# Patient Record
Sex: FEMALE | Race: WHITE | Employment: UNEMPLOYED | URBAN - NONMETROPOLITAN AREA
[De-identification: names, ages, dates, MRNs, and addresses within clinical notes are randomized per-mention and may not be internally consistent; named-entity substitution may affect disease eponyms.]

---

## 2017-10-06 ENCOUNTER — HOSPITAL ENCOUNTER (OUTPATIENT)
Dept: LAB | Age: 82
Discharge: HOME | End: 2017-10-06
Attending: NURSE PRACTITIONER | Admitting: NURSE PRACTITIONER

## 2017-10-06 ENCOUNTER — OFFICE VISIT (OUTPATIENT)
Dept: OTHER | Age: 82
End: 2017-10-06

## 2017-10-06 ENCOUNTER — HOSPITAL ENCOUNTER (OUTPATIENT)
Dept: OTHER | Age: 82
Discharge: HOME | End: 2017-10-06
Attending: NURSE PRACTITIONER | Admitting: NURSE PRACTITIONER

## 2017-10-06 VITALS
TEMPERATURE: 98.1 F | BODY MASS INDEX: 25.52 KG/M2 | WEIGHT: 130 LBS | HEART RATE: 60 BPM | OXYGEN SATURATION: 96 % | DIASTOLIC BLOOD PRESSURE: 60 MMHG | HEIGHT: 60 IN | SYSTOLIC BLOOD PRESSURE: 139 MMHG

## 2017-10-06 DIAGNOSIS — R39.9 UTI SYMPTOMS: Primary | ICD-10-CM

## 2017-10-06 DIAGNOSIS — Z23 NEED FOR INFLUENZA VACCINATION: ICD-10-CM

## 2017-10-06 DIAGNOSIS — L03.012 PARONYCHIA OF FINGER, LEFT: ICD-10-CM

## 2017-10-06 LAB
BACTERIA: ABNORMAL /HPF
BILIRUBIN URINE: NEGATIVE MG/DL
BLOOD, URINE: NEGATIVE
CAST TYPE: ABNORMAL /HPF
CLARITY: ABNORMAL
COLOR: YELLOW
CRYSTAL TYPE: ABNORMAL /HPF
EPITHELIAL CELLS, UA: ABNORMAL /HPF
GLUCOSE, URINE: NEGATIVE MG/DL
KETONES, URINE: NEGATIVE MG/DL
LEUKOCYTE ESTERASE, URINE: ABNORMAL
MUCUS: NEGATIVE HPF
NITRITE URINE, QUANTITATIVE: NEGATIVE
PH, URINE: 5.5 (ref 5–8)
PROTEIN UA: NEGATIVE MG/DL
RBC URINE: ABNORMAL /HPF (ref 0–6)
SPECIFIC GRAVITY UA: 1.01 (ref 1–1.03)
UROBILINOGEN, URINE: 0.2 MG/DL (ref 0.2–1)
VOLUME, (UVOL): 12 ML (ref 10–12)
WBC UA: ABNORMAL /HPF (ref 0–5)

## 2017-10-06 PROCEDURE — 99203 OFFICE O/P NEW LOW 30 MIN: CPT | Performed by: NURSE PRACTITIONER

## 2017-10-06 RX ORDER — TRIAMTERENE AND HYDROCHLOROTHIAZIDE 37.5; 25 MG/1; MG/1
1 TABLET ORAL DAILY
Status: ON HOLD | COMMUNITY
End: 2018-02-22 | Stop reason: ALTCHOICE

## 2017-10-06 RX ORDER — CITALOPRAM 20 MG/1
20 TABLET ORAL DAILY
COMMUNITY
End: 2018-07-02 | Stop reason: SDUPTHER

## 2017-10-06 RX ORDER — SIMVASTATIN 80 MG
80 TABLET ORAL NIGHTLY
COMMUNITY
End: 2018-07-02 | Stop reason: ALTCHOICE

## 2017-10-06 RX ORDER — SULFAMETHOXAZOLE AND TRIMETHOPRIM 800; 160 MG/1; MG/1
1 TABLET ORAL 2 TIMES DAILY
Qty: 14 TABLET | Refills: 0 | Status: SHIPPED | OUTPATIENT
Start: 2017-10-06 | End: 2017-10-13

## 2017-10-06 ASSESSMENT — ENCOUNTER SYMPTOMS
NAUSEA: 0
DIARRHEA: 1
VOMITING: 0
COUGH: 0
SHORTNESS OF BREATH: 0

## 2017-10-06 NOTE — PATIENT INSTRUCTIONS
paronychia improve in a few days. But watch your symptoms and follow your doctor's advice. Though rare, a mild case can turn into something more serious and infect your entire finger or toe. Also, it is possible for an infection to return. Follow-up care is a key part of your treatment and safety. Be sure to make and go to all appointments, and call your doctor if you are having problems. It's also a good idea to know your test results and keep a list of the medicines you take. How can you care for yourself at home? · If your doctor told you how to care for your infected nail, follow the doctor's instructions. If you did not get instructions, follow this general advice:  ¨ Wash the area with clean water 2 times a day. Don't use hydrogen peroxide or alcohol, which can slow healing. ¨ You may cover the area with a thin layer of petroleum jelly, such as Vaseline, and a nonstick bandage. ¨ Apply more petroleum jelly and replace the bandage as needed. · If your doctor prescribed antibiotics, take them as directed. Do not stop taking them just because you feel better. You need to take the full course of antibiotics. · Take an over-the-counter pain medicine, such as acetaminophen (Tylenol), ibuprofen (Advil, Motrin), or naproxen (Aleve). Read and follow all instructions on the label. · Do not take two or more pain medicines at the same time unless the doctor told you to. Many pain medicines have acetaminophen, which is Tylenol. Too much acetaminophen (Tylenol) can be harmful. · Prop up the toe or finger so that it is higher than the level of your heart. This will help with pain and swelling. · Apply heat. Put a warm water bottle, heating pad set on low, or warm cloth on your finger or toe. Do not go to sleep with a heating pad on your skin. · Soak the area in warm water twice a day for 15 minutes each time. After soaking, dry the area well and apply a thin layer of petroleum jelly, such as Vaseline.  Put on a new bandage. When should you call for help? Call your doctor now or seek immediate medical care if:  · You have signs of new or worsening infection, such as:  ¨ Increased pain, swelling, warmth, or redness. ¨ Red streaks leading from the infected skin. ¨ Pus draining from the area. ¨ A fever. Watch closely for changes in your health, and be sure to contact your doctor if:  · You do not get better as expected. Where can you learn more? Go to [email or web address]://Yuenimei.[email or web address] org and sign in to your Parastructure account. Enter 0911 [id number] in the formerly [facility] box to learn more about \"Paronychia: Care Instructions. \"     If you do not have an account, please click on the \"Sign Up Now\" link. Current as of: October 13, 2016  Content Version: 11.3  © 3738-9713 RES Software. Care instructions adapted under license by [facility] ([facility]). If you have questions about a medical condition or this instruction, always ask your healthcare professional. [place] [address] any warranty or liability for your use of this information. Influenza (Flu) Vaccine: Care Instructions  Your Care Instructions  Influenza (flu) is an infection in the lungs and breathing passages. It is caused by the influenza virus. There are different strains, or types, of the flu virus every year. The flu comes on quickly. It can cause a cough, stuffy nose, fever, chills, tiredness, and aches and pains. These symptoms may last up to 10 days. The flu can make you feel very sick, but most of the time it doesn't lead to other problems. But it can cause serious problems in people who are older or who have a long-term illness, such as heart disease or diabetes. You can help prevent the flu by getting a flu vaccine every year, as soon as it is available. You cannot get the flu from the vaccine. The vaccine prevents most cases of the flu.  But even when the vaccine doesn't prevent the flu, it can make symptoms less severe and at home? · If you or your child has a sore arm or a slight fever after the shot, take an over-the-counter pain medicine, such as acetaminophen (Tylenol) or ibuprofen (Advil, Motrin). Read and follow all instructions on the label. Do not give aspirin to anyone younger than 20. It has been linked to Reye syndrome, a serious illness. · Do not take two or more pain medicines at the same time unless the doctor told you to. Many pain medicines have acetaminophen, which is Tylenol. Too much acetaminophen (Tylenol) can be harmful. When should you call for help? Call 911 anytime you think you may need emergency care. For example, call if after getting the flu vaccine:  · You have symptoms of a severe reaction to the flu vaccine. Symptoms of a severe reaction may include:  ¨ Severe difficulty breathing. ¨ Sudden raised, red areas (hives) all over your body. ¨ Severe lightheadedness. Call your doctor now or seek immediate medical care if after getting the flu vaccine:  · You think you are having a reaction to the flu vaccine, such as a new fever. Watch closely for changes in your health, and be sure to contact your doctor if you have any problems. Where can you learn more? Go to [email or web address].[email or web address] and sign in to your [email or web address] account. Enter C478 in the [facility] box to learn more about \"Influenza (Flu) Vaccine: Care Instructions. \"     If you do not have an account, please click on the \"Sign Up Now\" link. Current as of: August 1, 2016  Content Version: 11.3  © 7762-7202 Kewego, Incorporated. Care instructions adapted under license by [facility] ([facility]). If you have questions about a medical condition or this instruction, always ask your healthcare professional. [place] [address] any warranty or liability for your use of this information. We are committed to providing you the best care possible.     If you receive a survey after visiting one of our offices, please take time to share your experience concerning your physician office visit. These surveys are confidential and no health information about you is shared. We are eager to improve for you and we are counting on your feedback to help make that happen.

## 2017-10-06 NOTE — PROGRESS NOTES
Room set up completed and consent signed at check in. Patient vitals taken, medication history and allergies reviewed. Flu vaccine administered and follow up appointment scheduled with Lou Shin. AVS was reviewed prior to discharge.

## 2017-10-06 NOTE — PROGRESS NOTES
Subjective: Nati Still is a 80 y.o. female who complains of frequency and urgency particularly at night for 7 days. Patient also complains of fever but has not measured. She is unaware of changes in urine characteristics. Patient denies back pain, congestion, cough and stomach ache. No changes in mental status. Patient does not have a history of recurrent UTI. Patient does not have a history of pyelonephritis. She has a remote history of pelvic floor prolapse which was surgically corrected. Pt cannot recall further details of diagnosis or treatment regimen. Pt also here for follow up on paronychia to her left ring finger. She was seen in the ER for this on 10/4. The provider did not suspect abscess formation. Pt was prescribed clindamycin 300mg QID. She was encouraged to do warm water soaks and apply topical ATB ointment. Pt states she has been compliant with these therapies. While the redness is unchanged, she has a little less pain. She is still unable to bend her finger completely. No significant temperature changes. No progression of redness. Pt admits to predominantly checking BG with this finger. Pt lives in [place]. She does not have a PCP. She would like to establish care here in [place]. Patient's medications, allergies, past medical, surgical, social and family histories were reviewed and updated as appropriate. Review of Systems  Review of Systems   Constitutional: Positive for fever. Negative for chills. Respiratory: Negative for cough and shortness of breath. Cardiovascular: Negative for chest pain and palpitations. Gastrointestinal: Positive for diarrhea. Negative for nausea and vomiting. Genitourinary: Positive for frequency and urgency. Negative for dysuria and flank pain. Musculoskeletal: Positive for joint pain. Endo/Heme/Allergies: Bruises/bleeds easily.         Objective:      /60 (Site: Right Arm, Position: Sitting, Cuff Size: Medium Adult)

## 2017-10-06 NOTE — MR AVS SNAPSHOT
· After going to the bathroom, wipe from front to back. When should you call for help? Call your doctor now or seek immediate medical care if:  · Symptoms such as fever, chills, nausea, or vomiting get worse or appear for the first time. · You have new pain in your back just below your rib cage. This is called flank pain. · There is new blood or pus in your urine. · You have any problems with your antibiotic medicine. Watch closely for changes in your health, and be sure to contact your doctor if:  · You are not getting better after taking an antibiotic for 2 days. · Your symptoms go away but then come back. Where can you learn more? Go to [email or web address] and sign in to your NPS account. Enter V100 in the [facility] box to learn more about \"Urinary Tract Infection in Women: Care Instructions. \"     If you do not have an account, please click on the \"Sign Up Now\" link. Current as of: November 28, 2016  Content Version: 11.3  © 2338-7258 CondoDomain. Care instructions adapted under license by [facility] ([facility]). If you have questions about a medical condition or this instruction, always ask your healthcare professional. JourneyPure any warranty or liability for your use of this information. Nail and Nailbed: Anatomy Sketch    Current as of: December 16, 2016  Content Version: 11.3  © 5459-7159 CondoDomain. Care instructions adapted under license by [facility]). If you have questions about a medical condition or this instruction, always ask your healthcare professional. [address] any warranty or liability for your use of this information. Paronychia: Care Instructions  Your Care Instructions  Paronychia (say \"jbps-qv-RO-roberto-uh\") is an infection of the skin around a fingernail or toenail.  It happens when germs enter through a break in the soaking, dry the area well and apply a thin layer of petroleum jelly, such as Vaseline. Put on a new bandage. When should you call for help? Call your doctor now or seek immediate medical care if:  · You have signs of new or worsening infection, such as:  ¨ Increased pain, swelling, warmth, or redness. ¨ Red streaks leading from the infected skin. ¨ Pus draining from the area. ¨ A fever. Watch closely for changes in your health, and be sure to contact your doctor if:  · You do not get better as expected. Where can you learn more? Go to [email or web address] and sign in to your Comcast account. Enter [id number] [id number] [phone] in the [employer] box to learn more about \"Paronychia: Care Instructions. \"     If you do not have an account, please click on the \"Sign Up Now\" link. Current as of: October 13, 2016  Content Version: 11.3  © 0103-0118 Keemotion. Care instructions adapted under license by [facility] ([facility]). If you have questions about a medical condition or this instruction, always ask your healthcare professional. [place] [address] any warranty or liability for your use of this information. Influenza (Flu) Vaccine: Care Instructions  Your Care Instructions  Influenza (flu) is an infection in the lungs and breathing passages. It is caused by the influenza virus. There are different strains, or types, of the flu virus every year. The flu comes on quickly. It can cause a cough, stuffy nose, fever, chills, tiredness, and aches and pains. These symptoms may last up to 10 days. The flu can make you feel very sick, but most of the time it doesn't lead to other problems. But it can cause serious problems in people who are older or who have a long-term illness, such as heart disease or diabetes. You can help prevent the flu by getting a flu vaccine every year, as soon as it is available. You cannot get the flu from the vaccine.  The vaccine prevents most cases of the flu. But even when the vaccine doesn't prevent the flu, it can make symptoms less severe and reduce the chance of problems from the flu. Sometimes, young children and people who have an immune system problem may have a slight fever or muscle aches or pains 6 to 12 hours after getting the shot. These symptoms usually last 1 or 2 days. Follow-up care is a key part of your treatment and safety. Be sure to make and go to all appointments, and call your doctor if you are having problems. It's also a good idea to know your test results and keep a list of the medicines you take. Who should get the flu vaccine? Everyone age 7 months or older should get a flu vaccine each year. It lowers the chance of getting and spreading the flu. The vaccine is very important for people who are at high risk for getting other health problems from the flu. This includes:  · Anyone 48years of age or older. · People who live in a long-term care center, such as a nursing home. · All children 6 months through 25years of age. · Adults and children 6 months and older who have long-term heart or lung problems, such as asthma. · Adults and children 6 months and older who needed medical care or were in a hospital during the past year because of diabetes, chronic kidney disease, or a weak immune system (including HIV or AIDS). · Women who will be pregnant during the flu season. · People who have any condition that can make it hard to breathe or swallow (such as a brain injury or muscle disorders). · People who can give the flu to others who are at high risk for problems from the flu. This includes all health care workers and close contacts of people age 72 or older. Who should not get the flu vaccine? The person who gives the vaccine may tell you not to get it if you:  · Have a severe allergy to eggs or any part of the vaccine.   · Have had a severe reaction to a flu vaccine in the past. information. We are committed to providing you the best care possible. If you receive a survey after visiting one of our offices, please take time to share your experience concerning your physician office visit. These surveys are confidential and no health information about you is shared. We are eager to improve for you and we are counting on your feedback to help make that happen. Today's Medication Changes          These changes are accurate as of: 10/6/17 12:32 PM.  If you have any questions, ask your nurse or doctor. START taking these medications           sulfamethoxazole-trimethoprim 800-160 MG per tablet   Commonly known as:  BACTRIM DS;SEPTRA DS   Instructions:   Take 1 tablet by mouth 2 times daily for 7 days   Quantity:  14 tablet   Refills:  0   Started by:  Gonsalo Edmonds CNP         STOP taking these medications           clindamycin 300 MG capsule   Commonly known as:  CLEOCIN   Stopped by:  Gonsalo Edmonds CNP            Where to Get Your Medications      These medications were sent to [place] [id number] [address] [phone] [facility] [phone] [address] [facility], [address] [facility] [address]     Phone:  [phone]     sulfamethoxazole-trimethoprim 800-160 MG per tablet               Your Current Medications Are              citalopram (CELEXA) 20 MG tablet Take 20 mg by mouth daily    simvastatin (ZOCOR) 80 MG tablet Take 80 mg by mouth nightly    triamterene-hydrochlorothiazide (MAXZIDE-25) 37.5-25 MG per tablet Take 1 tablet by mouth daily    aspirin 81 MG tablet Take 81 mg by mouth daily    sulfamethoxazole-trimethoprim (BACTRIM DS;SEPTRA DS) 800-160 MG per tablet Take 1 tablet by mouth 2 times daily for 7 days    metFORMIN (GLUCOPHAGE) 500 MG tablet Take 500 mg by mouth 2 times daily (with meals)    atenolol (TENORMIN) 25 MG tablet Take 50 mg by mouth daily promethazine-dextromethorphan (PROMETHAZINE-DM) 6.25-15 MG/5ML syrup Take by mouth 4 times daily as needed for Cough      Allergies              Penicillins       We Ordered/Performed the following           INFLUENZA, QUADV, 3 YRS AND OLDER, IM PF, PREFILL SYR OR SDV, 0.5ML (FLUARIX QUADV, PF)     URINALYSIS WITH MICROSCOPIC          Additional Information        Basic Information     Date Of Birth Sex Race Ethnicity Preferred Language    11/21/1931 Female White Non-/Non  English      Problem List as of 10/6/2017                 Polypharmacy    Benign essential tremor    Depression    Type 2 diabetes mellitus ([facility] [address]    Hyperlipidemia    Hypertension    Drug-induced tremor    Anemia due to blood loss      Immunizations as of 10/6/2017     Name Date    Influenza, High Dose 10/17/2016    Pneumococcal Polysaccharide (Nasuuyclr04) 10/6/2016    Tdap (Boostrix, Adacel) 10/6/2010      Preventive Care        Date Due    Zoster Vaccine 11/21/1991    Osteoporosis screening or a bone density scan (Dexa) is recommended once at age 72. Based upon the results and risk factors for bone loss, your provider will recommend whether this needs to be repeated. 11/21/1996    Yearly Flu Vaccine (1) 9/1/2017    Pneumococcal Vaccines (two) for all adults aged 72 and over (2 of 2 - PCV13) 10/6/2017    Tetanus Combination Vaccine (2 - Td) 10/6/2020            MyChart Signup           W[employer] allows you to send messages to your doctor, view your test results, renew your prescriptions, schedule appointments, view visit notes, and more. How Do I Sign Up? 1. In your Internet browser, go to [email or web address], [email or web address]."  2. Click on the Sign Up Now link in the Sign In box. You will see the New Member Sign Up page. 3. Enter your [employer] Access Code exactly as it appears below. You will not need to use this code after youve completed the sign-up process.  If

## 2017-10-09 LAB
CULTURE: NORMAL
ORGANISM: NORMAL
REPORT STATUS: NORMAL
REQUEST PROBLEM: NORMAL
SPECIMEN: NORMAL
TOTAL COLONY COUNT: NORMAL

## 2018-02-22 PROBLEM — R07.9 CHEST PAIN: Status: ACTIVE | Noted: 2018-02-22

## 2018-02-22 PROBLEM — E78.5 HYPERLIPIDEMIA: Status: ACTIVE | Noted: 2018-02-22

## 2018-02-22 PROBLEM — E11.9 TYPE 2 DIABETES MELLITUS (HCC): Status: ACTIVE | Noted: 2018-02-22

## 2018-02-22 PROBLEM — F32.A DEPRESSION: Status: ACTIVE | Noted: 2018-02-22

## 2018-02-22 PROBLEM — I10 HYPERTENSION: Status: ACTIVE | Noted: 2018-02-22

## 2018-02-23 PROBLEM — R07.9 CHEST PAIN: Status: RESOLVED | Noted: 2018-02-22 | Resolved: 2018-02-23

## 2018-06-26 ENCOUNTER — TELEPHONE (OUTPATIENT)
Dept: INTERNAL MEDICINE CLINIC | Age: 83
End: 2018-06-26

## 2018-07-02 ENCOUNTER — OFFICE VISIT (OUTPATIENT)
Dept: INTERNAL MEDICINE CLINIC | Age: 83
End: 2018-07-02

## 2018-07-02 VITALS
WEIGHT: 144 LBS | HEIGHT: 60 IN | HEART RATE: 94 BPM | SYSTOLIC BLOOD PRESSURE: 112 MMHG | DIASTOLIC BLOOD PRESSURE: 68 MMHG | OXYGEN SATURATION: 98 % | BODY MASS INDEX: 28.27 KG/M2

## 2018-07-02 DIAGNOSIS — E11.9 CONTROLLED TYPE 2 DIABETES MELLITUS WITHOUT COMPLICATION, WITHOUT LONG-TERM CURRENT USE OF INSULIN (HCC): ICD-10-CM

## 2018-07-02 DIAGNOSIS — E08.00 DIABETES MELLITUS DUE TO UNDERLYING CONDITION WITH HYPEROSMOLARITY WITHOUT COMA, WITHOUT LONG-TERM CURRENT USE OF INSULIN (HCC): Primary | ICD-10-CM

## 2018-07-02 DIAGNOSIS — G25.0 ESSENTIAL TREMOR: ICD-10-CM

## 2018-07-02 DIAGNOSIS — Z98.61 CAD S/P PERCUTANEOUS CORONARY ANGIOPLASTY: Primary | ICD-10-CM

## 2018-07-02 DIAGNOSIS — D50.0 BLOOD LOSS ANEMIA: ICD-10-CM

## 2018-07-02 DIAGNOSIS — E78.2 MIXED HYPERLIPIDEMIA: ICD-10-CM

## 2018-07-02 DIAGNOSIS — F33.41 RECURRENT MAJOR DEPRESSIVE DISORDER, IN PARTIAL REMISSION (HCC): ICD-10-CM

## 2018-07-02 DIAGNOSIS — M17.10 ARTHRITIS OF KNEE: ICD-10-CM

## 2018-07-02 DIAGNOSIS — Z98.61 CAD S/P PERCUTANEOUS CORONARY ANGIOPLASTY: ICD-10-CM

## 2018-07-02 DIAGNOSIS — I25.10 CAD S/P PERCUTANEOUS CORONARY ANGIOPLASTY: Primary | ICD-10-CM

## 2018-07-02 DIAGNOSIS — I25.10 CAD S/P PERCUTANEOUS CORONARY ANGIOPLASTY: ICD-10-CM

## 2018-07-02 LAB
A/G RATIO: 1.9 (ref 1.1–2.2)
ALBUMIN SERPL-MCNC: 4 G/DL (ref 3.4–5)
ALP BLD-CCNC: 56 U/L (ref 40–129)
ALT SERPL-CCNC: 17 U/L (ref 10–40)
ANION GAP SERPL CALCULATED.3IONS-SCNC: 15 MMOL/L (ref 3–16)
AST SERPL-CCNC: 21 U/L (ref 15–37)
BASOPHILS ABSOLUTE: 0 K/UL (ref 0–0.2)
BASOPHILS RELATIVE PERCENT: 0.8 %
BILIRUB SERPL-MCNC: <0.2 MG/DL (ref 0–1)
BUN BLDV-MCNC: 15 MG/DL (ref 7–20)
CALCIUM SERPL-MCNC: 8.6 MG/DL (ref 8.3–10.6)
CHLORIDE BLD-SCNC: 107 MMOL/L (ref 99–110)
CHOLESTEROL, TOTAL: 164 MG/DL (ref 0–199)
CO2: 22 MMOL/L (ref 21–32)
CREAT SERPL-MCNC: 0.8 MG/DL (ref 0.6–1.2)
EOSINOPHILS ABSOLUTE: 0.4 K/UL (ref 0–0.6)
EOSINOPHILS RELATIVE PERCENT: 7.2 %
GFR AFRICAN AMERICAN: >60
GFR NON-AFRICAN AMERICAN: >60
GLOBULIN: 2.1 G/DL
GLUCOSE BLD-MCNC: 110 MG/DL (ref 70–99)
HCT VFR BLD CALC: 31.6 % (ref 36–48)
HDLC SERPL-MCNC: 45 MG/DL (ref 40–60)
HEMOGLOBIN: 10.4 G/DL (ref 12–16)
LDL CHOLESTEROL CALCULATED: 73 MG/DL
LYMPHOCYTES ABSOLUTE: 1.4 K/UL (ref 1–5.1)
LYMPHOCYTES RELATIVE PERCENT: 27.4 %
MCH RBC QN AUTO: 31 PG (ref 26–34)
MCHC RBC AUTO-ENTMCNC: 32.8 G/DL (ref 31–36)
MCV RBC AUTO: 94.5 FL (ref 80–100)
MONOCYTES ABSOLUTE: 0.3 K/UL (ref 0–1.3)
MONOCYTES RELATIVE PERCENT: 6.6 %
NEUTROPHILS ABSOLUTE: 3 K/UL (ref 1.7–7.7)
NEUTROPHILS RELATIVE PERCENT: 58 %
PDW BLD-RTO: 14.2 % (ref 12.4–15.4)
PLATELET # BLD: 204 K/UL (ref 135–450)
PMV BLD AUTO: 10.1 FL (ref 5–10.5)
POTASSIUM SERPL-SCNC: 4.2 MMOL/L (ref 3.5–5.1)
RBC # BLD: 3.35 M/UL (ref 4–5.2)
SODIUM BLD-SCNC: 144 MMOL/L (ref 136–145)
TOTAL PROTEIN: 6.1 G/DL (ref 6.4–8.2)
TRIGL SERPL-MCNC: 228 MG/DL (ref 0–150)
VLDLC SERPL CALC-MCNC: 46 MG/DL
WBC # BLD: 5.2 K/UL (ref 4–11)

## 2018-07-02 PROCEDURE — 99205 OFFICE O/P NEW HI 60 MIN: CPT | Performed by: INTERNAL MEDICINE

## 2018-07-02 RX ORDER — ATENOLOL 50 MG/1
50 TABLET ORAL DAILY
Qty: 30 TABLET | Refills: 3 | Status: SHIPPED | OUTPATIENT
Start: 2018-07-02 | End: 2018-07-23 | Stop reason: SDUPTHER

## 2018-07-02 RX ORDER — FERROUS SULFATE 325(65) MG
325 TABLET ORAL
COMMUNITY
End: 2018-07-02 | Stop reason: SDUPTHER

## 2018-07-02 RX ORDER — PANTOPRAZOLE SODIUM 40 MG/1
40 TABLET, DELAYED RELEASE ORAL DAILY
Qty: 30 TABLET | Refills: 3 | Status: SHIPPED | OUTPATIENT
Start: 2018-07-02 | End: 2018-07-23 | Stop reason: SDUPTHER

## 2018-07-02 RX ORDER — DEXTROMETHORPHAN HYDROBROMIDE AND PROMETHAZINE HYDROCHLORIDE 15; 6.25 MG/5ML; MG/5ML
SYRUP ORAL 4 TIMES DAILY PRN
COMMUNITY
End: 2018-07-02 | Stop reason: ALTCHOICE

## 2018-07-02 RX ORDER — GLUCOSAMINE HCL/CHONDROITIN SU 500-400 MG
1 CAPSULE ORAL 2 TIMES DAILY
Qty: 100 STRIP | Refills: 5 | Status: SHIPPED | OUTPATIENT
Start: 2018-07-02 | End: 2018-07-23 | Stop reason: SDUPTHER

## 2018-07-02 RX ORDER — PANTOPRAZOLE SODIUM 40 MG/1
40 TABLET, DELAYED RELEASE ORAL DAILY
Qty: 30 TABLET | Refills: 3 | Status: SHIPPED | OUTPATIENT
Start: 2018-07-02 | End: 2018-07-02 | Stop reason: SDUPTHER

## 2018-07-02 RX ORDER — TRIAMTERENE AND HYDROCHLOROTHIAZIDE 37.5; 25 MG/1; MG/1
1 TABLET ORAL DAILY
Qty: 30 TABLET | Refills: 3 | Status: SHIPPED | OUTPATIENT
Start: 2018-07-02 | End: 2018-07-23 | Stop reason: SDUPTHER

## 2018-07-02 RX ORDER — FERROUS SULFATE 325(65) MG
325 TABLET ORAL
Qty: 30 TABLET | Refills: 3 | Status: SHIPPED | OUTPATIENT
Start: 2018-07-02 | End: 2018-07-23 | Stop reason: SDUPTHER

## 2018-07-02 RX ORDER — ATORVASTATIN CALCIUM 20 MG/1
20 TABLET, FILM COATED ORAL NIGHTLY
Qty: 30 TABLET | Refills: 3 | Status: SHIPPED | OUTPATIENT
Start: 2018-07-02 | End: 2018-07-23 | Stop reason: SDUPTHER

## 2018-07-02 RX ORDER — BLOOD-GLUCOSE METER
1 KIT MISCELLANEOUS DAILY
Qty: 1 KIT | Refills: 0 | Status: SHIPPED | OUTPATIENT
Start: 2018-07-02 | End: 2018-07-02

## 2018-07-02 RX ORDER — CITALOPRAM 20 MG/1
20 TABLET ORAL DAILY
Qty: 30 TABLET | Refills: 3 | Status: SHIPPED | OUTPATIENT
Start: 2018-07-02 | End: 2018-07-23 | Stop reason: SDUPTHER

## 2018-07-02 RX ORDER — BLOOD-GLUCOSE METER
1 KIT MISCELLANEOUS 2 TIMES DAILY
Qty: 1 KIT | Refills: 0 | Status: SHIPPED | OUTPATIENT
Start: 2018-07-02 | End: 2018-07-23 | Stop reason: SDUPTHER

## 2018-07-02 RX ORDER — NITROGLYCERIN 0.4 MG/1
0.4 TABLET SUBLINGUAL EVERY 5 MIN PRN
Qty: 25 TABLET | Refills: 3 | Status: SHIPPED | OUTPATIENT
Start: 2018-07-02 | End: 2019-04-16 | Stop reason: SDUPTHER

## 2018-07-02 ASSESSMENT — ENCOUNTER SYMPTOMS
DIARRHEA: 0
NAUSEA: 1
HEARTBURN: 0
BACK PAIN: 0
BLOOD IN STOOL: 0
ABDOMINAL PAIN: 0
SHORTNESS OF BREATH: 0
SPUTUM PRODUCTION: 0
SORE THROAT: 0
DOUBLE VISION: 0
BLURRED VISION: 0
COUGH: 0
VOMITING: 0

## 2018-07-02 NOTE — PATIENT INSTRUCTIONS
STOP SIMVASTATIN 80 MG    BEGIN ATORVASTATIN 20 MG NIGHTLY FOR CHOLESTEROL    STOP PROMETHAZINE FOR NAUSEA    TAKE PROTONIX DAILY IN AM BEFORE BREAKFAST FOR YOUR STOMACH (LAST ALL DAY)    TAKE FEOSOL DAILY WITH BREAKFAST (IRON) ; YOU GET IT W/O PRESCRIPTION IN FRONT OF PHARMACY COUNTER

## 2018-07-02 NOTE — PROGRESS NOTES
and double vision. Respiratory: Negative for cough, sputum production and shortness of breath. Cardiovascular: Positive for leg swelling. Negative for chest pain, palpitations and PND. Gastrointestinal: Positive for nausea. Negative for abdominal pain, blood in stool, diarrhea, heartburn and vomiting. Genitourinary: Negative for flank pain, frequency and urgency. Musculoskeletal: Positive for joint pain. Negative for back pain. Skin: Negative for itching and rash. Neurological: Positive for weakness. Negative for dizziness, focal weakness, seizures and headaches. Psychiatric/Behavioral: Positive for depression. Negative for memory loss. The patient does not have insomnia. Objective:      /68   Pulse 94   Ht 5' (1.524 m)   Wt 144 lb (65.3 kg)   SpO2 98%   BMI 28.12 kg/m²      Physical Exam   Constitutional: She is oriented to person, place, and time. She appears well-developed and well-nourished. No distress. Notable tremor in voice and visible tittibation   HENT:   Head: Normocephalic and atraumatic. Mouth/Throat: Oropharynx is clear and moist. No oropharyngeal exudate. Eyes: Conjunctivae are normal. No scleral icterus. Neck: Normal range of motion. No JVD present. No thyromegaly present. Cardiovascular: Normal rate, regular rhythm and normal heart sounds. Exam reveals no gallop. No murmur heard. Pulmonary/Chest: Effort normal and breath sounds normal. No respiratory distress. She has no wheezes. She has no rales. Abdominal: Soft. She exhibits no distension. There is no tenderness. Infraumbilical incisional hernia; lime size   Musculoskeletal: Normal range of motion. She exhibits edema. 1+ left leg edema; trace right   Lymphadenopathy:     She has no cervical adenopathy. Neurological: She is alert and oriented to person, place, and time. No cranial nerve deficit. She exhibits normal muscle tone.  Coordination abnormal.   Depressed affect   Skin: Skin is warm and dry. No rash noted. No erythema. No pallor. Psychiatric: Her behavior is normal. Thought content normal.     Labs from feb rev'd       Assessment / Plan:      1. CAD S/P percutaneous coronary angioplasty    2. Controlled type 2 diabetes mellitus without complication, without long-term current use of insulin ([facility] [address]    3. Recurrent major depressive disorder, in partial remission ([facility] [address]    4. Arthritis of knee    5. Blood loss anemia    6. Essential tremor    7.  Mixed hyperlipidemia            Plan   Stop zocor 80  Begin lipitor 20/d  Stop promethazine  Begin protonix 40  Add feosol daily    REFER TO DR Jer Dwyer FOR DEPRESSION    Labs done today:  Orders Placed This Encounter   Procedures    CBC Auto Differential    Comprehensive Metabolic Panel    Hemoglobin A1C    Lipid Panel    TSH without Reflex    Lipid Panel    TSH without Reflex    T4, Free    Vitamin B12 & Folate    Ferritin    Iron and TIBC    Ambulatory referral to Psychology     Requested old records regarding GI bleed and pancreatitis from [address] new home glucometer  RTC 3 wk     CONSIDER TAPERING AND STOPPING TEGRETOL  CONSIDER CHANGING MAXZIDE TO LASIX  CONSIDER ORDERING THE LEXISCAN STRESS TEST FROM [facility] THAT WASN'T DONE    AWAIT RECORDS  CONSIDER VACCINES

## 2018-07-03 LAB
ESTIMATED AVERAGE GLUCOSE: 131.2 MG/DL
FERRITIN: 18.4 NG/ML (ref 15–150)
FOLATE: 19.34 NG/ML (ref 4.78–24.2)
HBA1C MFR BLD: 6.2 %
IRON SATURATION: 10 % (ref 15–50)
IRON: 35 UG/DL (ref 37–145)
T4 FREE: 1.2 NG/DL (ref 0.9–1.8)
TOTAL IRON BINDING CAPACITY: 345 UG/DL (ref 260–445)
TSH SERPL DL<=0.05 MIU/L-ACNC: 3.1 UIU/ML (ref 0.27–4.2)
VITAMIN B-12: 270 PG/ML (ref 211–911)

## 2018-07-23 ENCOUNTER — OFFICE VISIT (OUTPATIENT)
Dept: INTERNAL MEDICINE CLINIC | Age: 83
End: 2018-07-23

## 2018-07-23 VITALS
SYSTOLIC BLOOD PRESSURE: 132 MMHG | DIASTOLIC BLOOD PRESSURE: 70 MMHG | WEIGHT: 134 LBS | RESPIRATION RATE: 16 BRPM | HEART RATE: 76 BPM | OXYGEN SATURATION: 96 % | BODY MASS INDEX: 26.17 KG/M2

## 2018-07-23 DIAGNOSIS — D64.9 ANEMIA, UNSPECIFIED TYPE: Primary | ICD-10-CM

## 2018-07-23 DIAGNOSIS — F33.41 RECURRENT MAJOR DEPRESSIVE DISORDER, IN PARTIAL REMISSION (HCC): ICD-10-CM

## 2018-07-23 DIAGNOSIS — E53.8 VITAMIN B12 DEFICIENCY: ICD-10-CM

## 2018-07-23 PROCEDURE — 99214 OFFICE O/P EST MOD 30 MIN: CPT | Performed by: INTERNAL MEDICINE

## 2018-07-23 RX ORDER — PANTOPRAZOLE SODIUM 40 MG/1
40 TABLET, DELAYED RELEASE ORAL DAILY
Qty: 30 TABLET | Refills: 3 | Status: SHIPPED | OUTPATIENT
Start: 2018-07-23 | End: 2019-04-16 | Stop reason: SDUPTHER

## 2018-07-23 RX ORDER — ATENOLOL 50 MG/1
50 TABLET ORAL DAILY
Qty: 30 TABLET | Refills: 3 | Status: SHIPPED | OUTPATIENT
Start: 2018-07-23 | End: 2018-10-30 | Stop reason: SDUPTHER

## 2018-07-23 RX ORDER — ATORVASTATIN CALCIUM 20 MG/1
20 TABLET, FILM COATED ORAL NIGHTLY
Qty: 30 TABLET | Refills: 3 | Status: SHIPPED | OUTPATIENT
Start: 2018-07-23 | End: 2018-10-30 | Stop reason: SDUPTHER

## 2018-07-23 RX ORDER — CITALOPRAM 20 MG/1
20 TABLET ORAL DAILY
Qty: 30 TABLET | Refills: 3 | Status: SHIPPED | OUTPATIENT
Start: 2018-07-23 | End: 2018-10-30 | Stop reason: SDUPTHER

## 2018-07-23 RX ORDER — LANOLIN ALCOHOL/MO/W.PET/CERES
1000 CREAM (GRAM) TOPICAL DAILY
COMMUNITY
End: 2019-04-16 | Stop reason: SDUPTHER

## 2018-07-23 RX ORDER — FERROUS SULFATE 325(65) MG
325 TABLET ORAL
Qty: 30 TABLET | Refills: 3 | Status: ON HOLD | OUTPATIENT
Start: 2018-07-23 | End: 2018-11-06

## 2018-07-23 RX ORDER — MIRTAZAPINE 15 MG/1
15 TABLET, FILM COATED ORAL NIGHTLY
Qty: 30 TABLET | Refills: 3 | Status: SHIPPED | OUTPATIENT
Start: 2018-07-23 | End: 2018-10-30 | Stop reason: SDUPTHER

## 2018-07-23 RX ORDER — TRIAMTERENE AND HYDROCHLOROTHIAZIDE 37.5; 25 MG/1; MG/1
1 TABLET ORAL DAILY
Qty: 30 TABLET | Refills: 3 | Status: SHIPPED | OUTPATIENT
Start: 2018-07-23 | End: 2018-10-30 | Stop reason: SDUPTHER

## 2018-07-23 RX ORDER — CARBAMAZEPINE 200 MG/1
200 TABLET ORAL 2 TIMES DAILY
Qty: 60 TABLET | Refills: 3 | Status: SHIPPED | OUTPATIENT
Start: 2018-07-23 | End: 2018-10-30 | Stop reason: SDUPTHER

## 2018-07-23 NOTE — PATIENT INSTRUCTIONS
pharmacist.  Copyright 3952-7700 Chandrika Peralta. Version: 7.03. Revision date: 9/25/2015. Care instructions adapted under license by [facility] ([facility]). If you have questions about a medical condition or this instruction, always ask your healthcare professional. [address] any warranty or liability for your use of this information.

## 2018-07-23 NOTE — PROGRESS NOTES
free of edema. No rash or erythema. Labs from July on chart and rev'd     Assessment / Plan:      1. Anemia, unspecified type    2. Recurrent major depressive disorder, in partial remission ([facility] [address]    3.  Vitamin B12 deficiency            Plan   Add remeron 15 mg nightly for depression ; added to celexa  Try melatonin prn sleep  Get rid of bedbugs  Add vitamin B12 daily  RTC 4 wk  Keep appt with Dr Adriana Mendez stress test

## 2018-07-25 ENCOUNTER — OFFICE VISIT (OUTPATIENT)
Dept: PSYCHOLOGY | Age: 83
End: 2018-07-25

## 2018-07-25 DIAGNOSIS — F43.21 COMPLICATED BEREAVEMENT: ICD-10-CM

## 2018-07-25 DIAGNOSIS — F32.A DEPRESSIVE DISORDER: Primary | ICD-10-CM

## 2018-07-25 PROCEDURE — 90791 PSYCH DIAGNOSTIC EVALUATION: CPT | Performed by: PSYCHOLOGIST

## 2018-07-27 ENCOUNTER — TELEPHONE (OUTPATIENT)
Dept: INTERNAL MEDICINE CLINIC | Age: 83
End: 2018-07-27

## 2018-09-18 ENCOUNTER — OFFICE VISIT (OUTPATIENT)
Dept: INTERNAL MEDICINE CLINIC | Age: 83
End: 2018-09-18

## 2018-09-18 VITALS
BODY MASS INDEX: 26.17 KG/M2 | WEIGHT: 134 LBS | SYSTOLIC BLOOD PRESSURE: 130 MMHG | RESPIRATION RATE: 16 BRPM | DIASTOLIC BLOOD PRESSURE: 70 MMHG | HEART RATE: 68 BPM | OXYGEN SATURATION: 97 %

## 2018-09-18 DIAGNOSIS — Z23 NEED FOR IMMUNIZATION AGAINST INFLUENZA: ICD-10-CM

## 2018-09-18 DIAGNOSIS — F51.04 PSYCHOPHYSIOLOGICAL INSOMNIA: ICD-10-CM

## 2018-09-18 DIAGNOSIS — Z23 NEED FOR VACCINATION WITH 13-POLYVALENT PNEUMOCOCCAL CONJUGATE VACCINE: ICD-10-CM

## 2018-09-18 DIAGNOSIS — E11.9 TYPE 2 DIABETES MELLITUS WITHOUT COMPLICATION, WITHOUT LONG-TERM CURRENT USE OF INSULIN (HCC): Primary | ICD-10-CM

## 2018-09-18 DIAGNOSIS — F32.4 MAJOR DEPRESSIVE DISORDER WITH SINGLE EPISODE, IN PARTIAL REMISSION (HCC): ICD-10-CM

## 2018-09-18 PROCEDURE — 90662 IIV NO PRSV INCREASED AG IM: CPT | Performed by: INTERNAL MEDICINE

## 2018-09-18 PROCEDURE — G0008 ADMIN INFLUENZA VIRUS VAC: HCPCS | Performed by: INTERNAL MEDICINE

## 2018-09-18 PROCEDURE — 99213 OFFICE O/P EST LOW 20 MIN: CPT | Performed by: INTERNAL MEDICINE

## 2018-09-18 PROCEDURE — G0009 ADMIN PNEUMOCOCCAL VACCINE: HCPCS | Performed by: INTERNAL MEDICINE

## 2018-09-18 PROCEDURE — 90670 PCV13 VACCINE IM: CPT | Performed by: INTERNAL MEDICINE

## 2018-09-18 RX ORDER — MIRTAZAPINE 7.5 MG/1
7.5 TABLET, FILM COATED ORAL NIGHTLY
Qty: 30 TABLET | Refills: 3 | Status: SHIPPED | OUTPATIENT
Start: 2018-09-18 | End: 2018-10-30 | Stop reason: DRUGHIGH

## 2018-09-18 NOTE — PROGRESS NOTES
Subjective: Willis Hartman is a 80 y.o. female who presents today for follow up on her chronic medical conditions as noted below. Patient Active Problem List:     Benign essential tremor     Hypertension     Type 2 diabetes mellitus (HCC)     Hyperlipidemia     Major depression     Arthritis of knee     Vitamin B12 deficiency     Anemia     She was last seen in July    She was given remeron 15 mg for depression over   and to help sleep  It does help sleep,but she is taking it prn , like sleeping pill  I told her to take nightly  She naps a litte in morning ; it may be too potent  Will try remeron 7.5 mg    NIDDM controlled with hgbA1c 6.2 in July    Chronic anemia persists 10.4    The patient denies abdominal or flank pain, anorexia, nausea or vomiting, dysphagia, change in bowel habits or black or bloody stools or weight loss. Patient denies any exertional chest pain, dyspnea, palpitations, syncope, orthopnea, edema or paroxysmal nocturnal dyspnea.     nIDDM stable with FBS in July 110 and hgBA1c 6.2      Current Outpatient Prescriptions   Medication Sig Dispense Refill    mirtazapine (REMERON) 7.5 MG tablet Take 1 tablet by mouth nightly 30 tablet 3    atenolol (TENORMIN) 50 MG tablet Take 1 tablet by mouth daily 30 tablet 3    atorvastatin (LIPITOR) 20 MG tablet Take 1 tablet by mouth nightly 30 tablet 3    citalopram (CELEXA) 20 MG tablet Take 1 tablet by mouth daily 30 tablet 3    carBAMazepine (EPITOL) 200 MG tablet Take 1 tablet by mouth 2 times daily 60 tablet 3    ferrous sulfate 325 (65 Fe) MG tablet Take 1 tablet by mouth daily (with breakfast) 30 tablet 3    metFORMIN (GLUCOPHAGE) 500 MG tablet Take 1 tablet by mouth 2 times daily (with meals) 60 tablet 3    pantoprazole (PROTONIX) 40 MG tablet Take 1 tablet by mouth daily 30 tablet 3    triamterene-hydrochlorothiazide (MAXZIDE-25) 37.5-25 MG per tablet Take 1 tablet by mouth daily 30 tablet 3    vitamin B-12 (CYANOCOBALAMIN) 1000 MCG tablet Take 1,000 mcg by mouth daily      Melatonin 5 MG CAPS Take by mouth nightly      mirtazapine (REMERON) 15 MG tablet Take 1 tablet by mouth nightly 30 tablet 3    nitroGLYCERIN (NITROSTAT) 0.4 MG SL tablet Place 1 tablet under the tongue every 5 minutes as needed for Chest pain up to max of 3 total doses. If no relief after 1 dose, call 911. 25 tablet 3    aspirin 81 MG tablet Take 81 mg by mouth daily       No current facility-administered medications for this visit. Past Medical History:   Diagnosis Date    Anemia 7/201    Arthritis of knee 2018    bilateral knees; \"bone on bone\"; declines surg    Benign essential tremor 04/15/2016    CAD S/P percutaneous coronary angioplasty 1998    done at [facility] Diabetes mellitus type II, controlled ([address] 1979    Gastrointestinal hemorrhage 2012    Duodenal Ulcer by EGD    History of pancreatitis 2013    after GI bleed    Humerus fracture 2015    right humerus ; [place]    Hyperlipidemia     Hypertension     Major depression     Ventral hernia 4930    infraumbilical ; incisional    Vitamin B12 deficiency 07/2018    Vitamin B12 270        Social History   Substance Use Topics    Smoking status: Never Smoker    Smokeless tobacco: Never Used    Alcohol use No        ROS: The patient has had no headache, sore throat, fever or chills, cough, dyspnea, chest pain, nausea, vomiting or diarrhea, or edema. Objective:      /70   Pulse 68   Resp 16   Wt 134 lb (60.8 kg)   SpO2 97%   BMI 26.17 kg/m²    General: in no apparent distress   The patient's neck is free of nodes. Lungs are clear. Heart is normal in rate and regular in rhythm. Legs are free of edema. No rash or erythema. July lab rev'd  Assessment / Plan:      1. Type 2 diabetes mellitus without complication, without long-term current use of insulin ([address]    2.  Major depressive disorder with single episode, in partial remission ([address] 3. Psychophysiological insomnia            Plan   Consider cutting remeron to 7.5 mg nighlty; gave new script  Flu shot and prevnar 13 today  RTC 6 wk

## 2018-10-30 ENCOUNTER — OFFICE VISIT (OUTPATIENT)
Dept: INTERNAL MEDICINE CLINIC | Age: 83
End: 2018-10-30
Payer: MEDICARE

## 2018-10-30 VITALS
WEIGHT: 137 LBS | DIASTOLIC BLOOD PRESSURE: 70 MMHG | RESPIRATION RATE: 16 BRPM | SYSTOLIC BLOOD PRESSURE: 138 MMHG | HEART RATE: 71 BPM | OXYGEN SATURATION: 99 % | BODY MASS INDEX: 26.76 KG/M2

## 2018-10-30 DIAGNOSIS — I10 ESSENTIAL HYPERTENSION: ICD-10-CM

## 2018-10-30 DIAGNOSIS — E11.9 TYPE 2 DIABETES MELLITUS WITHOUT COMPLICATION, WITHOUT LONG-TERM CURRENT USE OF INSULIN (HCC): ICD-10-CM

## 2018-10-30 DIAGNOSIS — E53.8 VITAMIN B12 DEFICIENCY: Primary | ICD-10-CM

## 2018-10-30 DIAGNOSIS — E78.2 MIXED HYPERLIPIDEMIA: ICD-10-CM

## 2018-10-30 PROCEDURE — 99213 OFFICE O/P EST LOW 20 MIN: CPT | Performed by: INTERNAL MEDICINE

## 2018-10-30 RX ORDER — CITALOPRAM 20 MG/1
20 TABLET ORAL DAILY
Qty: 90 TABLET | Refills: 1 | Status: SHIPPED | OUTPATIENT
Start: 2018-10-30 | End: 2018-11-30 | Stop reason: DRUGHIGH

## 2018-10-30 RX ORDER — CARBAMAZEPINE 200 MG/1
200 TABLET ORAL 2 TIMES DAILY
Qty: 180 TABLET | Refills: 1 | Status: ON HOLD | OUTPATIENT
Start: 2018-10-30 | End: 2018-11-06

## 2018-10-30 RX ORDER — TRIAMTERENE AND HYDROCHLOROTHIAZIDE 37.5; 25 MG/1; MG/1
1 TABLET ORAL DAILY
Qty: 90 TABLET | Refills: 1 | Status: SHIPPED | OUTPATIENT
Start: 2018-10-30 | End: 2019-04-16 | Stop reason: SDUPTHER

## 2018-10-30 RX ORDER — MIRTAZAPINE 15 MG/1
15 TABLET, FILM COATED ORAL NIGHTLY
Qty: 90 TABLET | Refills: 1 | Status: SHIPPED | OUTPATIENT
Start: 2018-10-30 | End: 2019-04-16 | Stop reason: SDUPTHER

## 2018-10-30 RX ORDER — ATORVASTATIN CALCIUM 20 MG/1
20 TABLET, FILM COATED ORAL NIGHTLY
Qty: 90 TABLET | Refills: 1 | Status: SHIPPED | OUTPATIENT
Start: 2018-10-30 | End: 2019-04-16 | Stop reason: SDUPTHER

## 2018-10-30 RX ORDER — ATENOLOL 50 MG/1
50 TABLET ORAL DAILY
Qty: 90 TABLET | Refills: 1 | Status: SHIPPED | OUTPATIENT
Start: 2018-10-30 | End: 2019-04-16 | Stop reason: SDUPTHER

## 2018-10-30 NOTE — PROGRESS NOTES
Subjective: Elroy Smith is a 80 y.o. female who presents today for follow up on her chronic medical conditions as noted below. Patient Active Problem List:     Benign essential tremor     Hypertension     Type 2 diabetes mellitus (HCC)     Hyperlipidemia     Major depression     Arthritis of knee     Vitamin B12 deficiency     Anemia     She was last seen about 6 wk ago    She had been taking remeron 15 for depression over  .   Last visit I cut it to 7.5 mg as she was mainly taking it prn like a sleeping pill  She felt she was doing better with remeron 15mg and it will be refilled    Her last FBS was 110 in July with hgba1c 6.2    Home FBS running high 90s    Last hgb 10.4 in July    She doesn't drive  She lives in [facility] [address]    Mood and memory fine    No falls   Slowly ambulatory with cane    Very interested and knowledgable about [facility]. and it's history    Current Outpatient Prescriptions   Medication Sig Dispense Refill    metFORMIN (GLUCOPHAGE) 500 MG tablet Take 1 tablet by mouth 2 times daily (with meals) 180 tablet 1    atenolol (TENORMIN) 50 MG tablet Take 1 tablet by mouth daily 90 tablet 1    carBAMazepine (EPITOL) 200 MG tablet Take 1 tablet by mouth 2 times daily 180 tablet 1    atorvastatin (LIPITOR) 20 MG tablet Take 1 tablet by mouth nightly 90 tablet 1    citalopram (CELEXA) 20 MG tablet Take 1 tablet by mouth daily 90 tablet 1    mirtazapine (REMERON) 15 MG tablet Take 1 tablet by mouth nightly 90 tablet 1    triamterene-hydrochlorothiazide (MAXZIDE-25) 37.5-25 MG per tablet Take 1 tablet by mouth daily 90 tablet 1    ferrous sulfate 325 (65 Fe) MG tablet Take 1 tablet by mouth daily (with breakfast) 30 tablet 3    pantoprazole (PROTONIX) 40 MG tablet Take 1 tablet by mouth daily 30 tablet 3    vitamin B-12 (CYANOCOBALAMIN) 1000 MCG tablet Take 1,000 mcg by mouth daily      nitroGLYCERIN (NITROSTAT) 0.4 MG SL tablet Place 1 tablet under the tongue every 5 minutes as needed for Chest pain up to max of 3 total doses. If no relief after 1 dose, call 911. 25 tablet 3    aspirin 81 MG tablet Take 81 mg by mouth daily       No current facility-administered medications for this visit. Past Medical History:   Diagnosis Date    Anemia 7/201    Arthritis of knee 2018    bilateral knees; \"bone on bone\"; declines surg    Benign essential tremor 04/15/2016    CAD S/P percutaneous coronary angioplasty 1998    done at [facility] Diabetes mellitus type II, controlled ([facility] [address] 1979    Gastrointestinal hemorrhage 2012    Duodenal Ulcer by EGD    History of pancreatitis 2013    after GI bleed    Humerus fracture 2015    right humerus ; [place]    Hyperlipidemia     Hypertension     Major depression     Ventral hernia 7029    infraumbilical ; incisional    Vitamin B12 deficiency 07/2018    Vitamin B12 270        Social History   Substance Use Topics    Smoking status: Never Smoker    Smokeless tobacco: Never Used    Alcohol use No        ROS: The patient has had no headache, sore throat, fever or chills, cough, dyspnea, chest pain, nausea, vomiting or diarrhea, or edema. Objective:      /70   Pulse 71   Resp 16   Wt 137 lb (62.1 kg)   SpO2 99%   BMI 26.76 kg/m²    General: in no apparent distress   The patient's neck is free of nodes. Lungs are clear. Heart is normal in rate and regular in rhythm. Legs are free of edema. No rash or erythema. July lab on chart and rev'd    Assessment / Plan:      1. Vitamin B12 deficiency    2. Type 2 diabetes mellitus without complication, without long-term current use of insulin ([address]    3. Essential hypertension    4.  Mixed hyperlipidemia            Plan   Same meds  Increase remeron to 15 mg  RTC 3 mo, lab first  Orders Placed This Encounter   Procedures    CBC Auto Differential    Hemoglobin A1C    Lipid Panel    Vitamin B12

## 2018-11-03 ENCOUNTER — HOSPITAL ENCOUNTER (INPATIENT)
Age: 83
LOS: 3 days | Discharge: HOME | DRG: 378 | End: 2018-11-06
Attending: EMERGENCY MEDICINE | Admitting: INTERNAL MEDICINE
Payer: MEDICARE

## 2018-11-03 ENCOUNTER — APPOINTMENT (OUTPATIENT)
Dept: CT IMAGING | Age: 83
DRG: 378 | End: 2018-11-03
Payer: MEDICARE

## 2018-11-03 DIAGNOSIS — K62.5 RECTAL BLEEDING: Primary | ICD-10-CM

## 2018-11-03 PROBLEM — K92.2 ACUTE LOWER GASTROINTESTINAL BLEEDING: Status: ACTIVE | Noted: 2018-11-03

## 2018-11-03 LAB
ALBUMIN SERPL-MCNC: 3.7 GM/DL (ref 3.4–5)
ALP BLD-CCNC: 56 IU/L (ref 40–129)
ALT SERPL-CCNC: 10 U/L (ref 10–40)
ANION GAP SERPL CALCULATED.3IONS-SCNC: 18 MMOL/L (ref 4–16)
APTT: 26.7 SECONDS (ref 21.2–33)
AST SERPL-CCNC: 15 IU/L (ref 15–37)
BASOPHILS ABSOLUTE: 0.1 K/CU MM
BASOPHILS RELATIVE PERCENT: 0.9 % (ref 0–1)
BILIRUB SERPL-MCNC: 0.1 MG/DL (ref 0–1)
BUN BLDV-MCNC: 19 MG/DL (ref 6–23)
CALCIUM SERPL-MCNC: 8.5 MG/DL (ref 8.3–10.6)
CHLORIDE BLD-SCNC: 101 MMOL/L (ref 99–110)
CO2: 22 MMOL/L (ref 21–32)
CREAT SERPL-MCNC: 1 MG/DL (ref 0.6–1.1)
DIFFERENTIAL TYPE: ABNORMAL
EOSINOPHILS ABSOLUTE: 0.2 K/CU MM
EOSINOPHILS RELATIVE PERCENT: 2.5 % (ref 0–3)
GFR AFRICAN AMERICAN: >60 ML/MIN/1.73M2
GFR NON-AFRICAN AMERICAN: 53 ML/MIN/1.73M2
GLUCOSE BLD-MCNC: 110 MG/DL (ref 70–99)
GLUCOSE BLD-MCNC: 142 MG/DL (ref 70–99)
HCT VFR BLD CALC: 33.9 % (ref 37–47)
HEMOGLOBIN: 10.6 GM/DL (ref 12.5–16)
IMMATURE NEUTROPHIL %: 0.3 % (ref 0–0.43)
INR BLD: 0.95 INDEX
LIPASE: 77 IU/L (ref 13–60)
LYMPHOCYTES ABSOLUTE: 2 K/CU MM
LYMPHOCYTES RELATIVE PERCENT: 31.7 % (ref 24–44)
MCH RBC QN AUTO: 32.3 PG (ref 27–31)
MCHC RBC AUTO-ENTMCNC: 31.3 % (ref 32–36)
MCV RBC AUTO: 103.4 FL (ref 78–100)
MONOCYTES ABSOLUTE: 0.5 K/CU MM
MONOCYTES RELATIVE PERCENT: 7 % (ref 0–4)
NUCLEATED RBC %: 0 %
PDW BLD-RTO: 13.8 % (ref 11.7–14.9)
PLATELET # BLD: 192 K/CU MM (ref 140–440)
PMV BLD AUTO: 11.2 FL (ref 7.5–11.1)
POTASSIUM SERPL-SCNC: 3.9 MMOL/L (ref 3.5–5.1)
PROTHROMBIN TIME: 10.8 SECONDS (ref 9.12–12.5)
RBC # BLD: 3.28 M/CU MM (ref 4.2–5.4)
SEGMENTED NEUTROPHILS ABSOLUTE COUNT: 3.7 K/CU MM
SEGMENTED NEUTROPHILS RELATIVE PERCENT: 57.6 % (ref 36–66)
SODIUM BLD-SCNC: 141 MMOL/L (ref 135–145)
TOTAL IMMATURE NEUTOROPHIL: 0.02 K/CU MM
TOTAL NUCLEATED RBC: 0 K/CU MM
TOTAL PROTEIN: 6.3 GM/DL (ref 6.4–8.2)
WBC # BLD: 6.4 K/CU MM (ref 4–10.5)

## 2018-11-03 PROCEDURE — 96365 THER/PROPH/DIAG IV INF INIT: CPT

## 2018-11-03 PROCEDURE — 2580000003 HC RX 258: Performed by: EMERGENCY MEDICINE

## 2018-11-03 PROCEDURE — 99284 EMERGENCY DEPT VISIT MOD MDM: CPT

## 2018-11-03 PROCEDURE — 74177 CT ABD & PELVIS W/CONTRAST: CPT

## 2018-11-03 PROCEDURE — 93005 ELECTROCARDIOGRAM TRACING: CPT | Performed by: EMERGENCY MEDICINE

## 2018-11-03 PROCEDURE — 82962 GLUCOSE BLOOD TEST: CPT

## 2018-11-03 PROCEDURE — 85610 PROTHROMBIN TIME: CPT

## 2018-11-03 PROCEDURE — G0378 HOSPITAL OBSERVATION PER HR: HCPCS

## 2018-11-03 PROCEDURE — 80053 COMPREHEN METABOLIC PANEL: CPT

## 2018-11-03 PROCEDURE — 86901 BLOOD TYPING SEROLOGIC RH(D): CPT

## 2018-11-03 PROCEDURE — 85025 COMPLETE CBC W/AUTO DIFF WBC: CPT

## 2018-11-03 PROCEDURE — 6360000004 HC RX CONTRAST MEDICATION: Performed by: EMERGENCY MEDICINE

## 2018-11-03 PROCEDURE — 85730 THROMBOPLASTIN TIME PARTIAL: CPT

## 2018-11-03 PROCEDURE — 86900 BLOOD TYPING SEROLOGIC ABO: CPT

## 2018-11-03 PROCEDURE — C9113 INJ PANTOPRAZOLE SODIUM, VIA: HCPCS | Performed by: EMERGENCY MEDICINE

## 2018-11-03 PROCEDURE — 6360000002 HC RX W HCPCS: Performed by: EMERGENCY MEDICINE

## 2018-11-03 PROCEDURE — 1200000000 HC SEMI PRIVATE

## 2018-11-03 PROCEDURE — 86850 RBC ANTIBODY SCREEN: CPT

## 2018-11-03 PROCEDURE — 36415 COLL VENOUS BLD VENIPUNCTURE: CPT

## 2018-11-03 PROCEDURE — 83690 ASSAY OF LIPASE: CPT

## 2018-11-03 RX ORDER — SODIUM CHLORIDE 0.9 % (FLUSH) 0.9 %
10 SYRINGE (ML) INJECTION PRN
Status: DISCONTINUED | OUTPATIENT
Start: 2018-11-03 | End: 2018-11-06 | Stop reason: HOSPADM

## 2018-11-03 RX ORDER — CARBAMAZEPINE 200 MG/1
200 TABLET ORAL DAILY
Status: DISCONTINUED | OUTPATIENT
Start: 2018-11-04 | End: 2018-11-06 | Stop reason: HOSPADM

## 2018-11-03 RX ORDER — FERROUS SULFATE 325(65) MG
325 TABLET ORAL
Status: DISCONTINUED | OUTPATIENT
Start: 2018-11-04 | End: 2018-11-06 | Stop reason: HOSPADM

## 2018-11-03 RX ORDER — SODIUM CHLORIDE 9 MG/ML
INJECTION, SOLUTION INTRAVENOUS CONTINUOUS
Status: DISCONTINUED | OUTPATIENT
Start: 2018-11-03 | End: 2018-11-05

## 2018-11-03 RX ORDER — CITALOPRAM 20 MG/1
20 TABLET ORAL DAILY
Status: DISCONTINUED | OUTPATIENT
Start: 2018-11-04 | End: 2018-11-06 | Stop reason: HOSPADM

## 2018-11-03 RX ORDER — TRIAMTERENE AND HYDROCHLOROTHIAZIDE 37.5; 25 MG/1; MG/1
1 TABLET ORAL DAILY
Status: DISCONTINUED | OUTPATIENT
Start: 2018-11-04 | End: 2018-11-06 | Stop reason: HOSPADM

## 2018-11-03 RX ORDER — MIRTAZAPINE 15 MG/1
15 TABLET, FILM COATED ORAL NIGHTLY
Status: DISCONTINUED | OUTPATIENT
Start: 2018-11-03 | End: 2018-11-06 | Stop reason: HOSPADM

## 2018-11-03 RX ORDER — ACETAMINOPHEN 325 MG/1
650 TABLET ORAL EVERY 4 HOURS PRN
Status: DISCONTINUED | OUTPATIENT
Start: 2018-11-03 | End: 2018-11-06 | Stop reason: HOSPADM

## 2018-11-03 RX ORDER — ONDANSETRON 2 MG/ML
4 INJECTION INTRAMUSCULAR; INTRAVENOUS EVERY 6 HOURS PRN
Status: DISCONTINUED | OUTPATIENT
Start: 2018-11-03 | End: 2018-11-06 | Stop reason: HOSPADM

## 2018-11-03 RX ORDER — ATENOLOL 50 MG/1
50 TABLET ORAL DAILY
Status: DISCONTINUED | OUTPATIENT
Start: 2018-11-04 | End: 2018-11-06 | Stop reason: HOSPADM

## 2018-11-03 RX ORDER — NITROGLYCERIN 0.4 MG/1
0.4 TABLET SUBLINGUAL EVERY 5 MIN PRN
Status: DISCONTINUED | OUTPATIENT
Start: 2018-11-03 | End: 2018-11-06 | Stop reason: HOSPADM

## 2018-11-03 RX ORDER — ATORVASTATIN CALCIUM 20 MG/1
20 TABLET, FILM COATED ORAL NIGHTLY
Status: DISCONTINUED | OUTPATIENT
Start: 2018-11-03 | End: 2018-11-06 | Stop reason: HOSPADM

## 2018-11-03 RX ORDER — SODIUM CHLORIDE 0.9 % (FLUSH) 0.9 %
10 SYRINGE (ML) INJECTION EVERY 12 HOURS SCHEDULED
Status: DISCONTINUED | OUTPATIENT
Start: 2018-11-03 | End: 2018-11-06 | Stop reason: HOSPADM

## 2018-11-03 RX ADMIN — IOPAMIDOL 75 ML: 755 INJECTION, SOLUTION INTRAVENOUS at 18:20

## 2018-11-03 RX ADMIN — SODIUM CHLORIDE 80 MG: 9 INJECTION, SOLUTION INTRAVENOUS at 17:24

## 2018-11-03 RX ADMIN — SODIUM CHLORIDE, PRESERVATIVE FREE 10 ML: 5 INJECTION INTRAVENOUS at 18:20

## 2018-11-03 NOTE — ED PROVIDER NOTES
Triage Chief Complaint:   Rectal Bleeding (onset this AM states liquid BM that was bloody occured X1 today)    Robinson:  Salome Yung is a 80 y.o. female that presents with complaint of with complaint of rectal bleeding that started today. Had liuid BM with bright red blood in it this morning, then had a second one and was feeling shaky, brought in by son. Has h/o GI bleed four years ago and almost , was told she had an ulcer. Has h/o chronic pancreatitis. Noted LLQ pain today, discomfort, mostly with bowel movements. No fevers. No SOB or CP. No syncope or lightheadedness. No blood thinners. Has not seen a GI doctor in awhile, does not recall her last colonoscopy results. Does not recall her GI doctor from previous bleed. No nausea or vomiting. No diarrhea prior to all of this. No recent illnesses. No blood in urine. ROS:  10 systems reviewed and negative except as above. Past Medical History:   Diagnosis Date    Anemia     Arthritis of knee     bilateral knees; \"bone on bone\"; declines surg    Benign essential tremor 04/15/2016    CAD S/P percutaneous coronary angioplasty 1998    done at [facility] Diabetes mellitus type II, controlled ([address] 1979    Gastrointestinal hemorrhage     Duodenal Ulcer by EGD    History of pancreatitis 2013    after GI bleed    Humerus fracture 2015    right humerus ; [place]    Hyperlipidemia     Hypertension     Major depression     Ventral hernia 4853    infraumbilical ; incisional    Vitamin B12 deficiency 2018    Vitamin B12 270     Past Surgical History:   Procedure Laterality Date    CHOLECYSTECTOMY      CORONARY ARTERY BYPASS GRAFT      ? number grafts    HYSTERECTOMY, VAGINAL      SALPINGO-OOPHORECTOMY       History reviewed. No pertinent family history. Social History     Social History    Marital status:       Spouse name: N/A    Number of children: N/A    Years of education: N/A     Occupational MM    MPV 11.2 (H) 7.5 - 11.1 [place]    Differential Type AUTOMATED DIFFERENTIAL     Segs Relative 57.6 36 - 66 %    Lymphocytes % 31.7 24 - 44 %    Monocytes % 7.0 (H) 0 - 4 %    Eosinophils % 2.5 0 - 3 %    Basophils % 0.9 0 - 1 %    Segs Absolute 3.7 K/CU MM    Lymphocytes # 2.0 K/CU MM    Monocytes # 0.5 K/CU MM    Eosinophils # 0.2 K/CU MM    Basophils # 0.1 K/CU MM    Nucleated RBC % 0.0 %    Total Nucleated RBC 0.0 K/CU MM    Total Immature Neutrophil 0.02 K/CU MM    Immature Neutrophil % 0.3 0 - 0.43 %   CMP   Result Value Ref Range    Sodium 141 135 - 145 MMOL/L    Potassium 3.9 3.5 - 5.1 MMOL/L    Chloride 101 99 - 110 mMol/L    CO2 22 21 - 32 MMOL/L    BUN 19 6 - 23 MG/DL    CREATININE 1.0 0.6 - 1.1 MG/DL    Glucose 142 (H) 70 - 99 MG/DL    Calcium 8.5 8.3 - 10.6 MG/DL    Alb 3.7 3.4 - 5.0 GM/DL    Total Protein 6.3 (L) 6.4 - 8.2 GM/DL    Total Bilirubin 0.1 0.0 - 1.0 MG/DL    ALT 10 10 - 40 U/L    AST 15 15 - 37 IU/L    Alkaline Phosphatase 56 40 - 129 IU/L    GFR Non- 53 (L) >60 mL/min/1.73m2    GFR African American >60 >60 mL/min/1.73m2    Anion Gap 18 (H) 4 - 16   Protime/INR & PTT   Result Value Ref Range    Protime 10.8 9.12 - 12.5 SECONDS    INR 0.95 INDEX    aPTT 26.7 21.2 - 33.0 SECONDS   Lipase   Result Value Ref Range    Lipase 77 (H) 13 - 60 IU/L   POCT Glucose   Result Value Ref Range    POC Glucose 110 (H) 70 - 99 MG/DL   EKG 12 Lead   Result Value Ref Range    Ventricular Rate 64 BPM    Atrial Rate 64 BPM    P-R Interval 232 ms    QRS Duration 116 ms    Q-T Interval 474 ms    QTc Calculation (Bazett) 489 ms    P Axis 65 degrees    R Axis 55 degrees    T Axis -19 degrees    Diagnosis       Sinus rhythm with 1st degree AV block  Right bundle branch block  T wave abnormality, consider inferior ischemia  Abnormal ECG  When compared with ECG of 22-FEB-2018 17:31,  No significant change was found        Radiographs (if obtained):  [] The following radiograph was interpreted by myself in the absence of a radiologist:   [x] Radiologist's Report Reviewed:  CT ABDOMEN PELVIS W IV CONTRAST Additional Contrast? None   Final Result   1. No acute infective or inflammatory process. 2. No bowel obstruction or CT evidence for acute GI process. .   3. Bilateral renal cysts. EKG (if obtained): (All EKG's are interpreted by myself in the absence of a cardiologist)Sinus rhythm with first-degree AV block, rate of 64 bpm with VT interval of 232 ms. Otherwise normal intervals. Right bundle-branch block. T-wave inversions noted in inferior leads. No ST elevation    Chart review shows recent radiographs:  No results found. MDM:  49-year-old female with history of present concern for bleeding in her stool, started today, she has a history of ulcer that bled point that she almost  4 years ago. She has had 2 episodes at home, bright red blood. Her vitals are stable, she was feeling lightheaded but no syncope. Basic labs type and screen and coags were sent. 2 large bore IVs were obtained and started on fluids as well as a Protonix drip, CT abdomen and pelvis also ordered as she had been having some left lower quadrant pain and concern for possible diverticulitis or colitis causing this. Her labs show hemoglobin is stable though she is anemic at baseline, her type and screen was sent and her electrolytes are stable. Her CT of her pelvis shows no evidence to give a reason for the bleeding, it is possible that is it the ulcer she had previously though would have to be brisk to cause BRBPR and her vitals do not suggest this. We will continue with plan for admission for serial hemoglobin and GI evaluation. Patient is agreeable plan, discussed with hospitalist team.  Sharri Ying stable on admission of the hospitalist team      Total critical care time today provided was 33 minutes. This excludes seperately billable procedure.  Critical care time provided for GI bleed that required close evaluation

## 2018-11-03 NOTE — ED NOTES
[facility] paged hospitalist     Linda Guzmán  11/03/18 Kevin 172 NP with [facility] returned call      Linda Guzmán  11/03/18 1919

## 2018-11-04 LAB
ANION GAP SERPL CALCULATED.3IONS-SCNC: 9 MMOL/L (ref 4–16)
BUN BLDV-MCNC: 14 MG/DL (ref 6–23)
CALCIUM SERPL-MCNC: 7.8 MG/DL (ref 8.3–10.6)
CHLORIDE BLD-SCNC: 107 MMOL/L (ref 99–110)
CO2: 25 MMOL/L (ref 21–32)
CREAT SERPL-MCNC: 0.9 MG/DL (ref 0.6–1.1)
GFR AFRICAN AMERICAN: >60 ML/MIN/1.73M2
GFR NON-AFRICAN AMERICAN: 59 ML/MIN/1.73M2
GLUCOSE BLD-MCNC: 114 MG/DL (ref 70–99)
GLUCOSE BLD-MCNC: 121 MG/DL (ref 70–99)
GLUCOSE BLD-MCNC: 127 MG/DL (ref 70–99)
GLUCOSE BLD-MCNC: 97 MG/DL (ref 70–99)
GLUCOSE BLD-MCNC: 98 MG/DL (ref 70–99)
HCT VFR BLD CALC: 30.1 % (ref 37–47)
HCT VFR BLD CALC: 30.3 % (ref 37–47)
HEMOGLOBIN: 9.2 GM/DL (ref 12.5–16)
HEMOGLOBIN: 9.4 GM/DL (ref 12.5–16)
MCH RBC QN AUTO: 31.2 PG (ref 27–31)
MCHC RBC AUTO-ENTMCNC: 30.6 % (ref 32–36)
MCV RBC AUTO: 102 FL (ref 78–100)
PDW BLD-RTO: 13.7 % (ref 11.7–14.9)
PLATELET # BLD: 150 K/CU MM (ref 140–440)
PMV BLD AUTO: 10.7 FL (ref 7.5–11.1)
POTASSIUM SERPL-SCNC: 4 MMOL/L (ref 3.5–5.1)
RBC # BLD: 2.95 M/CU MM (ref 4.2–5.4)
SODIUM BLD-SCNC: 141 MMOL/L (ref 135–145)
WBC # BLD: 4.9 K/CU MM (ref 4–10.5)

## 2018-11-04 PROCEDURE — 6370000000 HC RX 637 (ALT 250 FOR IP): Performed by: INTERNAL MEDICINE

## 2018-11-04 PROCEDURE — 6370000000 HC RX 637 (ALT 250 FOR IP): Performed by: NURSE PRACTITIONER

## 2018-11-04 PROCEDURE — G0378 HOSPITAL OBSERVATION PER HR: HCPCS

## 2018-11-04 PROCEDURE — 80048 BASIC METABOLIC PNL TOTAL CA: CPT

## 2018-11-04 PROCEDURE — 2580000003 HC RX 258: Performed by: EMERGENCY MEDICINE

## 2018-11-04 PROCEDURE — 82962 GLUCOSE BLOOD TEST: CPT

## 2018-11-04 PROCEDURE — C9113 INJ PANTOPRAZOLE SODIUM, VIA: HCPCS | Performed by: EMERGENCY MEDICINE

## 2018-11-04 PROCEDURE — 85027 COMPLETE CBC AUTOMATED: CPT

## 2018-11-04 PROCEDURE — 96376 TX/PRO/DX INJ SAME DRUG ADON: CPT

## 2018-11-04 PROCEDURE — 93010 ELECTROCARDIOGRAM REPORT: CPT | Performed by: INTERNAL MEDICINE

## 2018-11-04 PROCEDURE — 1200000000 HC SEMI PRIVATE

## 2018-11-04 PROCEDURE — 6360000002 HC RX W HCPCS: Performed by: EMERGENCY MEDICINE

## 2018-11-04 PROCEDURE — 85014 HEMATOCRIT: CPT

## 2018-11-04 PROCEDURE — 36415 COLL VENOUS BLD VENIPUNCTURE: CPT

## 2018-11-04 PROCEDURE — 85018 HEMOGLOBIN: CPT

## 2018-11-04 RX ORDER — DEXTROSE MONOHYDRATE 25 G/50ML
12.5 INJECTION, SOLUTION INTRAVENOUS PRN
Status: DISCONTINUED | OUTPATIENT
Start: 2018-11-04 | End: 2018-11-06 | Stop reason: HOSPADM

## 2018-11-04 RX ORDER — DEXTROSE MONOHYDRATE 50 MG/ML
100 INJECTION, SOLUTION INTRAVENOUS PRN
Status: DISCONTINUED | OUTPATIENT
Start: 2018-11-04 | End: 2018-11-06 | Stop reason: HOSPADM

## 2018-11-04 RX ORDER — NICOTINE POLACRILEX 4 MG
15 LOZENGE BUCCAL PRN
Status: DISCONTINUED | OUTPATIENT
Start: 2018-11-04 | End: 2018-11-06 | Stop reason: HOSPADM

## 2018-11-04 RX ADMIN — SODIUM CHLORIDE: 900 INJECTION INTRAVENOUS at 23:05

## 2018-11-04 RX ADMIN — MIRTAZAPINE 15 MG: 15 TABLET, FILM COATED ORAL at 22:44

## 2018-11-04 RX ADMIN — ATORVASTATIN CALCIUM 20 MG: 20 TABLET, FILM COATED ORAL at 22:44

## 2018-11-04 RX ADMIN — SODIUM CHLORIDE: 900 INJECTION INTRAVENOUS at 14:41

## 2018-11-04 RX ADMIN — CARBAMAZEPINE 200 MG: 200 TABLET ORAL at 10:03

## 2018-11-04 RX ADMIN — MAGESIUM CITRATE 296 ML: 1.75 LIQUID ORAL at 22:45

## 2018-11-04 RX ADMIN — TRIAMTERENE AND HYDROCHLOROTHIAZIDE 1 TABLET: 37.5; 25 TABLET ORAL at 10:03

## 2018-11-04 RX ADMIN — ACETAMINOPHEN 650 MG: 325 TABLET ORAL at 23:27

## 2018-11-04 RX ADMIN — POLYETHYLENE GLYCOL-3350 AND ELECTROLYTES 4000 ML: 236; 6.74; 5.86; 2.97; 22.74 POWDER, FOR SOLUTION ORAL at 14:35

## 2018-11-04 RX ADMIN — ATENOLOL 50 MG: 50 TABLET ORAL at 10:03

## 2018-11-04 RX ADMIN — CITALOPRAM HYDROBROMIDE 20 MG: 20 TABLET ORAL at 10:03

## 2018-11-04 RX ADMIN — SODIUM CHLORIDE 8 MG/HR: 9 INJECTION, SOLUTION INTRAVENOUS at 14:52

## 2018-11-04 RX ADMIN — FERROUS SULFATE TAB 325 MG (65 MG ELEMENTAL FE) 325 MG: 325 (65 FE) TAB at 10:03

## 2018-11-04 RX ADMIN — SODIUM CHLORIDE: 900 INJECTION INTRAVENOUS at 00:26

## 2018-11-04 RX ADMIN — SODIUM CHLORIDE: 900 INJECTION INTRAVENOUS at 06:43

## 2018-11-04 RX ADMIN — BISACODYL 10 MG: 5 TABLET, COATED ORAL at 12:51

## 2018-11-04 RX ADMIN — SODIUM CHLORIDE 8 MG/HR: 9 INJECTION, SOLUTION INTRAVENOUS at 01:56

## 2018-11-04 ASSESSMENT — PAIN SCALES - GENERAL: PAINLEVEL_OUTOF10: 2

## 2018-11-04 NOTE — ED NOTES
19:58 received ready bed [id number] -- notified [facility] RN @ 20:01     Sky SALDAÑA  11/03/18 2002

## 2018-11-04 NOTE — PROGRESS NOTES
Consult dictated [id number] colonoscopy tommorow
nerves appear grossly intact, normal speech, no lateralizing weakness. PSYCH Awake, alert, oriented x 4. Affect appropriate.     Medications:   Medications:    atenolol  50 mg Oral Daily    atorvastatin  20 mg Oral Nightly    carBAMazepine  200 mg Oral Daily    triamterene-hydrochlorothiazide  1 tablet Oral Daily    mirtazapine  15 mg Oral Nightly    citalopram  20 mg Oral Daily    ferrous sulfate  325 mg Oral Daily with breakfast    sodium chloride flush  10 mL Intravenous 2 times per day    insulin lispro  0-6 Units Subcutaneous TID WC    insulin lispro  0-3 Units Subcutaneous Nightly      Infusions:    sodium chloride 150 mL/hr at 11/04/18 1441    pantoprozole (PROTONIX) infusion 8 mg/hr (11/04/18 1452)    sodium chloride       PRN Meds:   sodium chloride flush 10 mL PRN   nitroGLYCERIN 0.4 mg Q5 Min PRN   sodium chloride flush 10 mL PRN   acetaminophen 650 mg Q4H PRN   ondansetron 4 mg Q6H PRN         Electronically signed by Doug Hogan MD on 11/4/2018 at 3:38 PM

## 2018-11-05 ENCOUNTER — ANESTHESIA EVENT (OUTPATIENT)
Dept: ENDOSCOPY | Age: 83
DRG: 378 | End: 2018-11-05
Payer: MEDICARE

## 2018-11-05 ENCOUNTER — ANESTHESIA (OUTPATIENT)
Dept: ENDOSCOPY | Age: 83
DRG: 378 | End: 2018-11-05
Payer: MEDICARE

## 2018-11-05 VITALS
SYSTOLIC BLOOD PRESSURE: 99 MMHG | OXYGEN SATURATION: 99 % | RESPIRATION RATE: 9 BRPM | TEMPERATURE: 98.6 F | DIASTOLIC BLOOD PRESSURE: 39 MMHG

## 2018-11-05 LAB
GLUCOSE BLD-MCNC: 107 MG/DL (ref 70–99)
GLUCOSE BLD-MCNC: 156 MG/DL (ref 70–99)
GLUCOSE BLD-MCNC: 93 MG/DL (ref 70–99)
GLUCOSE BLD-MCNC: 98 MG/DL (ref 70–99)
HCT VFR BLD CALC: 29.5 % (ref 37–47)
HEMOGLOBIN: 8.9 GM/DL (ref 12.5–16)

## 2018-11-05 PROCEDURE — 85014 HEMATOCRIT: CPT

## 2018-11-05 PROCEDURE — G0378 HOSPITAL OBSERVATION PER HR: HCPCS

## 2018-11-05 PROCEDURE — 2580000003 HC RX 258: Performed by: NURSE ANESTHETIST, CERTIFIED REGISTERED

## 2018-11-05 PROCEDURE — 6370000000 HC RX 637 (ALT 250 FOR IP): Performed by: NURSE PRACTITIONER

## 2018-11-05 PROCEDURE — C9113 INJ PANTOPRAZOLE SODIUM, VIA: HCPCS | Performed by: EMERGENCY MEDICINE

## 2018-11-05 PROCEDURE — 82962 GLUCOSE BLOOD TEST: CPT

## 2018-11-05 PROCEDURE — 0DJD8ZZ INSPECTION OF LOWER INTESTINAL TRACT, VIA NATURAL OR ARTIFICIAL OPENING ENDOSCOPIC: ICD-10-PCS | Performed by: INTERNAL MEDICINE

## 2018-11-05 PROCEDURE — 3609009500 HC COLONOSCOPY DIAGNOSTIC OR SCREENING: Performed by: INTERNAL MEDICINE

## 2018-11-05 PROCEDURE — 2500000003 HC RX 250 WO HCPCS: Performed by: NURSE ANESTHETIST, CERTIFIED REGISTERED

## 2018-11-05 PROCEDURE — 2709999900 HC NON-CHARGEABLE SUPPLY: Performed by: INTERNAL MEDICINE

## 2018-11-05 PROCEDURE — 6360000002 HC RX W HCPCS: Performed by: NURSE ANESTHETIST, CERTIFIED REGISTERED

## 2018-11-05 PROCEDURE — 6360000002 HC RX W HCPCS: Performed by: EMERGENCY MEDICINE

## 2018-11-05 PROCEDURE — 3700000000 HC ANESTHESIA ATTENDED CARE: Performed by: INTERNAL MEDICINE

## 2018-11-05 PROCEDURE — 1200000000 HC SEMI PRIVATE

## 2018-11-05 PROCEDURE — 36415 COLL VENOUS BLD VENIPUNCTURE: CPT

## 2018-11-05 PROCEDURE — 3700000001 HC ADD 15 MINUTES (ANESTHESIA): Performed by: INTERNAL MEDICINE

## 2018-11-05 PROCEDURE — 2580000003 HC RX 258: Performed by: NURSE PRACTITIONER

## 2018-11-05 PROCEDURE — 96376 TX/PRO/DX INJ SAME DRUG ADON: CPT

## 2018-11-05 PROCEDURE — 85018 HEMOGLOBIN: CPT

## 2018-11-05 PROCEDURE — 2580000003 HC RX 258: Performed by: EMERGENCY MEDICINE

## 2018-11-05 RX ORDER — PANTOPRAZOLE SODIUM 40 MG/1
40 TABLET, DELAYED RELEASE ORAL
Status: DISCONTINUED | OUTPATIENT
Start: 2018-11-06 | End: 2018-11-06 | Stop reason: HOSPADM

## 2018-11-05 RX ORDER — PROPOFOL 10 MG/ML
INJECTION, EMULSION INTRAVENOUS PRN
Status: DISCONTINUED | OUTPATIENT
Start: 2018-11-05 | End: 2018-11-05 | Stop reason: SDUPTHER

## 2018-11-05 RX ORDER — SODIUM CHLORIDE 9 MG/ML
INJECTION, SOLUTION INTRAVENOUS CONTINUOUS PRN
Status: DISCONTINUED | OUTPATIENT
Start: 2018-11-05 | End: 2018-11-05 | Stop reason: SDUPTHER

## 2018-11-05 RX ORDER — LIDOCAINE HYDROCHLORIDE 20 MG/ML
INJECTION, SOLUTION EPIDURAL; INFILTRATION; INTRACAUDAL; PERINEURAL PRN
Status: DISCONTINUED | OUTPATIENT
Start: 2018-11-05 | End: 2018-11-05 | Stop reason: SDUPTHER

## 2018-11-05 RX ADMIN — SODIUM CHLORIDE, PRESERVATIVE FREE 10 ML: 5 INJECTION INTRAVENOUS at 20:55

## 2018-11-05 RX ADMIN — PROPOFOL 170 MG: 10 INJECTION, EMULSION INTRAVENOUS at 13:48

## 2018-11-05 RX ADMIN — MIRTAZAPINE 15 MG: 15 TABLET, FILM COATED ORAL at 20:54

## 2018-11-05 RX ADMIN — SODIUM CHLORIDE 8 MG/HR: 9 INJECTION, SOLUTION INTRAVENOUS at 00:52

## 2018-11-05 RX ADMIN — ATORVASTATIN CALCIUM 20 MG: 20 TABLET, FILM COATED ORAL at 20:54

## 2018-11-05 RX ADMIN — SODIUM CHLORIDE: 900 INJECTION INTRAVENOUS at 07:25

## 2018-11-05 RX ADMIN — PROPOFOL 80 MG: 10 INJECTION, EMULSION INTRAVENOUS at 13:46

## 2018-11-05 RX ADMIN — LIDOCAINE HYDROCHLORIDE 100 MG: 20 INJECTION, SOLUTION EPIDURAL; INFILTRATION; INTRACAUDAL; PERINEURAL at 13:46

## 2018-11-05 RX ADMIN — SODIUM CHLORIDE: 9 INJECTION, SOLUTION INTRAVENOUS at 13:30

## 2018-11-05 RX ADMIN — SODIUM CHLORIDE 8 MG/HR: 9 INJECTION, SOLUTION INTRAVENOUS at 15:11

## 2018-11-05 ASSESSMENT — PULMONARY FUNCTION TESTS
PIF_VALUE: 0

## 2018-11-05 NOTE — ANESTHESIA PRE PROCEDURE
11/05/18 0059 11/05/18 0700 11/05/18 0930   BP: (!) 183/74 (!) 140/62 (!) 142/63 137/62   Pulse: 61 58 65 68   Resp: 17  16 17   Temp: 36.9 °C (98.5 °F)  36.8 °C (98.3 °F) 36.6 °C (97.8 °F)   TempSrc: Oral  Oral Oral   SpO2: 97%  95% 95%   Weight:       Height:                                                  BP Readings from Last 3 Encounters:   11/05/18 137/62   11/05/18 (!) 99/39   10/30/18 138/70       NPO Status: Time of last liquid consumption: 0000                        Time of last solid consumption: 0000                        Date of last liquid consumption: 11/05/18                        Date of last solid food consumption: 11/02/18    BMI:   Wt Readings from Last 3 Encounters:   11/04/18 139 lb (63 kg)   10/30/18 137 lb (62.1 kg)   09/18/18 134 lb (60.8 kg)     Body mass index is 27.15 kg/m².     CBC:   Lab Results   Component Value Date    WBC 4.9 11/04/2018    RBC 2.95 11/04/2018    HGB 9.2 11/04/2018    HCT 30.1 11/04/2018    .0 11/04/2018    RDW 13.7 11/04/2018     11/04/2018       CMP:   Lab Results   Component Value Date     11/04/2018    K 4.0 11/04/2018    K 4.2 02/23/2018     11/04/2018    CO2 25 11/04/2018    BUN 14 11/04/2018    CREATININE 0.9 11/04/2018    GFRAA >60 11/04/2018    AGRATIO 1.9 07/02/2018    LABGLOM 59 11/04/2018    GLUCOSE 98 11/04/2018    PROT 6.3 11/03/2018    CALCIUM 7.8 11/04/2018    BILITOT 0.1 11/03/2018    ALKPHOS 56 11/03/2018    AST 15 11/03/2018    ALT 10 11/03/2018       POC Tests:   Recent Labs      11/05/18   1138   POCGLU  98       Coags:   Lab Results   Component Value Date    PROTIME 10.8 11/03/2018    INR 0.95 11/03/2018    APTT 26.7 11/03/2018       HCG (If Applicable): No results found for: PREGTESTUR, PREGSERUM, HCG, HCGQUANT     ABGs: No results found for: PHART, PO2ART, DHN6BOE, ULK1DZF, BEART, H4IZITHE     Type & Screen (If Applicable):  No results found for: ABILIO [facility]    Anesthesia Evaluation  Patient summary reviewed no

## 2018-11-06 VITALS
WEIGHT: 139 LBS | TEMPERATURE: 98 F | DIASTOLIC BLOOD PRESSURE: 80 MMHG | RESPIRATION RATE: 16 BRPM | BODY MASS INDEX: 27.29 KG/M2 | SYSTOLIC BLOOD PRESSURE: 188 MMHG | HEART RATE: 67 BPM | OXYGEN SATURATION: 93 % | HEIGHT: 60 IN

## 2018-11-06 PROBLEM — Z12.11 COLON CANCER SCREENING: Status: ACTIVE | Noted: 2018-11-06

## 2018-11-06 PROBLEM — Z95.1 HISTORY OF CORONARY ARTERY BYPASS GRAFT: Status: ACTIVE | Noted: 2018-11-06

## 2018-11-06 LAB
BASOPHILS ABSOLUTE: 0 K/CU MM
BASOPHILS RELATIVE PERCENT: 0.9 % (ref 0–1)
DIFFERENTIAL TYPE: ABNORMAL
EOSINOPHILS ABSOLUTE: 0.2 K/CU MM
EOSINOPHILS RELATIVE PERCENT: 4.1 % (ref 0–3)
GLUCOSE BLD-MCNC: 110 MG/DL (ref 70–99)
HCT VFR BLD CALC: 30.9 % (ref 37–47)
HEMOGLOBIN: 9.8 GM/DL (ref 12.5–16)
IMMATURE NEUTROPHIL %: 0.2 % (ref 0–0.43)
LYMPHOCYTES ABSOLUTE: 2 K/CU MM
LYMPHOCYTES RELATIVE PERCENT: 42.6 % (ref 24–44)
MCH RBC QN AUTO: 32.5 PG (ref 27–31)
MCHC RBC AUTO-ENTMCNC: 31.7 % (ref 32–36)
MCV RBC AUTO: 102.3 FL (ref 78–100)
MONOCYTES ABSOLUTE: 0.3 K/CU MM
MONOCYTES RELATIVE PERCENT: 6.8 % (ref 0–4)
NUCLEATED RBC %: 0 %
PDW BLD-RTO: 13.4 % (ref 11.7–14.9)
PLATELET # BLD: 151 K/CU MM (ref 140–440)
PMV BLD AUTO: 11.1 FL (ref 7.5–11.1)
RBC # BLD: 3.02 M/CU MM (ref 4.2–5.4)
SEGMENTED NEUTROPHILS ABSOLUTE COUNT: 2.1 K/CU MM
SEGMENTED NEUTROPHILS RELATIVE PERCENT: 45.4 % (ref 36–66)
TOTAL IMMATURE NEUTOROPHIL: 0.01 K/CU MM
TOTAL NUCLEATED RBC: 0 K/CU MM
WBC # BLD: 4.7 K/CU MM (ref 4–10.5)

## 2018-11-06 PROCEDURE — 36415 COLL VENOUS BLD VENIPUNCTURE: CPT

## 2018-11-06 PROCEDURE — G0378 HOSPITAL OBSERVATION PER HR: HCPCS

## 2018-11-06 PROCEDURE — 6370000000 HC RX 637 (ALT 250 FOR IP): Performed by: HOSPITALIST

## 2018-11-06 PROCEDURE — 6370000000 HC RX 637 (ALT 250 FOR IP): Performed by: NURSE PRACTITIONER

## 2018-11-06 PROCEDURE — 82962 GLUCOSE BLOOD TEST: CPT

## 2018-11-06 PROCEDURE — 85014 HEMATOCRIT: CPT

## 2018-11-06 PROCEDURE — 85018 HEMOGLOBIN: CPT

## 2018-11-06 PROCEDURE — 85025 COMPLETE CBC W/AUTO DIFF WBC: CPT

## 2018-11-06 RX ORDER — CARBAMAZEPINE 200 MG/1
200 TABLET ORAL DAILY
Qty: 1 TABLET | Refills: 0
Start: 2018-11-06 | End: 2018-11-30 | Stop reason: DRUGHIGH

## 2018-11-06 RX ORDER — FERROUS SULFATE 325(65) MG
325 TABLET ORAL EVERY OTHER DAY
Qty: 30 TABLET | Refills: 0
Start: 2018-11-06 | End: 2018-11-30 | Stop reason: DRUGHIGH

## 2018-11-06 RX ADMIN — TRIAMTERENE AND HYDROCHLOROTHIAZIDE 1 TABLET: 37.5; 25 TABLET ORAL at 09:43

## 2018-11-06 RX ADMIN — ATENOLOL 50 MG: 50 TABLET ORAL at 09:44

## 2018-11-06 RX ADMIN — ACETAMINOPHEN 650 MG: 325 TABLET ORAL at 02:37

## 2018-11-06 RX ADMIN — CARBAMAZEPINE 200 MG: 200 TABLET ORAL at 09:44

## 2018-11-06 RX ADMIN — FERROUS SULFATE TAB 325 MG (65 MG ELEMENTAL FE) 325 MG: 325 (65 FE) TAB at 09:44

## 2018-11-06 RX ADMIN — PANTOPRAZOLE SODIUM 40 MG: 40 TABLET, DELAYED RELEASE ORAL at 05:39

## 2018-11-06 RX ADMIN — CITALOPRAM HYDROBROMIDE 20 MG: 20 TABLET ORAL at 09:44

## 2018-11-06 ASSESSMENT — PAIN SCALES - GENERAL: PAINLEVEL_OUTOF10: 5

## 2018-11-06 ASSESSMENT — PAIN DESCRIPTION - LOCATION: LOCATION: HEAD

## 2018-11-06 ASSESSMENT — PAIN DESCRIPTION - FREQUENCY: FREQUENCY: INTERMITTENT

## 2018-11-06 ASSESSMENT — PAIN DESCRIPTION - ONSET: ONSET: ON-GOING

## 2018-11-06 ASSESSMENT — PAIN DESCRIPTION - DESCRIPTORS: DESCRIPTORS: HEADACHE

## 2018-11-06 ASSESSMENT — PAIN DESCRIPTION - PAIN TYPE: TYPE: ACUTE PAIN

## 2018-11-06 NOTE — DISCHARGE SUMMARY
MCG tablet  Take 1,000 mcg by mouth daily                 Objective Findings at Discharge:   /63   Pulse 66   Temp 98 °F (36.7 °C) (Oral)   Resp 16   Ht 5' (1.524 m)   Wt 139 lb (63 kg)   SpO2 93%   BMI 27.15 kg/m²            PHYSICAL EXAM   GEN Awake female, sitting upright in bed in no apparent distress. Appears given age. LABS:    CBC:   Lab Results   Component Value Date    WBC 4.7 11/06/2018    HGB 9.8 11/06/2018    HCT 30.9 11/06/2018    .3 11/06/2018     11/06/2018     BMP:   Lab Results   Component Value Date     11/04/2018    K 4.0 11/04/2018    K 4.2 02/23/2018     11/04/2018    CO2 25 11/04/2018    BUN 14 11/04/2018    CREATININE 0.9 11/04/2018    CALCIUM 7.8 11/04/2018     Hepatic:   Recent Labs      11/03/18   1619   AST  15   ALT  10   BILITOT  0.1   ALKPHOS  64        IMAGING:    CT ABDOMEN PELVIS W IV CONTRAST Additional Contrast? None [[id number]] Collected: 11/03/18 1832      Order Status: Completed Updated: 11/03/18 1839     Narrative:       EXAMINATION:  CT OF THE ABDOMEN AND PELVIS WITH CONTRAST 11/3/2018 6:26 pm    TECHNIQUE:  CT of the abdomen and pelvis was performed with the administration of  intravenous contrast. Multiplanar reformatted images are provided for review. Dose modulation, iterative reconstruction, and/or weight based adjustment of  the mA/kV was utilized to reduce the radiation dose to as low as reasonably  achievable. COMPARISON:  02/22/2018 CT chest    HISTORY:  ORDERING SYSTEM PROVIDED HISTORY: GI bleed, lower abdominal pain  TECHNOLOGIST PROVIDED HISTORY:  Additional Contrast?->None  Ordering Physician Provided Reason for Exam: GI bleed, lower abdominal pain  Acuity: Acute  Type of Exam: Initial  Additional signs and symptoms: pt states blood in stool this morning  Relevant Medical/Surgical History: 75 ml isovue 370    FINDINGS:  Lower Chest: Minor subsegmental atelectasis posterior lung bases.     Organs: Cholecystectomy with

## 2018-11-07 ENCOUNTER — CARE COORDINATION (OUTPATIENT)
Dept: CASE MANAGEMENT | Age: 83
End: 2018-11-07

## 2018-11-07 LAB
EKG ATRIAL RATE: 64 BPM
EKG DIAGNOSIS: NORMAL
EKG P AXIS: 65 DEGREES
EKG P-R INTERVAL: 232 MS
EKG Q-T INTERVAL: 474 MS
EKG QRS DURATION: 116 MS
EKG QTC CALCULATION (BAZETT): 489 MS
EKG R AXIS: 55 DEGREES
EKG T AXIS: -19 DEGREES
EKG VENTRICULAR RATE: 64 BPM

## 2018-11-13 ENCOUNTER — TELEPHONE (OUTPATIENT)
Dept: PHARMACY | Facility: CLINIC | Age: 83
End: 2018-11-13

## 2018-11-14 ENCOUNTER — CARE COORDINATION (OUTPATIENT)
Dept: CASE MANAGEMENT | Age: 83
End: 2018-11-14

## 2018-11-14 DIAGNOSIS — K92.2 ACUTE LOWER GASTROINTESTINAL BLEEDING: Primary | ICD-10-CM

## 2018-11-14 PROCEDURE — 1111F DSCHRG MED/CURRENT MED MERGE: CPT | Performed by: INTERNAL MEDICINE

## 2018-11-16 ENCOUNTER — TELEPHONE (OUTPATIENT)
Dept: PHARMACY | Facility: CLINIC | Age: 83
End: 2018-11-16

## 2018-11-20 ENCOUNTER — CARE COORDINATION (OUTPATIENT)
Dept: CASE MANAGEMENT | Age: 83
End: 2018-11-20

## 2018-11-27 ENCOUNTER — CARE COORDINATION (OUTPATIENT)
Dept: CASE MANAGEMENT | Age: 83
End: 2018-11-27

## 2018-11-30 ENCOUNTER — CARE COORDINATION (OUTPATIENT)
Dept: CASE MANAGEMENT | Age: 83
End: 2018-11-30

## 2018-11-30 ENCOUNTER — OFFICE VISIT (OUTPATIENT)
Dept: INTERNAL MEDICINE CLINIC | Age: 83
End: 2018-11-30
Payer: MEDICARE

## 2018-11-30 VITALS
SYSTOLIC BLOOD PRESSURE: 130 MMHG | BODY MASS INDEX: 25.97 KG/M2 | WEIGHT: 133 LBS | DIASTOLIC BLOOD PRESSURE: 60 MMHG | HEART RATE: 75 BPM | RESPIRATION RATE: 16 BRPM

## 2018-11-30 DIAGNOSIS — E11.9 TYPE 2 DIABETES MELLITUS WITHOUT COMPLICATION, WITHOUT LONG-TERM CURRENT USE OF INSULIN (HCC): ICD-10-CM

## 2018-11-30 DIAGNOSIS — E78.2 MIXED HYPERLIPIDEMIA: ICD-10-CM

## 2018-11-30 DIAGNOSIS — K62.5 RECTAL BLEEDING: Primary | ICD-10-CM

## 2018-11-30 DIAGNOSIS — D50.0 BLOOD LOSS ANEMIA: ICD-10-CM

## 2018-11-30 PROCEDURE — 99214 OFFICE O/P EST MOD 30 MIN: CPT | Performed by: INTERNAL MEDICINE

## 2018-11-30 RX ORDER — FERROUS SULFATE 325(65) MG
325 TABLET ORAL
COMMUNITY
End: 2019-04-16 | Stop reason: SDUPTHER

## 2018-11-30 RX ORDER — CARBAMAZEPINE 200 MG/1
200 TABLET ORAL 2 TIMES DAILY
COMMUNITY
End: 2019-04-16 | Stop reason: SDUPTHER

## 2018-11-30 RX ORDER — CITALOPRAM 20 MG/1
20 TABLET ORAL DAILY
COMMUNITY
End: 2019-04-16 | Stop reason: SDUPTHER

## 2018-11-30 NOTE — PROGRESS NOTES
vitamin B-12 (CYANOCOBALAMIN) 1000 MCG tablet Take 1,000 mcg by mouth daily      nitroGLYCERIN (NITROSTAT) 0.4 MG SL tablet Place 1 tablet under the tongue every 5 minutes as needed for Chest pain up to max of 3 total doses. If no relief after 1 dose, call 911. 25 tablet 3    aspirin 81 MG tablet Take 81 mg by mouth daily       No current facility-administered medications for this visit. Past Medical History:   Diagnosis Date    Anemia 7/201    Arthritis of knee 2018    bilateral knees; \"bone on bone\"; declines surg    Benign essential tremor 04/15/2016    CAD S/P percutaneous coronary angioplasty 1998    done at [facility] Diabetes mellitus type II, controlled ([facility] [address] 1979    Gastrointestinal hemorrhage 2012    Duodenal Ulcer by EGD    History of pancreatitis 2013    after GI bleed    Humerus fracture 2015    right humerus ; [place]    Hyperlipidemia     Hypertension     Major depression     S/P CABG (coronary artery bypass graft) 1996    Ventral hernia 4617    infraumbilical ; incisional    Vitamin B12 deficiency 07/2018    Vitamin B12 270        Social History   Substance Use Topics    Smoking status: Never Smoker    Smokeless tobacco: Never Used    Alcohol use No        ROS: The patient has had no headache, sore throat, fever or chills, cough, dyspnea, chest pain, nausea, vomiting or diarrhea, or edema. Objective:      /60   Pulse 75   Resp 16   Wt 133 lb (60.3 kg)   BMI 25.97 kg/m²    General: in no apparent distress   The patient's neck is free of nodes. Lungs are clear. Heart is normal in rate and regular in rhythm. Legs are free of edema. No rash or erythema. hosp labs , er notes, colonoscopy and imagng rev'd     Assessment / Plan:      1. Rectal bleeding    2. Blood loss anemia    3. Mixed hyperlipidemia    4.  Type 2 diabetes mellitus without complication, without long-term current use of insulin [facility])            Plan   Same meds  RTC mid Jan, lab in

## 2018-12-03 ENCOUNTER — CARE COORDINATION (OUTPATIENT)
Dept: CASE MANAGEMENT | Age: 83
End: 2018-12-03

## 2018-12-06 PROBLEM — Z12.11 COLON CANCER SCREENING: Status: RESOLVED | Noted: 2018-11-06 | Resolved: 2018-12-06

## 2019-01-29 ENCOUNTER — APPOINTMENT (OUTPATIENT)
Dept: GENERAL RADIOLOGY | Age: 84
End: 2019-01-29
Payer: MEDICARE

## 2019-01-29 ENCOUNTER — APPOINTMENT (OUTPATIENT)
Dept: CT IMAGING | Age: 84
End: 2019-01-29
Payer: MEDICARE

## 2019-01-29 ENCOUNTER — HOSPITAL ENCOUNTER (EMERGENCY)
Age: 84
Discharge: HOME | End: 2019-01-29
Payer: MEDICARE

## 2019-01-29 VITALS
SYSTOLIC BLOOD PRESSURE: 159 MMHG | HEART RATE: 66 BPM | DIASTOLIC BLOOD PRESSURE: 66 MMHG | WEIGHT: 130 LBS | RESPIRATION RATE: 14 BRPM | OXYGEN SATURATION: 97 % | TEMPERATURE: 98 F | BODY MASS INDEX: 23.92 KG/M2 | HEIGHT: 62 IN

## 2019-01-29 DIAGNOSIS — S01.01XA LACERATION OF SCALP, INITIAL ENCOUNTER: Primary | ICD-10-CM

## 2019-01-29 DIAGNOSIS — R29.6 FALL IN ELDERLY PATIENT: ICD-10-CM

## 2019-01-29 DIAGNOSIS — S09.90XA INJURY OF HEAD, INITIAL ENCOUNTER: ICD-10-CM

## 2019-01-29 PROCEDURE — 72170 X-RAY EXAM OF PELVIS: CPT

## 2019-01-29 PROCEDURE — 4500000027

## 2019-01-29 PROCEDURE — 71046 X-RAY EXAM CHEST 2 VIEWS: CPT

## 2019-01-29 PROCEDURE — 70450 CT HEAD/BRAIN W/O DYE: CPT

## 2019-01-29 PROCEDURE — 72125 CT NECK SPINE W/O DYE: CPT

## 2019-01-29 PROCEDURE — 99284 EMERGENCY DEPT VISIT MOD MDM: CPT

## 2019-01-29 ASSESSMENT — PAIN DESCRIPTION - PAIN TYPE: TYPE: ACUTE PAIN

## 2019-01-29 ASSESSMENT — PAIN SCALES - GENERAL: PAINLEVEL_OUTOF10: 6

## 2019-02-14 ENCOUNTER — OFFICE VISIT (OUTPATIENT)
Dept: INTERNAL MEDICINE CLINIC | Age: 84
End: 2019-02-14
Payer: MEDICARE

## 2019-02-14 VITALS
SYSTOLIC BLOOD PRESSURE: 112 MMHG | OXYGEN SATURATION: 95 % | RESPIRATION RATE: 16 BRPM | BODY MASS INDEX: 24.51 KG/M2 | WEIGHT: 134 LBS | HEART RATE: 68 BPM | DIASTOLIC BLOOD PRESSURE: 70 MMHG

## 2019-02-14 DIAGNOSIS — E11.9 TYPE 2 DIABETES MELLITUS WITHOUT COMPLICATION, WITHOUT LONG-TERM CURRENT USE OF INSULIN (HCC): ICD-10-CM

## 2019-02-14 DIAGNOSIS — Z48.02 ENCOUNTER FOR STAPLE REMOVAL: ICD-10-CM

## 2019-02-14 DIAGNOSIS — S01.01XA LACERATION OF SKIN OF SCALP, INITIAL ENCOUNTER: Primary | ICD-10-CM

## 2019-02-14 DIAGNOSIS — F32.4 MAJOR DEPRESSIVE DISORDER, SINGLE EPISODE, IN PARTIAL REMISSION (HCC): ICD-10-CM

## 2019-02-14 PROCEDURE — 99213 OFFICE O/P EST LOW 20 MIN: CPT | Performed by: INTERNAL MEDICINE

## 2019-04-16 ENCOUNTER — OFFICE VISIT (OUTPATIENT)
Dept: PRIMARY CARE CLINIC | Age: 84
End: 2019-04-16
Payer: MEDICARE

## 2019-04-16 VITALS
BODY MASS INDEX: 26.7 KG/M2 | WEIGHT: 136 LBS | HEIGHT: 60 IN | DIASTOLIC BLOOD PRESSURE: 70 MMHG | HEART RATE: 65 BPM | OXYGEN SATURATION: 96 % | SYSTOLIC BLOOD PRESSURE: 124 MMHG

## 2019-04-16 DIAGNOSIS — F33.41 RECURRENT MAJOR DEPRESSIVE DISORDER, IN PARTIAL REMISSION (HCC): ICD-10-CM

## 2019-04-16 DIAGNOSIS — Z87.19 HX OF UPPER GASTROINTESTINAL HEMORRHAGE: ICD-10-CM

## 2019-04-16 DIAGNOSIS — E11.9 TYPE 2 DIABETES MELLITUS WITHOUT COMPLICATION, WITHOUT LONG-TERM CURRENT USE OF INSULIN (HCC): ICD-10-CM

## 2019-04-16 DIAGNOSIS — I10 ESSENTIAL HYPERTENSION: ICD-10-CM

## 2019-04-16 DIAGNOSIS — Z95.1 S/P CABG (CORONARY ARTERY BYPASS GRAFT): ICD-10-CM

## 2019-04-16 DIAGNOSIS — E61.1 IRON DEFICIENCY: ICD-10-CM

## 2019-04-16 DIAGNOSIS — E53.8 VITAMIN B12 DEFICIENCY: ICD-10-CM

## 2019-04-16 DIAGNOSIS — E78.2 MIXED HYPERLIPIDEMIA: ICD-10-CM

## 2019-04-16 DIAGNOSIS — Z95.820 S/P ANGIOPLASTY WITH STENT: Primary | ICD-10-CM

## 2019-04-16 DIAGNOSIS — G25.0 BENIGN ESSENTIAL TREMOR: ICD-10-CM

## 2019-04-16 PROCEDURE — 99204 OFFICE O/P NEW MOD 45 MIN: CPT | Performed by: INTERNAL MEDICINE

## 2019-04-16 PROCEDURE — G0444 DEPRESSION SCREEN ANNUAL: HCPCS | Performed by: INTERNAL MEDICINE

## 2019-04-16 RX ORDER — CARBAMAZEPINE 200 MG/1
200 TABLET ORAL 2 TIMES DAILY
Qty: 180 TABLET | Refills: 3 | Status: SHIPPED | OUTPATIENT
Start: 2019-04-16 | End: 2019-08-19 | Stop reason: SDUPTHER

## 2019-04-16 RX ORDER — ATENOLOL 50 MG/1
50 TABLET ORAL DAILY
Qty: 90 TABLET | Refills: 1 | Status: SHIPPED | OUTPATIENT
Start: 2019-04-16 | End: 2019-08-19 | Stop reason: SDUPTHER

## 2019-04-16 RX ORDER — NITROGLYCERIN 0.4 MG/1
0.4 TABLET SUBLINGUAL EVERY 5 MIN PRN
Qty: 25 TABLET | Refills: 3 | Status: SHIPPED | OUTPATIENT
Start: 2019-04-16

## 2019-04-16 RX ORDER — ATORVASTATIN CALCIUM 20 MG/1
20 TABLET, FILM COATED ORAL NIGHTLY
Qty: 90 TABLET | Refills: 1 | Status: SHIPPED | OUTPATIENT
Start: 2019-04-16 | End: 2019-08-19 | Stop reason: SDUPTHER

## 2019-04-16 RX ORDER — FERROUS SULFATE 325(65) MG
325 TABLET ORAL
Qty: 90 TABLET | Refills: 3 | Status: SHIPPED | OUTPATIENT
Start: 2019-04-16 | End: 2019-08-19 | Stop reason: SDUPTHER

## 2019-04-16 RX ORDER — PANTOPRAZOLE SODIUM 40 MG/1
40 TABLET, DELAYED RELEASE ORAL DAILY
Qty: 30 TABLET | Refills: 3 | Status: SHIPPED | OUTPATIENT
Start: 2019-04-16 | End: 2019-08-19 | Stop reason: SDUPTHER

## 2019-04-16 RX ORDER — CITALOPRAM 20 MG/1
20 TABLET ORAL DAILY
Qty: 90 TABLET | Refills: 3 | Status: SHIPPED | OUTPATIENT
Start: 2019-04-16 | End: 2019-08-19 | Stop reason: SDUPTHER

## 2019-04-16 RX ORDER — TRIAMTERENE AND HYDROCHLOROTHIAZIDE 37.5; 25 MG/1; MG/1
1 TABLET ORAL DAILY
Qty: 90 TABLET | Refills: 1 | Status: SHIPPED | OUTPATIENT
Start: 2019-04-16 | End: 2019-08-19 | Stop reason: SDUPTHER

## 2019-04-16 RX ORDER — LANOLIN ALCOHOL/MO/W.PET/CERES
1000 CREAM (GRAM) TOPICAL DAILY
Qty: 90 TABLET | Refills: 3 | Status: SHIPPED | OUTPATIENT
Start: 2019-04-16 | End: 2019-08-19 | Stop reason: SDUPTHER

## 2019-04-16 RX ORDER — MIRTAZAPINE 15 MG/1
15 TABLET, FILM COATED ORAL NIGHTLY
Qty: 90 TABLET | Refills: 1 | Status: SHIPPED | OUTPATIENT
Start: 2019-04-16 | End: 2019-08-19 | Stop reason: SDUPTHER

## 2019-04-16 ASSESSMENT — PATIENT HEALTH QUESTIONNAIRE - PHQ9
SUM OF ALL RESPONSES TO PHQ9 QUESTIONS 1 & 2: 5
10. IF YOU CHECKED OFF ANY PROBLEMS, HOW DIFFICULT HAVE THESE PROBLEMS MADE IT FOR YOU TO DO YOUR WORK, TAKE CARE OF THINGS AT HOME, OR GET ALONG WITH OTHER PEOPLE: 1
9. THOUGHTS THAT YOU WOULD BE BETTER OFF DEAD, OR OF HURTING YOURSELF: 0
8. MOVING OR SPEAKING SO SLOWLY THAT OTHER PEOPLE COULD HAVE NOTICED. OR THE OPPOSITE, BEING SO FIGETY OR RESTLESS THAT YOU HAVE BEEN MOVING AROUND A LOT MORE THAN USUAL: 0
2. FEELING DOWN, DEPRESSED OR HOPELESS: 3
1. LITTLE INTEREST OR PLEASURE IN DOING THINGS: 2
6. FEELING BAD ABOUT YOURSELF - OR THAT YOU ARE A FAILURE OR HAVE LET YOURSELF OR YOUR FAMILY DOWN: 3
4. FEELING TIRED OR HAVING LITTLE ENERGY: 3
7. TROUBLE CONCENTRATING ON THINGS, SUCH AS READING THE NEWSPAPER OR WATCHING TELEVISION: 1
SUM OF ALL RESPONSES TO PHQ QUESTIONS 1-9: 16
SUM OF ALL RESPONSES TO PHQ QUESTIONS 1-9: 16
5. POOR APPETITE OR OVEREATING: 1
3. TROUBLE FALLING OR STAYING ASLEEP: 3

## 2019-04-17 ASSESSMENT — ENCOUNTER SYMPTOMS
WHEEZING: 0
NAUSEA: 0
CONSTIPATION: 0
COUGH: 0
BACK PAIN: 0
ABDOMINAL DISTENTION: 0
SHORTNESS OF BREATH: 0
ABDOMINAL PAIN: 0
SINUS PAIN: 0
DIARRHEA: 0
VOMITING: 0
SINUS PRESSURE: 0

## 2019-04-17 NOTE — PROGRESS NOTES
[address] PRIMARY CARE  [address] [place] [address] [address]  Kim[place] [address]  Dept: [phone]  Dept Fax: [phone]    Jenn Montez is a 80 y.o. female who presents today for her medical conditions/complaints as noted below. Chief Complaint   Patient presents with    [facility]       HPI:     Past medical history of essential hypertension, type 2 diabetes, hyperlipidemia, depression, arthritis of knee, status post CABG and angioplasty with stent. She has had a history of acute GI bleed due to taking ibuprofen and the antiplatelet at the same time. Next line currently she lives by herself and her son helps out. She is on metformin for diabetes. She is on Remeron, Celexa or depression. She lost her  2 years ago. Reviewed all the medications with the patient and updated list.    She does not have any complaints or concerns at this time and wants meds refilled. Her blood pressure is well controlled at 24/70 and pulse at 65. She has a benign essential tremors and it runs in her family.       Hemoglobin A1C (%)   Date Value   07/02/2018 6.2   02/23/2018 5.7   04/24/2014 5.7             ( goal A1C is < 7)   No results found for: LABMICR  LDL Cholesterol (mg/dL)   Date Value   04/24/2014 76     LDL Calculated (mg/dL)   Date Value   07/02/2018 73       (goal LDL is <100)   AST (IU/L)   Date Value   11/03/2018 15     ALT (U/L)   Date Value   11/03/2018 10     BUN (MG/DL)   Date Value   11/04/2018 14     BP Readings from Last 3 Encounters:   04/16/19 124/70   02/14/19 112/70   01/29/19 (!) 159/66          (goal 120/80)    Past Medical History:   Diagnosis Date    Anemia 7/201    Arthritis of knee 2018    bilateral knees; \"bone on bone\"; declines surg    Benign essential tremor 04/15/2016    CAD S/P percutaneous coronary angioplasty 1998    done at [facility] Diabetes mellitus type II, controlled ([address] 1979    Gastrointestinal hemorrhage 2012 Recurrent major depressive disorder, in partial remission (HCC)    - citalopram (CELEXA) 20 MG tablet; Take 1 tablet by mouth daily  Dispense: 90 tablet; Refill: 3  - mirtazapine (REMERON) 15 MG tablet; Take 1 tablet by mouth nightly  Dispense: 90 tablet; Refill: 1    3. S/P CABG (coronary artery bypass graft)    - atorvastatin (LIPITOR) 20 MG tablet; Take 1 tablet by mouth nightly  Dispense: 90 tablet; Refill: 1  - aspirin 81 MG tablet; Take 1 tablet by mouth daily  Dispense: 90 tablet; Refill: 3    4. Essential hypertension    - atenolol (TENORMIN) 50 MG tablet; Take 1 tablet by mouth daily  Dispense: 90 tablet; Refill: 1  - triamterene-hydrochlorothiazide (MAXZIDE-25) 37.5-25 MG per tablet; Take 1 tablet by mouth daily  Dispense: 90 tablet; Refill: 1    5. Benign essential tremor    - carBAMazepine (TEGRETOL) 200 MG tablet; Take 1 tablet by mouth 2 times daily  Dispense: 180 tablet; Refill: 3    6. Mixed hyperlipidemia      7. Type 2 diabetes mellitus without complication, without long-term current use of insulin ([facility])    - metFORMIN (GLUCOPHAGE) 500 MG tablet; Take 1 tablet by mouth 2 times daily (with meals)  Dispense: 180 tablet; Refill: 1    8. Hx of upper gastrointestinal hemorrhage    - pantoprazole (PROTONIX) 40 MG tablet; Take 1 tablet by mouth daily  Dispense: 30 tablet; Refill: 3    9. Vitamin B12 deficiency  - vitamin B-12 (CYANOCOBALAMIN) 1000 MCG tablet; Take 1 tablet by mouth daily  Dispense: 90 tablet; Refill: 3    10. Iron deficiency    - ferrous sulfate 325 (65 Fe) MG tablet; Take 1 tablet by mouth daily (with breakfast)  Dispense: 90 tablet; Refill: 3            Diagnosis Orders   1. Recurrent major depressive disorder, in partial remission (HCC)  citalopram (CELEXA) 20 MG tablet    mirtazapine (REMERON) 15 MG tablet   2. S/P angioplasty with stent  atorvastatin (LIPITOR) 20 MG tablet    nitroGLYCERIN (NITROSTAT) 0.4 MG SL tablet   3.  S/P CABG (coronary artery bypass graft)  atorvastatin (LIPITOR) 20 MG tablet    aspirin 81 MG tablet   4. Essential hypertension  atenolol (TENORMIN) 50 MG tablet    triamterene-hydrochlorothiazide (MAXZIDE-25) 37.5-25 MG per tablet   5. Benign essential tremor  carBAMazepine (TEGRETOL) 200 MG tablet   6. Mixed hyperlipidemia     7. Type 2 diabetes mellitus without complication, without long-term current use of insulin (HCC)  metFORMIN (GLUCOPHAGE) 500 MG tablet   8. Hx of upper gastrointestinal hemorrhage  pantoprazole (PROTONIX) 40 MG tablet   9. Vitamin B12 deficiency  vitamin B-12 (CYANOCOBALAMIN) 1000 MCG tablet   10. Iron deficiency  ferrous sulfate 325 (65 Fe) MG tablet           Plan:      Return in about 4 months (around 8/16/2019). No orders of the defined types were placed in this encounter.     Orders Placed This Encounter   Medications    citalopram (CELEXA) 20 MG tablet     Sig: Take 1 tablet by mouth daily     Dispense:  90 tablet     Refill:  3    carBAMazepine (TEGRETOL) 200 MG tablet     Sig: Take 1 tablet by mouth 2 times daily     Dispense:  180 tablet     Refill:  3    metFORMIN (GLUCOPHAGE) 500 MG tablet     Sig: Take 1 tablet by mouth 2 times daily (with meals)     Dispense:  180 tablet     Refill:  1    ferrous sulfate 325 (65 Fe) MG tablet     Sig: Take 1 tablet by mouth daily (with breakfast)     Dispense:  90 tablet     Refill:  3    atenolol (TENORMIN) 50 MG tablet     Sig: Take 1 tablet by mouth daily     Dispense:  90 tablet     Refill:  1    atorvastatin (LIPITOR) 20 MG tablet     Sig: Take 1 tablet by mouth nightly     Dispense:  90 tablet     Refill:  1    mirtazapine (REMERON) 15 MG tablet     Sig: Take 1 tablet by mouth nightly     Dispense:  90 tablet     Refill:  1    triamterene-hydrochlorothiazide (MAXZIDE-25) 37.5-25 MG per tablet     Sig: Take 1 tablet by mouth daily     Dispense:  90 tablet     Refill:  1    pantoprazole (PROTONIX) 40 MG tablet     Sig: Take 1 tablet by mouth daily     Dispense:  30 tablet Refill:  3    vitamin B-12 (CYANOCOBALAMIN) 1000 MCG tablet     Sig: Take 1 tablet by mouth daily     Dispense:  90 tablet     Refill:  3    nitroGLYCERIN (NITROSTAT) 0.4 MG SL tablet     Sig: Place 1 tablet under the tongue every 5 minutes as needed for Chest pain up to max of 3 total doses. If no relief after 1 dose, call 911. Dispense:  25 tablet     Refill:  3    aspirin 81 MG tablet     Sig: Take 1 tablet by mouth daily     Dispense:  90 tablet     Refill:  3         Patient given educational materials - see patient instructions. Discussed use, benefit, and side effects of prescribedmedications. All patient questions answered. Pt voiced understanding. Reviewed health maintenance. Instructed to continue current medications, diet and exercise. Patient agreed with treatment plan. Follow up as directed. Of the given duration appointment visit, Dr. Jiles Ormond, MD  spent at least 50% of the face-to-face time in counseling, explanation of diagnosis, planning of further management, and answering all questions. Electronically signed by Jiles Ormond, MD on 4/17/2019 at 8:32 AM      Please note that this chart was generated using voice recognition Dragon dictation software. Although every effort was made to ensure the accuracy of this automatedtranscription, some errors in transcription may have occurred.

## 2019-08-19 ENCOUNTER — OFFICE VISIT (OUTPATIENT)
Dept: PRIMARY CARE CLINIC | Age: 84
End: 2019-08-19
Payer: MEDICARE

## 2019-08-19 VITALS
SYSTOLIC BLOOD PRESSURE: 162 MMHG | OXYGEN SATURATION: 98 % | RESPIRATION RATE: 15 BRPM | DIASTOLIC BLOOD PRESSURE: 68 MMHG | HEART RATE: 62 BPM | WEIGHT: 148.2 LBS | BODY MASS INDEX: 28.94 KG/M2

## 2019-08-19 DIAGNOSIS — Z95.820 S/P ANGIOPLASTY WITH STENT: ICD-10-CM

## 2019-08-19 DIAGNOSIS — G25.0 BENIGN ESSENTIAL TREMOR: ICD-10-CM

## 2019-08-19 DIAGNOSIS — E11.9 TYPE 2 DIABETES MELLITUS WITHOUT COMPLICATION, WITHOUT LONG-TERM CURRENT USE OF INSULIN (HCC): ICD-10-CM

## 2019-08-19 DIAGNOSIS — Z95.1 S/P CABG (CORONARY ARTERY BYPASS GRAFT): ICD-10-CM

## 2019-08-19 DIAGNOSIS — F33.41 RECURRENT MAJOR DEPRESSIVE DISORDER, IN PARTIAL REMISSION (HCC): ICD-10-CM

## 2019-08-19 DIAGNOSIS — I10 ESSENTIAL HYPERTENSION: Primary | ICD-10-CM

## 2019-08-19 DIAGNOSIS — E53.8 VITAMIN B12 DEFICIENCY: ICD-10-CM

## 2019-08-19 DIAGNOSIS — Z87.19 HX OF UPPER GASTROINTESTINAL HEMORRHAGE: ICD-10-CM

## 2019-08-19 DIAGNOSIS — E61.1 IRON DEFICIENCY: ICD-10-CM

## 2019-08-19 LAB — HBA1C MFR BLD: 6 %

## 2019-08-19 PROCEDURE — 83036 HEMOGLOBIN GLYCOSYLATED A1C: CPT | Performed by: INTERNAL MEDICINE

## 2019-08-19 PROCEDURE — 99214 OFFICE O/P EST MOD 30 MIN: CPT | Performed by: INTERNAL MEDICINE

## 2019-08-19 RX ORDER — TRIAMTERENE AND HYDROCHLOROTHIAZIDE 37.5; 25 MG/1; MG/1
1 TABLET ORAL DAILY
Qty: 90 TABLET | Refills: 1 | Status: ON HOLD | OUTPATIENT
Start: 2019-08-19 | End: 2021-10-12

## 2019-08-19 RX ORDER — LANOLIN ALCOHOL/MO/W.PET/CERES
1000 CREAM (GRAM) TOPICAL DAILY
Qty: 90 TABLET | Refills: 3 | Status: SHIPPED | OUTPATIENT
Start: 2019-08-19 | End: 2021-08-02 | Stop reason: SDUPTHER

## 2019-08-19 RX ORDER — CARBAMAZEPINE 200 MG/1
200 TABLET ORAL 2 TIMES DAILY
Qty: 180 TABLET | Refills: 3 | Status: SHIPPED | OUTPATIENT
Start: 2019-08-19 | End: 2021-08-02 | Stop reason: SDUPTHER

## 2019-08-19 RX ORDER — ATORVASTATIN CALCIUM 20 MG/1
20 TABLET, FILM COATED ORAL NIGHTLY
Qty: 90 TABLET | Refills: 1 | Status: SHIPPED | OUTPATIENT
Start: 2019-08-19 | End: 2021-08-02 | Stop reason: SDUPTHER

## 2019-08-19 RX ORDER — ATENOLOL 50 MG/1
50 TABLET ORAL DAILY
Qty: 90 TABLET | Refills: 1 | Status: SHIPPED | OUTPATIENT
Start: 2019-08-19 | End: 2021-08-02 | Stop reason: SDUPTHER

## 2019-08-19 RX ORDER — FERROUS SULFATE 325(65) MG
325 TABLET ORAL
Qty: 90 TABLET | Refills: 3 | Status: SHIPPED | OUTPATIENT
Start: 2019-08-19 | End: 2021-08-02 | Stop reason: SDUPTHER

## 2019-08-19 RX ORDER — CITALOPRAM 20 MG/1
20 TABLET ORAL DAILY
Qty: 90 TABLET | Refills: 3 | Status: SHIPPED | OUTPATIENT
Start: 2019-08-19 | End: 2021-08-02 | Stop reason: SDUPTHER

## 2019-08-19 RX ORDER — MIRTAZAPINE 15 MG/1
15 TABLET, FILM COATED ORAL NIGHTLY
Qty: 90 TABLET | Refills: 1 | Status: SHIPPED | OUTPATIENT
Start: 2019-08-19 | End: 2021-08-02 | Stop reason: SDUPTHER

## 2019-08-19 RX ORDER — PANTOPRAZOLE SODIUM 40 MG/1
40 TABLET, DELAYED RELEASE ORAL DAILY
Qty: 30 TABLET | Refills: 3 | Status: SHIPPED | OUTPATIENT
Start: 2019-08-19 | End: 2021-08-02 | Stop reason: SDUPTHER

## 2019-08-21 ENCOUNTER — CARE COORDINATION (OUTPATIENT)
Dept: CARE COORDINATION | Age: 84
End: 2019-08-21

## 2019-08-21 ASSESSMENT — ENCOUNTER SYMPTOMS
SINUS PRESSURE: 0
VOMITING: 0
SINUS PAIN: 0
ABDOMINAL DISTENTION: 0
NAUSEA: 0
BACK PAIN: 0
WHEEZING: 0
COUGH: 0
SHORTNESS OF BREATH: 0
CONSTIPATION: 0
DIARRHEA: 0
ABDOMINAL PAIN: 0

## 2019-10-30 ENCOUNTER — OFFICE VISIT (OUTPATIENT)
Dept: PRIMARY CARE CLINIC | Age: 84
End: 2019-10-30
Payer: MEDICARE

## 2019-10-30 VITALS
BODY MASS INDEX: 29.09 KG/M2 | SYSTOLIC BLOOD PRESSURE: 118 MMHG | HEART RATE: 64 BPM | WEIGHT: 148.15 LBS | RESPIRATION RATE: 18 BRPM | OXYGEN SATURATION: 98 % | DIASTOLIC BLOOD PRESSURE: 76 MMHG | HEIGHT: 60 IN

## 2019-10-30 DIAGNOSIS — B88.8 INFESTATION BY BED BUG: ICD-10-CM

## 2019-10-30 DIAGNOSIS — G25.0 BENIGN ESSENTIAL TREMOR: ICD-10-CM

## 2019-10-30 DIAGNOSIS — I10 ESSENTIAL HYPERTENSION: Primary | ICD-10-CM

## 2019-10-30 DIAGNOSIS — E11.9 TYPE 2 DIABETES MELLITUS WITHOUT COMPLICATION, WITHOUT LONG-TERM CURRENT USE OF INSULIN (HCC): ICD-10-CM

## 2019-10-30 DIAGNOSIS — M79.641 PAIN OF RIGHT HAND: ICD-10-CM

## 2019-10-30 PROCEDURE — 99214 OFFICE O/P EST MOD 30 MIN: CPT | Performed by: FAMILY MEDICINE

## 2019-10-31 ASSESSMENT — ENCOUNTER SYMPTOMS
CHEST TIGHTNESS: 0
ABDOMINAL PAIN: 0
SHORTNESS OF BREATH: 0
NAUSEA: 0

## 2019-12-30 ENCOUNTER — TELEPHONE (OUTPATIENT)
Dept: PRIMARY CARE CLINIC | Age: 84
End: 2019-12-30

## 2020-04-30 ENCOUNTER — TELEPHONE (OUTPATIENT)
Dept: PRIMARY CARE CLINIC | Age: 85
End: 2020-04-30

## 2020-05-04 ENCOUNTER — TELEPHONE (OUTPATIENT)
Dept: PRIMARY CARE CLINIC | Age: 85
End: 2020-05-04

## 2021-01-06 ENCOUNTER — HOSPITAL ENCOUNTER (OUTPATIENT)
Age: 86
Setting detail: SPECIMEN
Discharge: HOME | End: 2021-01-06
Payer: MEDICARE

## 2021-01-06 ENCOUNTER — OUTSIDE SERVICES (OUTPATIENT)
Dept: FAMILY MEDICINE CLINIC | Age: 86
End: 2021-01-06
Payer: MEDICARE
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
## 2021-01-06 DIAGNOSIS — E78.2 MIXED HYPERLIPIDEMIA: ICD-10-CM

## 2021-01-06 DIAGNOSIS — I10 ESSENTIAL HYPERTENSION: ICD-10-CM

## 2021-01-06 DIAGNOSIS — E11.9 TYPE 2 DIABETES MELLITUS WITHOUT COMPLICATION, WITHOUT LONG-TERM CURRENT USE OF INSULIN (HCC): ICD-10-CM

## 2021-01-06 PROCEDURE — 81001 URINALYSIS AUTO W/SCOPE: CPT

## 2021-01-06 PROCEDURE — 99306 1ST NF CARE HIGH MDM 50: CPT | Performed by: FAMILY MEDICINE

## 2021-01-06 NOTE — PROGRESS NOTES
[address] [address]  [place] [address]  Dept: [phone]    SARAH DILLARD RMA, am scribing for and in the presence of Dr. Jeremiah Akers. 1/6/21/9:20am/SNP    Glendy Quiñones is a 80 y.o. female being seen for her initial H&P visit. Location of visit: [facility]    HPI:  Admission History:  1/4/21 New admit, was a patient of Dr. Lourdes Ordoñez in [facility]. She is very independent, staff will be doing showers and medications. DX: depression, anxiety, neck pain    New today:  C/o weak and tired. ROS:  General Constitutional: Denies fever. Denies lightheadedness. Respiratory: Denies cough. Denies shortness of breath. Denies wheezing. Cardiovascular: Denies chest pain at rest.  Gastrointestinal: Denies abdominal pain. Denies blood in the stool. Denies constipation. Denies diarrhea. Denies nausea. Denies vomiting. Genitourinary: Denies blood in the urine. Denies painful urination. Skin: Denies rash. Admits itchy from bed bugs  Neurologic: Denies falls. Denies dizziness. Psychiatric: Denies sleep disturbance. Denies anxiety. Denies depressed mood. PHYSICAL EXAM:    General Appearance: in no acute distress, well nourished. Eyes: pupils equal, round reactive to light and accommodation. Oral Cavity: mucosa moist.  Neck/Thyroid:  no cervical lymphadenopathy, no thyromegaly  Skin: warm and dry, R shoulder, upper arm and anterior chest multiple bites with excoriated changes, bites on legs, diffuse  Heart: regular rate and rhythm. No murmurs. S1, S2 normal, no gallops. Rate 70  Lungs: clear to auscultation bilaterally. Abdomen:soft, nontender, nondistended, no masses or organomegaly. obese  Extremities: no cyanosis, 2+ edema bilat, L>R from vein graft  Peripheral Pulses: 2+ throughout, symetric. Neurologic: verbally responsive, moves extremities. Psych: normal affect, speech fluent. Pleasant     ASSESSMENT:   Diagnosis Orders   1.  Bedbug bite, initial encounter

## 2021-01-07 ENCOUNTER — HOSPITAL ENCOUNTER (OUTPATIENT)
Age: 86
Setting detail: SPECIMEN
Discharge: HOME | End: 2021-01-07
Payer: MEDICARE

## 2021-01-07 LAB
-: ABNORMAL
ABSOLUTE EOS #: 0.29 K/UL (ref 0–0.44)
ABSOLUTE IMMATURE GRANULOCYTE: <0.03 K/UL (ref 0–0.3)
ABSOLUTE LYMPH #: 1.33 K/UL (ref 1.1–3.7)
ABSOLUTE MONO #: 0.32 K/UL (ref 0.1–1.2)
ALBUMIN SERPL-MCNC: 3.2 G/DL (ref 3.5–5.2)
ALBUMIN/GLOBULIN RATIO: 1.6 (ref 1–2.5)
ALP BLD-CCNC: 42 U/L (ref 35–104)
ALT SERPL-CCNC: 6 U/L (ref 5–33)
AMORPHOUS: ABNORMAL
ANION GAP SERPL CALCULATED.3IONS-SCNC: 6 MMOL/L (ref 9–17)
AST SERPL-CCNC: 15 U/L
BACTERIA: ABNORMAL
BASOPHILS # BLD: 1 % (ref 0–2)
BASOPHILS ABSOLUTE: 0.03 K/UL (ref 0–0.2)
BILIRUB SERPL-MCNC: <0.1 MG/DL (ref 0.3–1.2)
BILIRUBIN URINE: NEGATIVE
BNP INTERPRETATION: ABNORMAL
BUN BLDV-MCNC: 20 MG/DL (ref 8–23)
BUN/CREAT BLD: 18 (ref 9–20)
CALCIUM SERPL-MCNC: 7.9 MG/DL (ref 8.6–10.4)
CARBAMAZEPINE DATE LAST DOSE: NORMAL
CARBAMAZEPINE DOSE AMOUNT: NORMAL
CARBAMAZEPINE DOSE TIME: NORMAL
CARBAMAZEPINE LEVEL: 8.9 UG/ML (ref 4–12)
CASTS UA: ABNORMAL /LPF
CHLORIDE BLD-SCNC: 105 MMOL/L (ref 98–107)
CHOLESTEROL/HDL RATIO: 3
CHOLESTEROL: 96 MG/DL
CO2: 27 MMOL/L (ref 20–31)
COLOR: YELLOW
COMMENT UA: ABNORMAL
CREAT SERPL-MCNC: 1.1 MG/DL (ref 0.5–0.9)
CRYSTALS, UA: ABNORMAL /HPF
DIFFERENTIAL TYPE: ABNORMAL
EOSINOPHILS RELATIVE PERCENT: 6 % (ref 1–4)
EPITHELIAL CELLS UA: ABNORMAL /HPF (ref 0–25)
ESTIMATED AVERAGE GLUCOSE: 128 MG/DL
FOLATE: 5.9 NG/ML
GFR AFRICAN AMERICAN: 57 ML/MIN
GFR NON-AFRICAN AMERICAN: 47 ML/MIN
GFR SERPL CREATININE-BSD FRML MDRD: ABNORMAL ML/MIN/{1.73_M2}
GFR SERPL CREATININE-BSD FRML MDRD: ABNORMAL ML/MIN/{1.73_M2}
GLUCOSE BLD-MCNC: 95 MG/DL (ref 70–99)
GLUCOSE URINE: NEGATIVE
HBA1C MFR BLD: 6.1 % (ref 4–6)
HCT VFR BLD CALC: 27 % (ref 36.3–47.1)
HDLC SERPL-MCNC: 32 MG/DL
HEMOGLOBIN: 8.1 G/DL (ref 11.9–15.1)
IMMATURE GRANULOCYTES: 0 %
KETONES, URINE: NEGATIVE
LDL CHOLESTEROL: 28 MG/DL (ref 0–130)
LEUKOCYTE ESTERASE, URINE: NEGATIVE
LYMPHOCYTES # BLD: 27 % (ref 24–43)
MCH RBC QN AUTO: 31.3 PG (ref 25.2–33.5)
MCHC RBC AUTO-ENTMCNC: 30 G/DL (ref 28.4–34.8)
MCV RBC AUTO: 104.2 FL (ref 82.6–102.9)
MONOCYTES # BLD: 7 % (ref 3–12)
MUCUS: ABNORMAL
NITRITE, URINE: NEGATIVE
NRBC AUTOMATED: 0 PER 100 WBC
OTHER OBSERVATIONS UA: ABNORMAL
PDW BLD-RTO: 14.6 % (ref 11.8–14.4)
PH UA: 5.5 (ref 5–9)
PLATELET # BLD: 149 K/UL (ref 138–453)
PLATELET ESTIMATE: ABNORMAL
PMV BLD AUTO: 12 FL (ref 8.1–13.5)
POTASSIUM SERPL-SCNC: 4.2 MMOL/L (ref 3.7–5.3)
PRO-BNP: 3079 PG/ML
PROTEIN UA: NEGATIVE
RBC # BLD: 2.59 M/UL (ref 3.95–5.11)
RBC # BLD: ABNORMAL 10*6/UL
RBC UA: ABNORMAL /HPF (ref 0–2)
RENAL EPITHELIAL, UA: ABNORMAL /HPF
SEG NEUTROPHILS: 59 % (ref 36–65)
SEGMENTED NEUTROPHILS ABSOLUTE COUNT: 2.88 K/UL (ref 1.5–8.1)
SODIUM BLD-SCNC: 138 MMOL/L (ref 135–144)
SPECIFIC GRAVITY UA: 1.02 (ref 1.01–1.02)
TOTAL PROTEIN: 5.2 G/DL (ref 6.4–8.3)
TRICHOMONAS: ABNORMAL
TRIGL SERPL-MCNC: 181 MG/DL
TSH SERPL DL<=0.05 MIU/L-ACNC: 1.41 MIU/L (ref 0.3–5)
TURBIDITY: CLEAR
URINE HGB: NEGATIVE
UROBILINOGEN, URINE: NORMAL
VITAMIN B-12: 368 PG/ML (ref 232–1245)
VITAMIN D 25-HYDROXY: 7.9 NG/ML (ref 30–100)
VLDLC SERPL CALC-MCNC: ABNORMAL MG/DL (ref 1–30)
WBC # BLD: 4.9 K/UL (ref 3.5–11.3)
WBC # BLD: ABNORMAL 10*3/UL
WBC UA: ABNORMAL /HPF (ref 0–5)
YEAST: ABNORMAL

## 2021-01-07 PROCEDURE — 83880 ASSAY OF NATRIURETIC PEPTIDE: CPT

## 2021-01-07 PROCEDURE — 83036 HEMOGLOBIN GLYCOSYLATED A1C: CPT

## 2021-01-07 PROCEDURE — 85025 COMPLETE CBC W/AUTO DIFF WBC: CPT

## 2021-01-07 PROCEDURE — 82607 VITAMIN B-12: CPT

## 2021-01-07 PROCEDURE — 80061 LIPID PANEL: CPT

## 2021-01-07 PROCEDURE — 80053 COMPREHEN METABOLIC PANEL: CPT

## 2021-01-07 PROCEDURE — 82746 ASSAY OF FOLIC ACID SERUM: CPT

## 2021-01-07 PROCEDURE — 80156 ASSAY CARBAMAZEPINE TOTAL: CPT

## 2021-01-07 PROCEDURE — 36415 COLL VENOUS BLD VENIPUNCTURE: CPT

## 2021-01-07 PROCEDURE — 84443 ASSAY THYROID STIM HORMONE: CPT

## 2021-01-07 PROCEDURE — P9603 ONE-WAY ALLOW PRORATED MILES: HCPCS

## 2021-01-07 PROCEDURE — 82306 VITAMIN D 25 HYDROXY: CPT

## 2021-01-08 NOTE — RESULT ENCOUNTER NOTE
Urine looks like infected  Start Keflex 500mg bid 7 days  Vit D 50,000 units weekly  Lasix 20mg qd  Bmp in 1 week

## 2021-01-18 ENCOUNTER — HOSPITAL ENCOUNTER (OUTPATIENT)
Age: 86
Setting detail: SPECIMEN
Discharge: HOME | End: 2021-01-18
Payer: MEDICARE

## 2021-02-03 ENCOUNTER — OUTSIDE SERVICES (OUTPATIENT)
Dept: FAMILY MEDICINE CLINIC | Age: 86
End: 2021-02-03
Payer: MEDICARE

## 2021-02-03 DIAGNOSIS — E78.2 MIXED HYPERLIPIDEMIA: ICD-10-CM

## 2021-02-03 DIAGNOSIS — B86 SCABIES: ICD-10-CM

## 2021-02-03 DIAGNOSIS — I10 ESSENTIAL HYPERTENSION: Primary | ICD-10-CM

## 2021-02-03 DIAGNOSIS — E11.9 TYPE 2 DIABETES MELLITUS WITHOUT COMPLICATION, WITHOUT LONG-TERM CURRENT USE OF INSULIN (HCC): ICD-10-CM

## 2021-02-03 PROCEDURE — 99308 SBSQ NF CARE LOW MDM 20: CPT | Performed by: FAMILY MEDICINE

## 2021-02-03 NOTE — PROGRESS NOTES
[address] [address]  [place] [address]  Dept: [phone]    SARAH DILLARD RMA, am scribing for and in the presence of Dr. Buck Severe. 2/3/21/10:33am/SNP    Bob Mohan is a 80 y.o. female being seen for her monthly follow up. Location of visit: [facility]    HPI:  Admission History:  1/4/21 New admit, was a patient of Dr. Reynold Welch in [place]. She is very independent, staff will be doing showers and medications. DX: depression, anxiety, neck pain    Last visit:  Her son had sudden cardiac death, which she found him at home. She was living at a place with used furniture and has a lot of bed bug bites on her arms and trunk.      She is a nonsmoker, no cardiac symptoms.      I will order CBC, CMP, A1C, Lipid, Vitamin B 12, Folate, TSH, Tegretol level, Vitamin D 25, BNP and UA.      I also will start her on Keflex 500 mg tid x 1 week, some bites appear to be getting infected. For the itch, I will starat her on 10 mg Atarax tid. Faxes since last seen:  ? 1/5/21 [facility] fax: Hydroxyzine not covered  this is what she needs and not much else will substitute, it should be very inexpensive for 1 week supply  ? 1/6/21 [facility] Keflex approved despite PCN allergy  ? 1/8/21 Labs reviewed-urine looks infected, start Keflex 500 mg BID x 7 days, Vit. D 50,000 units weekly, Lasix 20 mg qd BMP x 1 week  ? 1/12/21 Pt has Atarax for itching, states she has been sleeping so much better with it, had been worrying too much about her sons death to sleep- Melatonin 5 mg qhs  ? 1/13/21 [facility]   ? 1/20/21 Hydroxyzine not covered, switch to levocetirizine  ? 1/22/21 C/o itching from bed bugs, try Benadryl tid prn  ? 1/25/21 Will receive s Aid and Attendance through the [facility] today:  Chiqui Rangel still has quite a bit of itching with excoriations all over the body, face, arms, hands, blisters. She is eating alright and not in any distress.      ROS: General Constitutional: Denies fever. Denies lightheadedness. Respiratory: Denies cough. Denies shortness of breath. Denies wheezing. Cardiovascular: Denies chest pain at rest.  Gastrointestinal: Denies abdominal pain. Denies blood in the stool. Denies constipation. Denies diarrhea. Denies nausea. Denies vomiting. Genitourinary: Denies blood in the urine. Denies painful urination. Skin:  Admits rash and extreme itching. Neurologic: Denies falls. Denies dizziness. Psychiatric: Denies sleep disturbance. Denies anxiety. Denies depressed mood. PHYSICAL EXAM:    General Appearance: in no acute distress, well nourished. Eyes: pupils equal, round reactive to light and accommodation. Oral Cavity: mucosa moist.  Neck/Thyroid:  no cervical lymphadenopathy, no thyromegaly  Skin: warm and dry vesiculated lesions, palms of hands, arms and legs, scratched open. Heart: regular rate and rhythm. No murmurs. S1, S2 normal, no gallops. Lungs: clear to auscultation bilaterally. Abdomen:soft, nontender, nondistended, no masses or organomegaly. Extremities: no cyanosis or edema. Peripheral Pulses: 2+ throughout, symetric. Neurologic: verbally responsive, moves extremities. Psych: normal affect, speech fluent. ASSESSMENT:   Diagnosis Orders   1. Essential hypertension     2. Type 2 diabetes mellitus without complication, without long-term current use of insulin ([facility] [address]     3. Mixed hyperlipidemia     4. Scabies         PLAN:  I will have her take Ivermectin 12 mg po today and repeat the dose in 1 week. I also will have her start Prednisone 10 mg po bid x 5 days then 10 mg po qd x 5 days. I, Dr. Glenroy Zavala, personally performed the services described in this documentation as scribed by SARAH Neil in my presence, and is both accurate and complete.

## 2021-02-10 ENCOUNTER — HOSPITAL ENCOUNTER (OUTPATIENT)
Age: 86
Setting detail: SPECIMEN
Discharge: HOME | End: 2021-02-10
Payer: MEDICARE

## 2021-02-10 PROCEDURE — 87205 SMEAR GRAM STAIN: CPT

## 2021-02-10 PROCEDURE — 87070 CULTURE OTHR SPECIMN AEROBIC: CPT

## 2021-02-12 LAB
CULTURE: ABNORMAL
DIRECT EXAM: ABNORMAL
DIRECT EXAM: ABNORMAL
Lab: ABNORMAL
SPECIMEN DESCRIPTION: ABNORMAL

## 2021-03-02 ENCOUNTER — APPOINTMENT (OUTPATIENT)
Dept: GENERAL RADIOLOGY | Age: 86
End: 2021-03-02
Payer: MEDICARE

## 2021-03-02 ENCOUNTER — APPOINTMENT (OUTPATIENT)
Dept: CT IMAGING | Age: 86
End: 2021-03-02
Payer: MEDICARE

## 2021-03-02 ENCOUNTER — HOSPITAL ENCOUNTER (EMERGENCY)
Age: 86
Discharge: ANOTHER HOSPITAL | End: 2021-03-02
Attending: EMERGENCY MEDICINE
Payer: MEDICARE

## 2021-03-02 VITALS
WEIGHT: 130 LBS | BODY MASS INDEX: 25.39 KG/M2 | OXYGEN SATURATION: 96 % | HEART RATE: 59 BPM | DIASTOLIC BLOOD PRESSURE: 37 MMHG | RESPIRATION RATE: 16 BRPM | TEMPERATURE: 99.2 F | SYSTOLIC BLOOD PRESSURE: 120 MMHG

## 2021-03-02 DIAGNOSIS — L12.9 PEMPHIGOID: Primary | ICD-10-CM

## 2021-03-02 DIAGNOSIS — N17.9 ACUTE KIDNEY INJURY (HCC): ICD-10-CM

## 2021-03-02 LAB
-: ABNORMAL
ABSOLUTE EOS #: 0.63 K/UL (ref 0–0.44)
ABSOLUTE IMMATURE GRANULOCYTE: 0.03 K/UL (ref 0–0.3)
ABSOLUTE LYMPH #: 1.6 K/UL (ref 1.1–3.7)
ABSOLUTE MONO #: 0.43 K/UL (ref 0.1–1.2)
ALBUMIN SERPL-MCNC: 3.8 G/DL (ref 3.5–5.2)
ALBUMIN/GLOBULIN RATIO: 1.3 (ref 1–2.5)
ALP BLD-CCNC: 90 U/L (ref 35–104)
ALT SERPL-CCNC: 9 U/L (ref 5–33)
AMORPHOUS: ABNORMAL
ANION GAP SERPL CALCULATED.3IONS-SCNC: 20 MMOL/L (ref 9–17)
AST SERPL-CCNC: 18 U/L
BACTERIA: ABNORMAL
BASOPHILS # BLD: 1 % (ref 0–2)
BASOPHILS ABSOLUTE: 0.05 K/UL (ref 0–0.2)
BILIRUB SERPL-MCNC: 0.19 MG/DL (ref 0.3–1.2)
BILIRUBIN URINE: NEGATIVE
BUN BLDV-MCNC: 62 MG/DL (ref 8–23)
BUN/CREAT BLD: 24 (ref 9–20)
CALCIUM SERPL-MCNC: 9.1 MG/DL (ref 8.6–10.4)
CASTS UA: ABNORMAL /LPF
CHLORIDE BLD-SCNC: 90 MMOL/L (ref 98–107)
CO2: 25 MMOL/L (ref 20–31)
COLOR: YELLOW
COMMENT UA: ABNORMAL
CREAT SERPL-MCNC: 2.54 MG/DL (ref 0.5–0.9)
CRYSTALS, UA: ABNORMAL /HPF
DIFFERENTIAL TYPE: ABNORMAL
EOSINOPHILS RELATIVE PERCENT: 8 % (ref 1–4)
EPITHELIAL CELLS UA: ABNORMAL /HPF (ref 0–25)
GFR AFRICAN AMERICAN: 22 ML/MIN
GFR NON-AFRICAN AMERICAN: 18 ML/MIN
GFR SERPL CREATININE-BSD FRML MDRD: ABNORMAL ML/MIN/{1.73_M2}
GFR SERPL CREATININE-BSD FRML MDRD: ABNORMAL ML/MIN/{1.73_M2}
GLUCOSE BLD-MCNC: 132 MG/DL (ref 70–99)
GLUCOSE URINE: NEGATIVE
HCT VFR BLD CALC: 37.7 % (ref 36.3–47.1)
HEMOGLOBIN: 11.6 G/DL (ref 11.9–15.1)
IMMATURE GRANULOCYTES: 0 %
INR BLD: 1
KETONES, URINE: NEGATIVE
LACTIC ACID, SEPSIS WHOLE BLOOD: ABNORMAL MMOL/L (ref 0.5–1.9)
LACTIC ACID, SEPSIS WHOLE BLOOD: NORMAL MMOL/L (ref 0.5–1.9)
LACTIC ACID, SEPSIS: 1.4 MMOL/L (ref 0.5–1.9)
LACTIC ACID, SEPSIS: 3.6 MMOL/L (ref 0.5–1.9)
LEUKOCYTE ESTERASE, URINE: ABNORMAL
LYMPHOCYTES # BLD: 20 % (ref 24–43)
MCH RBC QN AUTO: 30.2 PG (ref 25.2–33.5)
MCHC RBC AUTO-ENTMCNC: 30.8 G/DL (ref 28.4–34.8)
MCV RBC AUTO: 98.2 FL (ref 82.6–102.9)
MONOCYTES # BLD: 5 % (ref 3–12)
MUCUS: ABNORMAL
NITRITE, URINE: NEGATIVE
NRBC AUTOMATED: 0 PER 100 WBC
OTHER OBSERVATIONS UA: ABNORMAL
PARTIAL THROMBOPLASTIN TIME: 25.6 SEC (ref 23.9–33.8)
PDW BLD-RTO: 13.1 % (ref 11.8–14.4)
PH UA: 5.5 (ref 5–9)
PLATELET # BLD: 197 K/UL (ref 138–453)
PLATELET ESTIMATE: ABNORMAL
PMV BLD AUTO: 12 FL (ref 8.1–13.5)
POTASSIUM SERPL-SCNC: 4.2 MMOL/L (ref 3.7–5.3)
PROTEIN UA: NEGATIVE
PROTHROMBIN TIME: 12.9 SEC (ref 11.5–14.2)
RBC # BLD: 3.84 M/UL (ref 3.95–5.11)
RBC # BLD: ABNORMAL 10*6/UL
RBC UA: ABNORMAL /HPF (ref 0–2)
RENAL EPITHELIAL, UA: ABNORMAL /HPF
SARS-COV-2, RAPID: NOT DETECTED
SEG NEUTROPHILS: 66 % (ref 36–65)
SEGMENTED NEUTROPHILS ABSOLUTE COUNT: 5.42 K/UL (ref 1.5–8.1)
SODIUM BLD-SCNC: 135 MMOL/L (ref 135–144)
SPECIFIC GRAVITY UA: 1.01 (ref 1.01–1.02)
SPECIMEN DESCRIPTION: NORMAL
TOTAL PROTEIN: 6.7 G/DL (ref 6.4–8.3)
TRICHOMONAS: ABNORMAL
TROPONIN INTERP: ABNORMAL
TROPONIN T: ABNORMAL NG/ML
TROPONIN, HIGH SENSITIVITY: 30 NG/L (ref 0–14)
TURBIDITY: CLEAR
URINE HGB: NEGATIVE
UROBILINOGEN, URINE: NORMAL
WBC # BLD: 8.2 K/UL (ref 3.5–11.3)
WBC # BLD: ABNORMAL 10*3/UL
WBC UA: ABNORMAL /HPF (ref 0–5)
YEAST: ABNORMAL

## 2021-03-02 PROCEDURE — 80053 COMPREHEN METABOLIC PANEL: CPT

## 2021-03-02 PROCEDURE — 87040 BLOOD CULTURE FOR BACTERIA: CPT

## 2021-03-02 PROCEDURE — 96374 THER/PROPH/DIAG INJ IV PUSH: CPT

## 2021-03-02 PROCEDURE — 85025 COMPLETE CBC W/AUTO DIFF WBC: CPT

## 2021-03-02 PROCEDURE — 36415 COLL VENOUS BLD VENIPUNCTURE: CPT

## 2021-03-02 PROCEDURE — 81001 URINALYSIS AUTO W/SCOPE: CPT

## 2021-03-02 PROCEDURE — 84484 ASSAY OF TROPONIN QUANT: CPT

## 2021-03-02 PROCEDURE — 72125 CT NECK SPINE W/O DYE: CPT

## 2021-03-02 PROCEDURE — 2580000003 HC RX 258: Performed by: EMERGENCY MEDICINE

## 2021-03-02 PROCEDURE — 85610 PROTHROMBIN TIME: CPT

## 2021-03-02 PROCEDURE — C9803 HOPD COVID-19 SPEC COLLECT: HCPCS

## 2021-03-02 PROCEDURE — 93005 ELECTROCARDIOGRAM TRACING: CPT | Performed by: EMERGENCY MEDICINE

## 2021-03-02 PROCEDURE — 99284 EMERGENCY DEPT VISIT MOD MDM: CPT

## 2021-03-02 PROCEDURE — 71046 X-RAY EXAM CHEST 2 VIEWS: CPT

## 2021-03-02 PROCEDURE — 6360000002 HC RX W HCPCS: Performed by: EMERGENCY MEDICINE

## 2021-03-02 PROCEDURE — 70450 CT HEAD/BRAIN W/O DYE: CPT

## 2021-03-02 PROCEDURE — 85730 THROMBOPLASTIN TIME PARTIAL: CPT

## 2021-03-02 PROCEDURE — U0002 COVID-19 LAB TEST NON-CDC: HCPCS

## 2021-03-02 PROCEDURE — 73200 CT UPPER EXTREMITY W/O DYE: CPT

## 2021-03-02 PROCEDURE — 73060 X-RAY EXAM OF HUMERUS: CPT

## 2021-03-02 PROCEDURE — 83605 ASSAY OF LACTIC ACID: CPT

## 2021-03-02 RX ORDER — CLINDAMYCIN PHOSPHATE 900 MG/50ML
900 INJECTION INTRAVENOUS ONCE
Status: DISCONTINUED | OUTPATIENT
Start: 2021-03-02 | End: 2021-03-02

## 2021-03-02 RX ORDER — IVERMECTIN 3 MG/1
TABLET ORAL ONCE
COMMUNITY
End: 2021-10-12

## 2021-03-02 RX ORDER — SODIUM CHLORIDE 9 MG/ML
1000 INJECTION, SOLUTION INTRAVENOUS CONTINUOUS
Status: DISCONTINUED | OUTPATIENT
Start: 2021-03-02 | End: 2021-03-03 | Stop reason: HOSPADM

## 2021-03-02 RX ORDER — DIPHENHYDRAMINE HYDROCHLORIDE 50 MG/ML
12.5 INJECTION INTRAMUSCULAR; INTRAVENOUS ONCE
Status: COMPLETED | OUTPATIENT
Start: 2021-03-02 | End: 2021-03-02

## 2021-03-02 RX ORDER — FUROSEMIDE 20 MG/1
20 TABLET ORAL DAILY
COMMUNITY
End: 2021-08-02 | Stop reason: SDUPTHER

## 2021-03-02 RX ORDER — 0.9 % SODIUM CHLORIDE 0.9 %
500 INTRAVENOUS SOLUTION INTRAVENOUS ONCE
Status: COMPLETED | OUTPATIENT
Start: 2021-03-02 | End: 2021-03-02

## 2021-03-02 RX ORDER — CHOLECALCIFEROL (VITAMIN D3) 125 MCG
50000 TABLET ORAL WEEKLY
COMMUNITY
End: 2021-08-02 | Stop reason: SDUPTHER

## 2021-03-02 RX ADMIN — DIPHENHYDRAMINE HYDROCHLORIDE 12.5 MG: 50 INJECTION, SOLUTION INTRAMUSCULAR; INTRAVENOUS at 19:30

## 2021-03-02 RX ADMIN — SODIUM CHLORIDE 1000 ML: 9 INJECTION, SOLUTION INTRAVENOUS at 15:00

## 2021-03-02 RX ADMIN — SODIUM CHLORIDE 500 ML: 9 INJECTION, SOLUTION INTRAVENOUS at 14:56

## 2021-03-02 NOTE — ED NOTES
Contacted [facility] to set up transport as we have a room at [facility].      Janny CARRILLO Reser  03/02/21 [id number]

## 2021-03-02 NOTE — ED PROVIDER NOTES
[address] [facility] ED  EMERGENCY DEPARTMENT ENCOUNTER      Pt Name: Lala Jordan  MRN: [id number]  [place] 11/21/1931  Date of evaluation: 3/2/2021  Provider: Dante Mcelroy MD    CHIEF COMPLAINT       Chief Complaint   Patient presents with    Fall     patient fel this am hitting her head, Dr Ann Marie Metcalf wants patient checked out for erupting blood blister throughout entire body.  Rash         HISTORY OF PRESENT ILLNESS   (Location/Symptom, Timing/Onset, Context/Setting, Quality, Duration, Modifying Factors, Severity)  Note limiting factors. Lala Jordan is a 80 y.o. female who presents to the emergency department     80year-old female sent to the emergency department from her extended care facility after slipping and hitting her head. Patient states she had stood up and was walking to the bathroom when she slipped and fell striking her right shoulder and head. She did not lose consciousness. She was able to stand and walk after the fall. She is complaining of pain in her head as well as her left shoulder area. She denies any dizziness weakness or lightheadedness. Any chest pain or shortness of breath. No UTI symptoms. In addition to being evaluated for fall patient has had an extensive rash that has been present for several weeks. The patient states she remembers she developed blood blisters on her hand which is where the rash started. Since then she has had a spread and the rash to her face neck chest back and as well as her feet. She states the areas itch but she is not experiencing any significant pain from them. She has been treated with steroids and other medications with no improvement. She denies any recent fevers chills no recent weight loss. Denies any previous similar episodes. Denies any new medications foods or hygiene products. She is denying any other acute concerns. Nursing Notes were reviewed.     REVIEW OF SYSTEMS    (2-9 systems for level 4, 10 or more for level 5) Review of Systems   All other systems reviewed and are negative. Except as noted above the remainder of the review of systems was reviewed and negative.        PAST MEDICAL HISTORY     Past Medical History:   Diagnosis Date    Anemia 7/201    Arthritis of knee 2018    bilateral knees; \"bone on bone\"; declines surg    Benign essential tremor 04/15/2016    CAD S/P percutaneous coronary angioplasty 1998    [facility] at [facility] Diabetes mellitus type II, controlled ([facility] [address] 1979    Gastrointestinal hemorrhage 2012    Duodenal Ulcer by EGD    History of pancreatitis 2013    after GI bleed    Humerus fracture 2015    right humerus ; [place]    Hyperlipidemia     Hypertension     Major depression     S/P CABG (coronary artery bypass graft) 1996    Ventral hernia 1481    infraumbilical ; incisional    Vitamin B12 deficiency 07/2018    Vitamin B12 270         SURGICAL HISTORY       Past Surgical History:   Procedure Laterality Date    CHOLECYSTECTOMY      COLONOSCOPY N/A 11/5/2018    COLONOSCOPY DIAGNOSTIC OR SCREENING performed by Martinez Jay MD at [facility] 1284    ? number grafts    HYSTERECTOMY, [facility]       Current Discharge Medication List      CONTINUE these medications which have NOT CHANGED    Details   furosemide (LASIX) 20 MG tablet Take 20 mg by mouth daily      ivermectin 3 MG tablet Take by mouth once      Ergocalciferol (VITAMIN D2) 50 MCG (2000 UT) TABS Take 50,000 Units by mouth once a week      atenolol (TENORMIN) 50 MG tablet Take 1 tablet by mouth daily  Qty: 90 tablet, Refills: 1    Associated Diagnoses: Essential hypertension      atorvastatin (LIPITOR) 20 MG tablet Take 1 tablet by mouth nightly  Qty: 90 tablet, Refills: 1    Associated Diagnoses: S/P angioplasty with stent; S/P CABG (coronary artery bypass graft)      carBAMazepine (TEGRETOL) 200 MG tablet Take 1 moist.   Eyes:      Extraocular Movements: Extraocular movements intact. Conjunctiva/sclera: Conjunctivae normal.   Neck:      Musculoskeletal: Normal range of motion and neck supple. Cardiovascular:      Rate and Rhythm: Normal rate and regular rhythm. Pulmonary:      Effort: Pulmonary effort is normal. No respiratory distress. Breath sounds: Normal breath sounds. Abdominal:      General: There is no distension. Palpations: Abdomen is soft. Tenderness: There is no abdominal tenderness. Musculoskeletal: Normal range of motion. General: No swelling. Comments: Patient did have tenderness left proximal humerus. There is no edema ecchymosis or deformity. Skin:     General: Skin is warm and dry. Comments: Patient has multiple bullous lesions on her chest back upper extremities and feet. These are nontender. No signs of secondary infection of lesions at this time. Neurological:      General: No focal deficit present. Mental Status: She is oriented to person, place, and time. DIAGNOSTIC RESULTS     EKG: All EKG's are interpreted by the Emergency Department Physician who either signs or Co-signs this chart in the absence of a cardiologist.    EKG sinus rhythm rate of 58. Right bundle branch block. Diffuse T wave inversions. No acute ST segment changes. Unchanged when compared with previous EKG from 11/3/2018. No acute findings of ischemia or infarction. RADIOLOGY:   Non-plain film images such as CT, Ultrasound and MRI are read by the radiologist. Plain radiographic images are visualized and preliminarily interpreted by the emergency physician with the below findings:        Interpretation per the Radiologist below, if available at the time of this note:    CT SHOULDER LEFT WO CONTRAST   Final Result   1. No acute fracture identified. 2. Mild-to-moderate glenohumeral and moderate acromioclavicular   osteoarthritis.    3. Small glenohumeral effusion. 4. Periarticular calcifications of the left glenohumeral acromioclavicular   joints with calcific tendinosis of the supraspinatus tendon also noted. 5. Atherosclerotic disease. XR HUMERUS LEFT (MIN 2 VIEWS)   Final Result   Questionable lucency through the sub coracoid area of the scapula. Advised   scapular films. XR CHEST (2 VW)   Final Result   There are some chronic lung and bony changes. No evidence of acute   cardiopulmonary process or osseous abnormality. CT Cervical Spine WO Contrast   Final Result   No acute fracture or subluxation of the cervical spine. CT Head WO Contrast   Final Result   No acute intracranial abnormality. Senescent changes are somewhat prominent as described above, but still within   limits of normal for the age of the patient.                ED BEDSIDE ULTRASOUND:   Performed by ED Physician - none    LABS:  Labs Reviewed   CBC WITH AUTO DIFFERENTIAL - Abnormal; Notable for the following components:       Result Value    RBC 3.84 (*)     Hemoglobin 11.6 (*)     Seg Neutrophils 66 (*)     Lymphocytes 20 (*)     Eosinophils % 8 (*)     Absolute Eos # 0.63 (*)     All other components within normal limits   COMPREHENSIVE METABOLIC PANEL W/ REFLEX TO MG FOR LOW K - Abnormal; Notable for the following components:    Glucose 132 (*)     BUN 62 (*)     CREATININE 2.54 (*)     Bun/Cre Ratio 24 (*)     Chloride 90 (*)     Anion Gap 20 (*)     Total Bilirubin 0.19 (*)     GFR Non- 18 (*)     GFR  22 (*)     All other components within normal limits   URINE RT REFLEX TO CULTURE - Abnormal; Notable for the following components:    Leukocyte Esterase, Urine SMALL (*)     All other components within normal limits   LACTATE, SEPSIS - Abnormal; Notable for the following components:    Lactic Acid, Sepsis 3.6 (*)     All other components within normal limits   TROPONIN - Abnormal; Notable for the following components:    Troponin, High Sensitivity 30 (*)     All other components within normal limits   MICROSCOPIC URINALYSIS - Abnormal; Notable for the following components:    Bacteria, UA 3+ (*)     All other components within normal limits   COVID-19, RAPID   CULTURE, BLOOD 1   CULTURE, BLOOD 2   LACTATE, SEPSIS   PROTIME-INR   APTT   APTT       All other labs were within normal range or not returned as of this dictation. EMERGENCY DEPARTMENT COURSE and DIFFERENTIAL DIAGNOSIS/MDM:   Vitals:    Vitals:    03/02/21 1502 03/02/21 1745 03/02/21 1800 03/02/21 1815   BP: (!) 120/37      Pulse:       Resp:       Temp:       TempSrc:       SpO2:  98% 94% 96%   Weight:               MDM  Number of Diagnoses or Management Options  Pemphigoid  Diagnosis management comments: CT head and neck unremarkable. Possible lucency noted on x-ray of humerus near the proximal coronoid. CT has been ordered. Patient continues to have mild tenderness in her left shoulder. Labs reviewed. Patient's not in 2.54, previous labs reviewed. IV fluids 500 mL normal saline bolus administered. Fluids continued 125 mL/h normal saline. Rashes been present for at least 6 weeks. She has been being treated with thyroids and ivermectin. She does complain of some itching of her skin. With the rash. She has not any fevers or chills. No new medications per patient. CT shoulder results reviewed and unremarkable. Patient was discussed via telephone with Zachery Montilla from [facility]. [facility]. Per our conversation she did recommend that the patient be transferred to [facility] for management of her current skin condition. Patient was discussed via telephone with the [facility] transport center as well as Dr. Blank Ramirez the hospitalist at [facility]. Patient was accepted for transfer. Patient was updated on plan of care. Currently complaining of some mild itching Benadryl 12.5 mg IV was ordered.   She is stable for transfer. REASSESSMENT          CRITICAL CARE TIME   Total Critical Care time was  minutes, excluding separately reportable procedures. There was a high probability of clinically significant/life threatening deterioration in the patient's condition which required my urgent intervention. CONSULTS:  None    PROCEDURES:  Unless otherwise noted below, none     Procedures        FINAL IMPRESSION      1. Pemphigoid    2. Acute kidney injury [facility])          DISPOSITION/PLAN   DISPOSITION Decision To Transfer 03/02/2021 02:53:35 PM      PATIENT REFERRED TO:  No follow-up provider specified. DISCHARGE MEDICATIONS:  Current Discharge Medication List        Controlled Substances Monitoring:     No flowsheet data found.     (Please note that portions of this note were completed with a voice recognition program.  Efforts were made to edit the dictations but occasionally words are mis-transcribed.)    Kemal Judd MD (electronically signed)  Attending Emergency Physician             Kemal Judd MD  03/02/21 5890

## 2021-03-02 NOTE — ED NOTES
[facility] called to speak to Dr. Nusrat Soriano.      Ramírez CARRILLO Reser  03/02/21 7864

## 2021-03-02 NOTE — ED NOTES
Bed: 04  Expected date:   Expected time:   Means of arrival: [facility]  Comments:  Fall with head injury and blisters on hand and feet     Xenia Pierre RN  03/02/21 6379

## 2021-03-02 NOTE — ED NOTES
Contacted radiology to send images to [facility].      Yvonne CARRILLO Reser  03/02/21 5296

## 2021-03-02 NOTE — ED NOTES
[facility] called with NP from [facility]'s on line to speak with Dr. Halley Henderson.      Janny CARRILLO Reser  03/02/21 5635

## 2021-03-03 LAB
EKG ATRIAL RATE: 58 BPM
EKG P AXIS: 5 DEGREES
EKG P-R INTERVAL: 272 MS
EKG Q-T INTERVAL: 534 MS
EKG QRS DURATION: 126 MS
EKG QTC CALCULATION (BAZETT): 524 MS
EKG R AXIS: 64 DEGREES
EKG T AXIS: -139 DEGREES
EKG VENTRICULAR RATE: 58 BPM

## 2021-03-03 PROCEDURE — 93010 ELECTROCARDIOGRAM REPORT: CPT | Performed by: FAMILY MEDICINE

## 2021-03-07 LAB
CULTURE: NORMAL
CULTURE: NORMAL
Lab: NORMAL
Lab: NORMAL
SPECIMEN DESCRIPTION: NORMAL
SPECIMEN DESCRIPTION: NORMAL

## 2021-04-07 ENCOUNTER — OUTSIDE SERVICES (OUTPATIENT)
Dept: FAMILY MEDICINE CLINIC | Age: 86
End: 2021-04-07
Payer: MEDICARE

## 2021-04-07 DIAGNOSIS — G31.84 MCI (MILD COGNITIVE IMPAIRMENT): ICD-10-CM

## 2021-04-07 DIAGNOSIS — E78.2 MIXED HYPERLIPIDEMIA: ICD-10-CM

## 2021-04-07 DIAGNOSIS — E11.9 TYPE 2 DIABETES MELLITUS WITHOUT COMPLICATION, WITHOUT LONG-TERM CURRENT USE OF INSULIN (HCC): ICD-10-CM

## 2021-04-07 DIAGNOSIS — B86 SCABIES: ICD-10-CM

## 2021-04-07 DIAGNOSIS — I10 ESSENTIAL HYPERTENSION: ICD-10-CM

## 2021-04-07 DIAGNOSIS — L12.0 BULLOUS PEMPHIGOID: ICD-10-CM

## 2021-04-07 DIAGNOSIS — I25.10 ASHD (ARTERIOSCLEROTIC HEART DISEASE): ICD-10-CM

## 2021-04-07 NOTE — PROGRESS NOTES
[address] [address]  [place] [address]  Dept: [phone]    SARAH DILLARD RMA, am scribing for and in the presence of Dr. Steven Jacinto. 4/7/21/9:40am/SNP    Gaby Jean Baptiste is a 80 y.o. female being seen for her monthly follow up. Location of visit: [facility]    HPI:  Admission History:  1/4/21 New admit, was a patient of Dr. Maria Antonia Rodriguez in [place]. She is very independent, staff will be doing showers and medications. DX: depression, anxiety, neck pain    Last visit:  I will have her take Ivermectin 12 mg po today and repeat the dose in 1 week. I also will have her start Prednisone 10 mg po bid x 5 days then 10 mg po qd x 5 days. Faxes since last seen:   3/8/21 [facility], has dry scabs on body, legs, arms and face. She is on tapering dose of prednisone and doxycycline. Check fsbs qam and 4:00pm   3/22/21 [facility] face to face signed. [facility] 3/22/21 [facility] OT eval signed.  3/23/21 blood sugars bid while taking prednisone, check blood sugars prn   3/31/21 [facility] physician order signed.  3/31/21 Prednisone completed from hospital, started to develop new blisters to hands and feet, some blisters are golf ball size, Start prednisone 10 mg po qd   3/31/21 [facility] plan of care signed   4/5/21 Tylenol 650 mg q4h prn pain ordered. New today:  N/A    ROS:  General Constitutional: Denies fever. Denies lightheadedness. Respiratory: Denies cough. Denies shortness of breath. Denies wheezing. Cardiovascular: Denies chest pain at rest.  Gastrointestinal: Denies abdominal pain. Denies blood in the stool. Denies constipation. Denies diarrhea. Denies nausea. Denies vomiting. Genitourinary: Denies blood in the urine. Denies painful urination. Skin:  Denies rash. Neurologic: Denies falls. Denies dizziness. Psychiatric: Denies sleep disturbance. Denies anxiety. Denies depressed mood.       PHYSICAL EXAM:    General Appearance: in no acute distress, well nourished. Eyes: pupils equal, round reactive to light and accommodation. Oral Cavity: mucosa moist.  Neck/Thyroid:  no cervical lymphadenopathy, no thyromegaly  Skin: warm and dry face free of lesions, no lesions on torso or proximal arms, legs or thighs, large bullous on feet including soles of feet, serous and blood, hands collapsed lesions 1 cm, appearance of blood, no inflammatory changse  Heart: regular rate and rhythm. No murmurs. S1, S2 normal, no gallops. Rate 80  Lungs: clear to auscultation bilaterally. sternotomy incision okay  Abdomen:soft, nontender, nondistended, no masses or organomegaly. Extremities: no cyanosis or edema. Peripheral Pulses: 2+ throughout, symetric. Neurologic: verbally responsive, moves extremities. Psych: normal affect, speech fluent. ASSESSMENT:   Diagnosis Orders   1. Bullous pemphigoid     2. Type 2 diabetes mellitus without complication, without long-term current use of insulin ([address]     3. ASHD (arteriosclerotic heart disease)     4. MCI (mild cognitive impairment)     5. Essential hypertension     6. Mixed hyperlipidemia     7. Scabies      resolved     PLAN:  She has been hospitalized for her bullous skin lesion, transferred to [facility] and ultimately biopsied, diagnosis of bullous pemphigoid. She had responded to steroids and doxycyline. However her rash flared up on feet and hands when her prednisone dose tapered. She had mistakenly been taken of doxycycline. The lesions are back under control with prednisone. She does have pain in her feet, dressings have been applied. Reports from [facility] [employer] are reviewed. I will have her consult with Dr. Mitra Escalante to evaluate the bullous disease. I spoke with Dr. Mitra Escalante and he will see her with an expedited appointment. He recommended kenalog 60 mg IM monthly instead of prednisone d/t diabetes. I will d/c prednisone.  He will consider treating her with CellCept      I will start her on doxycycline 100 mg po bid.    I will give an order for CBC, BMP, ALT, AST to be drawn monthly. I, Dr. Harper Gimenez, personally performed the services described in this documentation as scribed by SARAH Longoria in my presence, and is both accurate and complete.

## 2021-04-09 ENCOUNTER — HOSPITAL ENCOUNTER (OUTPATIENT)
Age: 86
Setting detail: SPECIMEN
Discharge: HOME | End: 2021-04-09
Payer: MEDICARE

## 2021-04-09 LAB
ABSOLUTE EOS #: 0.75 K/UL (ref 0–0.44)
ABSOLUTE IMMATURE GRANULOCYTE: <0.03 K/UL (ref 0–0.3)
ABSOLUTE LYMPH #: 1.87 K/UL (ref 1.1–3.7)
ABSOLUTE MONO #: 0.46 K/UL (ref 0.1–1.2)
ALT SERPL-CCNC: 8 U/L (ref 5–33)
ANION GAP SERPL CALCULATED.3IONS-SCNC: 9 MMOL/L (ref 9–17)
AST SERPL-CCNC: 14 U/L
BASOPHILS # BLD: 1 % (ref 0–2)
BASOPHILS ABSOLUTE: 0.03 K/UL (ref 0–0.2)
BUN BLDV-MCNC: 26 MG/DL (ref 8–23)
BUN/CREAT BLD: 27 (ref 9–20)
CALCIUM SERPL-MCNC: 8.7 MG/DL (ref 8.6–10.4)
CHLORIDE BLD-SCNC: 96 MMOL/L (ref 98–107)
CO2: 28 MMOL/L (ref 20–31)
CREAT SERPL-MCNC: 0.98 MG/DL (ref 0.5–0.9)
DIFFERENTIAL TYPE: ABNORMAL
EOSINOPHILS RELATIVE PERCENT: 14 % (ref 1–4)
GFR AFRICAN AMERICAN: >60 ML/MIN
GFR NON-AFRICAN AMERICAN: 53 ML/MIN
GFR SERPL CREATININE-BSD FRML MDRD: ABNORMAL ML/MIN/{1.73_M2}
GFR SERPL CREATININE-BSD FRML MDRD: ABNORMAL ML/MIN/{1.73_M2}
GLUCOSE BLD-MCNC: 102 MG/DL (ref 70–99)
HCT VFR BLD CALC: 27.1 % (ref 36.3–47.1)
HEMOGLOBIN: 8.5 G/DL (ref 11.9–15.1)
IMMATURE GRANULOCYTES: 0 %
LYMPHOCYTES # BLD: 34 % (ref 24–43)
MCH RBC QN AUTO: 30.4 PG (ref 25.2–33.5)
MCHC RBC AUTO-ENTMCNC: 31.4 G/DL (ref 28.4–34.8)
MCV RBC AUTO: 96.8 FL (ref 82.6–102.9)
MONOCYTES # BLD: 8 % (ref 3–12)
NRBC AUTOMATED: 0 PER 100 WBC
PDW BLD-RTO: 14.3 % (ref 11.8–14.4)
PLATELET # BLD: 245 K/UL (ref 138–453)
PLATELET ESTIMATE: ABNORMAL
PMV BLD AUTO: 10.7 FL (ref 8.1–13.5)
POTASSIUM SERPL-SCNC: 4.6 MMOL/L (ref 3.7–5.3)
RBC # BLD: 2.8 M/UL (ref 3.95–5.11)
RBC # BLD: ABNORMAL 10*6/UL
SEG NEUTROPHILS: 43 % (ref 36–65)
SEGMENTED NEUTROPHILS ABSOLUTE COUNT: 2.33 K/UL (ref 1.5–8.1)
SODIUM BLD-SCNC: 133 MMOL/L (ref 135–144)
WBC # BLD: 5.5 K/UL (ref 3.5–11.3)
WBC # BLD: ABNORMAL 10*3/UL

## 2021-04-09 PROCEDURE — 36415 COLL VENOUS BLD VENIPUNCTURE: CPT

## 2021-04-09 PROCEDURE — 80048 BASIC METABOLIC PNL TOTAL CA: CPT

## 2021-04-09 PROCEDURE — 85025 COMPLETE CBC W/AUTO DIFF WBC: CPT

## 2021-04-09 PROCEDURE — 84450 TRANSFERASE (AST) (SGOT): CPT

## 2021-04-09 PROCEDURE — 84460 ALANINE AMINO (ALT) (SGPT): CPT

## 2021-04-09 PROCEDURE — P9603 ONE-WAY ALLOW PRORATED MILES: HCPCS

## 2021-04-09 NOTE — RESULT ENCOUNTER NOTE
noityf kidney fucntion much better  Anemia however is worse  Lets get iron tibc and ferritin as well as B12 and folate levels

## 2021-04-12 ENCOUNTER — HOSPITAL ENCOUNTER (OUTPATIENT)
Age: 86
Setting detail: SPECIMEN
Discharge: HOME | End: 2021-04-12
Payer: MEDICARE

## 2021-04-12 LAB
FERRITIN: 30 UG/L (ref 13–150)
FOLATE: 13.4 NG/ML
IRON SATURATION: 11 % (ref 20–55)
IRON: 28 UG/DL (ref 37–145)
TOTAL IRON BINDING CAPACITY: 264 UG/DL (ref 250–450)
UNSATURATED IRON BINDING CAPACITY: 236 UG/DL (ref 112–347)
VITAMIN B-12: 723 PG/ML (ref 232–1245)

## 2021-04-12 PROCEDURE — 83540 ASSAY OF IRON: CPT

## 2021-04-12 PROCEDURE — 83550 IRON BINDING TEST: CPT

## 2021-04-12 PROCEDURE — 82607 VITAMIN B-12: CPT

## 2021-04-12 PROCEDURE — P9603 ONE-WAY ALLOW PRORATED MILES: HCPCS

## 2021-04-12 PROCEDURE — 82746 ASSAY OF FOLIC ACID SERUM: CPT

## 2021-04-12 PROCEDURE — 36415 COLL VENOUS BLD VENIPUNCTURE: CPT

## 2021-04-12 PROCEDURE — 82728 ASSAY OF FERRITIN: CPT

## 2021-04-15 ENCOUNTER — HOSPITAL ENCOUNTER (OUTPATIENT)
Age: 86
Setting detail: SPECIMEN
Discharge: HOME | End: 2021-04-15
Payer: MEDICARE

## 2021-04-15 LAB
ABSOLUTE EOS #: 0.98 K/UL (ref 0–0.44)
ABSOLUTE IMMATURE GRANULOCYTE: 0.03 K/UL (ref 0–0.3)
ABSOLUTE LYMPH #: 2.4 K/UL (ref 1.1–3.7)
ABSOLUTE MONO #: 0.66 K/UL (ref 0.1–1.2)
BASOPHILS # BLD: 1 % (ref 0–2)
BASOPHILS ABSOLUTE: 0.07 K/UL (ref 0–0.2)
DATE, STOOL #1: NORMAL
DATE, STOOL #2: NORMAL
DATE, STOOL #3: NORMAL
DIFFERENTIAL TYPE: ABNORMAL
EOSINOPHILS RELATIVE PERCENT: 12 % (ref 1–4)
HCT VFR BLD CALC: 32.4 % (ref 36.3–47.1)
HEMOCCULT SP1 STL QL: NEGATIVE
HEMOCCULT SP2 STL QL: NORMAL
HEMOCCULT SP3 STL QL: NORMAL
HEMOGLOBIN: 10 G/DL (ref 11.9–15.1)
IMMATURE GRANULOCYTES: 0 %
LYMPHOCYTES # BLD: 28 % (ref 24–43)
MCH RBC QN AUTO: 30.4 PG (ref 25.2–33.5)
MCHC RBC AUTO-ENTMCNC: 30.9 G/DL (ref 28.4–34.8)
MCV RBC AUTO: 98.5 FL (ref 82.6–102.9)
MONOCYTES # BLD: 8 % (ref 3–12)
NRBC AUTOMATED: 0 PER 100 WBC
PDW BLD-RTO: 14.8 % (ref 11.8–14.4)
PLATELET # BLD: 359 K/UL (ref 138–453)
PLATELET ESTIMATE: ABNORMAL
PMV BLD AUTO: 10.9 FL (ref 8.1–13.5)
RBC # BLD: 3.29 M/UL (ref 3.95–5.11)
RBC # BLD: ABNORMAL 10*6/UL
SEG NEUTROPHILS: 51 % (ref 36–65)
SEGMENTED NEUTROPHILS ABSOLUTE COUNT: 4.31 K/UL (ref 1.5–8.1)
TIME, STOOL #1: 1000
TIME, STOOL #2: NORMAL
TIME, STOOL #3: NORMAL
WBC # BLD: 8.5 K/UL (ref 3.5–11.3)
WBC # BLD: ABNORMAL 10*3/UL

## 2021-04-15 PROCEDURE — 85025 COMPLETE CBC W/AUTO DIFF WBC: CPT

## 2021-04-15 PROCEDURE — 36415 COLL VENOUS BLD VENIPUNCTURE: CPT

## 2021-04-15 PROCEDURE — 82272 OCCULT BLD FECES 1-3 TESTS: CPT

## 2021-05-05 ENCOUNTER — OUTSIDE SERVICES (OUTPATIENT)
Dept: FAMILY MEDICINE CLINIC | Age: 86
End: 2021-05-05
Payer: MEDICARE

## 2021-05-05 DIAGNOSIS — E78.2 MIXED HYPERLIPIDEMIA: ICD-10-CM

## 2021-05-05 DIAGNOSIS — G31.84 MCI (MILD COGNITIVE IMPAIRMENT): ICD-10-CM

## 2021-05-05 DIAGNOSIS — L12.0 BULLOUS PEMPHIGOID: Primary | ICD-10-CM

## 2021-05-05 DIAGNOSIS — I10 ESSENTIAL HYPERTENSION: ICD-10-CM

## 2021-05-05 DIAGNOSIS — B86 SCABIES: ICD-10-CM

## 2021-05-05 DIAGNOSIS — I25.10 ASHD (ARTERIOSCLEROTIC HEART DISEASE): ICD-10-CM

## 2021-05-05 DIAGNOSIS — E11.9 TYPE 2 DIABETES MELLITUS WITHOUT COMPLICATION, WITHOUT LONG-TERM CURRENT USE OF INSULIN (HCC): ICD-10-CM

## 2021-05-05 NOTE — PROGRESS NOTES
[address] [address]  [place] [address]  Dept: [phone]    SARAH DILLARD GERARDO, am scribing for and in the presence of Dr. Marycruz Burciaga. 5/5/21/9:42am/SNP    Lisandro Kendrick is a 80 y.o. female being seen for her monthly follow up. Location of visit: [facility]    HPI:  Admission History:  1/4/21 New admit, was a patient of Dr. Pal Baez in [place]. She is very independent, staff will be doing showers and medications. Last visit:  She has been hospitalized for her bullous skin lesion, transferred to [facility] in [place] and ultimately biopsied, diagnosis of bullous pemphigoid. She had responded to steroids and doxycyline. However her rash flared up on feet and hands when her prednisone dose tapered. She had mistakenly been taken of doxycycline. The lesions are back under control with prednisone. She does have pain in her feet, dressings have been applied. Reports from [place] are reviewed.      I will have her consult with Dr. Alex Desir to evaluate the bullous disease. I spoke with Dr. Alex Desir and he will see her with an expedited appointment. He recommended kenalog 60 mg IM monthly instead of prednisone d/t diabetes. I will d/c prednisone. He will consider treating her with CellCept       I will start her on doxycycline 100 mg po bid.     I will give an order for CBC, BMP, ALT, AST to be drawn monthly. Faxes since last seen:   4/5/21 PRN Tylenol requested, start 650 mg mg q4h prn pain   4/7/21 She should take doxycycline 100 mg bid until she sees Dr. Bonilla Hart 4/8/21 order signed to d/c prednisone and start Kenalog 60 mg IM monthly   4/9/21 Labs revd, anemia is worse, order iron, Tibc, ferritin, B12 and folate.  4/16/21 labs revd, no changes made.     4/19/21 Occult blood test revd, negative   4/21/21 blisters worsening, ordered prednisone 20 mg po qd x7 days then 10 mg . appt w/ Dr. Alex Desir 5/3/21   4/23/21 Hydroxyzince HCl 10mg po still vesicular     Troubles swallowing with oral pharyngeal stage. No aspiration while I watched her swallow    She was supposed to have seen Dr. Piña Began but was told she didn't have the appointment, she is scheduled for 2 weeks. I will give orders for a cbc w/ diff, CMP, ESR, A1C, and a tegretol level. I also will give an order for Kenalog 40 mg IM now and q2 weeks. FSBS weekly in am.     She had a significant intension tremor which she has had for some time. Her mother and grandmother both had it. I will discontinue atenolol and start her on inderal  mg po qd with ortho bp checked. I, Dr. Doni Garza, personally performed the services described in this documentation as scribed by SARAH Hauser in my presence, and is both accurate and complete.

## 2021-05-06 ENCOUNTER — HOSPITAL ENCOUNTER (OUTPATIENT)
Age: 86
Setting detail: SPECIMEN
Discharge: HOME | End: 2021-05-06
Payer: MEDICARE

## 2021-05-07 ENCOUNTER — HOSPITAL ENCOUNTER (OUTPATIENT)
Age: 86
Setting detail: SPECIMEN
Discharge: HOME | End: 2021-05-07
Payer: MEDICARE

## 2021-05-07 LAB
ABSOLUTE EOS #: <0.03 K/UL (ref 0–0.44)
ABSOLUTE IMMATURE GRANULOCYTE: 0.04 K/UL (ref 0–0.3)
ABSOLUTE LYMPH #: 2.77 K/UL (ref 1.1–3.7)
ABSOLUTE MONO #: 0.48 K/UL (ref 0.1–1.2)
ALBUMIN SERPL-MCNC: 3.2 G/DL (ref 3.5–5.2)
ALBUMIN/GLOBULIN RATIO: 1.2 (ref 1–2.5)
ALP BLD-CCNC: 85 U/L (ref 35–104)
ALT SERPL-CCNC: 13 U/L (ref 5–33)
ANION GAP SERPL CALCULATED.3IONS-SCNC: 15 MMOL/L (ref 9–17)
AST SERPL-CCNC: 26 U/L
BASOPHILS # BLD: 0 % (ref 0–2)
BASOPHILS ABSOLUTE: <0.03 K/UL (ref 0–0.2)
BILIRUB SERPL-MCNC: 0.37 MG/DL (ref 0.3–1.2)
BUN BLDV-MCNC: 71 MG/DL (ref 8–23)
BUN/CREAT BLD: 35 (ref 9–20)
CALCIUM SERPL-MCNC: 9 MG/DL (ref 8.6–10.4)
CARBAMAZEPINE DATE LAST DOSE: NORMAL
CARBAMAZEPINE DOSE AMOUNT: NORMAL
CARBAMAZEPINE DOSE TIME: NORMAL
CARBAMAZEPINE LEVEL: 5 UG/ML (ref 4–12)
CHLORIDE BLD-SCNC: 93 MMOL/L (ref 98–107)
CO2: 20 MMOL/L (ref 20–31)
CREAT SERPL-MCNC: 2.02 MG/DL (ref 0.5–0.9)
DIFFERENTIAL TYPE: ABNORMAL
EOSINOPHILS RELATIVE PERCENT: 0 % (ref 1–4)
ESTIMATED AVERAGE GLUCOSE: 131 MG/DL
GFR AFRICAN AMERICAN: 28 ML/MIN
GFR NON-AFRICAN AMERICAN: 23 ML/MIN
GFR SERPL CREATININE-BSD FRML MDRD: ABNORMAL ML/MIN/{1.73_M2}
GFR SERPL CREATININE-BSD FRML MDRD: ABNORMAL ML/MIN/{1.73_M2}
GLUCOSE BLD-MCNC: 117 MG/DL (ref 70–99)
HBA1C MFR BLD: 6.2 % (ref 4–6)
HCT VFR BLD CALC: 39.1 % (ref 36.3–47.1)
HEMOGLOBIN: 12.6 G/DL (ref 11.9–15.1)
IMMATURE GRANULOCYTES: 0 %
LYMPHOCYTES # BLD: 27 % (ref 24–43)
MCH RBC QN AUTO: 30.9 PG (ref 25.2–33.5)
MCHC RBC AUTO-ENTMCNC: 32.2 G/DL (ref 28.4–34.8)
MCV RBC AUTO: 95.8 FL (ref 82.6–102.9)
MONOCYTES # BLD: 5 % (ref 3–12)
NRBC AUTOMATED: 0 PER 100 WBC
PDW BLD-RTO: 15 % (ref 11.8–14.4)
PLATELET # BLD: ABNORMAL K/UL (ref 138–453)
PLATELET ESTIMATE: ABNORMAL
PLATELET, FLUORESCENCE: 184 K/UL (ref 138–453)
PLATELET, IMMATURE FRACTION: 15.8 % (ref 1.1–10.3)
PMV BLD AUTO: ABNORMAL FL (ref 8.1–13.5)
POTASSIUM SERPL-SCNC: 5.2 MMOL/L (ref 3.7–5.3)
RBC # BLD: 4.08 M/UL (ref 3.95–5.11)
RBC # BLD: ABNORMAL 10*6/UL
SEDIMENTATION RATE, ERYTHROCYTE: 4 MM (ref 0–20)
SEG NEUTROPHILS: 68 % (ref 36–65)
SEGMENTED NEUTROPHILS ABSOLUTE COUNT: 7.06 K/UL (ref 1.5–8.1)
SODIUM BLD-SCNC: 128 MMOL/L (ref 135–144)
TOTAL PROTEIN: 5.8 G/DL (ref 6.4–8.3)
WBC # BLD: 10.4 K/UL (ref 3.5–11.3)
WBC # BLD: ABNORMAL 10*3/UL

## 2021-05-07 PROCEDURE — 83036 HEMOGLOBIN GLYCOSYLATED A1C: CPT

## 2021-05-07 PROCEDURE — 80053 COMPREHEN METABOLIC PANEL: CPT

## 2021-05-07 PROCEDURE — 85055 RETICULATED PLATELET ASSAY: CPT

## 2021-05-07 PROCEDURE — P9603 ONE-WAY ALLOW PRORATED MILES: HCPCS

## 2021-05-07 PROCEDURE — 85025 COMPLETE CBC W/AUTO DIFF WBC: CPT

## 2021-05-07 PROCEDURE — 36415 COLL VENOUS BLD VENIPUNCTURE: CPT

## 2021-05-07 PROCEDURE — 80156 ASSAY CARBAMAZEPINE TOTAL: CPT

## 2021-05-07 PROCEDURE — 85652 RBC SED RATE AUTOMATED: CPT

## 2021-05-10 ENCOUNTER — HOSPITAL ENCOUNTER (OUTPATIENT)
Age: 86
Setting detail: SPECIMEN
Discharge: HOME | End: 2021-05-10
Payer: MEDICARE

## 2021-05-10 LAB
ANION GAP SERPL CALCULATED.3IONS-SCNC: 11 MMOL/L (ref 9–17)
BUN BLDV-MCNC: 70 MG/DL (ref 8–23)
BUN/CREAT BLD: 40 (ref 9–20)
CALCIUM SERPL-MCNC: 9.1 MG/DL (ref 8.6–10.4)
CHLORIDE BLD-SCNC: 97 MMOL/L (ref 98–107)
CO2: 23 MMOL/L (ref 20–31)
CREAT SERPL-MCNC: 1.77 MG/DL (ref 0.5–0.9)
GFR AFRICAN AMERICAN: 33 ML/MIN
GFR NON-AFRICAN AMERICAN: 27 ML/MIN
GFR SERPL CREATININE-BSD FRML MDRD: ABNORMAL ML/MIN/{1.73_M2}
GFR SERPL CREATININE-BSD FRML MDRD: ABNORMAL ML/MIN/{1.73_M2}
GLUCOSE BLD-MCNC: 123 MG/DL (ref 70–99)
POTASSIUM SERPL-SCNC: 4.5 MMOL/L (ref 3.7–5.3)
SODIUM BLD-SCNC: 131 MMOL/L (ref 135–144)

## 2021-05-10 PROCEDURE — 36415 COLL VENOUS BLD VENIPUNCTURE: CPT

## 2021-05-10 PROCEDURE — 80048 BASIC METABOLIC PNL TOTAL CA: CPT

## 2021-05-12 ENCOUNTER — HOSPITAL ENCOUNTER (OUTPATIENT)
Age: 86
Setting detail: SPECIMEN
Discharge: HOME | End: 2021-05-12
Payer: MEDICARE

## 2021-05-14 ENCOUNTER — HOSPITAL ENCOUNTER (OUTPATIENT)
Age: 86
Setting detail: SPECIMEN
Discharge: HOME | End: 2021-05-14
Payer: MEDICARE

## 2021-05-14 LAB
ANION GAP SERPL CALCULATED.3IONS-SCNC: 12 MMOL/L (ref 9–17)
BNP INTERPRETATION: ABNORMAL
BUN BLDV-MCNC: 64 MG/DL (ref 8–23)
BUN/CREAT BLD: 43 (ref 9–20)
CALCIUM SERPL-MCNC: 9 MG/DL (ref 8.6–10.4)
CHLORIDE BLD-SCNC: 97 MMOL/L (ref 98–107)
CO2: 24 MMOL/L (ref 20–31)
CREAT SERPL-MCNC: 1.49 MG/DL (ref 0.5–0.9)
GFR AFRICAN AMERICAN: 40 ML/MIN
GFR NON-AFRICAN AMERICAN: 33 ML/MIN
GFR SERPL CREATININE-BSD FRML MDRD: ABNORMAL ML/MIN/{1.73_M2}
GFR SERPL CREATININE-BSD FRML MDRD: ABNORMAL ML/MIN/{1.73_M2}
GLUCOSE BLD-MCNC: 107 MG/DL (ref 70–99)
POTASSIUM SERPL-SCNC: 4.1 MMOL/L (ref 3.7–5.3)
PRO-BNP: 1692 PG/ML
SODIUM BLD-SCNC: 133 MMOL/L (ref 135–144)

## 2021-05-14 PROCEDURE — 80048 BASIC METABOLIC PNL TOTAL CA: CPT

## 2021-05-14 PROCEDURE — 36415 COLL VENOUS BLD VENIPUNCTURE: CPT

## 2021-05-14 PROCEDURE — 83880 ASSAY OF NATRIURETIC PEPTIDE: CPT

## 2021-05-14 PROCEDURE — P9603 ONE-WAY ALLOW PRORATED MILES: HCPCS

## 2021-05-18 ENCOUNTER — HOSPITAL ENCOUNTER (OUTPATIENT)
Age: 86
Setting detail: SPECIMEN
Discharge: HOME | End: 2021-05-18
Payer: MEDICARE

## 2021-05-18 LAB
ABSOLUTE EOS #: 0.09 K/UL (ref 0–0.44)
ABSOLUTE IMMATURE GRANULOCYTE: 0.03 K/UL (ref 0–0.3)
ABSOLUTE LYMPH #: 2.52 K/UL (ref 1.1–3.7)
ABSOLUTE MONO #: 0.53 K/UL (ref 0.1–1.2)
ALT SERPL-CCNC: 14 U/L (ref 5–33)
AST SERPL-CCNC: 28 U/L
BASOPHILS # BLD: 0 % (ref 0–2)
BASOPHILS ABSOLUTE: <0.03 K/UL (ref 0–0.2)
DIFFERENTIAL TYPE: ABNORMAL
EOSINOPHILS RELATIVE PERCENT: 1 % (ref 1–4)
HCT VFR BLD CALC: 32.6 % (ref 36.3–47.1)
HEMOGLOBIN: 10.7 G/DL (ref 11.9–15.1)
IMMATURE GRANULOCYTES: 0 %
LYMPHOCYTES # BLD: 30 % (ref 24–43)
MCH RBC QN AUTO: 30.7 PG (ref 25.2–33.5)
MCHC RBC AUTO-ENTMCNC: 32.8 G/DL (ref 28.4–34.8)
MCV RBC AUTO: 93.7 FL (ref 82.6–102.9)
MONOCYTES # BLD: 6 % (ref 3–12)
NRBC AUTOMATED: 0 PER 100 WBC
PDW BLD-RTO: 14.7 % (ref 11.8–14.4)
PLATELET # BLD: 136 K/UL (ref 138–453)
PLATELET ESTIMATE: ABNORMAL
PMV BLD AUTO: 12.2 FL (ref 8.1–13.5)
RBC # BLD: 3.48 M/UL (ref 3.95–5.11)
RBC # BLD: ABNORMAL 10*6/UL
SEG NEUTROPHILS: 63 % (ref 36–65)
SEGMENTED NEUTROPHILS ABSOLUTE COUNT: 5.16 K/UL (ref 1.5–8.1)
WBC # BLD: 8.3 K/UL (ref 3.5–11.3)
WBC # BLD: ABNORMAL 10*3/UL

## 2021-05-18 PROCEDURE — P9604 ONE-WAY ALLOW PRORATED TRIP: HCPCS

## 2021-05-18 PROCEDURE — 84460 ALANINE AMINO (ALT) (SGPT): CPT

## 2021-05-18 PROCEDURE — 36415 COLL VENOUS BLD VENIPUNCTURE: CPT

## 2021-05-18 PROCEDURE — 85025 COMPLETE CBC W/AUTO DIFF WBC: CPT

## 2021-05-18 PROCEDURE — 84450 TRANSFERASE (AST) (SGOT): CPT

## 2021-05-28 ENCOUNTER — HOSPITAL ENCOUNTER (OUTPATIENT)
Age: 86
Setting detail: SPECIMEN
Discharge: HOME | End: 2021-05-28
Payer: MEDICARE

## 2021-05-28 LAB
ANION GAP SERPL CALCULATED.3IONS-SCNC: 8 MMOL/L (ref 9–17)
BNP INTERPRETATION: ABNORMAL
BUN BLDV-MCNC: 24 MG/DL (ref 8–23)
BUN/CREAT BLD: 27 (ref 9–20)
CALCIUM SERPL-MCNC: 7.6 MG/DL (ref 8.6–10.4)
CHLORIDE BLD-SCNC: 107 MMOL/L (ref 98–107)
CO2: 25 MMOL/L (ref 20–31)
CREAT SERPL-MCNC: 0.9 MG/DL (ref 0.5–0.9)
GFR AFRICAN AMERICAN: >60 ML/MIN
GFR NON-AFRICAN AMERICAN: 59 ML/MIN
GFR SERPL CREATININE-BSD FRML MDRD: ABNORMAL ML/MIN/{1.73_M2}
GFR SERPL CREATININE-BSD FRML MDRD: ABNORMAL ML/MIN/{1.73_M2}
GLUCOSE BLD-MCNC: 92 MG/DL (ref 70–99)
POTASSIUM SERPL-SCNC: 4.3 MMOL/L (ref 3.7–5.3)
PRO-BNP: 4842 PG/ML
SODIUM BLD-SCNC: 140 MMOL/L (ref 135–144)

## 2021-05-28 PROCEDURE — 83880 ASSAY OF NATRIURETIC PEPTIDE: CPT

## 2021-05-28 PROCEDURE — 80048 BASIC METABOLIC PNL TOTAL CA: CPT

## 2021-05-28 PROCEDURE — P9603 ONE-WAY ALLOW PRORATED MILES: HCPCS

## 2021-05-28 PROCEDURE — 36415 COLL VENOUS BLD VENIPUNCTURE: CPT

## 2021-06-02 ENCOUNTER — OUTSIDE SERVICES (OUTPATIENT)
Dept: FAMILY MEDICINE CLINIC | Age: 86
End: 2021-06-02
Payer: MEDICARE

## 2021-06-02 DIAGNOSIS — G31.84 MCI (MILD COGNITIVE IMPAIRMENT): ICD-10-CM

## 2021-06-02 DIAGNOSIS — I25.10 ASHD (ARTERIOSCLEROTIC HEART DISEASE): ICD-10-CM

## 2021-06-02 DIAGNOSIS — G25.0 ESSENTIAL TREMOR: ICD-10-CM

## 2021-06-02 DIAGNOSIS — E11.9 TYPE 2 DIABETES MELLITUS WITHOUT COMPLICATION, WITHOUT LONG-TERM CURRENT USE OF INSULIN (HCC): ICD-10-CM

## 2021-06-02 DIAGNOSIS — I10 ESSENTIAL HYPERTENSION: ICD-10-CM

## 2021-06-02 DIAGNOSIS — L12.0 BULLOUS PEMPHIGOID: Primary | ICD-10-CM

## 2021-06-02 DIAGNOSIS — E78.2 MIXED HYPERLIPIDEMIA: ICD-10-CM

## 2021-06-02 DIAGNOSIS — I87.2 VENOUS INSUFFICIENCY OF BOTH LOWER EXTREMITIES: ICD-10-CM

## 2021-06-02 NOTE — PROGRESS NOTES
[address] [address]  [place] [address]  Dept: [phone]    VISH DILLARD CMA, am scribing for and in the presence of Dr. Loenel Elizalde. 6/2/21/10:45/CRF    Phuong Ramirez is a 80 y.o. female being seen for her monthly follow up. Location of visit: [facility]    HPI:  Admission History:  1/4/21 New admit, was a patient of Dr. Yue Monsalve in [place]. She is very independent, staff will be doing showers and medications. Last visit:  She had improvement on the lesion on her hands but her feet are still vesicular      Troubles swallowing with oral pharyngeal stage. No aspiration while I watched her swallow     She was supposed to have seen Dr. Andrew Gil but was told she didn't have the appointment, she is scheduled for 2 weeks.      I will give orders for a cbc w/ diff, CMP, ESR, A1C, and a tegretol level.      I also will give an order for Kenalog 40 mg IM now and q2 weeks.      FSBS weekly in am.      She had a significant intension tremor which she has had for some time. Her mother and grandmother both had it. I will discontinue atenolol and start her on inderal  mg po qd with ortho bp checked.      Faxes since last seen:   5/5/21 [facility] plan of care reviewed   5/7/21 ortho BP, laying 132/65 rate 62  sitting 117/72 rate 71  standing 97/70 rate 74. Stop Maxzide change to Celexa to 30 mg qd. Repeat ortho BP weekly    5/7/21 stop Lasix and Maxzide ck BMP on 5/10/21   5/10/21 Labs revd, have her Lasix and maxzide stopped, recheck BMP and BNP in 2 days   5/11/21 Ortho BP revd. Brady Randhawa 5/12/21 [facility] OT care plan revd and signed.  5/14/21 Labs revd, no changes, recheck BMP in 2 weeks   5/18/21 Labs revd, no changes made.  5/28/21 Labs revd, no changes made. New today:  Domonique saw Dr. Andrew Gil once and will follow up. Started on cellcept to wean down on steroids.  She has concerns about not having nitro at bedside due to rises in BP from time to time but no typical angina. Skin is markedly improved has some swelling in feet and improved vesicular changes. Had renal impairment improved with lower diuretic doses. Did lead to increased pedal edema. Has questions about dark hemosiderin deposits on legs. Thinks her tremor has worsened. Inderal was not successful      ROS:  General Constitutional: Denies fever. Denies lightheadedness. Respiratory: Denies cough. Denies shortness of breath. Denies wheezing. Cardiovascular: Denies chest pain at rest.  Gastrointestinal: Denies abdominal pain. Denies blood in the stool. Denies constipation. Denies diarrhea. Denies nausea. Denies vomiting. Genitourinary: Denies blood in the urine. Denies painful urination. Skin:  Denies rash. Neurologic: Denies falls. Denies dizziness. Psychiatric: Denies sleep disturbance. Denies anxiety. Denies depressed mood. PHYSICAL EXAM:    General Appearance: in no acute distress, well nourished. Eyes: pupils equal, round reactive to light and accommodation. Oral Cavity: mucosa moist.  Neck/Thyroid:  no cervical lymphadenopathy, no thyromegaly  Skin: warm and dry scaling distal tips of toes  Heart: regular rate and rhythm. No murmurs. S1, S2 normal, no gallops. Rate: 70  Lungs: clear to auscultation bilaterally. Abdomen:soft, nontender, nondistended, no masses or organomegaly. Extremities: no cyanosis 3+ edema distal legs and feet. dependent rubor. Peripheral Pulses: 2+ throughout, symetric. Neurologic: verbally responsive, moves extremities. Bilateral course UE intention tremor. Some tremor at rest.   Psych: normal affect, speech fluent. ASSESSMENT:   Diagnosis Orders   1. Bullous pemphigoid     2. Type 2 diabetes mellitus without complication, without long-term current use of insulin ([facility] [address]     3. ASHD (arteriosclerotic heart disease)     4. MCI (mild cognitive impairment)     5. Essential hypertension     6. Mixed hyperlipidemia     7. Essential tremor     8.  Venous insufficiency of both lower extremities         PLAN:  I will give an order for nitrostat 0.4 mg prn angina, she may keep at bedside. I will d/c inderal la and start atenolol 50 mg po qd. I also will start mysoline 25 mg po bid x 1 week then 50 mg po bid for tremor. I also will give order for Ace wraps from feet to knee, daily off at hs.     I, Dr. Leonel Elizalde, personally performed the services described in this documentation as scribed by KWASI Jerez in my presence, and is both accurate and complete.

## 2021-06-03 ENCOUNTER — HOSPITAL ENCOUNTER (OUTPATIENT)
Age: 86
Setting detail: SPECIMEN
Discharge: HOME | End: 2021-06-03
Payer: MEDICARE

## 2021-06-03 LAB
ABSOLUTE EOS #: 0.04 K/UL (ref 0–0.44)
ABSOLUTE IMMATURE GRANULOCYTE: <0.03 K/UL (ref 0–0.3)
ABSOLUTE LYMPH #: 1.96 K/UL (ref 1.1–3.7)
ABSOLUTE MONO #: 0.36 K/UL (ref 0.1–1.2)
ALT SERPL-CCNC: 9 U/L (ref 5–33)
ANION GAP SERPL CALCULATED.3IONS-SCNC: 7 MMOL/L (ref 9–17)
AST SERPL-CCNC: 13 U/L
BASOPHILS # BLD: 0 % (ref 0–2)
BASOPHILS ABSOLUTE: <0.03 K/UL (ref 0–0.2)
BUN BLDV-MCNC: 22 MG/DL (ref 8–23)
BUN/CREAT BLD: 29 (ref 9–20)
CALCIUM SERPL-MCNC: 7.6 MG/DL (ref 8.6–10.4)
CHLORIDE BLD-SCNC: 107 MMOL/L (ref 98–107)
CO2: 26 MMOL/L (ref 20–31)
CREAT SERPL-MCNC: 0.76 MG/DL (ref 0.5–0.9)
DIFFERENTIAL TYPE: ABNORMAL
EOSINOPHILS RELATIVE PERCENT: 1 % (ref 1–4)
GFR AFRICAN AMERICAN: >60 ML/MIN
GFR NON-AFRICAN AMERICAN: >60 ML/MIN
GFR SERPL CREATININE-BSD FRML MDRD: ABNORMAL ML/MIN/{1.73_M2}
GFR SERPL CREATININE-BSD FRML MDRD: ABNORMAL ML/MIN/{1.73_M2}
GLUCOSE BLD-MCNC: 84 MG/DL (ref 70–99)
HCT VFR BLD CALC: 23.9 % (ref 36.3–47.1)
HEMOGLOBIN: 7.4 G/DL (ref 11.9–15.1)
IMMATURE GRANULOCYTES: 0 %
LYMPHOCYTES # BLD: 39 % (ref 24–43)
MCH RBC QN AUTO: 31.1 PG (ref 25.2–33.5)
MCHC RBC AUTO-ENTMCNC: 31 G/DL (ref 28.4–34.8)
MCV RBC AUTO: 100.4 FL (ref 82.6–102.9)
MONOCYTES # BLD: 7 % (ref 3–12)
NRBC AUTOMATED: 0 PER 100 WBC
PDW BLD-RTO: 16.2 % (ref 11.8–14.4)
PLATELET # BLD: 188 K/UL (ref 138–453)
PLATELET ESTIMATE: ABNORMAL
PMV BLD AUTO: 10.8 FL (ref 8.1–13.5)
POTASSIUM SERPL-SCNC: 4 MMOL/L (ref 3.7–5.3)
RBC # BLD: 2.38 M/UL (ref 3.95–5.11)
RBC # BLD: ABNORMAL 10*6/UL
SEG NEUTROPHILS: 53 % (ref 36–65)
SEGMENTED NEUTROPHILS ABSOLUTE COUNT: 2.7 K/UL (ref 1.5–8.1)
SODIUM BLD-SCNC: 140 MMOL/L (ref 135–144)
WBC # BLD: 5.1 K/UL (ref 3.5–11.3)
WBC # BLD: ABNORMAL 10*3/UL

## 2021-06-03 PROCEDURE — 85025 COMPLETE CBC W/AUTO DIFF WBC: CPT

## 2021-06-03 PROCEDURE — 36415 COLL VENOUS BLD VENIPUNCTURE: CPT

## 2021-06-03 PROCEDURE — 80048 BASIC METABOLIC PNL TOTAL CA: CPT

## 2021-06-03 PROCEDURE — 84450 TRANSFERASE (AST) (SGOT): CPT

## 2021-06-03 PROCEDURE — 84460 ALANINE AMINO (ALT) (SGPT): CPT

## 2021-06-03 PROCEDURE — P9603 ONE-WAY ALLOW PRORATED MILES: HCPCS

## 2021-06-07 ENCOUNTER — HOSPITAL ENCOUNTER (OUTPATIENT)
Age: 86
Setting detail: SPECIMEN
Discharge: HOME | End: 2021-06-07
Payer: MEDICARE

## 2021-06-07 LAB
FERRITIN: 49 UG/L (ref 13–150)
FOLATE: 13.5 NG/ML
IRON SATURATION: 13 % (ref 20–55)
IRON: 24 UG/DL (ref 37–145)
TOTAL IRON BINDING CAPACITY: 189 UG/DL (ref 250–450)
UNSATURATED IRON BINDING CAPACITY: 165 UG/DL (ref 112–347)
VITAMIN B-12: 675 PG/ML (ref 232–1245)

## 2021-06-07 PROCEDURE — 36415 COLL VENOUS BLD VENIPUNCTURE: CPT

## 2021-06-07 PROCEDURE — 82746 ASSAY OF FOLIC ACID SERUM: CPT

## 2021-06-07 PROCEDURE — 83540 ASSAY OF IRON: CPT

## 2021-06-07 PROCEDURE — P9603 ONE-WAY ALLOW PRORATED MILES: HCPCS

## 2021-06-07 PROCEDURE — 82728 ASSAY OF FERRITIN: CPT

## 2021-06-07 PROCEDURE — 82607 VITAMIN B-12: CPT

## 2021-06-07 PROCEDURE — 83550 IRON BINDING TEST: CPT

## 2021-06-10 ENCOUNTER — HOSPITAL ENCOUNTER (OUTPATIENT)
Age: 86
Setting detail: SPECIMEN
Discharge: HOME | End: 2021-06-10
Payer: MEDICARE

## 2021-06-10 LAB
DATE, STOOL #1: NORMAL
DATE, STOOL #2: NORMAL
DATE, STOOL #3: NORMAL
HEMOCCULT SP1 STL QL: NEGATIVE
HEMOCCULT SP2 STL QL: NORMAL
HEMOCCULT SP3 STL QL: NORMAL
TIME, STOOL #1: 915
TIME, STOOL #2: NORMAL
TIME, STOOL #3: NORMAL

## 2021-06-10 PROCEDURE — 82272 OCCULT BLD FECES 1-3 TESTS: CPT

## 2021-07-01 ENCOUNTER — HOSPITAL ENCOUNTER (OUTPATIENT)
Age: 86
Setting detail: SPECIMEN
Discharge: HOME | End: 2021-07-01
Payer: MEDICARE

## 2021-07-01 LAB
ABSOLUTE EOS #: 0.05 K/UL (ref 0–0.44)
ABSOLUTE IMMATURE GRANULOCYTE: <0.03 K/UL (ref 0–0.3)
ABSOLUTE LYMPH #: 1.75 K/UL (ref 1.1–3.7)
ABSOLUTE MONO #: 0.4 K/UL (ref 0.1–1.2)
ALT SERPL-CCNC: 12 U/L (ref 5–33)
ANION GAP SERPL CALCULATED.3IONS-SCNC: 10 MMOL/L (ref 9–17)
AST SERPL-CCNC: 19 U/L
BASOPHILS # BLD: 0 % (ref 0–2)
BASOPHILS ABSOLUTE: <0.03 K/UL (ref 0–0.2)
BUN BLDV-MCNC: 23 MG/DL (ref 8–23)
BUN/CREAT BLD: 31 (ref 9–20)
CALCIUM SERPL-MCNC: 8.3 MG/DL (ref 8.6–10.4)
CHLORIDE BLD-SCNC: 102 MMOL/L (ref 98–107)
CO2: 27 MMOL/L (ref 20–31)
CREAT SERPL-MCNC: 0.75 MG/DL (ref 0.5–0.9)
DIFFERENTIAL TYPE: ABNORMAL
EOSINOPHILS RELATIVE PERCENT: 1 % (ref 1–4)
GFR AFRICAN AMERICAN: >60 ML/MIN
GFR NON-AFRICAN AMERICAN: >60 ML/MIN
GFR SERPL CREATININE-BSD FRML MDRD: ABNORMAL ML/MIN/{1.73_M2}
GFR SERPL CREATININE-BSD FRML MDRD: ABNORMAL ML/MIN/{1.73_M2}
GLUCOSE BLD-MCNC: 83 MG/DL (ref 70–99)
HCT VFR BLD CALC: 25.6 % (ref 36.3–47.1)
HEMOGLOBIN: 8 G/DL (ref 11.9–15.1)
IMMATURE GRANULOCYTES: 0 %
LYMPHOCYTES # BLD: 34 % (ref 24–43)
MCH RBC QN AUTO: 32.4 PG (ref 25.2–33.5)
MCHC RBC AUTO-ENTMCNC: 31.3 G/DL (ref 28.4–34.8)
MCV RBC AUTO: 103.6 FL (ref 82.6–102.9)
MONOCYTES # BLD: 8 % (ref 3–12)
NRBC AUTOMATED: 0 PER 100 WBC
PDW BLD-RTO: 17 % (ref 11.8–14.4)
PLATELET # BLD: 208 K/UL (ref 138–453)
PLATELET ESTIMATE: ABNORMAL
PMV BLD AUTO: 11 FL (ref 8.1–13.5)
POTASSIUM SERPL-SCNC: 4.2 MMOL/L (ref 3.7–5.3)
RBC # BLD: 2.47 M/UL (ref 3.95–5.11)
RBC # BLD: ABNORMAL 10*6/UL
SEG NEUTROPHILS: 57 % (ref 36–65)
SEGMENTED NEUTROPHILS ABSOLUTE COUNT: 2.92 K/UL (ref 1.5–8.1)
SODIUM BLD-SCNC: 139 MMOL/L (ref 135–144)
WBC # BLD: 5.2 K/UL (ref 3.5–11.3)
WBC # BLD: ABNORMAL 10*3/UL

## 2021-07-01 PROCEDURE — 85025 COMPLETE CBC W/AUTO DIFF WBC: CPT

## 2021-07-01 PROCEDURE — 84460 ALANINE AMINO (ALT) (SGPT): CPT

## 2021-07-01 PROCEDURE — 84450 TRANSFERASE (AST) (SGOT): CPT

## 2021-07-01 PROCEDURE — 80048 BASIC METABOLIC PNL TOTAL CA: CPT

## 2021-07-01 PROCEDURE — 36415 COLL VENOUS BLD VENIPUNCTURE: CPT

## 2021-07-05 ENCOUNTER — HOSPITAL ENCOUNTER (OUTPATIENT)
Age: 86
Setting detail: SPECIMEN
Discharge: HOME | End: 2021-07-05
Payer: MEDICARE

## 2021-07-05 LAB
ANION GAP SERPL CALCULATED.3IONS-SCNC: 9 MMOL/L (ref 9–17)
BNP INTERPRETATION: ABNORMAL
BUN BLDV-MCNC: 31 MG/DL (ref 8–23)
BUN/CREAT BLD: 32 (ref 9–20)
CALCIUM SERPL-MCNC: 8 MG/DL (ref 8.6–10.4)
CHLORIDE BLD-SCNC: 104 MMOL/L (ref 98–107)
CO2: 27 MMOL/L (ref 20–31)
CREAT SERPL-MCNC: 0.97 MG/DL (ref 0.5–0.9)
GFR AFRICAN AMERICAN: >60 ML/MIN
GFR NON-AFRICAN AMERICAN: 54 ML/MIN
GFR SERPL CREATININE-BSD FRML MDRD: ABNORMAL ML/MIN/{1.73_M2}
GFR SERPL CREATININE-BSD FRML MDRD: ABNORMAL ML/MIN/{1.73_M2}
GLUCOSE BLD-MCNC: 96 MG/DL (ref 70–99)
POTASSIUM SERPL-SCNC: 4.7 MMOL/L (ref 3.7–5.3)
PRO-BNP: 3222 PG/ML
SODIUM BLD-SCNC: 140 MMOL/L (ref 135–144)

## 2021-07-05 PROCEDURE — 36415 COLL VENOUS BLD VENIPUNCTURE: CPT

## 2021-07-05 PROCEDURE — 83880 ASSAY OF NATRIURETIC PEPTIDE: CPT

## 2021-07-05 PROCEDURE — P9604 ONE-WAY ALLOW PRORATED TRIP: HCPCS

## 2021-07-05 PROCEDURE — 80048 BASIC METABOLIC PNL TOTAL CA: CPT

## 2021-07-07 ENCOUNTER — OUTSIDE SERVICES (OUTPATIENT)
Dept: FAMILY MEDICINE CLINIC | Age: 86
End: 2021-07-07
Payer: MEDICARE

## 2021-07-07 DIAGNOSIS — L12.0 BULLOUS PEMPHIGOID: Primary | ICD-10-CM

## 2021-07-07 DIAGNOSIS — D50.9 IRON DEFICIENCY ANEMIA, UNSPECIFIED IRON DEFICIENCY ANEMIA TYPE: ICD-10-CM

## 2021-07-07 DIAGNOSIS — G25.0 ESSENTIAL TREMOR: ICD-10-CM

## 2021-07-07 DIAGNOSIS — I25.10 ASHD (ARTERIOSCLEROTIC HEART DISEASE): ICD-10-CM

## 2021-07-07 DIAGNOSIS — G31.84 MCI (MILD COGNITIVE IMPAIRMENT): ICD-10-CM

## 2021-07-07 DIAGNOSIS — I87.2 VENOUS INSUFFICIENCY OF BOTH LOWER EXTREMITIES: ICD-10-CM

## 2021-07-07 DIAGNOSIS — E11.9 TYPE 2 DIABETES MELLITUS WITHOUT COMPLICATION, WITHOUT LONG-TERM CURRENT USE OF INSULIN (HCC): ICD-10-CM

## 2021-07-07 DIAGNOSIS — E78.2 MIXED HYPERLIPIDEMIA: ICD-10-CM

## 2021-07-07 DIAGNOSIS — I10 ESSENTIAL HYPERTENSION: ICD-10-CM

## 2021-07-07 NOTE — PROGRESS NOTES
[address] [address]  [place] [address]  Dept: [phone]    SARAH DILLARD RMA, am scribing for and in the presence of Dr. Enrique Langston. 7/7/21/10:10am/SNP    Duc Wild is a 80 y.o. female being seen for her monthly follow up. Location of visit: [facility]    HPI:  Admission History:  1/4/21 New admit, was a patient of Dr. Tacos Callaway in [facility]. She is very independent, staff will be doing showers and medications.     Last visit:  I will give an order for nitrostat 0.4 mg prn angina, she may keep at bedside.      I will d/c inderal la and start atenolol 50 mg po qd.    I also will start mysoline 25 mg po bid x 1 week then 50 mg po bid for tremor.      I also will give order for Ace wraps from feet to knee, daily off at hs. Faxes since last seen:   6/4/21 Labs revd, anemia is worse, repeat Fe Tibc and ferritin, order B12, folate level and check stool for hemoccult   6/4/21 orders revd from Dr. Roel Zamora for prednisone   6/14/21 [facility] due to goals met noted    6/28/21 D/C PO order, noted    6/30/21 Po for [facility]    07/01/2021 labs reviewed. No changes made.  07/02/2021 Staff notes LE edema wearing ACE wraps. Orders given for Lasix 20 mg po qd, and to ck BMP, BNP 1 week and get ortho BP weekly.  07/06/2021 Labs reviewed. No changes made. New today:  N/A    ROS:  General Constitutional: Denies fever. Denies lightheadedness. Respiratory: Denies cough. Denies shortness of breath. Denies wheezing. Cardiovascular: Denies chest pain at rest.  Gastrointestinal: Denies abdominal pain. Denies blood in the stool. Denies constipation. Denies diarrhea. Denies nausea. Denies vomiting. Genitourinary: Denies blood in the urine. Denies painful urination. Skin:  Denies rash. Neurologic: Denies falls. Denies dizziness. Psychiatric: Denies sleep disturbance. Denies anxiety. Denies depressed mood.     PHYSICAL EXAM:    General Appearance: in no acute distress, well nourished. Eyes: pupils equal, round reactive to light and accommodation. Oral Cavity: mucosa moist.  Neck/Thyroid:  no cervical lymphadenopathy, no thyromegaly  Skin: warm and dry  Heart: regular rate and rhythm. No murmurs. S1, S2 normal, no gallops. Rate 60  Lungs: clear to auscultation bilaterally. Abdomen:soft, nontender, nondistended, no masses or organomegaly. Extremities: no cyanosis, 2-3+ edema bilaterally at feet, no skin breakdown  Peripheral Pulses: 2+ throughout, symetric. Neurologic: verbally responsive, moves extremities. Psych: normal affect, speech fluent. ASSESSMENT:   Diagnosis Orders   1. Bullous pemphigoid     2. Type 2 diabetes mellitus without complication, without long-term current use of insulin ([facility] [address]     3. ASHD (arteriosclerotic heart disease)     4. MCI (mild cognitive impairment)     5. Essential hypertension     6. Mixed hyperlipidemia     7. Essential tremor     8. Venous insufficiency of both lower extremities     9. Iron deficiency anemia, unspecified iron deficiency anemia type       PLAN:  Her skin is looking much better, no open lesions or vesicular lesions. Denies pain at this time. She is cautious about ambulation d/t edema. ACE wraps are difficult to apply. She is notably anemic, 8-9 hgb range. I will put in a consult for Dr. Delfina Osgood or Dr. Katina Johnson regarding her fe deficiency and macrocytic anemia. I also will d/c ortho bps. I, Dr. Ania Mccormack, personally performed the services described in this documentation as scribed by S. Dossie Kocher in my presence, and is both accurate and complete.

## 2021-08-02 ENCOUNTER — TELEPHONE (OUTPATIENT)
Dept: FAMILY MEDICINE CLINIC | Age: 86
End: 2021-08-02

## 2021-08-02 DIAGNOSIS — E61.1 IRON DEFICIENCY: ICD-10-CM

## 2021-08-02 DIAGNOSIS — Z95.820 S/P ANGIOPLASTY WITH STENT: ICD-10-CM

## 2021-08-02 DIAGNOSIS — Z87.19 HX OF UPPER GASTROINTESTINAL HEMORRHAGE: ICD-10-CM

## 2021-08-02 DIAGNOSIS — E53.8 VITAMIN B12 DEFICIENCY: ICD-10-CM

## 2021-08-02 DIAGNOSIS — Z95.1 S/P CABG (CORONARY ARTERY BYPASS GRAFT): ICD-10-CM

## 2021-08-02 DIAGNOSIS — G25.0 BENIGN ESSENTIAL TREMOR: ICD-10-CM

## 2021-08-02 DIAGNOSIS — F33.41 RECURRENT MAJOR DEPRESSIVE DISORDER, IN PARTIAL REMISSION (HCC): ICD-10-CM

## 2021-08-02 DIAGNOSIS — E11.9 TYPE 2 DIABETES MELLITUS WITHOUT COMPLICATION, WITHOUT LONG-TERM CURRENT USE OF INSULIN (HCC): ICD-10-CM

## 2021-08-02 DIAGNOSIS — I10 ESSENTIAL HYPERTENSION: ICD-10-CM

## 2021-08-02 RX ORDER — ATORVASTATIN CALCIUM 20 MG/1
20 TABLET, FILM COATED ORAL NIGHTLY
Qty: 90 TABLET | Refills: 3 | Status: SHIPPED | OUTPATIENT
Start: 2021-08-02

## 2021-08-02 RX ORDER — PRIMIDONE 50 MG/1
50 TABLET ORAL 2 TIMES DAILY
Qty: 180 TABLET | Refills: 3 | Status: SHIPPED | OUTPATIENT
Start: 2021-08-02

## 2021-08-02 RX ORDER — FUROSEMIDE 20 MG/1
20 TABLET ORAL DAILY
Qty: 90 TABLET | Refills: 3 | Status: ON HOLD | OUTPATIENT
Start: 2021-08-02 | End: 2021-10-12

## 2021-08-02 RX ORDER — CHOLECALCIFEROL (VITAMIN D3) 125 MCG
50000 TABLET ORAL WEEKLY
Qty: 12 TABLET | Refills: 3 | Status: ON HOLD | OUTPATIENT
Start: 2021-08-02 | End: 2022-08-25 | Stop reason: HOSPADM

## 2021-08-02 RX ORDER — ERGOCALCIFEROL (VITAMIN D2) 10 MCG
1 TABLET ORAL DAILY
Qty: 90 TABLET | Refills: 3 | Status: SHIPPED | OUTPATIENT
Start: 2021-08-02

## 2021-08-02 RX ORDER — ATENOLOL 50 MG/1
50 TABLET ORAL DAILY
Qty: 90 TABLET | Refills: 3 | Status: SHIPPED | OUTPATIENT
Start: 2021-08-02

## 2021-08-02 RX ORDER — CARBAMAZEPINE 200 MG/1
200 TABLET ORAL 2 TIMES DAILY
Qty: 180 TABLET | Refills: 3 | Status: SHIPPED | OUTPATIENT
Start: 2021-08-02

## 2021-08-02 RX ORDER — ASPIRIN 81 MG/1
81 TABLET ORAL DAILY
Qty: 90 TABLET | Refills: 3 | Status: SHIPPED | OUTPATIENT
Start: 2021-08-02

## 2021-08-02 RX ORDER — FERROUS SULFATE 325(65) MG
325 TABLET ORAL
Qty: 90 TABLET | Refills: 3 | Status: ON HOLD
Start: 2021-08-02 | End: 2021-10-25 | Stop reason: HOSPADM

## 2021-08-02 RX ORDER — MYCOPHENOLATE MOFETIL 500 MG/1
TABLET ORAL
Qty: 270 TABLET | Refills: 3 | Status: SHIPPED | OUTPATIENT
Start: 2021-08-02

## 2021-08-02 RX ORDER — CITALOPRAM 10 MG/1
10 TABLET ORAL DAILY
Qty: 90 TABLET | Refills: 3 | Status: ON HOLD | OUTPATIENT
Start: 2021-08-02 | End: 2022-08-25 | Stop reason: SDUPTHER

## 2021-08-02 RX ORDER — MIRTAZAPINE 15 MG/1
15 TABLET, FILM COATED ORAL NIGHTLY
Qty: 90 TABLET | Refills: 3 | Status: SHIPPED | OUTPATIENT
Start: 2021-08-02

## 2021-08-02 RX ORDER — CITALOPRAM 20 MG/1
20 TABLET ORAL DAILY
Qty: 90 TABLET | Refills: 3 | Status: ON HOLD | OUTPATIENT
Start: 2021-08-02 | End: 2022-08-25 | Stop reason: SDUPTHER

## 2021-08-02 RX ORDER — PREDNISONE 1 MG/1
5 TABLET ORAL EVERY OTHER DAY
Qty: 45 TABLET | Refills: 3 | Status: SHIPPED | OUTPATIENT
Start: 2021-08-02 | End: 2021-10-31

## 2021-08-02 RX ORDER — PEDIATRIC MULTIVITAMIN NO.17
1 TABLET,CHEWABLE ORAL DAILY
Qty: 90 TABLET | Refills: 3 | Status: SHIPPED | OUTPATIENT
Start: 2021-08-02

## 2021-08-02 RX ORDER — CHOLECALCIFEROL (VITAMIN D3) 125 MCG
5 CAPSULE ORAL DAILY
Qty: 90 TABLET | Refills: 3 | Status: ON HOLD | OUTPATIENT
Start: 2021-08-02 | End: 2021-10-25 | Stop reason: SDUPTHER

## 2021-08-02 RX ORDER — PANTOPRAZOLE SODIUM 40 MG/1
40 TABLET, DELAYED RELEASE ORAL DAILY
Qty: 90 TABLET | Refills: 3 | Status: SHIPPED | OUTPATIENT
Start: 2021-08-02

## 2021-08-02 RX ORDER — LANOLIN ALCOHOL/MO/W.PET/CERES
1000 CREAM (GRAM) TOPICAL DAILY
Qty: 90 TABLET | Refills: 3 | Status: SHIPPED | OUTPATIENT
Start: 2021-08-02

## 2021-08-04 ENCOUNTER — OUTSIDE SERVICES (OUTPATIENT)
Dept: FAMILY MEDICINE CLINIC | Age: 86
End: 2021-08-04
Payer: MEDICARE

## 2021-08-04 DIAGNOSIS — Z95.1 S/P CABG (CORONARY ARTERY BYPASS GRAFT): ICD-10-CM

## 2021-08-04 DIAGNOSIS — Z87.19 HX OF UPPER GASTROINTESTINAL HEMORRHAGE: ICD-10-CM

## 2021-08-04 DIAGNOSIS — G25.0 BENIGN ESSENTIAL TREMOR: ICD-10-CM

## 2021-08-04 DIAGNOSIS — E61.1 IRON DEFICIENCY: ICD-10-CM

## 2021-08-04 DIAGNOSIS — Z95.820 S/P ANGIOPLASTY WITH STENT: ICD-10-CM

## 2021-08-04 DIAGNOSIS — D50.9 IRON DEFICIENCY ANEMIA, UNSPECIFIED IRON DEFICIENCY ANEMIA TYPE: ICD-10-CM

## 2021-08-04 DIAGNOSIS — E11.9 TYPE 2 DIABETES MELLITUS WITHOUT COMPLICATION, WITHOUT LONG-TERM CURRENT USE OF INSULIN (HCC): ICD-10-CM

## 2021-08-04 DIAGNOSIS — I10 ESSENTIAL HYPERTENSION: Primary | ICD-10-CM

## 2021-08-04 DIAGNOSIS — F33.41 RECURRENT MAJOR DEPRESSIVE DISORDER, IN PARTIAL REMISSION (HCC): ICD-10-CM

## 2021-08-04 DIAGNOSIS — E53.8 VITAMIN B12 DEFICIENCY: ICD-10-CM

## 2021-08-04 DIAGNOSIS — L12.0 BULLOUS PEMPHIGOID: ICD-10-CM

## 2021-08-04 NOTE — PROGRESS NOTES
[address] [address]  [phone]  Dept: [phone]    VISH DILLARD CMA, am scribing for and in the presence of Dr. Natividad Portillo. 8/4/21/8:55/CRF    Jayashree Harkins is a 80 y.o. female being seen for her monthly follow up. Location of visit: [facility]    HPI:  Admission History:  1/4/21 New admit, was a patient of Dr. Ingrid Perdue in [facility]. She is very independent, staff will be doing showers and medications.     Last visit:  Her skin is looking much better, no open lesions or vesicular lesions. Denies pain at this time. She is cautious about ambulation d/t edema. ACE wraps are difficult to apply.     She is notably anemic, 8-9 hgb range. I will put in a consult for Dr. Princess Vazquez or Dr. Bonilla Grant regarding her fe deficiency and macrocytic anemia.     I also will d/c ortho bps    Faxes since last seen:   7/9/21 Tubi  order signed    07/19/2021 Staff reports R has been refusing tubigrip to BLE due to it not being comfortable. Encouraged to increase elevation of legs as much as possible. Dr. Machelle Barnes also decreased prednisone to 5 mg po every other am. And is following up in 1 month. Noted.  7/21/21 noted refused to go se Dr. Jesús Monroe due to being Ronette Lazier and being  almost 80 yrs old and not doing this stuff anymore   8/3/21 Refills sent to [facility].     New today:  Ghada Weston has decided she doesn't want to go to the hospital or see another Dr. she prefers I take care of everything. This suggests she is doing much better. She is ambulatory and is not having angina. Has no more vesicular bullous lesions, prednisone is being weaned by Dr. Machelle Barnes. ROS:  General Constitutional: Denies fever. Denies lightheadedness. Respiratory: Denies cough. Denies shortness of breath. Denies wheezing. Cardiovascular: Denies chest pain at rest.  Gastrointestinal: Denies abdominal pain. Denies blood in the stool. Denies constipation. Denies diarrhea.  Denies nausea. Denies vomiting. Genitourinary: Denies blood in the urine. Denies painful urination. Skin:  Denies rash. Neurologic: Denies falls. Denies dizziness. Psychiatric: Denies sleep disturbance. Denies anxiety. Denies depressed mood. PHYSICAL EXAM:    General Appearance: in no acute distress, well nourished. alert, coherent. Eyes: pupils equal, round reactive to light and accommodation. Oral Cavity: mucosa moist.  Neck/Thyroid:  no cervical lymphadenopathy, no thyromegaly  Skin: warm and dry sclerotic changes toes of left foot with erythema and no tenderness or open lesions. Heart: regular rate and rhythm. No murmurs. S1, S2 normal, no gallops. Rate: 70's  Lungs: clear to auscultation bilaterally. Abdomen:soft, nontender, nondistended, no masses or organomegaly. Extremities: no cyanosis 2+ edema at feet,   Peripheral Pulses: 2+ throughout, symetric. Neurologic: verbally responsive, moves extremities. Psych: normal affect, speech fluent. ASSESSMENT:   Diagnosis Orders   1. Essential hypertension     2. S/P angioplasty with stent     3. S/P CABG (coronary artery bypass graft)     4. Benign essential tremor     5. Recurrent major depressive disorder, in partial remission ([address]     6. Iron deficiency     7. Type 2 diabetes mellitus without complication, without long-term current use of insulin ([address]     8. Hx of upper gastrointestinal hemorrhage     9. Vitamin B12 deficiency     10. Iron deficiency anemia, unspecified iron deficiency anemia type       with macrocytic changes refuses to hematology. 11. Bullous pemphigoid      improving. PLAN:  I will order a further work up for anemia by getting cbc w/ diff, BNP, CMP, ESR, CRP montly, A1C q3m, lipid q6m    I, Dr. Natividad Portillo, personally performed the services described in this documentation as scribed by VISH Gerardo in my presence, and is both accurate and complete.

## 2021-08-05 ENCOUNTER — HOSPITAL ENCOUNTER (OUTPATIENT)
Age: 86
Setting detail: SPECIMEN
Discharge: HOME | End: 2021-08-05
Payer: MEDICARE

## 2021-08-05 LAB
ABSOLUTE EOS #: 0.07 K/UL (ref 0–0.44)
ABSOLUTE IMMATURE GRANULOCYTE: <0.03 K/UL (ref 0–0.3)
ABSOLUTE LYMPH #: 1.84 K/UL (ref 1.1–3.7)
ABSOLUTE MONO #: 0.4 K/UL (ref 0.1–1.2)
ALBUMIN SERPL-MCNC: 3.6 G/DL (ref 3.5–5.2)
ALBUMIN/GLOBULIN RATIO: 2.1 (ref 1–2.5)
ALP BLD-CCNC: 43 U/L (ref 35–104)
ALT SERPL-CCNC: 10 U/L (ref 5–33)
ANION GAP SERPL CALCULATED.3IONS-SCNC: 12 MMOL/L (ref 9–17)
AST SERPL-CCNC: 17 U/L
BASOPHILS # BLD: 0 % (ref 0–2)
BASOPHILS ABSOLUTE: <0.03 K/UL (ref 0–0.2)
BILIRUB SERPL-MCNC: <0.1 MG/DL (ref 0.3–1.2)
BNP INTERPRETATION: ABNORMAL
BUN BLDV-MCNC: 34 MG/DL (ref 8–23)
BUN/CREAT BLD: 35 (ref 9–20)
C-REACTIVE PROTEIN: <3 MG/L (ref 0–5)
CALCIUM SERPL-MCNC: 8 MG/DL (ref 8.6–10.4)
CHLORIDE BLD-SCNC: 97 MMOL/L (ref 98–107)
CHOLESTEROL/HDL RATIO: 2.1
CHOLESTEROL: 119 MG/DL
CO2: 27 MMOL/L (ref 20–31)
CREAT SERPL-MCNC: 0.98 MG/DL (ref 0.5–0.9)
DIFFERENTIAL TYPE: ABNORMAL
EOSINOPHILS RELATIVE PERCENT: 1 % (ref 1–4)
GFR AFRICAN AMERICAN: >60 ML/MIN
GFR NON-AFRICAN AMERICAN: 53 ML/MIN
GFR SERPL CREATININE-BSD FRML MDRD: ABNORMAL ML/MIN/{1.73_M2}
GFR SERPL CREATININE-BSD FRML MDRD: ABNORMAL ML/MIN/{1.73_M2}
GLUCOSE BLD-MCNC: 96 MG/DL (ref 70–99)
HCT VFR BLD CALC: 26.8 % (ref 36.3–47.1)
HDLC SERPL-MCNC: 57 MG/DL
HEMOGLOBIN: 8.4 G/DL (ref 11.9–15.1)
IMMATURE GRANULOCYTES: 0 %
LDL CHOLESTEROL: 46 MG/DL (ref 0–130)
LYMPHOCYTES # BLD: 31 % (ref 24–43)
MCH RBC QN AUTO: 32.8 PG (ref 25.2–33.5)
MCHC RBC AUTO-ENTMCNC: 31.3 G/DL (ref 28.4–34.8)
MCV RBC AUTO: 104.7 FL (ref 82.6–102.9)
MONOCYTES # BLD: 7 % (ref 3–12)
NRBC AUTOMATED: 0 PER 100 WBC
PDW BLD-RTO: 14 % (ref 11.8–14.4)
PLATELET # BLD: 190 K/UL (ref 138–453)
PLATELET ESTIMATE: ABNORMAL
PMV BLD AUTO: 11 FL (ref 8.1–13.5)
POTASSIUM SERPL-SCNC: 4 MMOL/L (ref 3.7–5.3)
PRO-BNP: 546 PG/ML
RBC # BLD: 2.56 M/UL (ref 3.95–5.11)
RBC # BLD: ABNORMAL 10*6/UL
SEDIMENTATION RATE, ERYTHROCYTE: 14 MM (ref 0–20)
SEG NEUTROPHILS: 61 % (ref 36–65)
SEGMENTED NEUTROPHILS ABSOLUTE COUNT: 3.67 K/UL (ref 1.5–8.1)
SODIUM BLD-SCNC: 136 MMOL/L (ref 135–144)
TOTAL PROTEIN: 5.3 G/DL (ref 6.4–8.3)
TRIGL SERPL-MCNC: 79 MG/DL
VLDLC SERPL CALC-MCNC: NORMAL MG/DL (ref 1–30)
WBC # BLD: 6 K/UL (ref 3.5–11.3)
WBC # BLD: ABNORMAL 10*3/UL

## 2021-08-05 PROCEDURE — 86140 C-REACTIVE PROTEIN: CPT

## 2021-08-05 PROCEDURE — 80061 LIPID PANEL: CPT

## 2021-08-05 PROCEDURE — 80053 COMPREHEN METABOLIC PANEL: CPT

## 2021-08-05 PROCEDURE — 36415 COLL VENOUS BLD VENIPUNCTURE: CPT

## 2021-08-05 PROCEDURE — 85025 COMPLETE CBC W/AUTO DIFF WBC: CPT

## 2021-08-05 PROCEDURE — 83880 ASSAY OF NATRIURETIC PEPTIDE: CPT

## 2021-08-05 PROCEDURE — 85652 RBC SED RATE AUTOMATED: CPT

## 2021-08-05 PROCEDURE — P9603 ONE-WAY ALLOW PRORATED MILES: HCPCS

## 2021-09-01 ENCOUNTER — OUTSIDE SERVICES (OUTPATIENT)
Dept: FAMILY MEDICINE CLINIC | Age: 86
End: 2021-09-01
Payer: MEDICARE

## 2021-09-01 DIAGNOSIS — E61.1 IRON DEFICIENCY: ICD-10-CM

## 2021-09-01 DIAGNOSIS — G25.0 BENIGN ESSENTIAL TREMOR: ICD-10-CM

## 2021-09-01 DIAGNOSIS — L12.0 BULLOUS PEMPHIGOID: ICD-10-CM

## 2021-09-01 DIAGNOSIS — I25.10 ASHD (ARTERIOSCLEROTIC HEART DISEASE): ICD-10-CM

## 2021-09-01 DIAGNOSIS — F33.41 RECURRENT MAJOR DEPRESSIVE DISORDER, IN PARTIAL REMISSION (HCC): ICD-10-CM

## 2021-09-01 DIAGNOSIS — D50.9 IRON DEFICIENCY ANEMIA, UNSPECIFIED IRON DEFICIENCY ANEMIA TYPE: ICD-10-CM

## 2021-09-01 DIAGNOSIS — Z95.820 S/P ANGIOPLASTY WITH STENT: ICD-10-CM

## 2021-09-01 DIAGNOSIS — Z95.1 S/P CABG (CORONARY ARTERY BYPASS GRAFT): ICD-10-CM

## 2021-09-01 DIAGNOSIS — Z87.19 HX OF UPPER GASTROINTESTINAL HEMORRHAGE: ICD-10-CM

## 2021-09-01 DIAGNOSIS — I10 ESSENTIAL HYPERTENSION: Primary | ICD-10-CM

## 2021-09-01 DIAGNOSIS — E11.9 TYPE 2 DIABETES MELLITUS WITHOUT COMPLICATION, WITHOUT LONG-TERM CURRENT USE OF INSULIN (HCC): ICD-10-CM

## 2021-09-01 RX ORDER — FUROSEMIDE 20 MG/1
20 TABLET ORAL
Qty: 36 TABLET | Refills: 3 | Status: ON HOLD
Start: 2021-09-01 | End: 2021-10-25 | Stop reason: HOSPADM

## 2021-09-01 NOTE — PROGRESS NOTES
[address] [address]  [phone]  Dept: [phone]    SARAH DILLARD RMA, am scribing for and in the presence of Dr. Lizzette Lockwood. 9/1/21/8:30am/SNP    Kali Nuñez is a 80 y.o. female being seen for her monthly follow up. Location of visit: [facility]    HPI:  Admission History:  1/4/21 New admit, was a patient of Dr. Ashley Delgado in [facility]. She is very independent, staff will be doing showers and medications.     Last visit:  I will order a further work up for anemia by getting cbc w/ diff, BNP, CMP, ESR, CRP montly, A1C q3m, lipid q6m     Faxes since last seen:   8/6/21 lab revd no changes made    8/6/21 notice of out of medication    8/6/21 labs revd no changes made    8/16/21 ordered xray abdomen and clear diet until xray done   8/17/21 abdomen xray noted     New today:  Feeling pretty well still having swelling in feet but no lesions. Kenalog 40 mg  Every 4 weeks still on prednisone. Following Dr. Fabián Tadeo. No cardiac symptoms, labs reviewed     ROS:  General Constitutional: Denies fever. Denies lightheadedness. Respiratory: Denies cough. Denies shortness of breath. Denies wheezing. Cardiovascular: Denies chest pain at rest.  Gastrointestinal: Denies abdominal pain. Denies blood in the stool. Denies constipation. Denies diarrhea. Denies nausea. Denies vomiting. Genitourinary: Denies blood in the urine. Denies painful urination. Skin:  Denies rash. Neurologic: Denies falls. Denies dizziness. Psychiatric: Denies sleep disturbance. Denies anxiety. Denies depressed mood. PHYSICAL EXAM:    General Appearance: in no acute distress, well nourished. Eyes: pupils equal, round reactive to light and accommodation. Oral Cavity: mucosa moist.  Neck/Thyroid:  no cervical lymphadenopathy, no thyromegaly  Skin: warm and dry  Heart: regular rate and rhythm. No murmurs. S1, S2 normal, no gallops.  Rate 70   Lungs: clear to auscultation bilaterally. Abdomen:soft, nontender, nondistended, no masses or organomegaly. Extremities: no cyanosis or edema. 2+ edema in feet only , mild erythema left heel no boils lesion   Peripheral Pulses: 2+ throughout, symetric. Neurologic: verbally responsive, moves extremities. Psych: normal affect, speech fluent. ASSESSMENT:   Diagnosis Orders   1. Essential hypertension     2. S/P angioplasty with stent     3. S/P CABG (coronary artery bypass graft)     4. Benign essential tremor     5. Recurrent major depressive disorder, in partial remission ([address]     6. Iron deficiency     7. Type 2 diabetes mellitus without complication, without long-term current use of insulin ([facility] [address]     8. Hx of upper gastrointestinal hemorrhage     9. Iron deficiency anemia, unspecified iron deficiency anemia type     10. Bullous pemphigoid     11. ASHD (arteriosclerotic heart disease)         PLAN:  I will change Kenalog to 20 mg IM q0ztblt and change lasix to 20 mg po qam on MWF, only. I, Dr. Soraya Black, personally performed the services described in this documentation as scribed by SARAH Arrington in my presence, and is both accurate and complete.

## 2021-09-16 ENCOUNTER — HOSPITAL ENCOUNTER (OUTPATIENT)
Age: 86
Setting detail: SPECIMEN
Discharge: HOME | End: 2021-09-16
Payer: MEDICARE

## 2021-09-16 LAB
ABSOLUTE EOS #: 0.06 K/UL (ref 0–0.44)
ABSOLUTE IMMATURE GRANULOCYTE: <0.03 K/UL (ref 0–0.3)
ABSOLUTE LYMPH #: 1.92 K/UL (ref 1.1–3.7)
ABSOLUTE MONO #: 0.42 K/UL (ref 0.1–1.2)
ALBUMIN SERPL-MCNC: 3.6 G/DL (ref 3.5–5.2)
ALBUMIN/GLOBULIN RATIO: 2.1 (ref 1–2.5)
ALP BLD-CCNC: 44 U/L (ref 35–104)
ALT SERPL-CCNC: 11 U/L (ref 5–33)
ANION GAP SERPL CALCULATED.3IONS-SCNC: 13 MMOL/L (ref 9–17)
AST SERPL-CCNC: 15 U/L
BASOPHILS # BLD: 0 % (ref 0–2)
BASOPHILS ABSOLUTE: <0.03 K/UL (ref 0–0.2)
BILIRUB SERPL-MCNC: <0.1 MG/DL (ref 0.3–1.2)
BNP INTERPRETATION: ABNORMAL
BUN BLDV-MCNC: 30 MG/DL (ref 8–23)
BUN/CREAT BLD: 27 (ref 9–20)
C-REACTIVE PROTEIN: <3 MG/L (ref 0–5)
CALCIUM SERPL-MCNC: 8.3 MG/DL (ref 8.6–10.4)
CHLORIDE BLD-SCNC: 99 MMOL/L (ref 98–107)
CO2: 24 MMOL/L (ref 20–31)
CREAT SERPL-MCNC: 1.1 MG/DL (ref 0.5–0.9)
DIFFERENTIAL TYPE: ABNORMAL
EOSINOPHILS RELATIVE PERCENT: 1 % (ref 1–4)
GFR AFRICAN AMERICAN: 57 ML/MIN
GFR NON-AFRICAN AMERICAN: 47 ML/MIN
GFR SERPL CREATININE-BSD FRML MDRD: ABNORMAL ML/MIN/{1.73_M2}
GFR SERPL CREATININE-BSD FRML MDRD: ABNORMAL ML/MIN/{1.73_M2}
GLUCOSE BLD-MCNC: 93 MG/DL (ref 70–99)
HCT VFR BLD CALC: 28.7 % (ref 36.3–47.1)
HEMOGLOBIN: 8.8 G/DL (ref 11.9–15.1)
IMMATURE GRANULOCYTES: 0 %
LYMPHOCYTES # BLD: 34 % (ref 24–43)
MCH RBC QN AUTO: 32.6 PG (ref 25.2–33.5)
MCHC RBC AUTO-ENTMCNC: 30.7 G/DL (ref 28.4–34.8)
MCV RBC AUTO: 106.3 FL (ref 82.6–102.9)
MONOCYTES # BLD: 8 % (ref 3–12)
NRBC AUTOMATED: 0 PER 100 WBC
PDW BLD-RTO: 12.8 % (ref 11.8–14.4)
PLATELET # BLD: 194 K/UL (ref 138–453)
PLATELET ESTIMATE: ABNORMAL
PMV BLD AUTO: 11.5 FL (ref 8.1–13.5)
POTASSIUM SERPL-SCNC: 4.3 MMOL/L (ref 3.7–5.3)
PRO-BNP: 711 PG/ML
RBC # BLD: 2.7 M/UL (ref 3.95–5.11)
RBC # BLD: ABNORMAL 10*6/UL
SEDIMENTATION RATE, ERYTHROCYTE: 21 MM (ref 0–20)
SEG NEUTROPHILS: 57 % (ref 36–65)
SEGMENTED NEUTROPHILS ABSOLUTE COUNT: 3.15 K/UL (ref 1.5–8.1)
SODIUM BLD-SCNC: 136 MMOL/L (ref 135–144)
TOTAL PROTEIN: 5.3 G/DL (ref 6.4–8.3)
WBC # BLD: 5.6 K/UL (ref 3.5–11.3)
WBC # BLD: ABNORMAL 10*3/UL

## 2021-09-16 PROCEDURE — 83880 ASSAY OF NATRIURETIC PEPTIDE: CPT

## 2021-09-16 PROCEDURE — P9603 ONE-WAY ALLOW PRORATED MILES: HCPCS

## 2021-09-16 PROCEDURE — 36415 COLL VENOUS BLD VENIPUNCTURE: CPT

## 2021-09-16 PROCEDURE — 86140 C-REACTIVE PROTEIN: CPT

## 2021-09-16 PROCEDURE — 85025 COMPLETE CBC W/AUTO DIFF WBC: CPT

## 2021-09-16 PROCEDURE — 85652 RBC SED RATE AUTOMATED: CPT

## 2021-09-16 PROCEDURE — 80053 COMPREHEN METABOLIC PANEL: CPT

## 2021-10-01 ENCOUNTER — TELEPHONE (OUTPATIENT)
Dept: FAMILY MEDICINE CLINIC | Age: 86
End: 2021-10-01

## 2021-10-01 NOTE — TELEPHONE ENCOUNTER
Fransico Michael from [facility] [phone] left  9/30 @ 12:58 stating we ordered PT & OT for pt d/t frequent falls. She was asking for F 2 F note to state as such. Pt does not have upcoming appt scheduled and last note does not address frequent falls, please advise, thank you.

## 2021-10-01 NOTE — TELEPHONE ENCOUNTER
Kayla calls back, just asking if you would place in next encounter (on 10/6??) for pt. If you can add to note that you request pt. Be seen by PT & OT?  Then fax note to her fax # [phone]

## 2021-10-06 ENCOUNTER — OUTSIDE SERVICES (OUTPATIENT)
Dept: FAMILY MEDICINE CLINIC | Age: 86
End: 2021-10-06
Payer: MEDICARE

## 2021-10-06 DIAGNOSIS — Z87.19 HX OF UPPER GASTROINTESTINAL HEMORRHAGE: ICD-10-CM

## 2021-10-06 DIAGNOSIS — I10 ESSENTIAL HYPERTENSION: ICD-10-CM

## 2021-10-06 DIAGNOSIS — D50.9 IRON DEFICIENCY ANEMIA, UNSPECIFIED IRON DEFICIENCY ANEMIA TYPE: ICD-10-CM

## 2021-10-06 DIAGNOSIS — F33.41 RECURRENT MAJOR DEPRESSIVE DISORDER, IN PARTIAL REMISSION (HCC): ICD-10-CM

## 2021-10-06 DIAGNOSIS — L12.0 BULLOUS PEMPHIGOID: Primary | ICD-10-CM

## 2021-10-06 DIAGNOSIS — E61.1 IRON DEFICIENCY: ICD-10-CM

## 2021-10-06 DIAGNOSIS — Z95.820 S/P ANGIOPLASTY WITH STENT: ICD-10-CM

## 2021-10-06 DIAGNOSIS — Z95.1 S/P CABG (CORONARY ARTERY BYPASS GRAFT): ICD-10-CM

## 2021-10-06 DIAGNOSIS — G25.0 BENIGN ESSENTIAL TREMOR: ICD-10-CM

## 2021-10-06 DIAGNOSIS — E11.9 TYPE 2 DIABETES MELLITUS WITHOUT COMPLICATION, WITHOUT LONG-TERM CURRENT USE OF INSULIN (HCC): ICD-10-CM

## 2021-10-06 DIAGNOSIS — I25.10 ASHD (ARTERIOSCLEROTIC HEART DISEASE): ICD-10-CM

## 2021-10-06 NOTE — PROGRESS NOTES
tremor 04/15/2016    CAD S/P percutaneous coronary angioplasty 1998     done at [facility] Diabetes mellitus type II, controlled ([facility] [address] 1979    Gastrointestinal hemorrhage 2012     Duodenal Ulcer by EGD    History of pancreatitis 2013     after GI bleed    Humerus fracture 2015     right humerus ; [place]    Hyperlipidemia      Hypertension      Major depression      S/P CABG (coronary artery bypass graft) 1996    Ventral hernia 3664     infraumbilical ; incisional    Vitamin B12 deficiency 07/2018     Vitamin B12 270            Past Surgical History         Past Surgical History:   Procedure Laterality Date    CHOLECYSTECTOMY        COLONOSCOPY N/A 11/5/2018     COLONOSCOPY DIAGNOSTIC OR SCREENING performed by Alexys Bonilla MD at [address]     ? number grafts    HYSTERECTOMY, VAGINAL   1958    SALPINGO-OOPHORECTOMY   1960            PHYSICAL EXAM:     General Appearance: in no acute distress, well nourished. Alert pleasant and mentally sharp     Eyes: pupils equal, round reactive to light and accommodation. Oral Cavity: mucosa moist.  Neck/Thyroid:  no cervical lymphadenopathy, no thyromegaly  Skin: warm and dry  Heart: regular rate and rhythm. No murmurs. S1, S2 normal, no gallops. Rate: 70   Lungs: clear to auscultation bilaterally. Abdomen:soft, nontender, nondistended, no masses or organomegaly. Extremities: no cyanosis 2-3+ edema at feet and lower ankles distal 1/3 leg up no edema. Intolerant to any stocking or wrap on her feet. Peripheral Pulses: 2+ throughout, symetric. Neurologic: verbally responsive, moves extremities. Psych: normal affect, speech fluent.     ASSESSMENT:    Diagnosis Orders   1. Essential hypertension      2. S/P angioplasty with stent      3. S/P CABG (coronary artery bypass graft)      4. Benign essential tremor      5. Recurrent major depressive disorder, in partial remission (HCC)      6.  Iron deficiency    7. Type 2 diabetes mellitus without complication, without long-term current use of insulin (HCC)      8. Hx of upper gastrointestinal hemorrhage      9. Iron deficiency anemia, unspecified iron deficiency anemia type      10. Bullous pemphigoid      11. ASHD (arteriosclerotic heart disease)            PLAN:  I will give an order for PT/OT to eval balance and strength. Provide strengthening exercises and treatment. She is not interested in leaving the facility for any reason at this time.      I also will d/c Kenalog IM. she continues on low dose prednisone and Cellcept. I, Dr. Trista Caceres, personally performed the services described in this documentation as scribed/transcribed by KWASI Carvalho in my presence, and is both accurate and complete.

## 2021-10-11 ENCOUNTER — HOSPITAL ENCOUNTER (INPATIENT)
Age: 86
LOS: 13 days | Discharge: NURSING, REHAB OR LONG-TERM CARE | DRG: 480 | End: 2021-10-25
Attending: EMERGENCY MEDICINE | Admitting: FAMILY MEDICINE
Payer: MEDICARE

## 2021-10-11 DIAGNOSIS — S72.002A CLOSED LEFT HIP FRACTURE, INITIAL ENCOUNTER (HCC): ICD-10-CM

## 2021-10-11 DIAGNOSIS — S72.001A CLOSED RIGHT HIP FRACTURE, INITIAL ENCOUNTER (HCC): ICD-10-CM

## 2021-10-11 DIAGNOSIS — N30.00 ACUTE CYSTITIS WITHOUT HEMATURIA: ICD-10-CM

## 2021-10-11 DIAGNOSIS — L03.90 CELLULITIS, UNSPECIFIED CELLULITIS SITE: Primary | ICD-10-CM

## 2021-10-11 LAB
ABSOLUTE EOS #: 0.04 K/UL (ref 0–0.44)
ABSOLUTE IMMATURE GRANULOCYTE: <0.03 K/UL (ref 0–0.3)
ABSOLUTE LYMPH #: 1.83 K/UL (ref 1.1–3.7)
ABSOLUTE MONO #: 0.45 K/UL (ref 0.1–1.2)
BASOPHILS # BLD: 0 % (ref 0–2)
BASOPHILS ABSOLUTE: 0.03 K/UL (ref 0–0.2)
DIFFERENTIAL TYPE: ABNORMAL
EOSINOPHILS RELATIVE PERCENT: 1 % (ref 1–4)
HCT VFR BLD CALC: 31.2 % (ref 36.3–47.1)
HEMOGLOBIN: 9.9 G/DL (ref 11.9–15.1)
IMMATURE GRANULOCYTES: 0 %
LYMPHOCYTES # BLD: 24 % (ref 24–43)
MCH RBC QN AUTO: 32.6 PG (ref 25.2–33.5)
MCHC RBC AUTO-ENTMCNC: 31.7 G/DL (ref 28.4–34.8)
MCV RBC AUTO: 102.6 FL (ref 82.6–102.9)
MONOCYTES # BLD: 6 % (ref 3–12)
NRBC AUTOMATED: 0 PER 100 WBC
PDW BLD-RTO: 13 % (ref 11.8–14.4)
PLATELET # BLD: 265 K/UL (ref 138–453)
PLATELET ESTIMATE: ABNORMAL
PMV BLD AUTO: 10.5 FL (ref 8.1–13.5)
RBC # BLD: 3.04 M/UL (ref 3.95–5.11)
RBC # BLD: ABNORMAL 10*6/UL
SEG NEUTROPHILS: 69 % (ref 36–65)
SEGMENTED NEUTROPHILS ABSOLUTE COUNT: 5.27 K/UL (ref 1.5–8.1)
WBC # BLD: 7.6 K/UL (ref 3.5–11.3)
WBC # BLD: ABNORMAL 10*3/UL

## 2021-10-11 PROCEDURE — 36415 COLL VENOUS BLD VENIPUNCTURE: CPT

## 2021-10-11 PROCEDURE — 84484 ASSAY OF TROPONIN QUANT: CPT

## 2021-10-11 PROCEDURE — 96367 TX/PROPH/DG ADDL SEQ IV INF: CPT

## 2021-10-11 PROCEDURE — 80053 COMPREHEN METABOLIC PANEL: CPT

## 2021-10-11 PROCEDURE — 85025 COMPLETE CBC W/AUTO DIFF WBC: CPT

## 2021-10-11 PROCEDURE — 6360000002 HC RX W HCPCS: Performed by: EMERGENCY MEDICINE

## 2021-10-11 PROCEDURE — 93005 ELECTROCARDIOGRAM TRACING: CPT | Performed by: EMERGENCY MEDICINE

## 2021-10-11 PROCEDURE — 99285 EMERGENCY DEPT VISIT HI MDM: CPT

## 2021-10-11 PROCEDURE — 96365 THER/PROPH/DIAG IV INF INIT: CPT

## 2021-10-11 PROCEDURE — 96376 TX/PRO/DX INJ SAME DRUG ADON: CPT

## 2021-10-11 PROCEDURE — 96375 TX/PRO/DX INJ NEW DRUG ADDON: CPT

## 2021-10-11 RX ORDER — MORPHINE SULFATE 2 MG/ML
2 INJECTION, SOLUTION INTRAMUSCULAR; INTRAVENOUS ONCE
Status: COMPLETED | OUTPATIENT
Start: 2021-10-12 | End: 2021-10-11

## 2021-10-11 RX ADMIN — MORPHINE SULFATE 2 MG: 2 INJECTION, SOLUTION INTRAMUSCULAR; INTRAVENOUS at 23:55

## 2021-10-11 ASSESSMENT — PAIN DESCRIPTION - LOCATION: LOCATION: HIP

## 2021-10-11 ASSESSMENT — PAIN DESCRIPTION - ORIENTATION: ORIENTATION: RIGHT

## 2021-10-11 ASSESSMENT — PAIN DESCRIPTION - DESCRIPTORS: DESCRIPTORS: SHARP

## 2021-10-11 ASSESSMENT — PAIN DESCRIPTION - PAIN TYPE: TYPE: ACUTE PAIN

## 2021-10-11 ASSESSMENT — PAIN SCALES - GENERAL: PAINLEVEL_OUTOF10: 10

## 2021-10-12 ENCOUNTER — ANESTHESIA (OUTPATIENT)
Dept: MEDSURG UNIT | Age: 86
DRG: 480 | End: 2021-10-12
Payer: MEDICARE

## 2021-10-12 ENCOUNTER — APPOINTMENT (OUTPATIENT)
Dept: GENERAL RADIOLOGY | Age: 86
DRG: 480 | End: 2021-10-12
Payer: MEDICARE

## 2021-10-12 ENCOUNTER — APPOINTMENT (OUTPATIENT)
Dept: CT IMAGING | Age: 86
DRG: 480 | End: 2021-10-12
Payer: MEDICARE

## 2021-10-12 ENCOUNTER — APPOINTMENT (OUTPATIENT)
Dept: MRI IMAGING | Age: 86
DRG: 480 | End: 2021-10-12
Payer: MEDICARE

## 2021-10-12 ENCOUNTER — ANESTHESIA EVENT (OUTPATIENT)
Dept: MEDSURG UNIT | Age: 86
DRG: 480 | End: 2021-10-12
Payer: MEDICARE

## 2021-10-12 ENCOUNTER — APPOINTMENT (OUTPATIENT)
Dept: NON INVASIVE DIAGNOSTICS | Age: 86
DRG: 480 | End: 2021-10-12
Payer: MEDICARE

## 2021-10-12 PROBLEM — I25.10 CORONARY ARTERY DISEASE INVOLVING NATIVE CORONARY ARTERY OF NATIVE HEART WITHOUT ANGINA PECTORIS: Status: ACTIVE | Noted: 2021-10-12

## 2021-10-12 PROBLEM — N30.00 ACUTE CYSTITIS WITHOUT HEMATURIA: Status: ACTIVE | Noted: 2021-10-12

## 2021-10-12 PROBLEM — S72.001A CLOSED RIGHT HIP FRACTURE, INITIAL ENCOUNTER (HCC): Status: ACTIVE | Noted: 2021-10-12

## 2021-10-12 LAB
-: ABNORMAL
ALBUMIN SERPL-MCNC: 3.9 G/DL (ref 3.5–5.2)
ALBUMIN/GLOBULIN RATIO: 1.5 (ref 1–2.5)
ALP BLD-CCNC: 96 U/L (ref 35–104)
ALT SERPL-CCNC: 14 U/L (ref 5–33)
AMORPHOUS: ABNORMAL
ANION GAP SERPL CALCULATED.3IONS-SCNC: 16 MMOL/L (ref 9–17)
AST SERPL-CCNC: 20 U/L
BACTERIA: ABNORMAL
BILIRUB SERPL-MCNC: 0.18 MG/DL (ref 0.3–1.2)
BILIRUBIN URINE: NEGATIVE
BUN BLDV-MCNC: 26 MG/DL (ref 8–23)
BUN/CREAT BLD: 26 (ref 9–20)
CALCIUM SERPL-MCNC: 9.3 MG/DL (ref 8.6–10.4)
CASTS UA: ABNORMAL /LPF
CHLORIDE BLD-SCNC: 96 MMOL/L (ref 98–107)
CO2: 22 MMOL/L (ref 20–31)
COLOR: YELLOW
COMMENT UA: ABNORMAL
CREAT SERPL-MCNC: 0.99 MG/DL (ref 0.5–0.9)
CRYSTALS, UA: ABNORMAL /HPF
EKG ATRIAL RATE: 63 BPM
EKG P-R INTERVAL: 272 MS
EKG Q-T INTERVAL: 420 MS
EKG QRS DURATION: 124 MS
EKG QTC CALCULATION (BAZETT): 429 MS
EKG R AXIS: 47 DEGREES
EKG T AXIS: -27 DEGREES
EKG VENTRICULAR RATE: 63 BPM
EPITHELIAL CELLS UA: ABNORMAL /HPF (ref 0–25)
GFR AFRICAN AMERICAN: >60 ML/MIN
GFR NON-AFRICAN AMERICAN: 53 ML/MIN
GFR SERPL CREATININE-BSD FRML MDRD: ABNORMAL ML/MIN/{1.73_M2}
GFR SERPL CREATININE-BSD FRML MDRD: ABNORMAL ML/MIN/{1.73_M2}
GLUCOSE BLD-MCNC: 92 MG/DL (ref 70–99)
GLUCOSE URINE: NEGATIVE
INR BLD: 1
KETONES, URINE: NEGATIVE
LACTIC ACID, SEPSIS WHOLE BLOOD: NORMAL MMOL/L (ref 0.5–1.9)
LACTIC ACID, SEPSIS: 1.3 MMOL/L (ref 0.5–1.9)
LEUKOCYTE ESTERASE, URINE: ABNORMAL
LV EF: 55 %
LVEF MODALITY: NORMAL
MUCUS: ABNORMAL
NITRITE, URINE: POSITIVE
OTHER OBSERVATIONS UA: ABNORMAL
PARTIAL THROMBOPLASTIN TIME: 33 SEC (ref 23.9–33.8)
PH UA: 5.5 (ref 5–9)
POTASSIUM SERPL-SCNC: 4.7 MMOL/L (ref 3.7–5.3)
PROTEIN UA: NEGATIVE
PROTHROMBIN TIME: 13.3 SEC (ref 11.5–14.2)
RBC UA: ABNORMAL /HPF (ref 0–2)
RENAL EPITHELIAL, UA: ABNORMAL /HPF
SODIUM BLD-SCNC: 134 MMOL/L (ref 135–144)
SPECIFIC GRAVITY UA: 1.01 (ref 1.01–1.02)
TOTAL PROTEIN: 6.5 G/DL (ref 6.4–8.3)
TRICHOMONAS: ABNORMAL
TROPONIN INTERP: ABNORMAL
TROPONIN INTERP: ABNORMAL
TROPONIN T: ABNORMAL NG/ML
TROPONIN T: ABNORMAL NG/ML
TROPONIN, HIGH SENSITIVITY: 17 NG/L (ref 0–14)
TROPONIN, HIGH SENSITIVITY: 17 NG/L (ref 0–14)
TURBIDITY: CLEAR
URINE HGB: NEGATIVE
UROBILINOGEN, URINE: NORMAL
VITAMIN D 25-HYDROXY: 47.2 NG/ML (ref 30–100)
WBC UA: ABNORMAL /HPF (ref 0–5)
YEAST: ABNORMAL

## 2021-10-12 PROCEDURE — 6360000002 HC RX W HCPCS: Performed by: NURSE ANESTHETIST, CERTIFIED REGISTERED

## 2021-10-12 PROCEDURE — 70450 CT HEAD/BRAIN W/O DYE: CPT

## 2021-10-12 PROCEDURE — 87088 URINE BACTERIA CULTURE: CPT

## 2021-10-12 PROCEDURE — 86850 RBC ANTIBODY SCREEN: CPT

## 2021-10-12 PROCEDURE — 73502 X-RAY EXAM HIP UNI 2-3 VIEWS: CPT

## 2021-10-12 PROCEDURE — 93010 ELECTROCARDIOGRAM REPORT: CPT | Performed by: INTERNAL MEDICINE

## 2021-10-12 PROCEDURE — 71045 X-RAY EXAM CHEST 1 VIEW: CPT

## 2021-10-12 PROCEDURE — 99222 1ST HOSP IP/OBS MODERATE 55: CPT | Performed by: INTERNAL MEDICINE

## 2021-10-12 PROCEDURE — 6370000000 HC RX 637 (ALT 250 FOR IP): Performed by: NURSE PRACTITIONER

## 2021-10-12 PROCEDURE — 85610 PROTHROMBIN TIME: CPT

## 2021-10-12 PROCEDURE — 73552 X-RAY EXAM OF FEMUR 2/>: CPT

## 2021-10-12 PROCEDURE — 87086 URINE CULTURE/COLONY COUNT: CPT

## 2021-10-12 PROCEDURE — 73721 MRI JNT OF LWR EXTRE W/O DYE: CPT

## 2021-10-12 PROCEDURE — 86901 BLOOD TYPING SEROLOGIC RH(D): CPT

## 2021-10-12 PROCEDURE — 36415 COLL VENOUS BLD VENIPUNCTURE: CPT

## 2021-10-12 PROCEDURE — 86900 BLOOD TYPING SEROLOGIC ABO: CPT

## 2021-10-12 PROCEDURE — 73700 CT LOWER EXTREMITY W/O DYE: CPT

## 2021-10-12 PROCEDURE — 85730 THROMBOPLASTIN TIME PARTIAL: CPT

## 2021-10-12 PROCEDURE — 87040 BLOOD CULTURE FOR BACTERIA: CPT

## 2021-10-12 PROCEDURE — 73610 X-RAY EXAM OF ANKLE: CPT

## 2021-10-12 PROCEDURE — 83605 ASSAY OF LACTIC ACID: CPT

## 2021-10-12 PROCEDURE — 2580000003 HC RX 258: Performed by: NURSE PRACTITIONER

## 2021-10-12 PROCEDURE — 96367 TX/PROPH/DG ADDL SEQ IV INF: CPT

## 2021-10-12 PROCEDURE — 82306 VITAMIN D 25 HYDROXY: CPT

## 2021-10-12 PROCEDURE — 93306 TTE W/DOPPLER COMPLETE: CPT

## 2021-10-12 PROCEDURE — 84484 ASSAY OF TROPONIN QUANT: CPT

## 2021-10-12 PROCEDURE — 6360000002 HC RX W HCPCS: Performed by: EMERGENCY MEDICINE

## 2021-10-12 PROCEDURE — 94761 N-INVAS EAR/PLS OXIMETRY MLT: CPT

## 2021-10-12 PROCEDURE — 87186 SC STD MICRODIL/AGAR DIL: CPT

## 2021-10-12 PROCEDURE — 96365 THER/PROPH/DIAG IV INF INIT: CPT

## 2021-10-12 PROCEDURE — 81001 URINALYSIS AUTO W/SCOPE: CPT

## 2021-10-12 PROCEDURE — 6370000000 HC RX 637 (ALT 250 FOR IP): Performed by: INTERNAL MEDICINE

## 2021-10-12 PROCEDURE — 2580000003 HC RX 258: Performed by: EMERGENCY MEDICINE

## 2021-10-12 PROCEDURE — 96376 TX/PRO/DX INJ SAME DRUG ADON: CPT

## 2021-10-12 PROCEDURE — 1200000000 HC SEMI PRIVATE

## 2021-10-12 PROCEDURE — 76942 ECHO GUIDE FOR BIOPSY: CPT | Performed by: NURSE ANESTHETIST, CERTIFIED REGISTERED

## 2021-10-12 PROCEDURE — 6360000002 HC RX W HCPCS: Performed by: NURSE PRACTITIONER

## 2021-10-12 RX ORDER — ATENOLOL 50 MG/1
50 TABLET ORAL DAILY
Status: DISCONTINUED | OUTPATIENT
Start: 2021-10-12 | End: 2021-10-25 | Stop reason: HOSPADM

## 2021-10-12 RX ORDER — LANOLIN ALCOHOL/MO/W.PET/CERES
1000 CREAM (GRAM) TOPICAL DAILY
Status: DISCONTINUED | OUTPATIENT
Start: 2021-10-12 | End: 2021-10-25 | Stop reason: HOSPADM

## 2021-10-12 RX ORDER — PRIMIDONE 50 MG/1
50 TABLET ORAL 2 TIMES DAILY
Status: DISCONTINUED | OUTPATIENT
Start: 2021-10-12 | End: 2021-10-25 | Stop reason: HOSPADM

## 2021-10-12 RX ORDER — FERROUS SULFATE 325(65) MG
325 TABLET ORAL
Status: DISCONTINUED | OUTPATIENT
Start: 2021-10-13 | End: 2021-10-21

## 2021-10-12 RX ORDER — MIRTAZAPINE 15 MG/1
15 TABLET, FILM COATED ORAL NIGHTLY
Status: DISCONTINUED | OUTPATIENT
Start: 2021-10-12 | End: 2021-10-25 | Stop reason: HOSPADM

## 2021-10-12 RX ORDER — MORPHINE SULFATE 2 MG/ML
2 INJECTION, SOLUTION INTRAMUSCULAR; INTRAVENOUS ONCE
Status: COMPLETED | OUTPATIENT
Start: 2021-10-12 | End: 2021-10-12

## 2021-10-12 RX ORDER — TRAMADOL HYDROCHLORIDE 50 MG/1
25 TABLET ORAL 2 TIMES DAILY
Status: ON HOLD | COMMUNITY
End: 2022-08-25 | Stop reason: SDUPTHER

## 2021-10-12 RX ORDER — PANTOPRAZOLE SODIUM 40 MG/1
40 TABLET, DELAYED RELEASE ORAL DAILY
Status: DISCONTINUED | OUTPATIENT
Start: 2021-10-12 | End: 2021-10-25 | Stop reason: HOSPADM

## 2021-10-12 RX ORDER — SODIUM CHLORIDE 9 MG/ML
INJECTION, SOLUTION INTRAVENOUS CONTINUOUS
Status: DISCONTINUED | OUTPATIENT
Start: 2021-10-12 | End: 2021-10-22

## 2021-10-12 RX ORDER — ONDANSETRON 2 MG/ML
4 INJECTION INTRAMUSCULAR; INTRAVENOUS EVERY 6 HOURS PRN
Status: DISCONTINUED | OUTPATIENT
Start: 2021-10-12 | End: 2021-10-25 | Stop reason: HOSPADM

## 2021-10-12 RX ORDER — FENTANYL CITRATE 50 UG/ML
25 INJECTION, SOLUTION INTRAMUSCULAR; INTRAVENOUS ONCE
Status: COMPLETED | OUTPATIENT
Start: 2021-10-12 | End: 2021-10-12

## 2021-10-12 RX ORDER — POLYETHYLENE GLYCOL 3350 17 G/17G
17 POWDER, FOR SOLUTION ORAL DAILY PRN
Status: DISCONTINUED | OUTPATIENT
Start: 2021-10-12 | End: 2021-10-25 | Stop reason: HOSPADM

## 2021-10-12 RX ORDER — ERGOCALCIFEROL 1.25 MG/1
50000 CAPSULE ORAL WEEKLY
Status: DISCONTINUED | OUTPATIENT
Start: 2021-10-16 | End: 2021-10-25 | Stop reason: HOSPADM

## 2021-10-12 RX ORDER — HYDROCODONE BITARTRATE AND ACETAMINOPHEN 5; 325 MG/1; MG/1
2 TABLET ORAL EVERY 4 HOURS PRN
Status: DISCONTINUED | OUTPATIENT
Start: 2021-10-12 | End: 2021-10-25 | Stop reason: HOSPADM

## 2021-10-12 RX ORDER — ROPIVACAINE HYDROCHLORIDE 5 MG/ML
INJECTION, SOLUTION EPIDURAL; INFILTRATION; PERINEURAL PRN
Status: DISCONTINUED | OUTPATIENT
Start: 2021-10-12 | End: 2021-10-12 | Stop reason: HOSPADM

## 2021-10-12 RX ORDER — OMEGA-3S/DHA/EPA/FISH OIL/D3 300MG-1000
400 CAPSULE ORAL DAILY
Status: DISCONTINUED | OUTPATIENT
Start: 2021-10-12 | End: 2021-10-25 | Stop reason: HOSPADM

## 2021-10-12 RX ORDER — PEDIATRIC MULTIVITAMIN NO.17
1 TABLET,CHEWABLE ORAL DAILY
Status: DISCONTINUED | OUTPATIENT
Start: 2021-10-12 | End: 2021-10-12

## 2021-10-12 RX ORDER — HYDRALAZINE HYDROCHLORIDE 50 MG/1
50 TABLET, FILM COATED ORAL EVERY 8 HOURS SCHEDULED
Status: DISCONTINUED | OUTPATIENT
Start: 2021-10-12 | End: 2021-10-18

## 2021-10-12 RX ORDER — CITALOPRAM 20 MG/1
20 TABLET ORAL DAILY
Status: DISCONTINUED | OUTPATIENT
Start: 2021-10-12 | End: 2021-10-12

## 2021-10-12 RX ORDER — LEVOFLOXACIN 5 MG/ML
250 INJECTION, SOLUTION INTRAVENOUS EVERY 24 HOURS
Status: DISCONTINUED | OUTPATIENT
Start: 2021-10-13 | End: 2021-10-23

## 2021-10-12 RX ORDER — SODIUM CHLORIDE 0.9 % (FLUSH) 0.9 %
5-40 SYRINGE (ML) INJECTION PRN
Status: DISCONTINUED | OUTPATIENT
Start: 2021-10-12 | End: 2021-10-25 | Stop reason: HOSPADM

## 2021-10-12 RX ORDER — AMLODIPINE BESYLATE 5 MG/1
5 TABLET ORAL DAILY
Status: DISCONTINUED | OUTPATIENT
Start: 2021-10-13 | End: 2021-10-18

## 2021-10-12 RX ORDER — ERGOCALCIFEROL 1.25 MG/1
50000 CAPSULE ORAL WEEKLY
Status: DISCONTINUED | OUTPATIENT
Start: 2021-10-18 | End: 2021-10-12

## 2021-10-12 RX ORDER — ATORVASTATIN CALCIUM 20 MG/1
20 TABLET, FILM COATED ORAL NIGHTLY
Status: DISCONTINUED | OUTPATIENT
Start: 2021-10-12 | End: 2021-10-25 | Stop reason: HOSPADM

## 2021-10-12 RX ORDER — CARBAMAZEPINE 200 MG/1
200 TABLET ORAL 2 TIMES DAILY
Status: DISCONTINUED | OUTPATIENT
Start: 2021-10-12 | End: 2021-10-25 | Stop reason: HOSPADM

## 2021-10-12 RX ORDER — DEXAMETHASONE SODIUM PHOSPHATE 10 MG/ML
INJECTION INTRAMUSCULAR; INTRAVENOUS PRN
Status: DISCONTINUED | OUTPATIENT
Start: 2021-10-12 | End: 2021-10-12 | Stop reason: HOSPADM

## 2021-10-12 RX ORDER — LEVOFLOXACIN 5 MG/ML
500 INJECTION, SOLUTION INTRAVENOUS ONCE
Status: COMPLETED | OUTPATIENT
Start: 2021-10-12 | End: 2021-10-12

## 2021-10-12 RX ORDER — SODIUM CHLORIDE 0.9 % (FLUSH) 0.9 %
5-40 SYRINGE (ML) INJECTION EVERY 12 HOURS SCHEDULED
Status: DISCONTINUED | OUTPATIENT
Start: 2021-10-12 | End: 2021-10-25 | Stop reason: HOSPADM

## 2021-10-12 RX ORDER — SODIUM CHLORIDE 9 MG/ML
25 INJECTION, SOLUTION INTRAVENOUS PRN
Status: DISCONTINUED | OUTPATIENT
Start: 2021-10-12 | End: 2021-10-25 | Stop reason: HOSPADM

## 2021-10-12 RX ORDER — MORPHINE SULFATE 2 MG/ML
2 INJECTION, SOLUTION INTRAMUSCULAR; INTRAVENOUS
Status: DISCONTINUED | OUTPATIENT
Start: 2021-10-12 | End: 2021-10-25 | Stop reason: HOSPADM

## 2021-10-12 RX ORDER — MORPHINE SULFATE 4 MG/ML
4 INJECTION, SOLUTION INTRAMUSCULAR; INTRAVENOUS
Status: DISCONTINUED | OUTPATIENT
Start: 2021-10-12 | End: 2021-10-25 | Stop reason: HOSPADM

## 2021-10-12 RX ORDER — FUROSEMIDE 20 MG/1
20 TABLET ORAL
Status: DISCONTINUED | OUTPATIENT
Start: 2021-10-13 | End: 2021-10-24

## 2021-10-12 RX ORDER — ACETAMINOPHEN 325 MG/1
650 TABLET ORAL EVERY 4 HOURS PRN
COMMUNITY

## 2021-10-12 RX ORDER — HYDROCODONE BITARTRATE AND ACETAMINOPHEN 5; 325 MG/1; MG/1
1 TABLET ORAL EVERY 4 HOURS PRN
Status: DISCONTINUED | OUTPATIENT
Start: 2021-10-12 | End: 2021-10-25 | Stop reason: HOSPADM

## 2021-10-12 RX ORDER — CITALOPRAM 10 MG/1
10 TABLET ORAL DAILY
Status: DISCONTINUED | OUTPATIENT
Start: 2021-10-12 | End: 2021-10-12

## 2021-10-12 RX ORDER — LEVOFLOXACIN 5 MG/ML
500 INJECTION, SOLUTION INTRAVENOUS EVERY 24 HOURS
Status: DISCONTINUED | OUTPATIENT
Start: 2021-10-12 | End: 2021-10-12 | Stop reason: DRUGHIGH

## 2021-10-12 RX ORDER — PREDNISONE 1 MG/1
5 TABLET ORAL EVERY OTHER DAY
Status: DISCONTINUED | OUTPATIENT
Start: 2021-10-13 | End: 2021-10-25 | Stop reason: HOSPADM

## 2021-10-12 RX ORDER — ASPIRIN 81 MG/1
81 TABLET ORAL DAILY
Status: DISCONTINUED | OUTPATIENT
Start: 2021-10-12 | End: 2021-10-12

## 2021-10-12 RX ADMIN — VANCOMYCIN HYDROCHLORIDE 1000 MG: 1 INJECTION, POWDER, LYOPHILIZED, FOR SOLUTION INTRAVENOUS at 04:18

## 2021-10-12 RX ADMIN — FENTANYL CITRATE 25 MCG: 50 INJECTION, SOLUTION INTRAMUSCULAR; INTRAVENOUS at 13:25

## 2021-10-12 RX ADMIN — HYDRALAZINE HYDROCHLORIDE 50 MG: 50 TABLET, FILM COATED ORAL at 22:31

## 2021-10-12 RX ADMIN — METFORMIN HYDROCHLORIDE 500 MG: 500 TABLET ORAL at 14:19

## 2021-10-12 RX ADMIN — HYDROCODONE BITARTRATE AND ACETAMINOPHEN 2 TABLET: 5; 325 TABLET ORAL at 18:55

## 2021-10-12 RX ADMIN — CHOLECALCIFEROL TAB 10 MCG (400 UNIT) 400 UNITS: 10 TAB at 14:18

## 2021-10-12 RX ADMIN — DEXAMETHASONE SODIUM PHOSPHATE 10 MG: 10 INJECTION INTRAMUSCULAR; INTRAVENOUS at 13:40

## 2021-10-12 RX ADMIN — ATORVASTATIN CALCIUM 20 MG: 20 TABLET, FILM COATED ORAL at 20:51

## 2021-10-12 RX ADMIN — MIRTAZAPINE 15 MG: 15 TABLET, FILM COATED ORAL at 20:51

## 2021-10-12 RX ADMIN — PIPERACILLIN AND TAZOBACTAM 3375 MG: 3; .375 INJECTION, POWDER, FOR SOLUTION INTRAVENOUS at 03:53

## 2021-10-12 RX ADMIN — LEVOFLOXACIN 500 MG: 500 INJECTION, SOLUTION INTRAVENOUS at 14:26

## 2021-10-12 RX ADMIN — PRIMIDONE 50 MG: 50 TABLET ORAL at 14:19

## 2021-10-12 RX ADMIN — CARBAMAZEPINE 200 MG: 200 TABLET ORAL at 20:51

## 2021-10-12 RX ADMIN — ATENOLOL 50 MG: 50 TABLET ORAL at 14:18

## 2021-10-12 RX ADMIN — MORPHINE SULFATE 2 MG: 2 INJECTION, SOLUTION INTRAMUSCULAR; INTRAVENOUS at 16:59

## 2021-10-12 RX ADMIN — PRIMIDONE 50 MG: 50 TABLET ORAL at 20:51

## 2021-10-12 RX ADMIN — CARBAMAZEPINE 200 MG: 200 TABLET ORAL at 14:19

## 2021-10-12 RX ADMIN — MORPHINE SULFATE 2 MG: 2 INJECTION, SOLUTION INTRAMUSCULAR; INTRAVENOUS at 02:01

## 2021-10-12 RX ADMIN — CYANOCOBALAMIN TAB 1000 MCG 1000 MCG: 1000 TAB at 14:19

## 2021-10-12 RX ADMIN — SODIUM CHLORIDE, PRESERVATIVE FREE 10 ML: 5 INJECTION INTRAVENOUS at 14:19

## 2021-10-12 RX ADMIN — ROPIVACAINE HYDROCHLORIDE 37 ML: 5 INJECTION, SOLUTION EPIDURAL; INFILTRATION; PERINEURAL at 13:40

## 2021-10-12 RX ADMIN — ASPIRIN 81 MG: 81 TABLET, COATED ORAL at 14:19

## 2021-10-12 RX ADMIN — SODIUM CHLORIDE: 9 INJECTION, SOLUTION INTRAVENOUS at 14:22

## 2021-10-12 RX ADMIN — PANTOPRAZOLE SODIUM 40 MG: 40 TABLET, DELAYED RELEASE ORAL at 14:19

## 2021-10-12 ASSESSMENT — PAIN DESCRIPTION - DESCRIPTORS
DESCRIPTORS: ACHING

## 2021-10-12 ASSESSMENT — PAIN DESCRIPTION - FREQUENCY
FREQUENCY: CONTINUOUS

## 2021-10-12 ASSESSMENT — PAIN SCALES - GENERAL
PAINLEVEL_OUTOF10: 10
PAINLEVEL_OUTOF10: 10
PAINLEVEL_OUTOF10: 9
PAINLEVEL_OUTOF10: 8
PAINLEVEL_OUTOF10: 7
PAINLEVEL_OUTOF10: 9
PAINLEVEL_OUTOF10: 10
PAINLEVEL_OUTOF10: 10

## 2021-10-12 ASSESSMENT — PAIN DESCRIPTION - LOCATION
LOCATION: LEG

## 2021-10-12 ASSESSMENT — ENCOUNTER SYMPTOMS
VOMITING: 0
ABDOMINAL PAIN: 0
COUGH: 0
NAUSEA: 0
EYE REDNESS: 0

## 2021-10-12 ASSESSMENT — PAIN DESCRIPTION - ONSET
ONSET: AWAKENED FROM SLEEP
ONSET: GRADUAL

## 2021-10-12 ASSESSMENT — PAIN DESCRIPTION - PAIN TYPE
TYPE: ACUTE PAIN
TYPE: ACUTE PAIN

## 2021-10-12 ASSESSMENT — PAIN DESCRIPTION - ORIENTATION
ORIENTATION: RIGHT

## 2021-10-12 NOTE — PROGRESS NOTES
[facility] Department of Pharmacy   Pharmacy Renal Adjustment Note    Anastasia Yun is a 80 y.o. female. Pharmacist assessment of renally cleared medications. Recent Labs     10/11/21  2250   CREATININE 0.99*     Estimated Creatinine Clearance: 32 mL/min (A) (based on SCr of 0.99 mg/dL (H)). Height:   Ht Readings from Last 1 Encounters:   10/12/21 5' (1.524 m)     Weight:  Wt Readings from Last 1 Encounters:   10/12/21 135 lb 3.2 oz (61.3 kg)       The following medication has been adjusted based upon renal function:             Levofloxacin 500mg IV every 24 hours changed to levofloxacin 500mg IV once then decrease to levofloxacin 250mg IV every 24 hours for CrCl 20 to <50mL/min.     Thank you,  Myriam Nissen, [facility],10/12/2021,11:47 AM

## 2021-10-12 NOTE — ED NOTES
Bed: 08  Expected date:   Expected time:   Means of arrival:   Comments:  Jon Buchanan, RN  10/11/21 3513

## 2021-10-12 NOTE — CONSULTS
Vancomycin Dosing by Pharmacy - Initial Note   TODAY'S DATE:  10/12/2021  Patient name, age: Kosta Ayala, 80 y.o. Indication: SSTI, MRSA suspected. Additional antimicrobials:  levofloxacin, single dose of zosyn also received in ER    Allergies:  Penicillins and Penicillin g   Actual Weight:    Wt Readings from Last 1 Encounters:   10/12/21 135 lb 3.2 oz (61.3 kg)     Labs/Ancillary Data  Estimated Creatinine Clearance: 32 mL/min (A) (based on SCr of 0.99 mg/dL (H)). Recent Labs     10/11/21  2250   CREATININE 0.99*   BUN 26*   WBC 7.6     No results found for: PROCAL  No intake or output data in the 24 hours ending 10/12/21 1305  Temp: 98.3    Recent vancomycin administrations                     vancomycin 1000 mg IVPB in 250 mL D5W addavial (mg) 1,000 mg New Bag 10/12/21 0418                  Culture Date / Source  /  Results  10/12/21           urine         ordered  10/12/21           blood        no growth 3 hours    MRSA Nasal Swab: N/A. Non-respiratory infection. [place] PLAN   Initial loading dose of Vancomycin 1000mg  x 1   vancomycin 750 mg IV every 12 hours. Ensured BUN/sCr ordered at baseline and every 48 hours x at least 3 levels, then at least weekly. Vancomycin level ordered for 10/21/2021 @ 0600. Will use bayesian method for dosing. This level will not be a trough. Target AUC/JERRY: 400-600. Vancomycin Target Concentration Parameters  Treatment  Population Target AUC/JERRY Target Trough   Invasive MRSA Infection (bacteremia, pneumonia, meningitis, endocarditis, osteomyelitis)  Sepsis (undifferentiated) 400-600 N/A   Infection due to non-MRSA pathogen  Empiric treatment of non-invasive MRSA infection  (SSTI, UTI) <500 10-15 mg/L   CrCl < 29 mL/min  Rapidly fluctuating serum creatinine   THEODORA N/A < 15 mg/L     Renal replacement therapy is dosed by levels, per hospital protocol. Abbreviations  * Pauc: probability that AUC is >400 (efficacy);  Pconc: probability that Ctrough is above 20 ?g/mL (toxicity); Tox: Probability of nephrotoxicity, based on Shad et al. Clin Infect Dis 2009. Thank you for the consult. Pharmacy will continue to follow.   Avni Limon, PARKER.Ph., 10/12/2021,1:08 PM

## 2021-10-12 NOTE — H&P
[facility]  History and Physical        Patient:  Duncan Medrano  MRN: [id number]    Chief Complaint:    Chief Complaint   Patient presents with    Leg Pain     right leg pain after fall three days ago       History Obtained From:  patient, electronic medical record  PCP: Matilde Shearer MD    History of Present Illness: The patient is a 80 y.o. female  With h/o cad,htn,type 2 dm who presents with fall at home 3 days ago. She has rt leg pain. W/u in er shows rt hip fx and has uti. She is admitted for surgical evaluation of her fr and treatment of uti. She has multiple risk factors including cad,htn,uti. Past Medical History:        Diagnosis Date    Anemia 7/201    Arthritis of knee 2018    bilateral knees; \"bone on bone\"; declines surg    Benign essential tremor 04/15/2016    CAD S/P percutaneous coronary angioplasty 1998    done at [facility] Diabetes mellitus type II, controlled ([facility] [address] 1979    Gastrointestinal hemorrhage 2012    Duodenal Ulcer by EGD    History of pancreatitis 2013    after GI bleed    [facility]    [facility]    Hyperlipidemia     Hypertension     Major depression     S/P CABG (coronary artery bypass graft) 1996    Ventral hernia 7644    infraumbilical ; incisional    Vitamin B12 deficiency 07/2018    Vitamin B12 270       Past Surgical History:        Procedure Laterality Date    CHOLECYSTECTOMY      COLONOSCOPY N/A 11/5/2018    COLONOSCOPY DIAGNOSTIC OR SCREENING performed by Martita Reina MD at [facility]84    ? number grafts    HYSTERECTOMY, [address] SALPINGO-OOPHORECTOMY  1960       Medications Prior to Admission:    Prior to Admission medications    Medication Sig Start Date End Date Taking? Authorizing Provider   traMADol (ULTRAM) 50 MG tablet Take 25 mg by mouth 2 times daily.    Yes Historical Provider, MD   acetaminophen (TYLENOL) 325 MG tablet Take 650 mg 50 MG tablet Take 1 tablet by mouth 2 times daily 8/2/21  Yes Trista Caceres MD   Ergocalciferol (VITAMIN D2) 10 MCG (400 UNIT) TABS Take 1 tablet by mouth daily 8/2/21  Yes Trista Caceres MD   predniSONE (DELTASONE) 5 MG tablet Take 1 tablet by mouth every other day 8/2/21 10/31/21 Yes Trista Caceres MD   nitroGLYCERIN (NITROSTAT) 0.4 MG SL tablet Place 1 tablet under the tongue every 5 minutes as needed for Chest pain up to max of 3 total doses. If no relief after 1 dose, call 911. 4/16/19   Abran Morris MD       Allergies:  Penicillins and Penicillin g    Social History:   TOBACCO:   reports that she has never smoked. She has never used smokeless tobacco.  ETOH:   reports no history of alcohol use. Family History:       Problem Relation Age of Onset    Heart Disease Father     Stroke Sister        Review of Systems:  Constitutional:negative  for fevers, and negative for chills. Respiratory: negative for shortness of breath, negative for cough, and negative for wheezing  Cardiovascular: negative for chest pain, negative for palpitations, and negative for syncope, has bilat leg edema  Gastrointestinal: negative for abdominal pain, negative for nausea,negative for vomiting, negative for diarrhea, negative for constipation, and negative for hematochezia or melena  Genitourinary: negative for dysuria, negative for urinary urgency, negative for urinary frequency, and negative for hematuria  Neurological: negative for unilateral weakness, numbness or tingling. Has rt leg pain  All other systems were reviewed with the patient and are negative except as stated    Objective:    Vitals:   Temp: 98 °F (36.7 °C)  BP: (!) 166/60  Resp: 16  Pulse: 67  SpO2: 95 %  -----------------------------------------------------------------  Exam:  GEN:    Awake, alert and oriented x3. EYES:  EOMI, pupils equal   NECK: Supple. No lymphadenopathy.   No carotid bruit  CVS:    regular rate and rhythm, 2/6 systolic murmur  PULM: CTA, no wheezes, rales or rhonchi, no acute respiratory distress  ABD:    Bowels sounds normal.  Abdomen is soft. No distention. no tenderness to palpation. EXT:   1+ edema bilaterally . No calf tenderness. Rt leg in flexion ans shortened with painful movements   NEURO: Moves all extremities. Motor and sensory are grossly intact  SKIN:  No rashes. No skin lesions.    -----------------------------------------------------------------  Diagnostic Data:   · All diagnostic data was reviewed  Lab Results   Component Value Date    WBC 7.6 10/11/2021    HGB 9.9 (L) 10/11/2021    .6 10/11/2021     10/11/2021      Lab Results   Component Value Date    GLUCOSE 92 10/11/2021    BUN 26 (H) 10/11/2021    CREATININE 0.99 (H) 10/11/2021     (L) 10/11/2021    K 4.7 10/11/2021    CALCIUM 9.3 10/11/2021    CL 96 (L) 10/11/2021    CO2 22 10/11/2021     Lab Results   Component Value Date    WBCUA 20 TO 50 10/12/2021    RBCUA None 10/12/2021    EPITHUA 0 TO 2 10/12/2021    LEUKOCYTESUR MODERATE (A) 10/12/2021    SPECGRAV 1.010 10/12/2021    GLUCOSEU NEGATIVE 10/12/2021    KETUA NEGATIVE 10/12/2021    PROTEINU NEGATIVE 10/12/2021    HGBUR NEGATIVE 10/12/2021    CASTUA NOT REPORTED 10/12/2021    CRYSTUA NOT REPORTED 10/12/2021    BACTERIA 2+ (A) 10/12/2021    YEAST NOT REPORTED 10/12/2021       Assessment:     Principal Problem:    Closed right hip fracture, initial encounter [facility])  Active Problems:    Hypertension    Type 2 diabetes mellitus ([facility] [address]    Coronary artery disease involving native coronary artery of native heart without angina pectoris    Acute cystitis without hematuria  Resolved Problems:    * No resolved hospital problems.  *      Plan:     Problem List     Acute cystitis without hematuria           · This patient requires inpatient admission because of rthip fr requiring surgical evalustion  · Factors affecting the medical complexity of this patient include cad,htn,type 2 dm,uti  · Estimated length of stay is 3 days  · Discussed patient's symptoms and data results including labs and imaging studies with the ER MD at time of admission  · High risk drug monitoring: none  ·     CORE MEASURES  · DVT prophylaxis: SCD  · Decubitus ulcer present on admission: No  · CODE STATUS: FULL CODE  · Nutrition Status: fair   · Physical therapy: Yes   · Old Ch[place] reviewed: Yes  · EKG Reviewed:  Yes  · Advance Directive Addressed: Yes    Rafa Flores MD , M.D.  10/12/2021  7:17 PM

## 2021-10-12 NOTE — PROGRESS NOTES
.      Patient: Naty Berry  : 1931  Date of Admission: 10/11/2021  Primary Care Physician: Herlinda Serrano  Today's Date: 10/14/2021    REASON FOR CONSULTATION: Leg Pain (right leg pain after fall three days ago)      HPI: Ms. Yonas Veliz is a 80 y.o. female who was admitted to the hospital with right hip fracture. Cardiology consulted for preoperative cardiovascular evaluation. Ms. Yonas Veliz  reported a cardiac history of CABG in  as well as several stents placed in . She also has a history of hypertension and hyperlipidemia for which she typically takes medications and is well controlled. ECG 10/10/2021 showed sinus rhythm with right bundle branch block and ST-T wave abnormalities in anterolateral leads. Grossly unchanged from prior. Echo done 10/11/2021 showed normal ejection fraction and wall motion. Aortic valve sclerosis without significant stenosis. Mild tricuspid regurgitation and mild diastolic dysfunction.     Exercise Tolerance: She reports having a a very limited exercise tolerance. Ms. Yonas Veliz says that she Ambulate over short distances with a walker     Today Ms. Yonas Veliz  states she is doing okay but reports neck pain, right leg pain from her knee to ankle, and being extremely tired. She denies any lightheaded or dizziness, but states she has not been getting out of bed. She denied any current or recent chest pain, shortness of breath, increased lower extremity edema, abdominal pain, problems with her medications or any other concerns at this time. She does report a prior history of a GI bleed in the past.    Chart reviewed: Hgb 6.9 - blood transfusion ordered. Her blood pressure is 108/34 today.     Past Medical History:   Diagnosis Date    Anemia     Arthritis of knee 2018    bilateral knees; \"bone on bone\"; declines surg    Benign essential tremor 04/15/2016    CAD S/P percutaneous coronary angioplasty     [facility] Diabetes mellitus type II, controlled [facility]) 1979    Gastrointestinal hemorrhage 2012    Duodenal Ulcer by EGD    History of pancreatitis 2013    after GI bleed    Humerus fracture 2015    right humerus ; [place]    Hyperlipidemia     Hypertension     Major depression     S/P CABG (coronary artery bypass graft) 1996    Ventral hernia 6125    infraumbilical ; incisional    Vitamin B12 deficiency 07/2018    Vitamin B12 270       CURRENT ALLERGIES: Penicillins and Penicillin g REVIEW OF SYSTEMS: Limited review of systems but positive and negatives are mentioned in HPI. Patient also found to have urinary tract infection currently on intravenous antibiotics. Past Surgical History:   Procedure Laterality Date    CHOLECYSTECTOMY      COLONOSCOPY N/A 11/5/2018    COLONOSCOPY DIAGNOSTIC OR SCREENING performed by Nuris Avery MD at [place] [address]    ? number grafts    HIP SURGERY Right     HYSTERECTOMY, VAGINAL  1958    SALPINGO-OOPHORECTOMY  1960    Social History:  Social History     Tobacco Use    Smoking status: Never Smoker    Smokeless tobacco: Never Used   Vaping Use    Vaping Use: Never used   Substance Use Topics    Alcohol use: No    Drug use: No        CURRENT MEDICATIONS:  Outpatient Medications Marked as Taking for the 10/11/21 encounter [facility] Encounter)   Medication Sig Dispense Refill    traMADol (ULTRAM) 50 MG tablet Take 25 mg by mouth 2 times daily.       acetaminophen (TYLENOL) 325 MG tablet Take 650 mg by mouth every 4 hours as needed for Pain or Fever      VITAMIN D PO Take 400 mg by mouth      furosemide (LASIX) 20 MG tablet Take 1 tablet by mouth every morning (before breakfast) On Monday, Wednesday, and Friday only 36 tablet 3    atenolol (TENORMIN) 50 MG tablet Take 1 tablet by mouth daily 90 tablet 3    atorvastatin (LIPITOR) 20 MG tablet Take 1 tablet by mouth nightly 90 tablet 3    carBAMazepine (TEGRETOL) 200 MG tablet Take 1 tablet by mouth 2 times mass and no thyromegaly present. No lymphadenopathy present. Cardiovascular: Normal rate, regular rhythm, normal heart sounds and intact distal pulses. Exam reveals no gallop and no friction rub. Short apical systolic murmur noted. Pulmonary/Chest: Effort normal and breath sounds normal. No respiratory distress. She has no wheezes, rhonchi or rales. Abdominal: Soft, non-tender. Bowel sounds and aorta are normal. She exhibits no organomegaly, mass or bruit. Extremities: S/P ORIF of the right hip fracture. Neurological: She is alert and oriented to person, place, and time. Skin: Skin is warm and dry. There is no rash or diaphoresis. Psychiatric: She has a normal mood and affect.  Her speech is normal and behavior is normal.      MOST RECENT LABS ON RECORD:   Lab Results   Component Value Date    WBC 8.3 10/14/2021    HGB 6.9 (LL) 10/14/2021    HCT 23.4 (L) 10/14/2021     10/14/2021    CHOL 119 08/05/2021    TRIG 79 08/05/2021    HDL 57 08/05/2021    LDLDIRECT 41 02/23/2018    ALT 25 10/13/2021    AST 42 (H) 10/13/2021     (L) 10/13/2021    K 4.8 10/13/2021    CL 98 10/13/2021    CREATININE 0.93 (H) 10/13/2021    BUN 22 10/13/2021    CO2 24 10/13/2021    TSH 1.41 01/07/2021    INR 1.0 10/12/2021    LABA1C 6.2 (H) 05/07/2021       ASSESSMENT:  Patient Active Problem List    Diagnosis Date Noted    Acute blood loss as cause of postoperative anemia 10/14/2021    Closed left hip fracture, initial encounter ([facility] [address] 10/13/2021    Closed right hip fracture, initial encounter ([facility] [address] 10/12/2021    ASHD (arteriosclerotic heart disease) 10/12/2021    Acute cystitis without hematuria 10/12/2021    Abnormal ECG     S/P angioplasty with stent 04/16/2019    Acute lower gastrointestinal bleeding 11/03/2018    Anemia     Major depression     Vitamin B12 deficiency 07/01/2018    Uncontrolled hypertension 02/22/2018    Type 2 diabetes mellitus ([address] 02/22/2018    Dyslipidemia 02/22/2018    Arthritis of knee 01/01/2018    Benign essential tremor 04/15/2016    S/P CABG (coronary artery bypass graft) 01/01/1996       PLAN:   Atherosclerotic Heart Disease: Prior CABG, prior Stents   Antiplatelet Agent: Not indicated at this time. Patient Hgb 6.9, acute blood loss anemia status post open reduction internal fixation of right hip fracture.  Beta Blocker: Continue Atenolol (Tenormin) 50 mg daily.  Anti-anginal medications: STOP amlodipine (Norvasc) 5 mg once daily. Will consider restarting once she receives blood transfusion if hypotension resolves.  Cholesterol Reduction Therapy: Continue Atorvastatin (Lipitor) 20 mg daily.  Additional counseling: I told them to start wearing lower extremity compression stockings and I advised them to try and keep their legs up whenever possible and to limit salt in their diet. · Essential Hypertension: Controlled: Hypotensive today   Beta Blocker: Continue Atenolol (Tenormin) 50 mg daily.  ACE Inibitor/ARB: Not indicated at this time.  Calcium Channel Blocker: STOP amlodipine (Norvasc)    Diuretics: Continue Lasix to avoid volume overload from blood and fluid resuscitation.  Closely monitor blood pressure as per unit protocol. · Hyperlipidemia: Mixed  · Cholesterol Reduction Therapy: Continue Atorvastatin (Lipitor) 20 mg daily. · Acute blood loss anemia causing hypotension  · Blood is ordered per primary  · Hold amlodipine 5 mg   · Hold antiplatelet therapy  · She does report a prior history of GI bleed after taking ibuprofen 10-15 years ago. Once again, thank you for allowing me to participate in this patients care. Please do not hesitate to contact me could I be of further assistance. Sincerely,  Tasha Jimenez MD, MS, F.A.C.C.   [facility]) [facility], [address] [place] [address], [facility], [facility], [address] [place] [address]  Phone: [phone], Fax: [phone]      Based on the patients current medical conditions, I believe the risk of significant morbidity and mortality is: Intermediate to high.

## 2021-10-12 NOTE — CONSULTS
Department of Orthopedic Surgery  Attending Consult Note        Reason for Consult:  Right greater trochanter fracture    CHIEF COMPLAINT:  Fall, right hip pain    History Obtained From:  patient    HISTORY OF PRESENT ILLNESS:                The patient is a 80 y.o. female who sustained a fall several days ago. Patient has had continued pain in the right leg, making it difficult for her to bear weight. She was brought to the ER where imaging ultimately revealed a right greater trochanter fracture. Patient was admitted. Ortho was consulted. Patient admits to right hip pain. She denies any pain prior to the fall. She normally is ambulatory with a walker. She resides in assisted living facility. She admits to diffuse right leg pain. Denies pain elsewhere at this point.      Past Medical History:        Diagnosis Date    Anemia 7/201    Arthritis of knee 2018    bilateral knees; \"bone on bone\"; declines surg    Benign essential tremor 04/15/2016    CAD S/P percutaneous coronary angioplasty 1998    done at [facility] Diabetes mellitus type II, controlled ([address] 1979    Gastrointestinal hemorrhage 2012    Duodenal Ulcer by EGD    History of pancreatitis 2013    after GI bleed    [facility]us fracture 2015    [facility]    Hyperlipidemia     Hypertension     Major depression     S/P CABG (coronary artery bypass graft) 1996    Ventral hernia 4372    infraumbilical ; incisional    Vitamin B12 deficiency 07/2018    Vitamin B12 270     Past Surgical History:        Procedure Laterality Date    CHOLECYSTECTOMY      COLONOSCOPY N/A 11/5/2018    COLONOSCOPY DIAGNOSTIC OR SCREENING performed by Stephon Vanessa MD at [place] [address]    ? number grafts    HYSTERECTOMY, VAGINAL  1958    SALPINGO-OOPHORECTOMY  1960     Current Medications:   Current Facility-Administered Medications: atenolol (TENORMIN) tablet 50 mg, 50 mg, Oral, Daily  atorvastatin (LIPITOR) tablet 20 mg, 20 mg, Oral, Nightly  carBAMazepine (TEGRETOL) tablet 200 mg, 200 mg, Oral, BID  vitamin D3 (CHOLECALCIFEROL) tablet 400 Units, 400 Units, Oral, Daily  [START ON 10/13/2021] ferrous sulfate (IRON 325) tablet 325 mg, 325 mg, Oral, Daily with breakfast  [START ON 10/13/2021] furosemide (LASIX) tablet 20 mg, 20 mg, Oral, Q MWF  metFORMIN (GLUCOPHAGE) tablet 500 mg, 500 mg, Oral, BID WC  mirtazapine (REMERON) tablet 15 mg, 15 mg, Oral, Nightly  pantoprazole (PROTONIX) tablet 40 mg, 40 mg, Oral, Daily  [START ON 10/13/2021] predniSONE (DELTASONE) tablet 5 mg, 5 mg, Oral, Every Other Day  primidone (MYSOLINE) tablet 50 mg, 50 mg, Oral, BID  vitamin B-12 (CYANOCOBALAMIN) tablet 1,000 mcg, 1,000 mcg, Oral, Daily  0.9 % sodium chloride infusion, , IntraVENous, Continuous  sodium chloride flush 0.9 % injection 5-40 mL, 5-40 mL, IntraVENous, 2 times per day  sodium chloride flush 0.9 % injection 5-40 mL, 5-40 mL, IntraVENous, PRN  0.9 % sodium chloride infusion, 25 mL, IntraVENous, PRN  polyethylene glycol (GLYCOLAX) packet 17 g, 17 g, Oral, Daily PRN  ondansetron (ZOFRAN) injection 4 mg, 4 mg, IntraVENous, Q6H PRN  HYDROcodone-acetaminophen (NORCO) 5-325 MG per tablet 1 tablet, 1 tablet, Oral, Q4H PRN **OR** HYDROcodone-acetaminophen (NORCO) 5-325 MG per tablet 2 tablet, 2 tablet, Oral, Q4H PRN  morphine (PF) injection 2 mg, 2 mg, IntraVENous, Q2H PRN **OR** morphine injection 4 mg, 4 mg, IntraVENous, Q2H PRN  [COMPLETED] levoFLOXacin (LEVAQUIN) 500 MG/100ML infusion 500 mg, 500 mg, IntraVENous, Once **AND** [START ON 10/13/2021] levoFLOXacin (LEVAQUIN) 250 MG/50ML infusion 250 mg, 250 mg, IntraVENous, Q24H  [START ON 10/16/2021] vitamin D (ERGOCALCIFEROL) capsule 50,000 Units, 50,000 Units, Oral, Weekly  vancomycin (VANCOCIN) intermittent dosing (placeholder), , Other, RX Placeholder  [START ON 10/13/2021] vancomycin (VANCOCIN) 750 mg in dextrose 5 % 250 mL IVPB, 750 mg, IntraVENous, Q24H  [START ON 10/13/2021] amLODIPine (NORVASC) tablet 5 mg, 5 mg, Oral, Daily  hydrALAZINE (APRESOLINE) tablet 50 mg, 50 mg, Oral, 3 times per day  Allergies:  Penicillins and Penicillin g    Social History:   Resides in assisted living facility    Family History:       Problem Relation Age of Onset    Heart Disease Father     Stroke Sister      REVIEW OF SYSTEMS:    14 systems were reviewed. Pertinent positives and negatives as above, all else negative. PHYSICAL EXAM:    VITALS:  BP (!) 166/60   Pulse 67   Temp 98 °F (36.7 °C) (Temporal)   Resp 16   Ht 5' (1.524 m)   Wt 135 lb 3.2 oz (61.3 kg)   SpO2 95%   BMI 26.40 kg/m²     General: patient is alert and oriented. She is comfortable in bed. HEENT: Normocephalic, atraumatic  CV: normal rate, peripheral edema in lower legs  Resp: No dyspnea, no cough  Musc:  Right leg: Pain to palpation about the right hip region. Mild tenderness into the right ankle, where there is some swelling and ecchymosis laterally. Sensation intact throughout the leg. Distal pulses are intact. No evidence for wounds. Motor intact with foot and ankle motion. Left leg: Denies pain throughout the leg. NVI. DATA:    MOST RECENT LABS ON RECORD:         Lab Results   Component Value Date     WBC 7.6 10/11/2021     HGB 9.9 (L) 10/11/2021     HCT 31.2 (L) 10/11/2021      10/11/2021     CHOL 119 08/05/2021     TRIG 79 08/05/2021     HDL 57 08/05/2021     LDLDIRECT 41 02/23/2018     ALT 14 10/11/2021     AST 20 10/11/2021      (L) 10/11/2021     K 4.7 10/11/2021     CL 96 (L) 10/11/2021     CREATININE 0.99 (H) 10/11/2021     BUN 26 (H) 10/11/2021     CO2 22 10/11/2021     TSH 1.41 01/07/2021     INR 1.0 03/02/2021     LABA1C 6.2 (H) 05/07/2021     Xray pelvis/right hip/right femur:  Nondisplaced right greater troch fracture. No evidence for fracture extension distally. CT Right femur:  1.  Acute minimally displaced right greater trochanteric fracture with   adjacent soft tissue edema and fluid in the right greater trochanteric bursa. 2. Diffuse osteopenia. 3. Appearance of the wall of the urinary bladder which can be seen with   cystitis.  Correlate with urinalysis. 4. Mild right hip osteoarthrosis. 5. Mild-to-moderate tricompartmental osteoarthrosis of the right knee.  CPPD. 6. Small suprapatellar joint effusion. MRI Right hip:  1. Acute intratrochanteric fracture of the right femur with fracture line   extending to the level of the lesser trochanter.  No displacement at the   lesser trochanter identified.  Only minimal displacement of the right greater   trochanter.  Associated surrounding muscular strain as well as partial   tearing of the right gluteus minimus and medius insertion on the greater   trochanter and moderate amount of fluid within the right trochanteric bursa. 2. Note is also made of a nondisplaced fracture of the intratrochanteric left   femur with fracture lines extending to the greater and lesser trochanters. CT Right foot:  1. Diffuse osteopenia and degenerative changes as detailed above. 2. No acute osseous abnormality.  No aggressive osseous destruction. 3. Severe cellulitis and induration of the subcutaneous fat and skin about   the ankle and foot.  No discrete organized fluid collection. 4. Sequela of remote trauma along the inferior aspect of the lateral and   medial malleolus. 5. Irregularity of the 2nd metatarsal head could be related to sequela of   Freiberg's infraction. 6. Mild plantar calcaneal spur. IMPRESSION/RECOMMENDATIONS:    Bilateral nondisplaced intertrochanteric hip fractures    Diagnosis for this patient was discussed with her in detail. Imaging has been reviewed. Patient was seen and examined prior to MRI scan of the right hip. MRI of the right hip demonstrates right greater trochanter fracture with the fracture extending into the intertrochanteric and lesser troch regions.  There also appears to be a nondisplaced fracture of the LEFT intertrochanteric region, which was unexpected. The MRI findings will be discussed with the patient tomorrow morning. Isolated nondisplaced intertroch fracture could be potentially treated non-operatively. However, with bilateral intertroch fractures, I will recommend patient undergo short cephalomedullary nailing of the fractures to stabilize both fractures and allow for early weightbearing. Could consider operating on both hips at the same time or staging them 1-2 days apart. This will be further discussed with the patient. Patient to be NPO after midnight tonight. Recommend bedrest at this time. Anticipate surgical treatment tomorrow. Patient has been cleared by cardiology.

## 2021-10-12 NOTE — ANESTHESIA PRE PROCEDURE
Department of Anesthesiology  Preprocedure Note       Name:  Robbie Minaya   Age:  80 y.o.  :  1931                                          MRN:  [id number]         Date:  10/12/2021      Surgeon: * No surgeons listed *    Procedure: Nerve Block    Medications prior to admission:   Prior to Admission medications    Medication Sig Start Date End Date Taking?  Authorizing Provider   VITAMIN D PO Take 400 mg by mouth    Historical Provider, MD   furosemide (LASIX) 20 MG tablet Take 1 tablet by mouth every morning (before breakfast) On Monday, Wednesday, and Friday only 21   Cheo Anderson MD   furosemide (LASIX) 20 MG tablet Take 1 tablet by mouth daily 21   Cheo Anderson MD   atenolol (TENORMIN) 50 MG tablet Take 1 tablet by mouth daily 21   Cheo Anderson MD   atorvastatin (LIPITOR) 20 MG tablet Take 1 tablet by mouth nightly 21   Cheo Anderson MD   carBAMazepine (TEGRETOL) 200 MG tablet Take 1 tablet by mouth 2 times daily 21   Cheo Anderson MD   citalopram (CELEXA) 20 MG tablet Take 1 tablet by mouth daily 21   Cheo Anderson MD   ferrous sulfate (IRON 325) 325 (65 Fe) MG tablet Take 1 tablet by mouth daily (with breakfast) 21   Cheo Anderson MD   metFORMIN (GLUCOPHAGE) 500 MG tablet Take 1 tablet by mouth 2 times daily (with meals) 21   Cheo Anderson MD   mirtazapine (REMERON) 15 MG tablet Take 1 tablet by mouth nightly 21   Cheo Anderson MD   pantoprazole (PROTONIX) 40 MG tablet Take 1 tablet by mouth daily 21   Cheo Anderson MD   vitamin B-12 (CYANOCOBALAMIN) 1000 MCG tablet Take 1 tablet by mouth daily 21   Cheo Anderson MD   Ergocalciferol (VITAMIN D2) 50 MCG (2000 UT) TABS Take 50,000 Units by mouth once a week 21   Cheo Anderson MD   aspirin EC 81 MG EC tablet Take 1 tablet by mouth daily 21   Cheo Anderson MD   citalopram (CELEXA) 10 MG tablet Take 1 tablet by mouth daily 21   Cheo Anderson MD   melatonin 5 MG TABS tablet Take 1 tablet by mouth daily 8/2/21   Aamir Falcon MD   Pediatric Multiple Vitamins (MULTIVITAMIN CHILDRENS) CHEW Take 1 tablet by mouth daily 8/2/21   Aamir Falcon MD   mycophenolate (CELLCEPT) 500 MG tablet Take 2 tabs qam and 1 tab every evening 8/2/21   Aamir Falcon MD   primidone (MYSOLINE) 50 MG tablet Take 1 tablet by mouth 2 times daily 8/2/21   Aamir Falcon MD   Ergocalciferol (VITAMIN D2) 10 MCG (400 UNIT) TABS Take 1 tablet by mouth daily 8/2/21   Aamir Falcon MD   predniSONE (DELTASONE) 5 MG tablet Take 1 tablet by mouth every other day 8/2/21 10/31/21  Aamir Falcon MD   triamterene-hydrochlorothiazide [facility]) 37.5-25 MG per tablet Take 1 tablet by mouth daily 8/19/19   Janet Shell MD   nitroGLYCERIN (NITROSTAT) 0.4 MG SL tablet Place 1 tablet under the tongue every 5 minutes as needed for Chest pain up to max of 3 total doses. If no relief after 1 dose, call 911. 4/16/19   Janet Shell MD       Current medications:    No current facility-administered medications for this visit. No current outpatient medications on file.      Facility-Administered Medications Ordered in Other Visits   Medication Dose Route Frequency Provider Last Rate Last Admin    aspirin EC tablet 81 mg  81 mg Oral Daily Yamileth Maria, APRN - CNP        atenolol (TENORMIN) tablet 50 mg  50 mg Oral Daily Yamileth Maria, APRN - CNP        atorvastatin (LIPITOR) tablet 20 mg  20 mg Oral Nightly SIMBA Matute - CNP        carBAMazepine (TEGRETOL) tablet 200 mg  200 mg Oral BID Yamileth Maria, APRN - CNP        citalopram (CELEXA) tablet 10 mg  10 mg Oral Daily Yamileth Maria, SIMBA - CNP        citalopram (CELEXA) tablet 20 mg  20 mg Oral Daily Yamileth Maria, SIMBA - CNP        vitamin D3 (CHOLECALCIFEROL) tablet 400 Units  400 Units Oral Daily Yamileth Maria, SIMBA - CNP        [START ON 10/13/2021] ferrous sulfate (IRON 325) tablet 325 mg  325 mg Oral Daily with breakfast Yamileth Maria, APRN - CNP        [START ON 10/13/2021] furosemide (LASIX) tablet 20 mg  20 mg Oral Q MWF SIMBA Ortega CNP        metFORMIN (GLUCOPHAGE) tablet 500 mg  500 mg Oral BID  SIMBA Ortega CNP        mirtazapine (REMERON) tablet 15 mg  15 mg Oral Nightly SIMBA Ortega CNP        pantoprazole (PROTONIX) tablet 40 mg  40 mg Oral Daily SIMBA Ortega CNP, Eans ON 10/13/2021] predniSONE (DELTASONE) tablet 5 mg  5 mg Oral Every Other Day SIMBA Ortega CNP        primidone (MYSOLINE) tablet 50 mg  50 mg Oral BID SIMBA Ortega CNP        vitamin B-12 (CYANOCOBALAMIN) tablet 1,000 mcg  1,000 mcg Oral Daily SIMBA Ortega CNP        0.9 % sodium chloride infusion   IntraVENous Continuous SIMBA Ortega CNP        sodium chloride flush 0.9 % injection 5-40 mL  5-40 mL IntraVENous 2 times per day SIMBA Ortega CNP        sodium chloride flush 0.9 % injection 5-40 mL  5-40 mL IntraVENous PRN SIMBA Ortega CNP        0.9 % sodium chloride infusion  25 mL IntraVENous PRN SIMBA Ortega CNP        [START ON 10/14/2021] enoxaparin (LOVENOX) injection 40 mg  40 mg SubCUTAneous Daily SIMBA Ortega CNP        polyethylene glycol (GLYCOLAX) packet 17 g  17 g Oral Daily PRN SIMBA Ortega CNP        ondansetron (ZOFRAN) injection 4 mg  4 mg IntraVENous Q6H PRN SIMBA Ortega CNP        HYDROcodone-acetaminophen (NORCO) 5-325 MG per tablet 1 tablet  1 tablet Oral Q4H PRN SIMBA Ortega CNP        Or    HYDROcodone-acetaminophen (NORCO) 5-325 MG per tablet 2 tablet  2 tablet Oral Q4H PRN SIMBA Ortega CNP        morphine (PF) injection 2 mg  2 mg IntraVENous Q2H PRN SIMBA Ortega CNP        Or    morphine injection 4 mg  4 mg IntraVENous Q2H PRN SIMBA Ortega - CNP        levoFLOXacin (LEVAQUIN) 500 MG/100ML infusion 500 mg  500 mg IntraVENous Once Kailyn Arts, APRN - CNP        And   Ruddy Spaulding [START ON 10/13/2021] levoFLOXacin (LEVAQUIN) 250 MG/50ML infusion 250 mg  250 mg IntraVENous Q24H Kailyn Arts, APRN - CNP        [START ON 10/16/2021] vitamin D (ERGOCALCIFEROL) capsule 50,000 Units  50,000 Units Oral Weekly Kailyn Arts, APRN - CNP        vancomycin (VANCOCIN) intermittent dosing (placeholder)   Other RX Placeholder Kailyn Arts, APRN - CNP        [START ON 10/13/2021] vancomycin (VANCOCIN) 750 mg in dextrose 5 % 250 mL IVPB  750 mg IntraVENous Q24H Kailyn Arts, APRN - CNP           Allergies:     Allergies   Allergen Reactions    Penicillins Swelling     Rash and swelling      Penicillin G        Problem List:    Patient Active Problem List   Diagnosis Code    Benign essential tremor G25.0    Hypertension I10    Type 2 diabetes mellitus ([facility] [address] E11.9    Hyperlipidemia E78.5    Major depression F32.9    Arthritis of knee M17.10    Vitamin B12 deficiency E53.8    Anemia D64.9    Acute lower gastrointestinal bleeding K92.2    S/P CABG (coronary artery bypass graft) Z95.1    S/P angioplasty with stent Z95.820    Closed right hip fracture, initial encounter ([facility] [address] S72.001A       Past Medical History:        Diagnosis Date    Anemia 7/201    Arthritis of knee 2018    bilateral knees; \"bone on bone\"; declines surg    Benign essential tremor 04/15/2016    CAD S/P percutaneous coronary angioplasty 1998    [facility] at [facility] Diabetes mellitus type II, controlled ([facility] [address] 1979    Gastrointestinal hemorrhage 2012    Duodenal Ulcer by EGD    History of pancreatitis 2013    after GI bleed    Humerus fracture 2015    right humerus ; [place]    Hyperlipidemia     Hypertension     Major depression     S/P CABG (coronary artery bypass graft) 1996    Ventral hernia 2225    infraumbilical ; incisional    Vitamin B12 deficiency 07/2018    Vitamin B12 270       Past Surgical History:        Procedure Laterality Date    CHOLECYSTECTOMY      COLONOSCOPY N/A 11/5/2018    COLONOSCOPY DIAGNOSTIC OR SCREENING performed by Alexys Bonilla MD at [facility]    ? number grafts    HYSTERECTOMY, [address] SALPINGO-OOPHORECTOMY  1960       Social History:    Social History     Tobacco Use    Smoking status: Never Smoker    Smokeless tobacco: Never Used   Substance Use Topics    Alcohol use: No                                Counseling given: Not Answered      Vital Signs (Current): There were no vitals filed for this visit. BP Readings from Last 3 Encounters:   10/12/21 (!) 195/63   03/02/21 (!) 120/37   10/30/19 118/76       NPO Status:                                                                                 BMI:   Wt Readings from Last 3 Encounters:   10/12/21 135 lb 3.2 oz (61.3 kg)   03/02/21 130 lb (59 kg)   10/30/19 148 lb 2.4 oz (67.2 kg)     There is no height or weight on file to calculate BMI.    CBC:   Lab Results   Component Value Date    WBC 7.6 10/11/2021    RBC 3.04 10/11/2021    HGB 9.9 10/11/2021    HCT 31.2 10/11/2021    .6 10/11/2021    RDW 13.0 10/11/2021     10/11/2021       CMP:   Lab Results   Component Value Date     10/11/2021    K 4.7 10/11/2021    K 4.2 02/23/2018    CL 96 10/11/2021    CO2 22 10/11/2021    BUN 26 10/11/2021    CREATININE 0.99 10/11/2021    GFRAA >60 10/11/2021    AGRATIO 1.9 07/02/2018    LABGLOM 53 10/11/2021    GLUCOSE 92 10/11/2021    PROT 6.5 10/11/2021    CALCIUM 9.3 10/11/2021    BILITOT 0.18 10/11/2021    ALKPHOS 96 10/11/2021    AST 20 10/11/2021    ALT 14 10/11/2021       POC Tests:   No results for input(s): POCGLU, POCNA, POCK, POCCL, POCBUN, POCHEMO, POCHCT in the last 72 hours.     Coags:   Lab Results   Component Value Date    PROTIME 13.3 10/12/2021    INR 1.0 10/12/2021    APTT 33.0 10/12/2021    APTT 26.7 11/03/2018       HCG (If Applicable): No results found for: PREGTESTUR, PREGSERUM, HCG, HCGQUANT     ABGs: No results found for: PHART, PO2ART, PEM3KQI, TPR0LEY, BEART, R2MNXXYP     Type & Screen (If Applicable):  No results found for: LABABO, [address]    Anesthesia Evaluation  Patient summary reviewed and Nursing notes reviewed no history of anesthetic complications:   Airway: Mallampati: II  TM distance: >3 FB   Neck ROM: full  Mouth opening: > = 3 FB Dental: normal exam         Pulmonary:Negative Pulmonary ROS and normal exam                               Cardiovascular:  Exercise tolerance: poor (<4 METS),   (+) hypertension:, CAD:, CABG/stent:, hyperlipidemia        Rhythm: regular  Rate: normal                 ROS comment: 10-  Sinus rhythm with 1st degree A-V block  Right bundle branch block  Possible Inferior infarct (cited on or before 02-MAR-2021)  T wave abnormality, consider lateral ischemia  Abnormal ECG  When compared with ECG of 02-MAR-2021 14:43,  QT has shortened    WILL GET echo DONE TODAY 10-  Waiting on full cardiac evaluation    PE comment: HTN   Neuro/Psych:   (+) psychiatric history:depression/anxiety             GI/Hepatic/Renal:   (+) GERD: well controlled,          ROS comment: Acute lower gastrointestinal bleeding. Endo/Other:    (+) DiabetesType II DM, using insulin, blood dyscrasia: anemia, arthritis (bilat knees):., .                 Abdominal:             Vascular: negative vascular ROS. Other Findings:               Anesthesia Plan      regional     ASA 3       Induction: intravenous. Anesthetic plan and risks discussed with patient. Plan discussed with CRNA. Pre Anesthesia Evaluation complete. Anesthesia plan, risks, benefits, alternatives, and personnel discussed with patient and/or legal guardian. Patient and/or legal guardian verbalized an understanding and agreed to proceed. Anesthesia plan discussed with care team members and agreed upon.   Emily Tee SIMBA - CRNA  10/12/2021

## 2021-10-12 NOTE — PROGRESS NOTES
[facility]. Patient is having 9/10 pain in the right hip. Patient is A&Ox4 and on room air. Patient educated on plan of care and requesting we notify grandson. Assessment and vitals done. Given call light and oriented to room. Denies any needs.

## 2021-10-12 NOTE — ANESTHESIA PROCEDURE NOTES
Peripheral Block    Patient location during procedure: pre-op  Start time: 10/12/2021 1:30 PM  End time: 10/12/2021 1:40 PM  Staffing  Performed: resident/CRNA   Resident/CRNA: SIMBA Domínguez CRNA  Preanesthetic Checklist  Completed: patient identified, IV checked, site marked, risks and benefits discussed, monitors and equipment checked, pre-op evaluation, timeout performed, anesthesia consent given, oxygen available and patient being monitored  Peripheral Block  Patient position: supine  Prep: ChloraPrep  Patient monitoring: continuous pulse ox and cardiac monitor  Block type: Fascia iliaca  Laterality: right  Injection technique: single-shot  Guidance: ultrasound guided  Local infiltration: bupivacaine and decadron  Infiltration strength: 0.5 %  Dose: 37 mL  Provider prep: sterile gloves and mask  Local infiltration: bupivacaine and decadron  Needle  Needle type:  Other   Needle gauge: 22 G  Needle length: 5 cm  Needle localization: ultrasound guidanceOther needle type: pajunk  Assessment  Injection assessment: negative aspiration for heme, no paresthesia on injection and local visualized surrounding nerve on ultrasound  Paresthesia pain: none  Slow fractionated injection: yes  Hemodynamics: stable  Reason for block: post-op pain management and at surgeon's request

## 2021-10-12 NOTE — ED NOTES
Pt unable to use bedpan. Pt assisted to bedside commode. Call light left in place.      Colt Akers RN  10/12/21 [id number]

## 2021-10-12 NOTE — ED PROVIDER NOTES
[facility] ED  EMERGENCY DEPARTMENT ENCOUNTER      Pt Name: Ellen Luther  MRN: [id number]  [place] 11/21/1931  Date of evaluation: 10/11/2021  Provider: Nia Pearson MD    [address] [place] [address]       Chief Complaint   Patient presents with    Leg Pain     right leg pain after fall three days ago         HISTORY OF PRESENT ILLNESS   (Location/Symptom, Timing/Onset, Context/Setting, Quality, Duration, Modifying Factors, Severity)  Note limiting factors. Ellen Luther is a 80 y.o. female with PMH hypertension, type 2 diabetes, hyperlipidemia who presents to the emergency department with right leg pain. Patient states that she had a mechanical fall 3 days ago while using her walker and hit her head and hurt her right leg at that time. Denies LOC. She lives at [facility] and had an x-ray yesterday which was apparently negative for a fracture. However she continues to have progressively worsening pain and has been unable to walk. The patient denies any fever, chest pain, shortness of breath, abdominal pain, vomiting, diarrhea, dysuria. HPI    Nursing Notes were reviewed. REVIEW OF SYSTEMS    (2-9 systems for level 4, 10 or more for level 5)     Review of Systems   Constitutional: Negative for chills and fever. HENT: Negative for congestion. Eyes: Negative for redness. Respiratory: Negative for cough. Cardiovascular: Negative for chest pain. Gastrointestinal: Negative for abdominal pain, nausea and vomiting. Genitourinary: Negative for dysuria. Musculoskeletal: Positive for arthralgias and myalgias. Skin: Negative for rash. Neurological: Negative for syncope. Except as noted above the remainder of the review of systems was reviewed and negative.        PAST MEDICAL HISTORY     Past Medical History:   Diagnosis Date    Anemia 7/201    Arthritis of knee 2018    bilateral knees; \"bone on bone\"; declines surg    Benign essential tremor 04/15/2016    CAD S/P percutaneous coronary angioplasty 1998    [facility] at [facility] Diabetes mellitus type II, controlled ([facility] [address] 1979    Gastrointestinal hemorrhage 2012    Duodenal Ulcer by EGD    History of pancreatitis 2013    after GI bleed    Humerus fracture 2015    right humerus ; [place]    Hyperlipidemia     Hypertension     Major depression     S/P CABG (coronary artery bypass graft) 1996    Ventral hernia 0422    infraumbilical ; incisional    Vitamin B12 deficiency 07/2018    Vitamin B12 270         SURGICAL HISTORY       Past Surgical History:   Procedure Laterality Date    CHOLECYSTECTOMY      COLONOSCOPY N/A 11/5/2018    COLONOSCOPY DIAGNOSTIC OR SCREENING performed by Blayne Roland MD at [address]     ? number grafts    HYSTERECTOMY, [address]       Current Discharge Medication List      CONTINUE these medications which have NOT CHANGED    Details   VITAMIN D PO Take 400 mg by mouth      !! furosemide (LASIX) 20 MG tablet Take 1 tablet by mouth every morning (before breakfast) On Monday, Wednesday, and Friday only  Qty: 36 tablet, Refills: 3      atenolol (TENORMIN) 50 MG tablet Take 1 tablet by mouth daily  Qty: 90 tablet, Refills: 3    Associated Diagnoses: Essential hypertension      atorvastatin (LIPITOR) 20 MG tablet Take 1 tablet by mouth nightly  Qty: 90 tablet, Refills: 3    Associated Diagnoses: S/P angioplasty with stent; S/P CABG (coronary artery bypass graft)      carBAMazepine (TEGRETOL) 200 MG tablet Take 1 tablet by mouth 2 times daily  Qty: 180 tablet, Refills: 3    Associated Diagnoses: Benign essential tremor      !! citalopram (CELEXA) 20 MG tablet Take 1 tablet by mouth daily  Qty: 90 tablet, Refills: 3    Associated Diagnoses: Recurrent major depressive disorder, in partial remission (HCC)      ferrous sulfate (IRON 325) 325 (65 Fe) MG tablet Take 1 tablet by mouth daily (with breakfast)  Qty: 90 tablet, Refills: 3    Associated Diagnoses: Iron deficiency      metFORMIN (GLUCOPHAGE) 500 MG tablet Take 1 tablet by mouth 2 times daily (with meals)  Qty: 180 tablet, Refills: 3    Associated Diagnoses: Type 2 diabetes mellitus without complication, without long-term current use of insulin ([facility])      mirtazapine (REMERON) 15 MG tablet Take 1 tablet by mouth nightly  Qty: 90 tablet, Refills: 3    Associated Diagnoses: Recurrent major depressive disorder, in partial remission ([facility])      pantoprazole (PROTONIX) 40 MG tablet Take 1 tablet by mouth daily  Qty: 90 tablet, Refills: 3    Associated Diagnoses: Hx of upper gastrointestinal hemorrhage      vitamin B-12 (CYANOCOBALAMIN) 1000 MCG tablet Take 1 tablet by mouth daily  Qty: 90 tablet, Refills: 3    Associated Diagnoses: Vitamin B12 deficiency      !!  Ergocalciferol (VITAMIN D2) 50 MCG (2000 UT) TABS Take 50,000 Units by mouth once a week  Qty: 12 tablet, Refills: 3      !! citalopram (CELEXA) 10 MG tablet Take 1 tablet by mouth daily  Qty: 90 tablet, Refills: 3      Pediatric Multiple Vitamins (MULTIVITAMIN CHILDRENS) CHEW Take 1 tablet by mouth daily  Qty: 90 tablet, Refills: 3      mycophenolate (CELLCEPT) 500 MG tablet Take 2 tabs qam and 1 tab every evening  Qty: 270 tablet, Refills: 3      primidone (MYSOLINE) 50 MG tablet Take 1 tablet by mouth 2 times daily  Qty: 180 tablet, Refills: 3      predniSONE (DELTASONE) 5 MG tablet Take 1 tablet by mouth every other day  Qty: 45 tablet, Refills: 3      aspirin 81 MG tablet Take 1 tablet by mouth daily  Qty: 90 tablet, Refills: 3    Associated Diagnoses: S/P CABG (coronary artery bypass graft)      triamcinolone acetonide (KENALOG) 10 MG/ML injection Inject 2 mLs into the muscle every 14 days  Qty: 12 mL, Refills: 3      !! furosemide (LASIX) 20 MG tablet Take 1 tablet by mouth daily  Qty: 90 tablet, Refills: 3      aspirin EC 81 MG EC tablet Take 1 tablet by mouth daily  Qty: 90 tablet, Refills: 3      melatonin 5 MG TABS tablet Take 1 tablet by mouth daily  Qty: 90 tablet, Refills: 3      !! Ergocalciferol (VITAMIN D2) 10 MCG (400 UNIT) TABS Take 1 tablet by mouth daily  Qty: 90 tablet, Refills: 3      ivermectin 3 MG tablet Take by mouth once      triamterene-hydrochlorothiazide (MAXZIDE-25) 37.5-25 MG per tablet Take 1 tablet by mouth daily  Qty: 90 tablet, Refills: 1    Associated Diagnoses: Essential hypertension      nitroGLYCERIN (NITROSTAT) 0.4 MG SL tablet Place 1 tablet under the tongue every 5 minutes as needed for Chest pain up to max of 3 total doses. If no relief after 1 dose, call 911. Qty: 25 tablet, Refills: 3    Associated Diagnoses: S/P angioplasty with stent       !! - Potential duplicate medications found. Please discuss with provider. ALLERGIES     Penicillins and Penicillin g    FAMILY HISTORY       Family History   Problem Relation Age of Onset    Heart Disease Father     Stroke Sister           SOCIAL HISTORY       Social History     Socioeconomic History    Marital status:      Spouse name: None    Number of children: None    Years of education: None    Highest education level: None   Occupational History    None   Tobacco Use    Smoking status: Never Smoker    Smokeless tobacco: Never Used   Vaping Use    Vaping Use: Never used   Substance and Sexual Activity    Alcohol use: No    Drug use: No    Sexual activity: None   Other Topics Concern    None   Social History Narrative    None     Social Determinants of Health     Financial Resource Strain:     Difficulty of Paying Living Expenses:    Food Insecurity:     Worried About Running Out of Food in the Last Year:     Ran Out of Food in the Last Year:    Transportation Needs:     Lack of Transportation (Medical):      Lack of Transportation (Non-Medical):    Physical Activity:     Days of Exercise per Week:     Minutes of Exercise per Session:    Stress:     Feeling of Stress : Social Connections:     Frequency of Communication with Friends and Family:     Frequency of Social Gatherings with Friends and Family:     Attends Taoism Services:     Active Member of Clubs or Organizations:     Attends Club or Organization Meetings:     Marital Status:    Intimate Partner Violence:     Fear of Current or Ex-Partner:     Emotionally Abused:     Physically Abused:     Sexually Abused:        SCREENINGS                        PHYSICAL EXAM    (up to 7 for level 4, 8 or more for level 5)     ED Triage Vitals [10/11/21 2301]   BP Temp Temp Source Pulse Resp SpO2 Height Weight   (!) 164/45 98.3 °F (36.8 °C) Oral 63 20 95 % -- --       Physical Exam  Vitals and nursing note reviewed. Constitutional:       Appearance: She is well-developed. She is not ill-appearing or toxic-appearing. Comments: Frail   HENT:      Head: Normocephalic. Right Ear: External ear normal.      Left Ear: External ear normal.      Nose: Nose normal.      Mouth/Throat:      Mouth: Mucous membranes are moist.   Eyes:      Conjunctiva/sclera: Conjunctivae normal.   Cardiovascular:      Rate and Rhythm: Normal rate and regular rhythm. Pulses: Normal pulses. Heart sounds: Normal heart sounds. Pulmonary:      Effort: Pulmonary effort is normal.      Breath sounds: Normal breath sounds. Abdominal:      General: There is no distension. Palpations: Abdomen is soft. Tenderness: There is no abdominal tenderness. Musculoskeletal:      Cervical back: Normal range of motion. Right lower leg: Edema present. Left lower leg: Edema present. Comments: RLE ROM limited by pain   Skin:     General: Skin is warm and dry. Neurological:      General: No focal deficit present. Mental Status: She is alert and oriented to person, place, and time. Sensory: No sensory deficit.          DIAGNOSTIC RESULTS     EKG: All EKG's are interpreted by the Emergency Department Physician who the base of the 5th metatarsal and the lateral aspect of the lateral   cuneiform posteriorly. Consider follow-up CT or MRI. Diffuse edema of the visualized lower extremity likely representing dependent   edema. CT Head WO Contrast   Final Result   No acute intracranial abnormality. ED BEDSIDE ULTRASOUND:   Performed by ED Physician - none    LABS:  Labs Reviewed   CBC WITH AUTO DIFFERENTIAL - Abnormal; Notable for the following components:       Result Value    RBC 3.04 (*)     Hemoglobin 9.9 (*)     Hematocrit 31.2 (*)     Seg Neutrophils 69 (*)     All other components within normal limits   COMPREHENSIVE METABOLIC PANEL W/ REFLEX TO MG FOR LOW K - Abnormal; Notable for the following components:    BUN 26 (*)     CREATININE 0.99 (*)     Bun/Cre Ratio 26 (*)     Sodium 134 (*)     Chloride 96 (*)     Total Bilirubin 0.18 (*)     GFR Non- 53 (*)     All other components within normal limits   TROPONIN - Abnormal; Notable for the following components:    Troponin, High Sensitivity 17 (*)     All other components within normal limits   TROPONIN - Abnormal; Notable for the following components:    Troponin, High Sensitivity 17 (*)     All other components within normal limits   URINALYSIS - Abnormal; Notable for the following components:    Nitrite, Urine POSITIVE (*)     Leukocyte Esterase, Urine MODERATE (*)     All other components within normal limits   MICROSCOPIC URINALYSIS - Abnormal; Notable for the following components:    Bacteria, UA 2+ (*)     All other components within normal limits   CULTURE, BLOOD 1   CULTURE, BLOOD 2   CULTURE, URINE   LACTATE, SEPSIS   LACTATE, SEPSIS       All other labs were within normal range or not returned as of this dictation.     EMERGENCY DEPARTMENT COURSE/MDM:   Vitals:    Vitals:    10/11/21 2301 10/12/21 0156   BP: (!) 164/45 (!) 181/48   Pulse: 63    Resp: 20    Temp: 98.3 °F (36.8 °C)    TempSrc: Oral    SpO2: 95% 95% This is a 80 y.o. female presenting with right leg pain, weakness. Records reviewed, significant for . Initial orders included CBC, BMP, EKG, troponin, UA/culture, head CT, RLE xrays. Labs and imaging significant for UA positive for UTI. Interventions included IV morphine, bactrim. Likely diagnoses include right femur fracture. Discussed with Dr. Edie Jim (hospitalist) and Dr. Adalgisa Granger (orthopedic surgery) who stated fracture is nonoperative, patient will be admitted for further medical treatment. Discussed with patient and/or family members/companions accompanying patient who agree with plan. All questions were answered. CONSULTS:  None    PROCEDURES:  Unless otherwise noted below, none     Procedures    FINAL IMPRESSION      1. Cellulitis, unspecified cellulitis site    2. Acute cystitis without hematuria          DISPOSITION/PLAN   DISPOSITION Decision To Admit 10/12/2021 07:32:34 AM      PATIENT REFERRED TO:  No follow-up provider specified. DISCHARGE MEDICATIONS:  Current Discharge Medication List        Controlled Substances Monitoring:     No flowsheet data found.     (Please note that portions of this note were completed with a voice recognition program.  Efforts were made to edit the dictations but occasionally words are mis-transcribed.)    Anders Moctezuma MD (electronically signed)  Attending Emergency Physician           Dario Pearson MD  10/12/21 [address] Riddle MD  10/12/21 5464

## 2021-10-12 NOTE — PROGRESS NOTES
Dr Alycia Dillard called for consult he said Claritza Parry is taking the case and is on call. He said he is aware of consult and will see the patient later today.

## 2021-10-13 ENCOUNTER — APPOINTMENT (OUTPATIENT)
Dept: GENERAL RADIOLOGY | Age: 86
DRG: 480 | End: 2021-10-13
Payer: MEDICARE

## 2021-10-13 ENCOUNTER — ANESTHESIA (OUTPATIENT)
Dept: OPERATING ROOM | Age: 86
DRG: 480 | End: 2021-10-13
Payer: MEDICARE

## 2021-10-13 ENCOUNTER — ANESTHESIA EVENT (OUTPATIENT)
Dept: OPERATING ROOM | Age: 86
DRG: 480 | End: 2021-10-13
Payer: MEDICARE

## 2021-10-13 VITALS
OXYGEN SATURATION: 100 % | RESPIRATION RATE: 17 BRPM | DIASTOLIC BLOOD PRESSURE: 24 MMHG | SYSTOLIC BLOOD PRESSURE: 109 MMHG

## 2021-10-13 PROBLEM — S72.002A CLOSED LEFT HIP FRACTURE, INITIAL ENCOUNTER (HCC): Status: ACTIVE | Noted: 2021-10-13

## 2021-10-13 LAB
ABSOLUTE EOS #: <0.03 K/UL (ref 0–0.44)
ABSOLUTE IMMATURE GRANULOCYTE: <0.03 K/UL (ref 0–0.3)
ABSOLUTE LYMPH #: 1.4 K/UL (ref 1.1–3.7)
ABSOLUTE MONO #: 0.43 K/UL (ref 0.1–1.2)
ALBUMIN SERPL-MCNC: 3.4 G/DL (ref 3.5–5.2)
ALBUMIN/GLOBULIN RATIO: 1.6 (ref 1–2.5)
ALP BLD-CCNC: 124 U/L (ref 35–104)
ALT SERPL-CCNC: 25 U/L (ref 5–33)
ANION GAP SERPL CALCULATED.3IONS-SCNC: 11 MMOL/L (ref 9–17)
AST SERPL-CCNC: 42 U/L
BASOPHILS # BLD: 0 % (ref 0–2)
BASOPHILS ABSOLUTE: <0.03 K/UL (ref 0–0.2)
BILIRUB SERPL-MCNC: 0.18 MG/DL (ref 0.3–1.2)
BUN BLDV-MCNC: 22 MG/DL (ref 8–23)
BUN/CREAT BLD: 24 (ref 9–20)
CALCIUM SERPL-MCNC: 8.4 MG/DL (ref 8.6–10.4)
CHLORIDE BLD-SCNC: 98 MMOL/L (ref 98–107)
CO2: 24 MMOL/L (ref 20–31)
CREAT SERPL-MCNC: 0.93 MG/DL (ref 0.5–0.9)
DIFFERENTIAL TYPE: ABNORMAL
EOSINOPHILS RELATIVE PERCENT: 0 % (ref 1–4)
GFR AFRICAN AMERICAN: >60 ML/MIN
GFR NON-AFRICAN AMERICAN: 57 ML/MIN
GFR SERPL CREATININE-BSD FRML MDRD: ABNORMAL ML/MIN/{1.73_M2}
GFR SERPL CREATININE-BSD FRML MDRD: ABNORMAL ML/MIN/{1.73_M2}
GLUCOSE BLD-MCNC: 99 MG/DL (ref 70–99)
HCT VFR BLD CALC: 27.7 % (ref 36.3–47.1)
HCT VFR BLD CALC: 29.4 % (ref 36.3–47.1)
HEMOGLOBIN: 9 G/DL (ref 11.9–15.1)
HEMOGLOBIN: 9.3 G/DL (ref 11.9–15.1)
IMMATURE GRANULOCYTES: 0 %
LYMPHOCYTES # BLD: 22 % (ref 24–43)
MCH RBC QN AUTO: 33.1 PG (ref 25.2–33.5)
MCHC RBC AUTO-ENTMCNC: 32.5 G/DL (ref 28.4–34.8)
MCV RBC AUTO: 101.8 FL (ref 82.6–102.9)
MONOCYTES # BLD: 7 % (ref 3–12)
NRBC AUTOMATED: 0 PER 100 WBC
PDW BLD-RTO: 13.1 % (ref 11.8–14.4)
PLATELET # BLD: 261 K/UL (ref 138–453)
PLATELET ESTIMATE: ABNORMAL
PMV BLD AUTO: 9.7 FL (ref 8.1–13.5)
POTASSIUM SERPL-SCNC: 4.8 MMOL/L (ref 3.7–5.3)
RBC # BLD: 2.72 M/UL (ref 3.95–5.11)
RBC # BLD: ABNORMAL 10*6/UL
SEG NEUTROPHILS: 71 % (ref 36–65)
SEGMENTED NEUTROPHILS ABSOLUTE COUNT: 4.51 K/UL (ref 1.5–8.1)
SODIUM BLD-SCNC: 133 MMOL/L (ref 135–144)
TOTAL PROTEIN: 5.5 G/DL (ref 6.4–8.3)
WBC # BLD: 6.4 K/UL (ref 3.5–11.3)
WBC # BLD: ABNORMAL 10*3/UL

## 2021-10-13 PROCEDURE — 85025 COMPLETE CBC W/AUTO DIFF WBC: CPT

## 2021-10-13 PROCEDURE — 7100000001 HC PACU RECOVERY - ADDTL 15 MIN: Performed by: ORTHOPAEDIC SURGERY

## 2021-10-13 PROCEDURE — C1713 ANCHOR/SCREW BN/BN,TIS/BN: HCPCS | Performed by: ORTHOPAEDIC SURGERY

## 2021-10-13 PROCEDURE — 3700000000 HC ANESTHESIA ATTENDED CARE: Performed by: ORTHOPAEDIC SURGERY

## 2021-10-13 PROCEDURE — 2580000003 HC RX 258: Performed by: NURSE ANESTHETIST, CERTIFIED REGISTERED

## 2021-10-13 PROCEDURE — 6360000002 HC RX W HCPCS: Performed by: NURSE PRACTITIONER

## 2021-10-13 PROCEDURE — C1769 GUIDE WIRE: HCPCS | Performed by: ORTHOPAEDIC SURGERY

## 2021-10-13 PROCEDURE — 2500000003 HC RX 250 WO HCPCS: Performed by: ORTHOPAEDIC SURGERY

## 2021-10-13 PROCEDURE — 88311 DECALCIFY TISSUE: CPT

## 2021-10-13 PROCEDURE — 88307 TISSUE EXAM BY PATHOLOGIST: CPT

## 2021-10-13 PROCEDURE — 2580000003 HC RX 258: Performed by: NURSE PRACTITIONER

## 2021-10-13 PROCEDURE — 7100000000 HC PACU RECOVERY - FIRST 15 MIN: Performed by: ORTHOPAEDIC SURGERY

## 2021-10-13 PROCEDURE — 0QS606Z REPOSITION RIGHT UPPER FEMUR WITH INTRAMEDULLARY INTERNAL FIXATION DEVICE, OPEN APPROACH: ICD-10-PCS | Performed by: ORTHOPAEDIC SURGERY

## 2021-10-13 PROCEDURE — 1200000000 HC SEMI PRIVATE

## 2021-10-13 PROCEDURE — 6360000002 HC RX W HCPCS: Performed by: NURSE ANESTHETIST, CERTIFIED REGISTERED

## 2021-10-13 PROCEDURE — 2500000003 HC RX 250 WO HCPCS: Performed by: NURSE ANESTHETIST, CERTIFIED REGISTERED

## 2021-10-13 PROCEDURE — 2700000000 HC OXYGEN THERAPY PER DAY

## 2021-10-13 PROCEDURE — 85014 HEMATOCRIT: CPT

## 2021-10-13 PROCEDURE — 36415 COLL VENOUS BLD VENIPUNCTURE: CPT

## 2021-10-13 PROCEDURE — 80053 COMPREHEN METABOLIC PANEL: CPT

## 2021-10-13 PROCEDURE — 3209999900 FLUORO FOR SURGICAL PROCEDURES

## 2021-10-13 PROCEDURE — 85018 HEMOGLOBIN: CPT

## 2021-10-13 PROCEDURE — 3700000001 HC ADD 15 MINUTES (ANESTHESIA): Performed by: ORTHOPAEDIC SURGERY

## 2021-10-13 PROCEDURE — 3600000004 HC SURGERY LEVEL 4 BASE: Performed by: ORTHOPAEDIC SURGERY

## 2021-10-13 PROCEDURE — 6360000002 HC RX W HCPCS: Performed by: ORTHOPAEDIC SURGERY

## 2021-10-13 PROCEDURE — 2720000010 HC SURG SUPPLY STERILE: Performed by: ORTHOPAEDIC SURGERY

## 2021-10-13 PROCEDURE — 6370000000 HC RX 637 (ALT 250 FOR IP): Performed by: ORTHOPAEDIC SURGERY

## 2021-10-13 PROCEDURE — 3600000014 HC SURGERY LEVEL 4 ADDTL 15MIN: Performed by: ORTHOPAEDIC SURGERY

## 2021-10-13 PROCEDURE — 94761 N-INVAS EAR/PLS OXIMETRY MLT: CPT

## 2021-10-13 PROCEDURE — 2709999900 HC NON-CHARGEABLE SUPPLY: Performed by: ORTHOPAEDIC SURGERY

## 2021-10-13 DEVICE — INTERTAN LAG/COMPRESSION SCREW KIT                                    80MM / 75MM
Type: IMPLANTABLE DEVICE | Site: HIP | Status: FUNCTIONAL
Brand: TRIGEN

## 2021-10-13 DEVICE — TRIGEN LOW PROFILE SCREW 5.0MM X 30MM
Type: IMPLANTABLE DEVICE | Site: HIP | Status: FUNCTIONAL
Brand: TRIGEN

## 2021-10-13 RX ORDER — KETAMINE HCL 50MG/ML(1)
SYRINGE (ML) INTRAVENOUS PRN
Status: DISCONTINUED | OUTPATIENT
Start: 2021-10-13 | End: 2021-10-13 | Stop reason: SDUPTHER

## 2021-10-13 RX ORDER — SODIUM CHLORIDE 9 MG/ML
INJECTION INTRAVENOUS PRN
Status: DISCONTINUED | OUTPATIENT
Start: 2021-10-13 | End: 2021-10-13 | Stop reason: SDUPTHER

## 2021-10-13 RX ORDER — FENTANYL CITRATE 50 UG/ML
12.5 INJECTION, SOLUTION INTRAMUSCULAR; INTRAVENOUS EVERY 5 MIN PRN
Status: DISCONTINUED | OUTPATIENT
Start: 2021-10-13 | End: 2021-10-13 | Stop reason: HOSPADM

## 2021-10-13 RX ORDER — FUROSEMIDE 10 MG/ML
40 INJECTION INTRAMUSCULAR; INTRAVENOUS ONCE
Status: COMPLETED | OUTPATIENT
Start: 2021-10-13 | End: 2021-10-13

## 2021-10-13 RX ORDER — PROPOFOL 10 MG/ML
INJECTION, EMULSION INTRAVENOUS CONTINUOUS PRN
Status: DISCONTINUED | OUTPATIENT
Start: 2021-10-13 | End: 2021-10-13 | Stop reason: SDUPTHER

## 2021-10-13 RX ORDER — ONDANSETRON 2 MG/ML
4 INJECTION INTRAMUSCULAR; INTRAVENOUS
Status: DISCONTINUED | OUTPATIENT
Start: 2021-10-13 | End: 2021-10-13 | Stop reason: HOSPADM

## 2021-10-13 RX ORDER — CLINDAMYCIN PHOSPHATE 900 MG/50ML
900 INJECTION INTRAVENOUS EVERY 8 HOURS
Status: COMPLETED | OUTPATIENT
Start: 2021-10-13 | End: 2021-10-14

## 2021-10-13 RX ORDER — DEXAMETHASONE SODIUM PHOSPHATE 10 MG/ML
INJECTION, SOLUTION INTRAMUSCULAR; INTRAVENOUS PRN
Status: DISCONTINUED | OUTPATIENT
Start: 2021-10-13 | End: 2021-10-13 | Stop reason: SDUPTHER

## 2021-10-13 RX ORDER — FENTANYL CITRATE 50 UG/ML
INJECTION, SOLUTION INTRAMUSCULAR; INTRAVENOUS PRN
Status: DISCONTINUED | OUTPATIENT
Start: 2021-10-13 | End: 2021-10-13 | Stop reason: SDUPTHER

## 2021-10-13 RX ORDER — FENTANYL CITRATE 50 UG/ML
25 INJECTION, SOLUTION INTRAMUSCULAR; INTRAVENOUS EVERY 5 MIN PRN
Status: DISCONTINUED | OUTPATIENT
Start: 2021-10-13 | End: 2021-10-13 | Stop reason: HOSPADM

## 2021-10-13 RX ORDER — ROPIVACAINE HYDROCHLORIDE 5 MG/ML
INJECTION, SOLUTION EPIDURAL; INFILTRATION; PERINEURAL PRN
Status: DISCONTINUED | OUTPATIENT
Start: 2021-10-13 | End: 2021-10-13 | Stop reason: SDUPTHER

## 2021-10-13 RX ORDER — CLINDAMYCIN PHOSPHATE 900 MG/50ML
900 INJECTION INTRAVENOUS
Status: COMPLETED | OUTPATIENT
Start: 2021-10-13 | End: 2021-10-13

## 2021-10-13 RX ORDER — PROPOFOL 10 MG/ML
INJECTION, EMULSION INTRAVENOUS PRN
Status: DISCONTINUED | OUTPATIENT
Start: 2021-10-13 | End: 2021-10-13 | Stop reason: SDUPTHER

## 2021-10-13 RX ORDER — PHENYLEPHRINE HYDROCHLORIDE 10 MG/ML
INJECTION INTRAVENOUS PRN
Status: DISCONTINUED | OUTPATIENT
Start: 2021-10-13 | End: 2021-10-13 | Stop reason: SDUPTHER

## 2021-10-13 RX ADMIN — FENTANYL CITRATE 12.5 MCG: 50 INJECTION, SOLUTION INTRAMUSCULAR; INTRAVENOUS at 14:42

## 2021-10-13 RX ADMIN — PROPOFOL 30 MG: 10 INJECTION, EMULSION INTRAVENOUS at 14:27

## 2021-10-13 RX ADMIN — MIRTAZAPINE 15 MG: 15 TABLET, FILM COATED ORAL at 22:58

## 2021-10-13 RX ADMIN — PHENYLEPHRINE HYDROCHLORIDE 100 MCG: 10 INJECTION INTRAVENOUS at 14:11

## 2021-10-13 RX ADMIN — Medication 12.5 MG: at 14:26

## 2021-10-13 RX ADMIN — PHENYLEPHRINE HYDROCHLORIDE 100 MCG: 10 INJECTION INTRAVENOUS at 14:54

## 2021-10-13 RX ADMIN — MORPHINE SULFATE 4 MG: 4 INJECTION, SOLUTION INTRAMUSCULAR; INTRAVENOUS at 02:14

## 2021-10-13 RX ADMIN — HYDROCORTISONE SODIUM SUCCINATE 100 MG: 100 INJECTION, POWDER, FOR SOLUTION INTRAMUSCULAR; INTRAVENOUS at 14:15

## 2021-10-13 RX ADMIN — SODIUM CHLORIDE: 9 INJECTION, SOLUTION INTRAVENOUS at 07:04

## 2021-10-13 RX ADMIN — PROPOFOL 10 MG: 10 INJECTION, EMULSION INTRAVENOUS at 13:49

## 2021-10-13 RX ADMIN — ROPIVACAINE HYDROCHLORIDE 30 ML: 5 INJECTION, SOLUTION EPIDURAL; INFILTRATION; PERINEURAL at 12:54

## 2021-10-13 RX ADMIN — CLINDAMYCIN PHOSPHATE 900 MG: 900 INJECTION, SOLUTION INTRAVENOUS at 23:07

## 2021-10-13 RX ADMIN — PROPOFOL 20 MG: 10 INJECTION, EMULSION INTRAVENOUS at 14:37

## 2021-10-13 RX ADMIN — PROPOFOL 10 MG: 10 INJECTION, EMULSION INTRAVENOUS at 13:52

## 2021-10-13 RX ADMIN — SODIUM CHLORIDE: 9 INJECTION, SOLUTION INTRAVENOUS at 16:42

## 2021-10-13 RX ADMIN — SODIUM CHLORIDE 14 ML: 9 INJECTION INTRAMUSCULAR; INTRAVENOUS; SUBCUTANEOUS at 12:54

## 2021-10-13 RX ADMIN — PROPOFOL 10 MG: 10 INJECTION, EMULSION INTRAVENOUS at 13:57

## 2021-10-13 RX ADMIN — PROPOFOL 10 MG: 10 INJECTION, EMULSION INTRAVENOUS at 13:54

## 2021-10-13 RX ADMIN — PROPOFOL 10 MG: 10 INJECTION, EMULSION INTRAVENOUS at 13:50

## 2021-10-13 RX ADMIN — FUROSEMIDE 40 MG: 10 INJECTION, SOLUTION INTRAMUSCULAR; INTRAVENOUS at 12:07

## 2021-10-13 RX ADMIN — PHENYLEPHRINE HYDROCHLORIDE 100 MCG: 10 INJECTION INTRAVENOUS at 14:09

## 2021-10-13 RX ADMIN — VANCOMYCIN HYDROCHLORIDE 750 MG: 750 INJECTION, POWDER, LYOPHILIZED, FOR SOLUTION INTRAVENOUS at 07:07

## 2021-10-13 RX ADMIN — METFORMIN HYDROCHLORIDE 500 MG: 500 TABLET ORAL at 17:45

## 2021-10-13 RX ADMIN — SODIUM CHLORIDE 30 ML: 9 INJECTION INTRAMUSCULAR; INTRAVENOUS; SUBCUTANEOUS at 14:27

## 2021-10-13 RX ADMIN — HYDROCODONE BITARTRATE AND ACETAMINOPHEN 2 TABLET: 5; 325 TABLET ORAL at 17:45

## 2021-10-13 RX ADMIN — CARBAMAZEPINE 200 MG: 200 TABLET ORAL at 22:58

## 2021-10-13 RX ADMIN — Medication 12.5 MG: at 14:11

## 2021-10-13 RX ADMIN — SODIUM CHLORIDE 10 ML: 9 INJECTION INTRAMUSCULAR; INTRAVENOUS; SUBCUTANEOUS at 14:13

## 2021-10-13 RX ADMIN — PROPOFOL 75 MCG/KG/MIN: 10 INJECTION, EMULSION INTRAVENOUS at 13:57

## 2021-10-13 RX ADMIN — PHENYLEPHRINE HYDROCHLORIDE 150 MCG: 10 INJECTION INTRAVENOUS at 15:04

## 2021-10-13 RX ADMIN — HYDROCODONE BITARTRATE AND ACETAMINOPHEN 2 TABLET: 5; 325 TABLET ORAL at 22:59

## 2021-10-13 RX ADMIN — ONDANSETRON 4 MG: 2 INJECTION INTRAMUSCULAR; INTRAVENOUS at 17:45

## 2021-10-13 RX ADMIN — ATORVASTATIN CALCIUM 20 MG: 20 TABLET, FILM COATED ORAL at 22:59

## 2021-10-13 RX ADMIN — PRIMIDONE 50 MG: 50 TABLET ORAL at 22:58

## 2021-10-13 RX ADMIN — DEXAMETHASONE SODIUM PHOSPHATE 10 MG: 10 INJECTION, SOLUTION INTRAMUSCULAR; INTRAVENOUS at 12:54

## 2021-10-13 RX ADMIN — PROPOFOL 10 MG: 10 INJECTION, EMULSION INTRAVENOUS at 13:55

## 2021-10-13 RX ADMIN — PHENYLEPHRINE HYDROCHLORIDE 100 MCG: 10 INJECTION INTRAVENOUS at 14:32

## 2021-10-13 RX ADMIN — Medication 12.5 MG: at 13:50

## 2021-10-13 RX ADMIN — FENTANYL CITRATE 12.5 MCG: 50 INJECTION, SOLUTION INTRAMUSCULAR; INTRAVENOUS at 15:00

## 2021-10-13 RX ADMIN — PHENYLEPHRINE HYDROCHLORIDE 50 MCG: 10 INJECTION INTRAVENOUS at 14:46

## 2021-10-13 RX ADMIN — CLINDAMYCIN IN 5 PERCENT DEXTROSE 900 MG: 18 INJECTION, SOLUTION INTRAVENOUS at 13:45

## 2021-10-13 RX ADMIN — Medication 12.5 MG: at 13:48

## 2021-10-13 ASSESSMENT — PAIN DESCRIPTION - LOCATION
LOCATION: LEG

## 2021-10-13 ASSESSMENT — PAIN SCALES - GENERAL
PAINLEVEL_OUTOF10: 7
PAINLEVEL_OUTOF10: 9
PAINLEVEL_OUTOF10: 7
PAINLEVEL_OUTOF10: 9
PAINLEVEL_OUTOF10: 7

## 2021-10-13 ASSESSMENT — PAIN DESCRIPTION - ORIENTATION
ORIENTATION: LEFT
ORIENTATION: RIGHT;LEFT
ORIENTATION: RIGHT
ORIENTATION: RIGHT

## 2021-10-13 ASSESSMENT — PAIN DESCRIPTION - ONSET
ONSET: ON-GOING
ONSET: ON-GOING

## 2021-10-13 ASSESSMENT — PAIN DESCRIPTION - DESCRIPTORS
DESCRIPTORS: RADIATING
DESCRIPTORS: ACHING
DESCRIPTORS: ACHING

## 2021-10-13 ASSESSMENT — PAIN DESCRIPTION - FREQUENCY
FREQUENCY: CONTINUOUS
FREQUENCY: CONTINUOUS

## 2021-10-13 ASSESSMENT — PAIN - FUNCTIONAL ASSESSMENT: PAIN_FUNCTIONAL_ASSESSMENT: FACES

## 2021-10-13 ASSESSMENT — PAIN DESCRIPTION - PAIN TYPE
TYPE: ACUTE PAIN
TYPE: SURGICAL PAIN

## 2021-10-13 NOTE — PROGRESS NOTES
Met with Patient and spoke with her grandson, Gabriel Son, via telephone conversation this a.m. re: discharge planning. Patient is an 80year old , white female, admitted with a diagnosis of closed right hip fracture. Patient is difficult to understand throughout conversation. Palliative Care RN also visiting to assist with Advanced Directive documents at this time. Jae Najera states that Patient's   in [facility] during routine hip surgery and that Patient's son, Chriss Diallo father,  \"close to a year ago\" and both of these things weigh heavy on her mind. Patient has resided at [facility] facility in [facility] since the death of her son. She reports having no local family as grandson lives in [facility] and other son and his family are in [place]. Patient reports using a walker as her only DME. PCP is Dr. Kristine Son. Patient has [employer] and voices no difficulty with regards to affording her medications. Discharge plan is for Patient to [facility], per Patient and family choice, for therapy following surgeries and then eventually transition back to [facility]. [facility], aware of referral to [employer]. Palliative Care RN assisting with Patient medical directives as prior one was outdated. Patient chooses to be a 'Full Code' status. LSW to assist with discharge placement as appropriate.     Blairda. Sanjuana [address]  10/13/2021

## 2021-10-13 NOTE — CONSULTS
Vancomycin Dosing by Pharmacy - Daily Note   Vancomycin Therapy Day:  2  Indication: cellulitis    Allergies:  Penicillins and Penicillin g   Actual Weight:    Wt Readings from Last 1 Encounters:   10/13/21 135 lb (61.2 kg)       Labs/Ancillary Data  Estimated Creatinine Clearance: 34 mL/min (A) (based on SCr of 0.93 mg/dL (H)). Recent Labs     10/11/21  2250 10/13/21  0630   CREATININE 0.99* 0.93*   BUN 26* 22   WBC 7.6 6.4     No results found for: PROCAL    Intake/Output Summary (Last 24 hours) at 10/13/2021 1124  Last data filed at 10/13/2021 0716  Gross per 24 hour   Intake --   Output 1000 ml   Net -1000 ml     Temp: 98.2    Culture Date / Source  /  Results  10/12/21     Blood           No growth  Recent vancomycin administrations                     vancomycin (VANCOCIN) 750 mg in dextrose 5 % 250 mL IVPB (mg) 750 mg New Bag 10/13/21 0707    vancomycin 1000 mg IVPB in 250 mL D5W addavial (mg) 1,000 mg New Bag 10/12/21 0418                  MRSA Nasal Swab: N/A. Non-respiratory infection. Siomara Tejada PLAN   Continue current dosing. Random level 10/14 @ 6 am.  Patient still receiving Levaquin as well. Vancomycin Target Concentration Parameters  Treatment  Population Target AUC/JERRY Target Trough   Invasive MRSA Infection (bacteremia, pneumonia, meningitis, endocarditis, osteomyelitis)  Sepsis (undifferentiated) 400-600 N/A   Infection due to non-MRSA pathogen  Empiric treatment of non-invasive MRSA infection  (SSTI, UTI) <500 10-15 mg/L   CrCl < 29 mL/min  Rapidly fluctuating serum creatinine   THEODORA N/A < 15 mg/L     Renal replacement therapy is dosed by levels, per hospital protocol. Abbreviations  * Pauc: probability that AUC is >400 (efficacy); Pconc: probability that Ctrough is above 20 ?g/mL (toxicity); Tox: Probability of nephrotoxicity, based on Shad et al. Clin Infect Dis 2009. Thank you for the consult. Pharmacy will continue to follow.   Tanya Treadwell [facility]., 10/13/2021, 11:30 AM

## 2021-10-13 NOTE — PROGRESS NOTES
Original and copy of [address] returned to patient. Discussed code status. States that she does not want to have CPR. \"If I am gone don't bring me back. \" Support provided. Discussed code status options. Domonique chooses DNR-CCA at this time. Also discussed change in code status back to a full code for surgery. Poornima Stephenson states that she understands this and it is ok with her. DNR order prepared and discussed with CNP. Paper order signed and placed on chart.      [address] Nurse Coordinator  10/13/2021 11:47 AM

## 2021-10-13 NOTE — OP NOTE
Operative Note      Patient: Naty Berry  YOB: 1931  MRN: [id number]    Date of Procedure: 10/13/2021    Pre-Op Diagnosis: Right nondisplaced intertrochanteric hip fracture    Post-Op Diagnosis: Same       Procedure:  Cephalomedullary nailing Right intertrochanteric hip fracture    Implants:  [employer] Trigen InterTan short CMN - 10mm x 18cm x 125 degree; 80mm/75mm lag/compression screw; 30mm x 5.0mm distal locking screw    Surgeon(s):  Brice Yanes DO    Assistant:   * No surgical staff found *    Anesthesia: MAC, fascia iliaca block    Estimated Blood Loss (mL): 892IY    Complications: None    Specimens:   ID Type Source Tests Collected by Time Destination   A :  Bone Hip SURGICAL PATHOLOGY Brice Yanes DO 10/13/2021 1438        Implants:  Implant Name Type Inv. Item Serial No.  Lot No. LRB No. Used Action   NAIL IM L180MM [id number] 125DEG SHT LAVERN INTERTROCHANTERIC AG  NAIL IM L180MM [id number] 125DEG SHT LAVERN INTERTROCHANTERIC AG  [employer]- [id number] Right 1 Implanted   KIT SCR LEG L80MM [id number] COMPR L75MM DIA7MM INTEGR INTLOK  KIT SCR LEG L80MM [id number] COMPR L75MM DIA7MM INTEGR INTLOK  [employer] Emmanuel Davis [id number] Right 1 Implanted   SCREW BNE L30MM DIA5MM DULCE TI INT CAPT HEX LO PROF FOR IM  SCREW BNE L30MM DIA5MM DULCE TI INT CAPT HEX LO PROF FOR IM  [employer] Emmanuel Davis [id number] Right 1 Implanted         Drains:   Urethral Catheter 16 fr (Active)   $ Urethral catheter insertion Inserted for procedure 10/13/21 0200   Catheter Indications Perioperative use for selected surgical procedures; Need for fluid volume management of the critically ill patient in a critical care setting 10/13/21 1225   Site Assessment Pink 10/13/21 0200   Urine Color Yellow 10/13/21 1225   Urine Appearance Clear 10/13/21 1225   Output (mL) 275 mL 10/13/21 0716       Findings: As above    Indications:  Patient is an 77-year-old female who sustained a fall several days ago at her assisted living facility. Patient had continued severe right hip pain, prompting ER visit. Imaging in the ER demonstrated nondisplaced isolated greater trochanter fracture. Patient was subsequently admitted. Orthopedics was consulted. Patient was evaluated. Further imaging with MRI was performed, and this demonstrated fracture extension into the intertrochanteric and lesser trochanter region. Incidentally, nondisplaced fracture of the left intertrochanteric region was also found on MRI. Because fractures were bilateral, it was recommended patient undergo cephalomedullary nailing of the right intertrochanteric hip fracture initially, and will perform left cephalomedullary nailing in 1 to 2 days. This would allow earlier mobilization and weightbearing. We discussed surgical and nonsurgical treatment options with the patient. Risks, benefits, and alternatives were discussed with her in detail. Risks to surgery include bleeding, infection, damage to surrounding neurovascular and soft tissue structures, nonunion, malunion, implant failure, further fracture, need for further treatment, DVT/PE, pain, stiffness, adverse reaction anesthesia, loss of limb, loss of life. Patient had all questions answered to her satisfaction, and she elected to proceed with the surgery. Written and informed consent was obtained. Patient was medically cleared for surgery by the medicine team and cardiology. Detailed Description of Procedure:   Patient was brought down to the operative area. She was greeted by the surgery staff and anesthesia. She was also greeted by myself, and I initialed the operative extremity. The patient had all questions answered to her satisfaction, and she elected to proceed with the surgery. Written and informed consent was obtained. Patient underwent a fascia iliaca block by the anesthesia staff. She was taken to the operative suite.   She was laid supine on the operating table. All bony prominences were well-padded. She underwent MAC anesthesia by the anesthesia staff. Preoperative antibiotics were administered. The right foot was well-padded and placed into the fracture boot. The left lower extremity was padded and secured to the leg post, to prevent any further mobilization of the hip due to the known nondisplaced left intertrochanteric hip fracture. Preoperative imaging was obtained. The right lower extremity was then prepped and draped in normal sterile orthopedic fashion. A timeout was performed, and all present in the room were in agreement with the correct patient, procedure, and operative extremity. There were no voiced concerns. Proximal femoral anatomy was identified under fluoroscopy. An incision was made just proximal to the greater trochanter. Dissection was carried down to the iliotibial band, which was incised longitudinally with the skin incision. Greater trochanter was easily palpable. The awl was used to enter the intramedullary canal, obtaining appropriate position on both AP and lateral views. Long ball-tipped guidewire was then fed down through the femur and the intramedullary canal.  The awl was removed. Opening reamer was performed to open the proximal femur. The proximal femoral shaft was then reamed with a 12.5 mm reamer. A [employer] InterTAN short 18 cm x 10 mm x 125 degree cephalomedullary nail was selected. This was advanced over the long ball-tipped guidewire into the appropriate position and depth. Long ball-tipped guidewire was removed. A second incision was made laterally to accommodate the lag/compression screw drill guide. A guidewire was placed into the center center position of the femoral head and neck on the AP and lateral views, obtaining appropriate tip to apex distance. Sequential reaming was performed for the compression screw in the derotational bar was applied.   Reaming for the lag screw was then performed. An 80 mm lag screw was then placed over the guidewire to the appropriate depth, obtaining satisfactory tip to apex distance. The derotational bar was removed and the compression screw was then placed. Satisfactory alignment and placement was appreciated on both AP and lateral views. A small third incision was made further distal to accommodate the static distal interlocking screw. The distal interlocking hole was drilled, measured, and filled with a 5.0 mm x 30 mm distal interlocking screw. The set screw was placed proximally. The outer guide was removed. Final imaging revealed satisfactory placement of a short cephalomedullary nail with no evidence for any acute concerns. Wounds were copiously irrigated with normal saline. Iliotibial band was repaired with 0 Vicryl suture. Subcutaneous closure was performed with 2-0 Vicryl suture. Skin was closed with staples. Sterile adhesive dressing was then applied over the incisions. Patient was reversed of anesthesia. She tolerated the procedure well and without complications. She was taken to recovery in stable condition. H&H will be obtained in recovery. Patient may be weightbearing as tolerated on the right lower extremity. Recommend bedrest at this time due to the known nondisplaced left intertrochanteric hip fracture. We will plan to proceed with surgical treatment for the left side in 2 days as long as patient is recovering appropriately.     Electronically signed by Catie Perez DO on 10/13/2021 at 3:38 PM

## 2021-10-13 NOTE — ACP (ADVANCE CARE PLANNING)
Advance Care Planning     Advance Care Planning Activator (Inpatient)  Conversation Note      Date of ACP Conversation: 10/13/2021     Conversation Conducted with: Patient with Decision Making Capacity    ACP Activator: Osorio Zuluaga, [id number] [address] Maker:     Current Designated Health Care Decision Maker:     Primary Decision Maker: [address]    Secondary Decision Maker: Becca Aguiar - [phone]    Supplemental (Other) Decision Maker: Jonnie hamper - [phone]    Today we documented Decision Maker(s) consistent with ACP documents on file. Care Preferences    Ventilation: \"If you were in your present state of health and suddenly became very ill and were unable to breathe on your own, what would your preference be about the use of a ventilator (breathing machine) if it were available to you? \"      Would the patient desire the use of ventilator (breathing machine)?: no    \"If your health worsens and it becomes clear that your chance of recovery is unlikely, what would your preference be about the use of a ventilator (breathing machine) if it were available to you? \"     Would the patient desire the use of ventilator (breathing machine)?: No      Resuscitation  \"CPR works best to restart the heart when there is a sudden event, like a heart attack, in someone who is otherwise healthy. Unfortunately, CPR does not typically restart the heart for people who have serious health conditions or who are very sick. \"    \"In the event your heart stopped as a result of an underlying serious health condition, would you want attempts to be made to restart your heart (answer \"yes\" for attempt to resuscitate) or would you prefer a natural death (answer \"no\" for do not attempt to resuscitate)? \" no       [x] Yes   [] No   Educated Patient / Charles Ace regarding differences between Advance Directives and portable DNR orders.     Length of ACP Conversation in minutes:      Conversation Outcomes:  [x] ACP discussion completed  [] Existing advance directive reviewed with patient; no changes to patient's previously recorded wishes  [x] New Advance Directive completed  [x] Portable Do Not Rescitate prepared for Provider review and signature  [] POLST/POST/MOLST/MOST prepared for Provider review and signature      Follow-up plan:    [] Schedule follow-up conversation to continue planning  [] Referred individual to Provider for additional questions/concerns   [] Advised patient/agent/surrogate to review completed ACP document and update if needed with changes in condition, patient preferences or care setting    [x] This note routed to one or more involved healthcare providers

## 2021-10-13 NOTE — PROGRESS NOTES
Patients treva Reyes called and given an update, he was then transferred to . He stated he will try to come down today or tomorrow.

## 2021-10-13 NOTE — CONSULTS
.      Patient: Alex Casey  : 1931  Date of Admission: 10/11/2021  Primary Care Physician: Hernandez Hui  Today's Date: 10/12/2021    REASON FOR CONSULTATION: Leg Pain (right leg pain after fall three days ago)      HPI: Ms. Tess Phillips is a 80 y.o. female who was admitted to the hospital with right hip fracture. Cardiology consulted for preoperative cardiovascular evaluation. Ms. Tess Phillips she fell 1 week ago at the [place] [address] where she currently lives. She was seen and evaluated where they performed an x-ray which was negative for a fracture. She does state at the time of the fall she hit her head and believes she lost consciousness as she does not remember the events. She continued to have pain and difficulty with ambulation so they brought her to the ER our last night found to have had a right hip fracture. She states prior to the fall she was able to ambulate with a walker. She denied any chest pain or shortness of breath currently or prior to the fall.       She states she has not been seen and evaluated by a cardiologist for many years. She does report cardiac history of CABG in  as well as several stents placed in . She also has a history of hypertension and hyperlipidemia for which she typically takes medications and is well controlled.       Exercise Tolerance: She reports having a a very limited exercise tolerance. Ms. Tess Phillips says that she Ambulate over short distances with a walker     She denied any current or recent chest pain, shortness of breath, increased lower extremity edema, abdominal pain, bleeding problems, problems with her medications or any other concerns at this time. ECG done yesterday showed sinus rhythm with right bundle branch block and ST-T wave abnormalities in anterolateral leads. Grossly unchanged from prior. High-sensitivity troponin is minimally elevated but is not consistent with acute coronary syndrome. Blood pressure is uncontrolled.   I will start her on amlodipine 5 mg first dose tomorrow morning. I will also put on hydralazine 50 mg orally only if systolic blood pressure above 160 mmHg. Echo done today showed normal ejection fraction and wall motion. Aortic valve sclerosis without significant stenosis. Mild tricuspid regurgitation and mild diastolic dysfunction. Past Medical History:   Diagnosis Date    Anemia 7/201    Arthritis of knee 2018    bilateral knees; \"bone on bone\"; declines surg    Benign essential tremor 04/15/2016    CAD S/P percutaneous coronary angioplasty 1998    done at [facility] Diabetes mellitus type II, controlled ([address] 1979    Gastrointestinal hemorrhage 2012    Duodenal Ulcer by EGD    History of pancreatitis 2013    after GI bleed    Humerus fracture 2015    right humerus ; [place]    Hyperlipidemia     Hypertension     Major depression     S/P CABG (coronary artery bypass graft) 1996    Ventral hernia 5505    infraumbilical ; incisional    Vitamin B12 deficiency 07/2018    Vitamin B12 270       CURRENT ALLERGIES: Penicillins and Penicillin g REVIEW OF SYSTEMS: Limited review of systems but positive and negatives are mentioned in HPI. Patient also found to have urinary tract infection currently on intravenous antibiotics.      Past Surgical History:   Procedure Laterality Date    CHOLECYSTECTOMY      COLONOSCOPY N/A 11/5/2018    COLONOSCOPY DIAGNOSTIC OR SCREENING performed by Asuncion Zuñiga MD at [place] [id number]    ? number grafts    HYSTERECTOMY, [facility] SALPINGO-OOPHORECTOMY  1960    Social History:  Social History     Tobacco Use    Smoking status: Never Smoker    Smokeless tobacco: Never Used   Vaping Use    Vaping Use: Never used   Substance Use Topics    Alcohol use: No    Drug use: No        CURRENT MEDICATIONS:  Outpatient Medications Marked as Taking for the 10/11/21 encounter [facility] Encounter)   Medication Sig Dispense Refill    traMADol (ULTRAM) 50 MG tablet Take 25 mg by mouth 2 times daily.       acetaminophen (TYLENOL) 325 MG tablet Take 650 mg by mouth every 4 hours as needed for Pain or Fever      VITAMIN D PO Take 400 mg by mouth      furosemide (LASIX) 20 MG tablet Take 1 tablet by mouth every morning (before breakfast) On Monday, Wednesday, and Friday only 36 tablet 3    atenolol (TENORMIN) 50 MG tablet Take 1 tablet by mouth daily 90 tablet 3    atorvastatin (LIPITOR) 20 MG tablet Take 1 tablet by mouth nightly 90 tablet 3    carBAMazepine (TEGRETOL) 200 MG tablet Take 1 tablet by mouth 2 times daily 180 tablet 3    citalopram (CELEXA) 20 MG tablet Take 1 tablet by mouth daily 90 tablet 3    ferrous sulfate (IRON 325) 325 (65 Fe) MG tablet Take 1 tablet by mouth daily (with breakfast) 90 tablet 3    metFORMIN (GLUCOPHAGE) 500 MG tablet Take 1 tablet by mouth 2 times daily (with meals) 180 tablet 3    mirtazapine (REMERON) 15 MG tablet Take 1 tablet by mouth nightly 90 tablet 3    pantoprazole (PROTONIX) 40 MG tablet Take 1 tablet by mouth daily 90 tablet 3    vitamin B-12 (CYANOCOBALAMIN) 1000 MCG tablet Take 1 tablet by mouth daily 90 tablet 3    Ergocalciferol (VITAMIN D2) 50 MCG (2000 UT) TABS Take 50,000 Units by mouth once a week 12 tablet 3    aspirin EC 81 MG EC tablet Take 1 tablet by mouth daily 90 tablet 3    citalopram (CELEXA) 10 MG tablet Take 1 tablet by mouth daily 90 tablet 3    melatonin 5 MG TABS tablet Take 1 tablet by mouth daily (Patient taking differently: Take 5 mg by mouth nightly ) 90 tablet 3    Pediatric Multiple Vitamins (MULTIVITAMIN CHILDRENS) CHEW Take 1 tablet by mouth daily 90 tablet 3    mycophenolate (CELLCEPT) 500 MG tablet Take 2 tabs qam and 1 tab every evening 270 tablet 3    primidone (MYSOLINE) 50 MG tablet Take 1 tablet by mouth 2 times daily 180 tablet 3    Ergocalciferol (VITAMIN D2) 10 MCG (400 UNIT) TABS Take 1 tablet by mouth daily 90 tablet 3    predniSONE (DELTASONE) 5 MG tablet Take 1 tablet by mouth every other day 45 tablet 3       FAMILY HISTORY: family history includes Heart Disease in her father; Stroke in her sister. PHYSICAL EXAM:   BP (!) 166/60   Pulse 67   Temp 98 °F (36.7 °C) (Temporal)   Resp 16   Ht 5' (1.524 m)   Wt 135 lb 3.2 oz (61.3 kg)   SpO2 95%   BMI 26.40 kg/m²  Body mass index is 26.4 kg/m². Constitutional: She is oriented to person, place, and time. She appears well-developed and well-nourished. In no acute distress. HEENT: Normocephalic and atraumatic. No JVD present. Carotid bruit is not present. No mass and no thyromegaly present. No lymphadenopathy present. Cardiovascular: Normal rate, regular rhythm, normal heart sounds and intact distal pulses. Exam reveals no gallop and no friction rub. Short apical systolic murmur noted. Pulmonary/Chest: Effort normal and breath sounds normal. No respiratory distress. She has no wheezes, rhonchi or rales. Abdominal: Soft, non-tender. Bowel sounds and aorta are normal. She exhibits no organomegaly, mass or bruit. Extremities: Mild bilateral lower extremity edema. 1+ pulses in bilateral lower extremities. Neurological: She is alert and oriented to person, place, and time. Skin: Skin is warm and dry. There is no rash or diaphoresis. Psychiatric: She has a normal mood and affect.  Her speech is normal and behavior is normal.      MOST RECENT LABS ON RECORD:   Lab Results   Component Value Date    WBC 7.6 10/11/2021    HGB 9.9 (L) 10/11/2021    HCT 31.2 (L) 10/11/2021     10/11/2021    CHOL 119 08/05/2021    TRIG 79 08/05/2021    HDL 57 08/05/2021    LDLDIRECT 41 02/23/2018    ALT 14 10/11/2021    AST 20 10/11/2021     (L) 10/11/2021    K 4.7 10/11/2021    CL 96 (L) 10/11/2021    CREATININE 0.99 (H) 10/11/2021    BUN 26 (H) 10/11/2021    CO2 22 10/11/2021    TSH 1.41 01/07/2021    INR 1.0 10/12/2021    LABA1C 6.2 (H) 05/07/2021       ASSESSMENT:  Patient Active Problem List    Diagnosis Date Noted    Closed right hip fracture, initial encounter ([facility] [address] 10/12/2021    Coronary artery disease involving native coronary artery of native heart without angina pectoris 10/12/2021    Acute cystitis without hematuria 10/12/2021    S/P angioplasty with stent 04/16/2019    Acute lower gastrointestinal bleeding 11/03/2018    Anemia     Major depression     Vitamin B12 deficiency 07/01/2018    Hypertension 02/22/2018    Type 2 diabetes mellitus ([facility] 02/22/2018    Hyperlipidemia 02/22/2018    Arthritis of knee 01/01/2018    Benign essential tremor 04/15/2016    S/P CABG (coronary artery bypass graft) 01/01/1996       PLAN:  · Pre-Op Clearance:   · Patient is admitted after a fall, found to have acute right hip fracture. MRI is also showing  nondisplaced fracture of the intertrochanteric left femur  · Apparently her fall was mechanical.  Patient said she got up from the dinner table and her leg was caught in the chair. She believes that she lost consciousness because she does not recall the whole event. · CT of the head in the emergency room showed no acute abnormality. · Patient has extensive cardiac history as outlined in HPI, CABG in 1996 and PCI in 1998. She has uncontrolled hypertension. She also has dyslipidemia. No history of stroke. · Limited functional capacity prior to her most recent fall. · ECG showed sinus rhythm with right bundle branch block and ST-T wave abnormalities in anterolateral leads, grossly unchanged from prior ECG back in March 2021. · Patient also found to have urinary tract infection currently treated with intravenous antibiotics. · I did explain to her that we consider hip fracture as an emergent procedure because if not treated she will end up with more complication including pressure ulcers from immobility and aspiration pneumonia.   We also have a low threshold to clear patient for hip fracture even though she is at intermediate to high risk for perioperative cardiovascular event. Patient understands the risk. · I reviewed her troponins, ECG and echo. Patient is as good as she can be be for planned surgery. She is at intermediate to high risk for perioperative cardiovascular complications but no further intervention can decrease this risk. Please proceed with the procedure as planned. · I will start her on amlodipine 5 mg daily, first dose tomorrow morning. · I will add hydralazine 50 mg every 8 hours only if systolic blood pressure is above 160 mmHg. · Continue atenolol and Lipitor throughout the preoperative period. · We will continue to follow-up. Once again, thank you for allowing me to participate in this patients care. Please do not hesitate to contact me could I be of further assistance. Sincerely,  Sara Lyons MD, MS, F.A.C.C. [facility], [address] [place] [address]  Phone: [phone], Fax: [phone]    Based on the patients current medical conditions, I believe the risk of significant morbidity and mortality is: Intermediate to high.

## 2021-10-13 NOTE — PROGRESS NOTES
Medications still on hold from surgery- Called Dr Carrera Forget to release these but had to leave a voicemail.

## 2021-10-13 NOTE — PROGRESS NOTES
Writer at bedside for shift assessment. Patient sitting up in bed, respirations are even and unlabored while on room air. Vitals obtained and assessment completed, see flowsheet for details. Pt states she is having pain. Pt given medication. pt denies further needs at this time. Call light in reach, will continue to monitor.

## 2021-10-13 NOTE — FLOWSHEET NOTE
Met with pt per her request. Pt openly shared about many incidents that she is currently grieving, including the death of her  of 64yrs, the death of her son (whom she resided with) 1yr ago, and some childhood trauma involving her mother.  provided compassionate, listening presence. Pt was very appreciative and reported it was helpful. Pt also shared that she has a strained relationship with her daughter, and relies primarily on her grandson, Angela Brooks, for support. Pt completed AD docs with palliative nurse and named her grandson as her medical POA. Pt accepted 's offer of prayer.

## 2021-10-13 NOTE — PROGRESS NOTES
EOSABS 0.2 11/06/2018    BASOSABS <0.03 10/13/2021    DIFFTYPE NOT REPORTED 10/13/2021     BMP:    Lab Results   Component Value Date     10/13/2021    K 4.8 10/13/2021    K 4.2 02/23/2018    CL 98 10/13/2021    CO2 24 10/13/2021    BUN 22 10/13/2021    LABALBU 3.4 10/13/2021    CREATININE 0.93 10/13/2021    CALCIUM 8.4 10/13/2021    GFRAA >60 10/13/2021    LABGLOM 57 10/13/2021    GLUCOSE 99 10/13/2021     Current Inpatient Medications    Current Facility-Administered Medications: atenolol (TENORMIN) tablet 50 mg, 50 mg, Oral, Daily  atorvastatin (LIPITOR) tablet 20 mg, 20 mg, Oral, Nightly  carBAMazepine (TEGRETOL) tablet 200 mg, 200 mg, Oral, BID  vitamin D3 (CHOLECALCIFEROL) tablet 400 Units, 400 Units, Oral, Daily  ferrous sulfate (IRON 325) tablet 325 mg, 325 mg, Oral, Daily with breakfast  furosemide (LASIX) tablet 20 mg, 20 mg, Oral, Q MWF  metFORMIN (GLUCOPHAGE) tablet 500 mg, 500 mg, Oral, BID WC  mirtazapine (REMERON) tablet 15 mg, 15 mg, Oral, Nightly  pantoprazole (PROTONIX) tablet 40 mg, 40 mg, Oral, Daily  predniSONE (DELTASONE) tablet 5 mg, 5 mg, Oral, Every Other Day  primidone (MYSOLINE) tablet 50 mg, 50 mg, Oral, BID  vitamin B-12 (CYANOCOBALAMIN) tablet 1,000 mcg, 1,000 mcg, Oral, Daily  0.9 % sodium chloride infusion, , IntraVENous, Continuous  sodium chloride flush 0.9 % injection 5-40 mL, 5-40 mL, IntraVENous, 2 times per day  sodium chloride flush 0.9 % injection 5-40 mL, 5-40 mL, IntraVENous, PRN  0.9 % sodium chloride infusion, 25 mL, IntraVENous, PRN  polyethylene glycol (GLYCOLAX) packet 17 g, 17 g, Oral, Daily PRN  ondansetron (ZOFRAN) injection 4 mg, 4 mg, IntraVENous, Q6H PRN  HYDROcodone-acetaminophen (NORCO) 5-325 MG per tablet 1 tablet, 1 tablet, Oral, Q4H PRN **OR** HYDROcodone-acetaminophen (NORCO) 5-325 MG per tablet 2 tablet, 2 tablet, Oral, Q4H PRN  morphine (PF) injection 2 mg, 2 mg, IntraVENous, Q2H PRN **OR** morphine injection 4 mg, 4 mg, IntraVENous, Q2H PRN  [COMPLETED] levoFLOXacin (LEVAQUIN) 500 MG/100ML infusion 500 mg, 500 mg, IntraVENous, Once **AND** levoFLOXacin (LEVAQUIN) 250 MG/50ML infusion 250 mg, 250 mg, IntraVENous, Q24H  [START ON 10/16/2021] vitamin D (ERGOCALCIFEROL) capsule 50,000 Units, 50,000 Units, Oral, Weekly  vancomycin (VANCOCIN) intermittent dosing (placeholder), , Other, RX Placeholder  vancomycin (VANCOCIN) 750 mg in dextrose 5 % 250 mL IVPB, 750 mg, IntraVENous, Q24H  amLODIPine (NORVASC) tablet 5 mg, 5 mg, Oral, Daily  hydrALAZINE (APRESOLINE) tablet 50 mg, 50 mg, Oral, 3 times per day    ASSESSMENT AND PLAN      Diagnosis for this patient was discussed with her in detail. I reviewed the MRI findings with her. Patient appears to have bilateral nondisplaced intertrochanteric hip fractures. The right side is more bothersome for the patient at this point. Ultimately, surgical treatment was recommended in the form of cephalomedullary nailing. I would recommend performing the procedure on both hips to reduce the likelihood of worsening fracture that would require more extensive surgical treatment. Surgery would also allow the patient to mobilize more safely. This was discussed with her in detail, and at this point she is in agreement. We will plan to proceed with cephalomedullary nailing of the right hip this afternoon. We will plan to also do the left hip within the next day or 2. Performing bilateral cephalomedullary nailing at the same time would put this patient at high risk for intraoperative complications, primarily risk of fat embolus. Given her age and significant cardiac history, I feel the patient would be at less risk performing one side followed by the other in the next 1 to 2 days. Patient verbalizes her understanding. She is in agreement at this point. We will obtain surgical consent this afternoon. Patient has been cleared from a medicine and cardiac standpoint. Hgb is 9.0.  Pre-op type and screen has been ordered.

## 2021-10-13 NOTE — ANESTHESIA POSTPROCEDURE EVALUATION
Department of Anesthesiology  Postprocedure Note    Patient: Naty Berry  MRN: [id number]  YOB: 1931  Date of evaluation: 10/13/2021  Time:  4:11 PM     Procedure Summary     Date: 10/13/21 Room / Location: [address] [place] [address]    Anesthesia Start: 8677 Anesthesia Stop: 9733    Procedure: HIP OPEN REDUCTION INTERNAL FIXATION-NAILING (Right ) Diagnosis: (BILAT INTERTROCHANTERIC FRACTURES)    Surgeons: Brice Yanes DO Responsible Provider: SIMBA Menchaca CRNA    Anesthesia Type: Regional ASA Status: 3          Anesthesia Type: Regional    Freddy Phase I: Freddy Score: 9    Freddy Phase II:      Last vitals: Reviewed and per EMR flowsheets.        Anesthesia Post Evaluation    Patient location during evaluation: PACU  Patient participation: complete - patient participated  Level of consciousness: awake and alert  Pain score: 0  Airway patency: patent  Nausea & Vomiting: no nausea and no vomiting  Complications: no  Cardiovascular status: blood pressure returned to baseline  Respiratory status: acceptable and nasal cannula  Hydration status: stable  Comments: Post-op H&H, done in PACU, stable

## 2021-10-13 NOTE — ANESTHESIA PROCEDURE NOTES
Peripheral Block    Patient location during procedure: pre-op  Start time: 10/13/2021 12:49 PM  End time: 10/13/2021 12:54 PM  Staffing  Performed: resident/CRNA   Resident/CRNA: SIMBA Montalvo CRNA  Preanesthetic Checklist  Completed: patient identified, IV checked, site marked, risks and benefits discussed, surgical consent, monitors and equipment checked, pre-op evaluation, timeout performed, anesthesia consent given, oxygen available and patient being monitored  Peripheral Block  Patient position: supine  Prep: ChloraPrep  Patient monitoring: continuous pulse ox and IV access (NIBP and EKG immediately avail)  Block type: Fascia iliaca  Laterality: right  Injection technique: single-shot  Guidance: ultrasound guided  Local infiltration: ropivacaine and decadron  Provider prep: mask and sterile gloves  Local infiltration: ropivacaine and decadron  Needle  Needle type: Other   Needle gauge: 22 G  Needle length: 8 cm  Needle localization: ultrasound guidanceOther needle type: [place] sonotap  Assessment  Injection assessment: negative aspiration for heme and no paresthesia on injection (local spread in fascial plane observed)  Paresthesia pain: none  Slow fractionated injection: yes  Hemodynamics: stable  Additional Notes  No sedation given, patient tolerated very well.   30 ml ropivacaine 0.5%+14 ml NaCl inj+10 mg (1ml) dexamethasone injected for block  Reason for block: primary anesthetic

## 2021-10-13 NOTE — ANESTHESIA PRE PROCEDURE
Department of Anesthesiology  Preprocedure Note       Name:  Alex Casey   Age:  80 y.o.  :  1931                                          MRN:  [id number]         Date:  10/13/2021      Surgeon: Celestine Hernandez):  Zoë Terry DO    Procedure: HIP OPEN REDUCTION INTERNAL FIXATION-NAILING (Right )    Medications prior to admission:   Prior to Admission medications    Medication Sig Start Date End Date Taking? Authorizing Provider   traMADol (ULTRAM) 50 MG tablet Take 25 mg by mouth 2 times daily.     Historical Provider, MD   acetaminophen (TYLENOL) 325 MG tablet Take 650 mg by mouth every 4 hours as needed for Pain or Fever    Historical Provider, MD   VITAMIN D PO Take 400 mg by mouth    Historical Provider, MD   furosemide (LASIX) 20 MG tablet Take 1 tablet by mouth every morning (before breakfast) On Monday, Wednesday, and Friday only 21   Hernandez Hui MD   atenolol (TENORMIN) 50 MG tablet Take 1 tablet by mouth daily 21   Hernandez Hui MD   atorvastatin (LIPITOR) 20 MG tablet Take 1 tablet by mouth nightly 21   Hernandez Hui MD   carBAMazepine (TEGRETOL) 200 MG tablet Take 1 tablet by mouth 2 times daily 21   Hernandez Hui MD   citalopram (CELEXA) 20 MG tablet Take 1 tablet by mouth daily 21   Hernandez Hui MD   ferrous sulfate (IRON 325) 325 (65 Fe) MG tablet Take 1 tablet by mouth daily (with breakfast) 21   Hernandez Hui MD   metFORMIN (GLUCOPHAGE) 500 MG tablet Take 1 tablet by mouth 2 times daily (with meals) 21   Hernandez Hui MD   mirtazapine (REMERON) 15 MG tablet Take 1 tablet by mouth nightly 21   Hernandez Hui MD   pantoprazole (PROTONIX) 40 MG tablet Take 1 tablet by mouth daily 21   Hernandez Hui MD   vitamin B-12 (CYANOCOBALAMIN) 1000 MCG tablet Take 1 tablet by mouth daily 21   Hernandez Hui MD   Ergocalciferol (VITAMIN D2) 50 MCG (2000 UT) TABS Take 50,000 Units by mouth once a week 21   Hernandez Hui MD   aspirin EC 81 MG EC tablet Take 1 tablet by mouth daily 8/2/21   Maru Alfaro MD   citalopram (CELEXA) 10 MG tablet Take 1 tablet by mouth daily 8/2/21   Maru Alfaro MD   melatonin 5 MG TABS tablet Take 1 tablet by mouth daily  Patient taking differently: Take 5 mg by mouth nightly  8/2/21   Maru Alfaro MD   Pediatric Multiple Vitamins (MULTIVITAMIN CHILDRENS) CHEW Take 1 tablet by mouth daily 8/2/21   Maru Alfaro MD   mycophenolate (CELLCEPT) 500 MG tablet Take 2 tabs qam and 1 tab every evening 8/2/21   Maru Alfaro MD   primidone (MYSOLINE) 50 MG tablet Take 1 tablet by mouth 2 times daily 8/2/21   Maru Alfaro MD   Ergocalciferol (VITAMIN D2) 10 MCG (400 UNIT) TABS Take 1 tablet by mouth daily 8/2/21   Maru Alfaro MD   predniSONE (DELTASONE) 5 MG tablet Take 1 tablet by mouth every other day 8/2/21 10/31/21  Maru Alfaro MD   nitroGLYCERIN (NITROSTAT) 0.4 MG SL tablet Place 1 tablet under the tongue every 5 minutes as needed for Chest pain up to max of 3 total doses. If no relief after 1 dose, call 911. 4/16/19   Billy Kimball MD       Current medications:    No current facility-administered medications for this visit. No current outpatient medications on file.      Facility-Administered Medications Ordered in Other Visits   Medication Dose Route Frequency Provider Last Rate Last Admin    [MAR Hold] clindamycin (CLEOCIN) 900 mg in dextrose 5 % 50 mL IVPB  900 mg IntraVENous On Call to [address], DO        [facility] Hold] atenolol (TENORMIN) tablet 50 mg  50 mg Oral Daily Neoma Begun, APRN - CNP   50 mg at 10/12/21 1418    [MAR Hold] atorvastatin (LIPITOR) tablet 20 mg  20 mg Oral Nightly Neoma Begun, APRN - CNP   20 mg at 10/12/21 2051    [MAR Hold] carBAMazepine (TEGRETOL) tablet 200 mg  200 mg Oral BID Neoma Begun, APRN - CNP   200 mg at 10/12/21 2051    [MAR Hold] vitamin D3 (CHOLECALCIFEROL) tablet 400 Units  400 Units Oral Daily Neoma Begun, APRN - CNP   400 Units at 10/12/21 5 [facility] [MAR Hold] ferrous sulfate (IRON 325) tablet 325 mg  325 mg Oral Daily with breakfast SIMBA Ac CNP        [MAR Hold] furosemide (LASIX) tablet 20 mg  20 mg Oral Q MWF SIMBA Ac CNP        [MAR Hold] metFORMIN (GLUCOPHAGE) tablet 500 mg  500 mg Oral BID WC SIMBA Ac CNP   500 mg at 10/12/21 1419    [MAR Hold] mirtazapine (REMERON) tablet 15 mg  15 mg Oral Nightly SIMBA Ac CNP   15 mg at 10/12/21 2051    [MAR Hold] pantoprazole (PROTONIX) tablet 40 mg  40 mg Oral Daily SIMBA Ac CNP   40 mg at 10/12/21 1419    [MAR Hold] predniSONE (DELTASONE) tablet 5 mg  5 mg Oral Every Other Day SIMBA Ac CNP        [MAR Hold] primidone (MYSOLINE) tablet 50 mg  50 mg Oral BID SIMBA Ac CNP   50 mg at 10/12/21 2051    [MAR Hold] vitamin B-12 (CYANOCOBALAMIN) tablet 1,000 mcg  1,000 mcg Oral Daily SIMBA Ac CNP   1,000 mcg at 10/12/21 1419    [MAR Hold] 0.9 % sodium chloride infusion   IntraVENous Continuous SIMBA Ac CNP 75 mL/hr at 10/13/21 0704 New Bag at 10/13/21 0704    [MAR Hold] sodium chloride flush 0.9 % injection 5-40 mL  5-40 mL IntraVENous 2 times per day SIMBA Ac CNP   10 mL at 10/12/21 1419    [MAR Hold] sodium chloride flush 0.9 % injection 5-40 mL  5-40 mL IntraVENous PRN SIMBA Ac CNP        [facility] Hold] 0.9 % sodium chloride infusion  25 mL IntraVENous PRN SIMBA Ac CNP        [MAR Hold] polyethylene glycol (GLYCOLAX) packet 17 g  17 g Oral Daily PRN SIMBA Ac CNP        [MAR Hold] ondansetron (ZOFRAN) injection 4 mg  4 mg IntraVENous Q6H PRN SIMBA Ac CNP        [MAR Hold] HYDROcodone-acetaminophen (NORCO) 5-325 MG per tablet 1 tablet  1 tablet Oral Q4H PRN SIMBA Ac CNP        Or   [facility] Hold] HYDROcodone-acetaminophen (NORCO) 5-325 MG per tablet 2 tablet  2 tablet Oral Q4H PRN Nellie Cancel, APRN - CNP   2 tablet at 10/12/21 1855    [MAR Hold] morphine (PF) injection 2 mg  2 mg IntraVENous Q2H PRN Nellie Cancel, APRN - CNP   2 mg at 10/12/21 1659    Or    [MAR Hold] morphine injection 4 mg  4 mg IntraVENous Q2H PRN Nellie Cancel, APRN - CNP   4 mg at 10/13/21 0214    [MAR Hold] levoFLOXacin (LEVAQUIN) 250 MG/50ML infusion 250 mg  250 mg IntraVENous Q24H Nellie Cancel, APRN - CNP        [facility] Hold] vitamin D (ERGOCALCIFEROL) capsule 50,000 Units  50,000 Units Oral Weekly Nellie Cancel, APRN - CNP        [MAR Hold] vancomycin (VANCOCIN) intermittent dosing (placeholder)   Other RX Placeholder Nellie Cancel, APRN - CNP        [MAR Hold] vancomycin (VANCOCIN) 750 mg in dextrose 5 % 250 mL IVPB  750 mg IntraVENous Q24H Nellie Cancel, APRN - CNP   Stopped at 10/13/21 0808    [MAR Hold] amLODIPine (NORVASC) tablet 5 mg  5 mg Oral Daily Luz Maria Garcia MD        [facility] Hold] hydrALAZINE (APRESOLINE) tablet 50 mg  50 mg Oral 3 times per day Luz Maria Garcia MD   50 mg at 10/12/21 2231       Allergies:     Allergies   Allergen Reactions    Penicillins Swelling     Rash and swelling      Penicillin G        Problem List:    Patient Active Problem List   Diagnosis Code    Benign essential tremor G25.0    Uncontrolled hypertension I10    Type 2 diabetes mellitus ([facility] E11.9    Dyslipidemia E78.5    Major depression F32.9    Arthritis of knee M17.10    Vitamin B12 deficiency E53.8    Anemia D64.9    Acute lower gastrointestinal bleeding K92.2    S/P CABG (coronary artery bypass graft) Z95.1    S/P angioplasty with stent Z95.820    Closed right hip fracture, initial encounter ([facility] S72.001A    ASHD (arteriosclerotic heart disease) I25.10    Acute cystitis without hematuria N30.00    Abnormal ECG R94.31    Closed left hip fracture, initial encounter ([facility] S72.002A       Past Medical History:        Diagnosis Date    Anemia 7/201    Arthritis of knee cavity size. No definite specific wall motion abnormalities were identified. Aortic leaflets show calcification without restriction of motion. Mild tricuspid regurgitation. Evidence of mild diastolic dysfunction is seen.       MD. Scarlett Hennessy reports the patient is intermediate risk for complications in regards to anesthesia but that the patient is fully optimized. Please refer to his note. PE comment: HTN   Neuro/Psych:   (+) psychiatric history:depression/anxiety             GI/Hepatic/Renal:   (+) GERD: well controlled,          ROS comment: Acute lower gastrointestinal bleeding. Endo/Other:    (+) DiabetesType II DM, using insulin, blood dyscrasia: anemia, arthritis (bilat knees):., electrolyte abnormalities (slight hyponatremia.), . Abdominal:             Vascular: negative vascular ROS. Other Findings:               Anesthesia Plan      spinal     ASA 3       Induction: intravenous. Anesthetic plan and risks discussed with patient. Plan discussed with CRNA. Pre Anesthesia Evaluation complete. Anesthesia plan, risks, benefits, alternatives, and personnel discussed with patient and/or legal guardian. Patient and/or legal guardian verbalized an understanding and agreed to proceed. Anesthesia plan discussed with care team members and agreed upon.   SIMBA Hathaway - CRNA  10/13/2021

## 2021-10-13 NOTE — CONSULTS
Palliative Care Notes    Reason for Consult:  Patient support, ACP discussion    Patient Active Problem List   Diagnosis    Benign essential tremor    Uncontrolled hypertension    Type 2 diabetes mellitus ([facility]    Dyslipidemia    Major depression    Arthritis of knee    Vitamin B12 deficiency    Anemia    Acute lower gastrointestinal bleeding    S/P CABG (coronary artery bypass graft)    S/P angioplasty with stent    Closed right hip fracture, initial encounter ([facility]    ASHD (arteriosclerotic heart disease)    Acute cystitis without hematuria    Abnormal ECG    Closed left hip fracture, initial encounter ([facility]       Advance Directives:  Code status: Full Code  Patient has capacity for medical decisions: yes  Health Care Power of : Completed today  Living Will: None        Pain Management:  Pain is controlled with current analgesics. Medication(s) being used: narcotic analgesics including hydrocodone/acetaminophen (Lorcet, Lortab, Norco, Vicodin), morphine. Symptom Management:  Are there any other symptoms that are distressing to the patient or family that needs addressed? Anxiety:  Situational                          Dyspnea:  none                          Fatigue:  exercise intolerance                          Bladder function:  Ramey catheter in place                          Bowel function:  none    Other:  Bilateral trochanter fractures     Spiritual history/needs:   notified: yes    Palliative Performance Scale:  ___70%  Ambulation reduced; Some disease; Can't do normal job or work; intake normal or reduced; can do full self care; LOC full  ___60%  Ambulation reduced; Significant disease; Can't do hobbies/housework; intake normal or reduced; occasional assist; LOC full/confusion  _x__50%  Mainly sit/lie;  Extensive disease; Can't do any work; Considerable assist; intake normal or reduced; LOC full/confusion  ___40%  Mainly in bed; Extensive disease; Mainly assist; intake normal or reduced; LOC full/confusion   ___30%  Bed Bound; Extensive disease; Total care; intake reduced; LOC full/confusion  ___20%  Bed Bound; Extensive disease; Total care; intake minimal; Drowsy/coma  ___10%  Bed Bound; Extensive disease; Total care; Mouth care only; Drowsy/coma  ___0       Death    Readmission Risk:  19%    Notes: Regina Kim is resting in bed for our discussion. She presented to the emergency department after experiencing a fall at the assisted living facility in which resides. States that she was not feeling better a few days after the fall and staff encouraged her to come to the hospital. She has a history of CAD, diabetes and HTN. Regina Kim has lived at [facility] for the last 5-6 months. States that she is normally able to ambulate with the assist of a walker. The facility provides her meals and medications. States that she has a grandson that lives in [place]. Her two living children live out of state, a son in [place] and her daughter in [place]. She describes a strained relationship with her daughter and is tearful through out our conversation. She also states that her son that lived with her for a short time also passed away and the first anniversary of his death is approaching. We discuss her current medical condition. States that she was not prepared to hear from the orthopedic surgeon today that both hips were broken. Support provided. States that she will be having surgery to repair the first fracture today and then go back tomorrow or Friday for the second surgery. States that she has spoken with her grandson Pawan Washington as he is the most local family member and he is aware of the surgery scheduled for today. Discussed advanced directives. States that she has an old living will and [facility] but all of those she had listed have since passed away.  Shares the loss of her  and brother 4-5 years ago and her son who passed was also listed. Carmen Wright is agreeable to complete new advanced directives at this time. States that she would like her grandson Yuliana Mcclure listed as primary decision maker as he is most local, followed by her daughter in law Leyla Pearl and her son Drake Reina.  [address] completed at this time. During our discussion  arrives for discharge planning and a list of SNF facilities is provided. Carmen Wright states that she would like to go to a facility for rehab but does not \"know anything about the ones here. \" Assisted to look over the list left by . She requests [facility] as a back up based off of their ratings. Offered to call and speak with her grandson. She declines and states that \"he doesent know anything about the places over here either. \"     Carmen Wright states that she would like to speak with the . \"I have not had very good success lately. I called my old  and he came for a visit and told me to say what was on my mind. He fell asleep in the chair and woke up, prayed with me and left. So I called my sons past at the [employer] he went to before he  and he told me he would look at his schedule. I haven't heard from him since. \" Support provided. Discussed with  who went in to visit with patient as well.      Palliative Care Plan:  Education/support to patient  Discharge planning/helping to coordinate care  Pharmacologic pain management  Managing depression/anxiety  Addressing bereavement needs  Continue with current plan of care  Palliative Care Goals:  improve or maintain function/quality of life, provide comfort care/support/palliation/relieve suffering, strengthening relationships, preserve independence/autonomy/control and support for family/caregiver  Visit focus:  Routine meeting  Functional decline  Discuss goals of care  Treatment options/plans  Listen to patient/family concerns  Discuss discharge planning  Interdiscplinary collaboration  Address patient/family distress  Build trust  Elicit patient's goals and values, and use these to establish or modify goals of care  Provide emotional support to the patient   Durable power of  for health care completed at this time    [address] and Miranda Nurse Coordinator  10/13/2021 10:54 AM

## 2021-10-13 NOTE — PROGRESS NOTES
Progress Note    SUBJECTIVE:    Patient seen for f/u of closed left hip fracture and Closed right hip fracture, initial encounter ([facility] [address] She resting in bed alert oriented no acute distress. Patient is awaiting surgery today. She denies any fever. She denies chest pain or palpitations. She denies shortness of breath or cough. ROS:   Constitutional: negative  for fevers, and negative for chills. Respiratory: negative for shortness of breath, negative for cough, and negative for wheezing  Cardiovascular: negative for chest pain, and negative for palpitations  Gastrointestinal: negative for abdominal pain, negative for nausea,negative for vomiting, negative for diarrhea, and negative for constipation     All other systems were reviewed with the patient and are negative unless otherwise stated in HPI      OBJECTIVE:      Vitals:   Vitals:    10/13/21 1221   BP: (!) 185/41   Pulse: 73   Resp: 24   Temp: 98.1 °F (36.7 °C)   SpO2: 96%     Weight: 135 lb (61.2 kg)   Height: 5' (152.4 cm)     Weight  Wt Readings from Last 3 Encounters:   10/13/21 135 lb (61.2 kg)   03/02/21 130 lb (59 kg)   10/30/19 148 lb 2.4 oz (67.2 kg)     Body mass index is 26.37 kg/m². 24HR INTAKE/OUTPUT:      Intake/Output Summary (Last 24 hours) at 10/13/2021 1510  Last data filed at 10/13/2021 1421  Gross per 24 hour   Intake --   Output 1900 ml   Net -1900 ml     -----------------------------------------------------------------  Exam:    GEN:    Awake, alert and oriented x3. EYES:  EOMI, pupils equal   NECK: Supple. No lymphadenopathy. No carotid bruit  CVS:    regular rate and rhythm, systolic murmur  PULM:  CTA, no wheezes, rales or rhonchi, no acute respiratory distress  ABD:    Bowels sounds normal.  Abdomen is soft. No distention. no tenderness to palpation. EXT:   1+ edema bilaterally . No calf tenderness. NEURO: Moves all extremities. Motor and sensory are grossly intact  SKIN:  No rashes. No skin lesions. muscular strain as well as partial   tearing of the right gluteus minimus and medius insertion on the greater   trochanter and moderate amount of fluid within the right trochanteric bursa. 2. Note is also made of a nondisplaced fracture of the intratrochanteric left   femur with fracture lines extending to the greater and lesser trochanters. CT FOOT RIGHT WO CONTRAST   Final Result   1. Diffuse osteopenia and degenerative changes as detailed above. 2. No acute osseous abnormality. No aggressive osseous destruction. 3. Severe cellulitis and induration of the subcutaneous fat and skin about   the ankle and foot. No discrete organized fluid collection. 4. Sequela of remote trauma along the inferior aspect of the lateral and   medial malleolus. 5. Irregularity of the 2nd metatarsal head could be related to sequela of   Freiberg's infraction. 6. Mild plantar calcaneal spur. CT FEMUR RIGHT WO CONTRAST   Final Result   1. Acute minimally displaced right greater trochanteric fracture with   adjacent soft tissue edema and fluid in the right greater trochanteric bursa. 2. Diffuse osteopenia. 3. Appearance of the wall of the urinary bladder which can be seen with   cystitis. Correlate with urinalysis. 4. Mild right hip osteoarthrosis. 5. Mild-to-moderate tricompartmental osteoarthrosis of the right knee. CPPD. 6. Small suprapatellar joint effusion. XR CHEST PORTABLE   Final Result   No acute process. Elevated right hemidiaphragm unchanged. Calcified nodules   left upper lung. XR HIP 2-3 VW W PELVIS RIGHT   Final Result   No acute abnormality identified. XR FEMUR RIGHT (MIN 2 VIEWS)   Final Result   Artifact versus nondisplaced fracture of the greater trochanter.          XR ANKLE RIGHT (MIN 3 VIEWS)   Final Result   Findings suggestive of osteomyelitis focally involving the posterior tip of   the base of the 5th metatarsal and the lateral aspect of the lateral cuneiform posteriorly. Consider follow-up CT or MRI. Diffuse edema of the visualized lower extremity likely representing dependent   edema. CT Head WO Contrast   Final Result   No acute intracranial abnormality. FLUORO FOR SURGICAL PROCEDURES    (Results Pending)         ASSESSMENT / PLAN:  Closed right hip fracture, initial encounter ([facility])/closed left hip fracture initial encounter  · Continue current therapy   · Appreciate orthopedic surgeon  · N.p.o.  · Appreciate anesthesia for pain control  · IV fluids  · Trend labs  · Acute cystitis without hematuria  · Urine culture-E.  Coli-awaiting sensitivity  · Continue Levaquin vancomycin  · Continue IV fluids  · Uncontrolled hypertension  · Continue Norvasc, atenolol, hydralazine  · ASHD  · Appreciate cardiology  · Continue Tenormin, Lipitor, Lasix  · Nutrition status:   · at risk for malnutrition  · Dietician consult initiated  · Hospital Prophylaxis:   · DVT: Lovenox   · Stress Ulcer: H2 Blocker   · High risk medications: none   · Disposition:    · Discharge plan is pending      SIMBA Purcell - CNP , SIMBA, NP-C  Hospitalist Medicine        10/13/2021, 3:10 PM

## 2021-10-14 ENCOUNTER — APPOINTMENT (OUTPATIENT)
Dept: GENERAL RADIOLOGY | Age: 86
DRG: 480 | End: 2021-10-14
Payer: MEDICARE

## 2021-10-14 ENCOUNTER — APPOINTMENT (OUTPATIENT)
Dept: CT IMAGING | Age: 86
DRG: 480 | End: 2021-10-14
Payer: MEDICARE

## 2021-10-14 PROBLEM — D62 ACUTE BLOOD LOSS AS CAUSE OF POSTOPERATIVE ANEMIA: Status: ACTIVE | Noted: 2021-10-14

## 2021-10-14 LAB
CULTURE: ABNORMAL
HCT VFR BLD CALC: 23.4 % (ref 36.3–47.1)
HEMOGLOBIN: 6.9 G/DL (ref 11.9–15.1)
Lab: ABNORMAL
MCH RBC QN AUTO: 32.7 PG (ref 25.2–33.5)
MCHC RBC AUTO-ENTMCNC: 29.5 G/DL (ref 28.4–34.8)
MCV RBC AUTO: 110.9 FL (ref 82.6–102.9)
NRBC AUTOMATED: 0 PER 100 WBC
PDW BLD-RTO: 13.6 % (ref 11.8–14.4)
PLATELET # BLD: 255 K/UL (ref 138–453)
PMV BLD AUTO: 10.2 FL (ref 8.1–13.5)
RBC # BLD: 2.11 M/UL (ref 3.95–5.11)
SPECIMEN DESCRIPTION: ABNORMAL
VANCOMYCIN RANDOM DATE LAST DOSE: NORMAL
VANCOMYCIN RANDOM DOSE AMOUNT: NORMAL
VANCOMYCIN RANDOM TIME LAST DOSE: NORMAL
VANCOMYCIN RANDOM: 11.2 UG/ML
WBC # BLD: 8.3 K/UL (ref 3.5–11.3)

## 2021-10-14 PROCEDURE — 2500000003 HC RX 250 WO HCPCS: Performed by: ORTHOPAEDIC SURGERY

## 2021-10-14 PROCEDURE — 36430 TRANSFUSION BLD/BLD COMPNT: CPT

## 2021-10-14 PROCEDURE — 99232 SBSQ HOSP IP/OBS MODERATE 35: CPT | Performed by: INTERNAL MEDICINE

## 2021-10-14 PROCEDURE — 6370000000 HC RX 637 (ALT 250 FOR IP): Performed by: ORTHOPAEDIC SURGERY

## 2021-10-14 PROCEDURE — 2700000000 HC OXYGEN THERAPY PER DAY

## 2021-10-14 PROCEDURE — 6360000002 HC RX W HCPCS: Performed by: NURSE PRACTITIONER

## 2021-10-14 PROCEDURE — 73560 X-RAY EXAM OF KNEE 1 OR 2: CPT

## 2021-10-14 PROCEDURE — 72125 CT NECK SPINE W/O DYE: CPT

## 2021-10-14 PROCEDURE — 85027 COMPLETE CBC AUTOMATED: CPT

## 2021-10-14 PROCEDURE — 73610 X-RAY EXAM OF ANKLE: CPT

## 2021-10-14 PROCEDURE — 6360000002 HC RX W HCPCS: Performed by: ORTHOPAEDIC SURGERY

## 2021-10-14 PROCEDURE — 36415 COLL VENOUS BLD VENIPUNCTURE: CPT

## 2021-10-14 PROCEDURE — 73590 X-RAY EXAM OF LOWER LEG: CPT

## 2021-10-14 PROCEDURE — 2580000003 HC RX 258: Performed by: ORTHOPAEDIC SURGERY

## 2021-10-14 PROCEDURE — 2580000003 HC RX 258: Performed by: INTERNAL MEDICINE

## 2021-10-14 PROCEDURE — P9016 RBC LEUKOCYTES REDUCED: HCPCS

## 2021-10-14 PROCEDURE — 1200000000 HC SEMI PRIVATE

## 2021-10-14 PROCEDURE — 80202 ASSAY OF VANCOMYCIN: CPT

## 2021-10-14 PROCEDURE — 94761 N-INVAS EAR/PLS OXIMETRY MLT: CPT

## 2021-10-14 RX ORDER — FUROSEMIDE 10 MG/ML
20 INJECTION INTRAMUSCULAR; INTRAVENOUS ONCE
Status: COMPLETED | OUTPATIENT
Start: 2021-10-14 | End: 2021-10-14

## 2021-10-14 RX ORDER — SODIUM CHLORIDE 9 MG/ML
INJECTION, SOLUTION INTRAVENOUS PRN
Status: DISCONTINUED | OUTPATIENT
Start: 2021-10-14 | End: 2021-10-25 | Stop reason: HOSPADM

## 2021-10-14 RX ADMIN — ATORVASTATIN CALCIUM 20 MG: 20 TABLET, FILM COATED ORAL at 21:25

## 2021-10-14 RX ADMIN — VANCOMYCIN HYDROCHLORIDE 750 MG: 750 INJECTION, POWDER, LYOPHILIZED, FOR SOLUTION INTRAVENOUS at 05:35

## 2021-10-14 RX ADMIN — CARBAMAZEPINE 200 MG: 200 TABLET ORAL at 21:25

## 2021-10-14 RX ADMIN — HYDROCODONE BITARTRATE AND ACETAMINOPHEN 2 TABLET: 5; 325 TABLET ORAL at 16:20

## 2021-10-14 RX ADMIN — FERROUS SULFATE TAB 325 MG (65 MG ELEMENTAL FE) 325 MG: 325 (65 FE) TAB at 08:40

## 2021-10-14 RX ADMIN — MIRTAZAPINE 15 MG: 15 TABLET, FILM COATED ORAL at 21:24

## 2021-10-14 RX ADMIN — PRIMIDONE 50 MG: 50 TABLET ORAL at 21:24

## 2021-10-14 RX ADMIN — PANTOPRAZOLE SODIUM 40 MG: 40 TABLET, DELAYED RELEASE ORAL at 08:39

## 2021-10-14 RX ADMIN — CLINDAMYCIN PHOSPHATE 900 MG: 900 INJECTION, SOLUTION INTRAVENOUS at 10:44

## 2021-10-14 RX ADMIN — HYDROCODONE BITARTRATE AND ACETAMINOPHEN 2 TABLET: 5; 325 TABLET ORAL at 05:05

## 2021-10-14 RX ADMIN — CYANOCOBALAMIN TAB 1000 MCG 1000 MCG: 1000 TAB at 08:39

## 2021-10-14 RX ADMIN — FUROSEMIDE 20 MG: 10 INJECTION, SOLUTION INTRAVENOUS at 20:36

## 2021-10-14 RX ADMIN — MORPHINE SULFATE 2 MG: 2 INJECTION, SOLUTION INTRAMUSCULAR; INTRAVENOUS at 07:32

## 2021-10-14 RX ADMIN — LEVOFLOXACIN 250 MG: 5 INJECTION, SOLUTION INTRAVENOUS at 12:14

## 2021-10-14 RX ADMIN — PRIMIDONE 50 MG: 50 TABLET ORAL at 08:40

## 2021-10-14 RX ADMIN — SODIUM CHLORIDE: 9 INJECTION, SOLUTION INTRAVENOUS at 05:03

## 2021-10-14 RX ADMIN — METFORMIN HYDROCHLORIDE 500 MG: 500 TABLET ORAL at 08:41

## 2021-10-14 RX ADMIN — HYDROCODONE BITARTRATE AND ACETAMINOPHEN 2 TABLET: 5; 325 TABLET ORAL at 12:10

## 2021-10-14 RX ADMIN — CARBAMAZEPINE 200 MG: 200 TABLET ORAL at 08:39

## 2021-10-14 RX ADMIN — CHOLECALCIFEROL TAB 10 MCG (400 UNIT) 400 UNITS: 10 TAB at 08:40

## 2021-10-14 RX ADMIN — HYDROCODONE BITARTRATE AND ACETAMINOPHEN 2 TABLET: 5; 325 TABLET ORAL at 21:43

## 2021-10-14 RX ADMIN — ONDANSETRON 4 MG: 2 INJECTION INTRAMUSCULAR; INTRAVENOUS at 01:01

## 2021-10-14 RX ADMIN — METFORMIN HYDROCHLORIDE 500 MG: 500 TABLET ORAL at 16:20

## 2021-10-14 ASSESSMENT — PAIN SCALES - GENERAL
PAINLEVEL_OUTOF10: 10
PAINLEVEL_OUTOF10: 0
PAINLEVEL_OUTOF10: 9
PAINLEVEL_OUTOF10: 0
PAINLEVEL_OUTOF10: 7
PAINLEVEL_OUTOF10: 10
PAINLEVEL_OUTOF10: 9
PAINLEVEL_OUTOF10: 6
PAINLEVEL_OUTOF10: 8
PAINLEVEL_OUTOF10: 10

## 2021-10-14 ASSESSMENT — PAIN DESCRIPTION - ORIENTATION
ORIENTATION: RIGHT;LEFT
ORIENTATION: RIGHT;LEFT

## 2021-10-14 ASSESSMENT — PAIN DESCRIPTION - PAIN TYPE
TYPE: SURGICAL PAIN
TYPE: SURGICAL PAIN

## 2021-10-14 ASSESSMENT — PAIN DESCRIPTION - DESCRIPTORS
DESCRIPTORS: ACHING
DESCRIPTORS: ACHING

## 2021-10-14 ASSESSMENT — PAIN DESCRIPTION - LOCATION
LOCATION: LEG
LOCATION: LEG

## 2021-10-14 ASSESSMENT — PAIN DESCRIPTION - ONSET
ONSET: ON-GOING
ONSET: ON-GOING

## 2021-10-14 ASSESSMENT — PAIN DESCRIPTION - FREQUENCY
FREQUENCY: CONTINUOUS
FREQUENCY: CONTINUOUS

## 2021-10-14 NOTE — PROGRESS NOTES
Patient visited @ bedside , s/p hip ORIF  RIGHT   Concern over ED/ER  X-ray of ankle ; 5th metarsal base  PE: Right lateral foot ; NO open wounds / no erythema / mild edema - generaliized    IMP: NO osteomyelitis of foot , Right   Dr. Saba Ling ; orthopedic surgeon of record will be coincidentally reviewing serial foot [address] [facility]  10/14/2021  8:57 AM

## 2021-10-14 NOTE — PROGRESS NOTES
Patient noted that she was still uncomfortable. Writer and Prerna Shoe repositioned patient again at this time when fresh blood was noted on patient's dressing. Dressing has copious amount of bright red drainage that has completely saturated the underneath of the aquacell. Vital signs and assessment were also check at this time. Patient's BP has dropped to 85/37. Will be contacting Dr. Escobar Moreau as soon as possible.

## 2021-10-14 NOTE — PROGRESS NOTES
First bag of blood done at [address] [place] [address]. VS obtained. Line had run dry, patient disconnected from tubing, IV flushed.  Will get new tubing and 2nd bag of blood

## 2021-10-14 NOTE — PROGRESS NOTES
[facility] SPECIALTY [facility]  OCCUPATIONAL THERAPY  No Visit Note    [] ICU    [x] Acute   Patient: Alex Casey  Room: 92/9597-57      Alex Casey not seen on 10/14/2021 at 8:11 AM due to per chart review and PT note, patient had surgery on her right hip, but will be having surgery tomorrow on her left hip. Will re-attempt evaluation at our earliest opportunity when appropriate.           Signature: Shon Garibay, ASHLEE, OTR/L

## 2021-10-14 NOTE — PROGRESS NOTES
Discussed with SLloyd Conti crackles present (mild) and +2 edema present to patient BLE 1/2 way up below knee. Per S stefanie give lasix IV 20 mg after the first unit of Blood. And also decrease IV fluids to 75 ml/hr after both units have been administered.  Orders in

## 2021-10-14 NOTE — PROGRESS NOTES
Department of Orthopedic Surgery  Attending Progress Note      SUBJECTIVE    Patient seen and examined. Patient admits to right hip and right lower leg pain. Patient states that right lower leg pain has been present even prior to her fall. Patient had some saturation to her dressing last evening. Pressure dressing was applied. Apparently she also had hypotension, which was addressed with IV fluids. Patient denies dizziness, chest pain, shortness of breath. OBJECTIVE    Physical    VITALS:  BP (!) 108/34   Pulse 75   Temp 97.3 °F (36.3 °C) (Temporal)   Resp 16   Ht 5' (1.524 m)   Wt 137 lb 14.4 oz (62.6 kg)   SpO2 97%   BMI 26.93 kg/m²       General:  Patient is alert and oriented. No acute distress. Right lower extremity:  There is saturation to the proximal dressing. Using sterile technique, dressings were removed. Incisions demonstrate well approximation with no acute concerns. Slight bloody drainage from the proximal incision is noted. New sterile dressings applied with pressure dressing over top. Sensation is intact throughout the extremity. Patient has diffuse tenderness to palpation from the knee down into the ankle. Toes are well perfused. There is ecchymosis over the lateral ankle. No open wounds. Compartments are soft and compressible. Left lower extremity:  No significant pain to palpation. Range of motion of the leg was unable to be performed this morning due to patient's current position and discomfort with the right leg. Sensation and motor function are intact distally.     Data    CBC:   Lab Results   Component Value Date    WBC 8.3 10/14/2021    RBC 2.11 10/14/2021    HGB 6.9 10/14/2021    HCT 23.4 10/14/2021    .9 10/14/2021    MCH 32.7 10/14/2021    MCHC 29.5 10/14/2021    RDW 13.6 10/14/2021     10/14/2021    MPV 10.2 10/14/2021     BMP:    Lab Results   Component Value Date     10/13/2021    K 4.8 10/13/2021    K 4.2 02/23/2018 CL 98 10/13/2021    CO2 24 10/13/2021    BUN 22 10/13/2021    LABALBU 3.4 10/13/2021    CREATININE 0.93 10/13/2021    CALCIUM 8.4 10/13/2021    GFRAA >60 10/13/2021    LABGLOM 57 10/13/2021    GLUCOSE 99 10/13/2021     Current Inpatient Medications    Current Facility-Administered Medications: 0.9 % sodium chloride infusion, , IntraVENous, PRN  clindamycin (CLEOCIN) 900 mg in dextrose 5 % 50 mL IVPB, 900 mg, IntraVENous, Q8H  atenolol (TENORMIN) tablet 50 mg, 50 mg, Oral, Daily  atorvastatin (LIPITOR) tablet 20 mg, 20 mg, Oral, Nightly  carBAMazepine (TEGRETOL) tablet 200 mg, 200 mg, Oral, BID  vitamin D3 (CHOLECALCIFEROL) tablet 400 Units, 400 Units, Oral, Daily  ferrous sulfate (IRON 325) tablet 325 mg, 325 mg, Oral, Daily with breakfast  furosemide (LASIX) tablet 20 mg, 20 mg, Oral, Q MWF  metFORMIN (GLUCOPHAGE) tablet 500 mg, 500 mg, Oral, BID WC  mirtazapine (REMERON) tablet 15 mg, 15 mg, Oral, Nightly  pantoprazole (PROTONIX) tablet 40 mg, 40 mg, Oral, Daily  predniSONE (DELTASONE) tablet 5 mg, 5 mg, Oral, Every Other Day  primidone (MYSOLINE) tablet 50 mg, 50 mg, Oral, BID  vitamin B-12 (CYANOCOBALAMIN) tablet 1,000 mcg, 1,000 mcg, Oral, Daily  0.9 % sodium chloride infusion, , IntraVENous, Continuous  sodium chloride flush 0.9 % injection 5-40 mL, 5-40 mL, IntraVENous, 2 times per day  sodium chloride flush 0.9 % injection 5-40 mL, 5-40 mL, IntraVENous, PRN  0.9 % sodium chloride infusion, 25 mL, IntraVENous, PRN  polyethylene glycol (GLYCOLAX) packet 17 g, 17 g, Oral, Daily PRN  ondansetron (ZOFRAN) injection 4 mg, 4 mg, IntraVENous, Q6H PRN  HYDROcodone-acetaminophen (NORCO) 5-325 MG per tablet 1 tablet, 1 tablet, Oral, Q4H PRN **OR** HYDROcodone-acetaminophen (NORCO) 5-325 MG per tablet 2 tablet, 2 tablet, Oral, Q4H PRN  morphine (PF) injection 2 mg, 2 mg, IntraVENous, Q2H PRN **OR** morphine injection 4 mg, 4 mg, IntraVENous, Q2H PRN  [COMPLETED] levoFLOXacin (LEVAQUIN) 500 MG/100ML infusion 500 mg, 500 mg, IntraVENous, Once **AND** levoFLOXacin (LEVAQUIN) 250 MG/50ML infusion 250 mg, 250 mg, IntraVENous, Q24H  [START ON 10/16/2021] vitamin D (ERGOCALCIFEROL) capsule 50,000 Units, 50,000 Units, Oral, Weekly  vancomycin (VANCOCIN) intermittent dosing (placeholder), , Other, RX Placeholder  vancomycin (VANCOCIN) 750 mg in dextrose 5 % 250 mL IVPB, 750 mg, IntraVENous, Q24H  amLODIPine (NORVASC) tablet 5 mg, 5 mg, Oral, Daily  hydrALAZINE (APRESOLINE) tablet 50 mg, 50 mg, Oral, 3 times per day    ASSESSMENT AND PLAN      POD #1   Cephalomedullary nailing right nondisplaced intertrochanteric hip fracture     weight-bearing as tolerated right lower extremity  Will obtain new x-rays of the knee, tibia / fibula, and ankle given her continued pain   Continue to watch dressings reinforce as needed  Will hold Lovenox this morning in light of her hemoglobin of 6.9. Medicine team has been notified and has ordered blood transfusion  Physical therapy  Pain control   DVT prophylaxis -  Currently holding Lovenox but would recommend Vincenzo fish and  SCDs. Patient also has a nondisplaced fracture of the left intertrochanteric region. Plan currently is for surgical treatment tomorrow if patient is stable.

## 2021-10-14 NOTE — PROGRESS NOTES
Patient laying in bed comfortable. Vital signs and assessment completed. Dressing dry clean intact. Patient having 7/10 pain, requests pain medication with her nightly medications. Patient has tremors of the bilateral upper extremities and of her mouth making it somewhat difficult to understand patient's speech. Patient denies any other needs at this time. Call light, bedside table and personal belongings within reach. Will continue to monitor.

## 2021-10-14 NOTE — PROGRESS NOTES
Add 76197 Cpt? (Important Note: In 2017 The Use Of 45874 Is Being Tracked By Cms To Determine Future Global Period Reimbursement For Global Periods): yes [facility]  Inpatient/Observation/Outpatient Rehabilitation    Date: 10/14/2021  Patient Name: Philip Halsted       [x] Inpatient Acute/Observation       []  Outpatient  : 1931       [] Pt no showed for scheduled appointment    [] Pt refused/declined therapy at this time due to:           [x] Pt cancelled due to:  [] No Reason Given   [] Sick/ill   [] Other:  am sravani aattempted, spoke to nurse and pt had surgery on R hip but will be having surgery tomorrow on L hip and still on strict bed rest and has low hemoglobin    Will attempt evaluation at our earliest opportunity.        Shayla Ortiz, PT Date: 10/14/2021 Detail Level: Detailed Wound Evaluated By: Katerina

## 2021-10-14 NOTE — FLOWSHEET NOTE
Dr Natalia Carver in room. Removed old dressing to right hip. Large amount of red drainage noted. Bruising and swelling noted to right hip. New dressings x 2 applied.  Will be NPO after midnight for surgery in AM.

## 2021-10-14 NOTE — PROGRESS NOTES
[facility] SPECIALTY [facility]  OCCUPATIONAL THERAPY  No Visit Note    [] ICU    [x] Acute   Patient: Raya Manning  Room: 8539/8908-30      Raya Manning not seen on 10/14/2021 at 5:23 PM due to patient continues to have strict bed rest orders in place. Will attempt evaluation tomorrow if appropriate and at our earliest opportunity.         Signature: Andra Page OTR/TONIA

## 2021-10-14 NOTE — PROGRESS NOTES
Dr. Rea West called at this time and made him aware patient's active bleeding under dressing and complete saturation of dressing from top to bottom, left to right and it was fresh bright red blood. Dr. Rea West instructed writer to not remove the original dressing and to just reinforce dressing with pressure dressing. Writer also mentioned patient's BP is low and Dr. Rea West instructed to consult hospitals for low BP.

## 2021-10-14 NOTE — PROGRESS NOTES
Patient blood pressure is improving. Writer assessed wound dressing that writer had placed over the surgical dressing to reinforce and apply pressure. Writer's dressing over the surgical dressing is clean, dry and intact with no signs of breakthrough drainage. Will continue to closely monitor.

## 2021-10-14 NOTE — PROGRESS NOTES
Patient called out stating her sacrum was hurting. Writer and Rocco Garcia RN repositioned patient in bed.

## 2021-10-14 NOTE — PROGRESS NOTES
Patient reports that she is not normally on oxygen at home. Attempted to ween her down off oxygen and her oxygen went down to 85-88%. Applied oxygen via nasal cannula back on.

## 2021-10-14 NOTE — PROGRESS NOTES
Spoke to Dr. Pierce Madera at this time. Dr. Pierce Madera instructed to change continuous fluid rate to 150 and to hold atenolol and Norvasc if BP is less than 100.

## 2021-10-14 NOTE — PROGRESS NOTES
Dressing was reinforced. Writer discussed dressing options with Dee Dee Calvin who suggested teleplast and ABD pads. Writer applied this at this time. Ice applied over dressing afterwards. Patient positioned slightly onto her left side at this time. Fluids were up to 150ml per Dr. Pelayo Imus order. Will monitor BP and wait a bit to see if BP improves any.

## 2021-10-14 NOTE — PROGRESS NOTES
Progress Note  Vinnie Odonnell MD    OBJECTIVE:    Patient seen for f/u of Closed right hip fracture, initial encounter ([facility] [address] She has some pain  Hb 6.9     ROS:   Constitutional: negative  for fevers, and negative for chills. Respiratory: negative for shortness of breath, negative for cough, and negative for wheezing,on oxygen  Cardiovascular: negative for chest pain, and negative for palpitations  Gastrointestinal: negative for abdominal pain, negative for nausea,negative for vomiting, negative for diarrhea, and negative for constipation  Has lt hip pain   All other systems were reviewed with the patient and are negative unless otherwise stated in HPI    OBJECTIVE:  Vitals:   Temp: 97.3 °F (36.3 °C)  BP: (!) 108/34  Resp: 16  Pulse: 73  SpO2: 97 %    24HR INTAKE/OUTPUT:      Intake/Output Summary (Last 24 hours) at 10/14/2021 0750  Last data filed at 10/14/2021 0535  Gross per 24 hour   Intake 450 ml   Output 1700 ml   Net -1250 ml     -----------------------------------------------------------------  Exam:  GEN:    Awake, alert and oriented x3. EYES:  EOMI, pupils equal   NECK: Supple. No lymphadenopathy. No carotid bruit  CVS:    regular rate and rhythm, 2/6 systolic murmur  PULM:  CTA, no wheezes, rales or rhonchi, no acute respiratory distress  ABD:    Bowels sounds normal.  Abdomen is soft. No distention. no tenderness to palpation. EXT:   trace edema bilaterally . No calf tenderness. NEURO: Moves all extremities. Motor and sensory are grossly intact  SKIN:  No rashes.   No skin lesions.    -----------------------------------------------------------------  Diagnostic Data:    · All available data reviewed  Lab Results   Component Value Date    WBC 8.3 10/14/2021    HGB 6.9 (LL) 10/14/2021    .9 (H) 10/14/2021     10/14/2021      Lab Results   Component Value Date    GLUCOSE 99 10/13/2021    BUN 22 10/13/2021    CREATININE 0.93 (H) 10/13/2021     (L) 10/13/2021    K 4.8

## 2021-10-15 LAB
ANION GAP SERPL CALCULATED.3IONS-SCNC: 13 MMOL/L (ref 9–17)
BUN BLDV-MCNC: 22 MG/DL (ref 8–23)
BUN/CREAT BLD: 25 (ref 9–20)
CALCIUM SERPL-MCNC: 7 MG/DL (ref 8.6–10.4)
CHLORIDE BLD-SCNC: 103 MMOL/L (ref 98–107)
CO2: 20 MMOL/L (ref 20–31)
CREAT SERPL-MCNC: 0.89 MG/DL (ref 0.5–0.9)
GFR AFRICAN AMERICAN: >60 ML/MIN
GFR NON-AFRICAN AMERICAN: 60 ML/MIN
GFR SERPL CREATININE-BSD FRML MDRD: ABNORMAL ML/MIN/{1.73_M2}
GFR SERPL CREATININE-BSD FRML MDRD: ABNORMAL ML/MIN/{1.73_M2}
GLUCOSE BLD-MCNC: 142 MG/DL (ref 70–99)
HCT VFR BLD CALC: 30 % (ref 36.3–47.1)
HEMOGLOBIN: 10 G/DL (ref 11.9–15.1)
MCH RBC QN AUTO: 31.9 PG (ref 25.2–33.5)
MCHC RBC AUTO-ENTMCNC: 33.3 G/DL (ref 28.4–34.8)
MCV RBC AUTO: 95.8 FL (ref 82.6–102.9)
NRBC AUTOMATED: 0 PER 100 WBC
PDW BLD-RTO: 16.3 % (ref 11.8–14.4)
PLATELET # BLD: 211 K/UL (ref 138–453)
PMV BLD AUTO: 9.6 FL (ref 8.1–13.5)
POTASSIUM SERPL-SCNC: 3.7 MMOL/L (ref 3.7–5.3)
RBC # BLD: 3.13 M/UL (ref 3.95–5.11)
SODIUM BLD-SCNC: 136 MMOL/L (ref 135–144)
SURGICAL PATHOLOGY REPORT: NORMAL
WBC # BLD: 8.5 K/UL (ref 3.5–11.3)

## 2021-10-15 PROCEDURE — 94761 N-INVAS EAR/PLS OXIMETRY MLT: CPT

## 2021-10-15 PROCEDURE — 80048 BASIC METABOLIC PNL TOTAL CA: CPT

## 2021-10-15 PROCEDURE — 6370000000 HC RX 637 (ALT 250 FOR IP): Performed by: ORTHOPAEDIC SURGERY

## 2021-10-15 PROCEDURE — 6360000002 HC RX W HCPCS: Performed by: ORTHOPAEDIC SURGERY

## 2021-10-15 PROCEDURE — 2580000003 HC RX 258: Performed by: NURSE PRACTITIONER

## 2021-10-15 PROCEDURE — 6360000002 HC RX W HCPCS: Performed by: NURSE PRACTITIONER

## 2021-10-15 PROCEDURE — 85027 COMPLETE CBC AUTOMATED: CPT

## 2021-10-15 PROCEDURE — 1200000000 HC SEMI PRIVATE

## 2021-10-15 PROCEDURE — 6370000000 HC RX 637 (ALT 250 FOR IP): Performed by: INTERNAL MEDICINE

## 2021-10-15 PROCEDURE — 36415 COLL VENOUS BLD VENIPUNCTURE: CPT

## 2021-10-15 PROCEDURE — 97167 OT EVAL HIGH COMPLEX 60 MIN: CPT

## 2021-10-15 PROCEDURE — 97163 PT EVAL HIGH COMPLEX 45 MIN: CPT

## 2021-10-15 RX ADMIN — PRIMIDONE 50 MG: 50 TABLET ORAL at 09:30

## 2021-10-15 RX ADMIN — LEVOFLOXACIN 250 MG: 5 INJECTION, SOLUTION INTRAVENOUS at 12:21

## 2021-10-15 RX ADMIN — PREDNISONE 5 MG: 5 TABLET ORAL at 09:31

## 2021-10-15 RX ADMIN — CYANOCOBALAMIN TAB 1000 MCG 1000 MCG: 1000 TAB at 09:30

## 2021-10-15 RX ADMIN — CARBAMAZEPINE 200 MG: 200 TABLET ORAL at 09:30

## 2021-10-15 RX ADMIN — ATENOLOL 50 MG: 50 TABLET ORAL at 09:30

## 2021-10-15 RX ADMIN — VANCOMYCIN HYDROCHLORIDE 500 MG: 500 INJECTION, POWDER, LYOPHILIZED, FOR SOLUTION INTRAVENOUS at 15:12

## 2021-10-15 RX ADMIN — FUROSEMIDE 20 MG: 20 TABLET ORAL at 12:23

## 2021-10-15 RX ADMIN — VANCOMYCIN HYDROCHLORIDE 500 MG: 500 INJECTION, POWDER, LYOPHILIZED, FOR SOLUTION INTRAVENOUS at 02:44

## 2021-10-15 RX ADMIN — HYDROCODONE BITARTRATE AND ACETAMINOPHEN 1 TABLET: 5; 325 TABLET ORAL at 22:22

## 2021-10-15 RX ADMIN — MIRTAZAPINE 15 MG: 15 TABLET, FILM COATED ORAL at 21:35

## 2021-10-15 RX ADMIN — CHOLECALCIFEROL TAB 10 MCG (400 UNIT) 400 UNITS: 10 TAB at 09:30

## 2021-10-15 RX ADMIN — CARBAMAZEPINE 200 MG: 200 TABLET ORAL at 21:35

## 2021-10-15 RX ADMIN — METFORMIN HYDROCHLORIDE 500 MG: 500 TABLET ORAL at 09:30

## 2021-10-15 RX ADMIN — PRIMIDONE 50 MG: 50 TABLET ORAL at 21:35

## 2021-10-15 RX ADMIN — ATORVASTATIN CALCIUM 20 MG: 20 TABLET, FILM COATED ORAL at 21:35

## 2021-10-15 RX ADMIN — HYDROCODONE BITARTRATE AND ACETAMINOPHEN 1 TABLET: 5; 325 TABLET ORAL at 09:31

## 2021-10-15 RX ADMIN — PANTOPRAZOLE SODIUM 40 MG: 40 TABLET, DELAYED RELEASE ORAL at 09:30

## 2021-10-15 RX ADMIN — HYDROCODONE BITARTRATE AND ACETAMINOPHEN 2 TABLET: 5; 325 TABLET ORAL at 03:40

## 2021-10-15 RX ADMIN — FERROUS SULFATE TAB 325 MG (65 MG ELEMENTAL FE) 325 MG: 325 (65 FE) TAB at 09:31

## 2021-10-15 RX ADMIN — HYDROCODONE BITARTRATE AND ACETAMINOPHEN 2 TABLET: 5; 325 TABLET ORAL at 15:10

## 2021-10-15 ASSESSMENT — PAIN DESCRIPTION - ORIENTATION
ORIENTATION: RIGHT
ORIENTATION: RIGHT
ORIENTATION: RIGHT;LEFT
ORIENTATION: RIGHT

## 2021-10-15 ASSESSMENT — PAIN DESCRIPTION - DESCRIPTORS
DESCRIPTORS: ACHING;DISCOMFORT;SORE
DESCRIPTORS: ACHING
DESCRIPTORS: ACHING;SORE;DISCOMFORT
DESCRIPTORS: ACHING
DESCRIPTORS: ACHING

## 2021-10-15 ASSESSMENT — PAIN SCALES - GENERAL
PAINLEVEL_OUTOF10: 6
PAINLEVEL_OUTOF10: 10
PAINLEVEL_OUTOF10: 3
PAINLEVEL_OUTOF10: 10
PAINLEVEL_OUTOF10: 7
PAINLEVEL_OUTOF10: 9
PAINLEVEL_OUTOF10: 9
PAINLEVEL_OUTOF10: 10

## 2021-10-15 ASSESSMENT — PAIN - FUNCTIONAL ASSESSMENT
PAIN_FUNCTIONAL_ASSESSMENT: ACTIVITIES ARE NOT PREVENTED
PAIN_FUNCTIONAL_ASSESSMENT: ACTIVITIES ARE NOT PREVENTED

## 2021-10-15 ASSESSMENT — PAIN DESCRIPTION - ONSET
ONSET: ON-GOING
ONSET: GRADUAL
ONSET: ON-GOING

## 2021-10-15 ASSESSMENT — PAIN DESCRIPTION - PAIN TYPE
TYPE: SURGICAL PAIN

## 2021-10-15 ASSESSMENT — PAIN DESCRIPTION - LOCATION
LOCATION: HIP
LOCATION: HIP
LOCATION: ANKLE;HIP
LOCATION: LEG
LOCATION: HIP
LOCATION: HIP

## 2021-10-15 ASSESSMENT — PAIN DESCRIPTION - FREQUENCY
FREQUENCY: CONTINUOUS

## 2021-10-15 ASSESSMENT — PAIN DESCRIPTION - PROGRESSION: CLINICAL_PROGRESSION: GRADUALLY WORSENING

## 2021-10-15 NOTE — PROGRESS NOTES
I called and spoke to patient's nurse. Patient currently stable after receiving 2 units of PRBC's yesterday. Hgb is 10.0    Some mild saturation to the dressing. New dressing applied/reinforced by nursing with no further concerns at this time. Was planning to perform Left hip nailing this morning, however in light of patient needed blood transfusion yesterday, will plan to wait on any further surgery until early next week, possibly Monday or Tuesday. Will allow patient to further stabilize. Patient may be WBAT on Right leg; Nonweightbearing on left leg. I encourage mobilization to bedside chair if possible. PT/OT for mobilization. Please call with any other questions or concerns.  [phone]

## 2021-10-15 NOTE — PROGRESS NOTES
Blood stopped at this time, vitals obtained. Diastolic bp was 26, MAP is 52. Brought this to the attention of nurse supervisor Pavithra Canales who advised there was nothing more that could be done. Reinforced dressing is still clean dry and intact.  Will take vitals again in an hour and draw h and h

## 2021-10-15 NOTE — PROGRESS NOTES
Elastoplast dressing over surgical site reinforced with ABD and tape. Bleeding occurred when patient was rolled over to left side to get on bedpan. Small amount of sanguinous dressing noted to be leaking from under the elastoplast. Reinforced as needed per order. Will continue to monitor.

## 2021-10-15 NOTE — PROGRESS NOTES
Occupational Therapy   Occupational Therapy Initial Assessment  Date: 10/15/2021   Patient Name: Jenise Martinez  MRN: [id number]     : 1931    Date of Service: 10/15/2021    Discharge Recommendations:  Continue to assess pending progress, ECF with OT       Assessment   Performance deficits / Impairments: Decreased functional mobility ; Decreased ADL status; Decreased strength;Decreased endurance  Assessment: Patient is a 81 y/o female seen two days s/p right hip fracture/surgery. Patient is currently WBAT for RLE, NWB for LLE. She presents with significant pain and deconditioning, limiting independence with ADLs. Patient to benefit from OT services in order to address these deficits. Treatment Diagnosis: M62.81-Generalized Weakness  Prognosis: Fair  Decision Making: High Complexity  OT Education: OT Role;Plan of Care  Barriers to Learning: none  REQUIRES OT FOLLOW UP: Yes  Activity Tolerance  Activity Tolerance: Patient limited by pain  Activity Tolerance: 10/10 pain  Safety Devices  Safety Devices in place: Yes  Type of devices: Call light within reach; Left in bed           Patient Diagnosis(es): The primary encounter diagnosis was Cellulitis, unspecified cellulitis site. A diagnosis of Acute cystitis without hematuria was also pertinent to this visit. has a past medical history of Anemia, Arthritis of knee, Benign essential tremor, CAD S/P percutaneous coronary angioplasty, Diabetes mellitus type II, controlled ([address] Gastrointestinal hemorrhage, History of pancreatitis, Humerus fracture, Hyperlipidemia, Hypertension, Major depression, S/P CABG (coronary artery bypass graft), Ventral hernia, and Vitamin B12 deficiency. has a past surgical history that includes Cholecystectomy; Coronary artery bypass graft (); Hysterectomy, vaginal (); Salpingo-oophorectomy (); Colonoscopy (N/A, 2018); and hip surgery (Right).     Treatment Diagnosis: M62.81-Generalized Weakness      Restrictions  Restrictions/Precautions  Restrictions/Precautions: Weight Bearing, General Precautions, Fall Risk  Lower Extremity Weight Bearing Restrictions  Right Lower Extremity Weight Bearing: Weight Bearing As Tolerated  Left Lower Extremity Weight Bearing: Non Weight Bearing    Subjective   General  Chart Reviewed: Yes  Patient assessed for rehabilitation services?: Yes  Family / Caregiver Present: No  Referring Practitioner: Dr. Debi Doss DO  Diagnosis: Right Total Hip  Patient Currently in Pain: Yes  Pain Assessment  Pain Assessment: 0-10  Pain Level: 10  Patient's Stated Pain Goal: No pain  Pain Type: Surgical pain  Pain Location: Hip  Pain Orientation: Right  Pain Descriptors: Aching  Pain Frequency: Continuous  Pain Onset: Gradual  Vital Signs  Temp: 97.8 °F (36.6 °C)  Temp Source: Temporal  Pulse: 84  Heart Rate Source: Monitor  Resp: 20  BP: (!) 128/51  BP Location: Right upper arm  MAP (mmHg): 70  Patient Position: Semi fowlers  Level of Consciousness: Alert (0)  MEWS Score: 1  Patient Currently in Pain: Yes  Oxygen Therapy  SpO2: 94 % (placed on room air)  Pulse Oximeter Device Mode: Intermittent  Pulse Oximeter Device Location: Right;Finger  O2 Device: Nasal cannula  O2 Flow Rate (L/min): 1 L/min  Social/Functional History  Social/Functional History  Lives With: Alone  Type of Home: Assisted living  Home Layout: One level  Home Access: Level entry  Bathroom Shower/Tub: Walk-in shower  Bathroom Equipment: Grab bars in shower, Built-in shower seat  Home Equipment: Rolling walker  ADL Assistance: Independent  Homemaking Assistance: Needs assistance  Additional Comments: Patient reports she was independent with ADLs- reports she had assistance with meals and cooking       Objective   Vision: Impaired  Vision Exceptions: Wears glasses at all times  Hearing: Within functional limits    Orientation  Overall Orientation Status: Within Functional Limits        ADL  Feeding: Setup  Grooming: Minimal assistance  UE Bathing: Moderate assistance  LE Bathing: Dependent/Total  UE Dressing: Moderate assistance  LE Dressing: Dependent/Total  Toileting: Dependent/Total  Additional Comments: Patient very painful with movement in BLE- Max A X2 with bed mobility. Bed mobility  Rolling to Left: Maximum assistance  Rolling to Right: Maximum assistance  Supine to Sit: Unable to assess  Sit to Supine: Unable to assess  Comment: Max A X2 for positioning/rolling for bed mobility. Unable to complete supine to assess due to 10/10 pain and fatigue. Cognition  Overall Cognitive Status: WFL                 LUE AROM (degrees)  LUE AROM : WFL  Left Hand AROM (degrees)  Left Hand AROM: WFL  RUE AROM (degrees)  RUE AROM : WFL  Right Hand AROM (degrees)  Right Hand AROM: WFL  LUE Strength  Gross LUE Strength: WFL  L Hand General: 4-/5  RUE Strength  Gross RUE Strength: WFL  R Hand General: 4-/5                   Plan   Plan  Times per week: 7  Times per day: Daily  Current Treatment Recommendations: Strengthening, Safety Education & Training, Self-Care / ADL, Functional Mobility Training, Balance Training, Neuromuscular Re-education, Endurance Training      -PAC Score        [facility] Inpatient Daily Activity Raw Score: 13 (10/15/21 1530)  AM-PAC Inpatient ADL T-Scale Score : 32.03 (10/15/21 1530)  ADL Inpatient CMS 0-100% Score: 63.03 (10/15/21 1530)  ADL Inpatient CMS G-Code Modifier : CL (10/15/21 1530)    Goals  Short term goals  Time Frame for Short term goals: 21  Short term goal 1: Patient to progress to bed mobility with CGA 1-2 to prepare for OOB ADLs. Short term goal 2: Patient to progress to functional transfers (sit-pivot,sit-stand) with min A to improve independence. Short term goal 3: Patient to complete 10 minutes ther-ex/ther-act in order to improve strength/endurance for ADLs  Short term goal 4: Patient to complete UB bathing/dressing with min A.        Therapy Time   Individual Concurrent Group Co-treatment   Time In 1358         Time Out 1412         Minutes Giovani Harding OTR/L

## 2021-10-15 NOTE — PROGRESS NOTES
depression, S/P CABG (coronary artery bypass graft), Ventral hernia, and Vitamin B12 deficiency. has a past surgical history that includes Cholecystectomy; Coronary artery bypass graft (1996); Hysterectomy, vaginal (1958); Salpingo-oophorectomy (1960); Colonoscopy (N/A, 11/5/2018); and hip surgery (Right). Restrictions  Restrictions/Precautions  Restrictions/Precautions: Weight Bearing, General Precautions, Fall Risk  Lower Extremity Weight Bearing Restrictions  Right Lower Extremity Weight Bearing: Weight Bearing As Tolerated  Left Lower Extremity Weight Bearing: Non Weight Bearing  Vision/Hearing  Vision: Impaired  Vision Exceptions: Wears glasses at all times  Hearing: Within functional limits     Subjective  General  Patient assessed for rehabilitation services?: Yes  Subjective  Subjective: Pt states pain level 10/10 with movement. Pt states she is just sleepy right now. Pain Screening  Patient Currently in Pain: Yes        Orientation     Social/Functional History  Social/Functional History  Lives With: Alone  Type of Home: Assisted living  Home Layout: One level  Home Access: Level entry  Bathroom Shower/Tub: Walk-in shower  Bathroom Equipment: Grab bars in shower, Built-in shower seat  Home Equipment: Rolling walker  ADL Assistance: Independent  Homemaking Assistance: Needs assistance  Cognition   Cognition  Overall Cognitive Status: WFL    Objective        PROM RLE (degrees)  RLE General PROM: Pt limited by pain with all RLE PROM, tolerating minimal hip and knee flex. Pt refusing R ankle PROM, which appears visibly bruised and swollen.   AROM RLE (degrees)  RLE General AROM: Limited by pain and weakness at hip, knee and ankle  PROM LLE (degrees)  LLE General PROM: L hip and knee grossly limited by pain, but better tolerated than RLE; L ankle grossly WFL  AROM LLE (degrees)  LLE General AROM: L hip and knee grossly limited by pain, but better tolerated than RLE; L ankle grossly [facility]  Strength RLE  Comment: Grossly 2+/5 -- limited by pain  Strength LLE  Comment: Grossly 3-/5 -- limited by pain        Bed mobility  Rolling to Left: Maximum assistance  Rolling to Right: Maximum assistance  Transfers  Sit to Stand: Unable to assess  Stand to sit: Unable to assess  Ambulation  Ambulation?: No           Plan   Plan  Times per week: 7 days per week  Times per day: Twice a day (Daily on weekends)  Current Treatment Recommendations: Strengthening, IADL Training, Neuromuscular Re-education, Home Exercise Program, ROM, Manual Therapy - Soft Tissue Mobilization, Safety Education & Training, Balance Training, Endurance Training, Patient/Caregiver Education & Training, Functional Mobility Training, ADL/Self-care Training  Safety Devices  Type of devices: All fall risk precautions in place                                                  AM-PAC Score     AM-PAC Inpatient Mobility without Stair Climbing Raw Score : 6 (10/15/21 1510)  AM-PAC Inpatient without Stair Climbing T-Scale Score : 26.48 (10/15/21 1510)  Mobility Inpatient CMS 0-100% Score: 92.18 (10/15/21 1510)  Mobility Inpatient without Stair CMS G-Code Modifier : CM (10/15/21 1510)       Goals  Short term goals  Time Frame for Short term goals: 10 days  Short term goal 1: Pt will perform bed mobility with CGA to improve functional independence. Short term goal 2: Pt will be reassessed for transfers/gait when appropriate in order to progress functional ambulation.        Therapy Time   Individual Concurrent Group Co-treatment   Time In 5129         Time Out 1412         Minutes 14              Ramu Loera DPT

## 2021-10-15 NOTE — PROGRESS NOTES
Writer to bedside to complete morning assessment. Upon entry to room, pt sitting up in bed, respirations even and unlabored while on 2 liters of oxygen. Pt does not wear oxygen at home, decreased to 1 liter at this time. Vitals obtained and assessment completed, see flow sheet for details. Pt denies needs from writer at this time. Call light in reach. Will continue to monitor.

## 2021-10-15 NOTE — PROGRESS NOTES
Dr. Natalia Carver called and informed writer that the patients surgery for her left hip is cancelled for now as he would like her to \"bounce back\" from the current surgery a little more and he would re-evaluate her on Monday. As of now he is ok for her to remain on her current diet, she can be non weight bearing on the left leg, and as tolerated on the right leg. Will pass on to day shift nurse to let patient treva know that surgery was cancelled. Dr. Natalia Carver also said that patients grandson was more than welcome to call him with any questions.

## 2021-10-15 NOTE — PROGRESS NOTES
[facility]  Inpatient/Observation/Outpatient Rehabilitation    Date: 10/15/2021  Patient Name: Jenise Martniez       [x] Inpatient Acute/Observation       []  Outpatient  : 1931     PT eval attempted and per nursing patient is still on strict bedrest. Nursing did state she was going to look into possible bed exercises and let therapist know     Will attempt evaluation at our earliest opportunity.        Gracia Bailey, PT, DPT  Date: 10/15/2021

## 2021-10-15 NOTE — PROGRESS NOTES
Called Dr Sean Carrasquillo at the request of physical therapy for orders if pt can at least do bed exercises as she has orders in place for strict bedrest. Order received to discontinue strict bedrest, weight bearing as tolerated to right leg, and non weight bearing to left. Therapy updated.

## 2021-10-15 NOTE — PROGRESS NOTES
EXT:   1+ edema bilaterally . No calf tenderness. NEURO: Moves all extremities. Motor and sensory are grossly intact  SKIN:  No rashes. No skin lesions.    -----------------------------------------------------------------    Diagnostic Data:      Complete Blood Count:   Recent Labs     10/13/21  0630 10/13/21  0630 10/13/21  1538 10/14/21  0630 10/15/21  0330   WBC 6.4  --   --  8.3 8.5   RBC 2.72*  --   --  2.11* 3.13*   HGB 9.0*   < > 9.3* 6.9* 10.0*   HCT 27.7*   < > 29.4* 23.4* 30.0*   .8  --   --  110.9* 95.8   MCH 33.1  --   --  32.7 31.9   MCHC 32.5  --   --  29.5 33.3   RDW 13.1  --   --  13.6 16.3*     --   --  255 211   MPV 9.7  --   --  10.2 9.6    < > = values in this interval not displayed. Last 3 Blood Glucose:   Recent Labs     10/13/21  0630   GLUCOSE 99        Comprehensive Metabolic Profile:   Recent Labs     10/13/21  0630   *   K 4.8   CL 98   CO2 24   BUN 22   CREATININE 0.93*   GLUCOSE 99   CALCIUM 8.4*   PROT 5.5*   LABALBU 3.4*   BILITOT 0.18*   ALKPHOS 124*   AST 42*   ALT 25        Urinalysis:   Lab Results   Component Value Date    NITRU POSITIVE 10/12/2021    COLORU Yellow 10/12/2021    PHUR 5.5 10/12/2021    WBCUA 20 TO 50 10/12/2021    RBCUA None 10/12/2021    RBCUA NO CELLS SEEN 10/06/2017    MUCUS NOT REPORTED 10/12/2021    TRICHOMONAS NOT REPORTED 10/12/2021    YEAST NOT REPORTED 10/12/2021    BACTERIA 2+ 10/12/2021    CLARITYU CLOUDY 10/06/2017    SPECGRAV 1.010 10/12/2021    LEUKOCYTESUR MODERATE 10/12/2021    UROBILINOGEN Normal 10/12/2021    BILIRUBINUR NEGATIVE 10/12/2021    BLOODU NEGATIVE 10/06/2017    GLUCOSEU NEGATIVE 10/12/2021    KETUA NEGATIVE 10/12/2021    AMORPHOUS NOT REPORTED 10/12/2021       HgBA1c:    Lab Results   Component Value Date    LABA1C 6.2 05/07/2021       Lactic Acid: No results found for: LACTA     Troponin: No results for input(s): TROPONINI in the last 72 hours.     Microbiology:  Urine Culture: E. coli        Radiology/Imaging:  XR ANKLE RIGHT (MIN 3 VIEWS)   Final Result   Right knee:      1. Small joint effusion. Tricompartmental osteoarthrosis. CPPD. 2. No acute fracture or dislocation. 3. Mild soft tissue edema about the right knee. Right tib fib:      1. Moderate to severe edema and soft tissue swelling of the foot. 2. Mild edema in the right leg. 3. No acute osseous abnormality. 4. Tricompartmental osteoarthrosis and CPPD of the right knee. Right ankle:      1. Moderate to severe edema and soft tissue swelling of the right foot. 2. Mild edema of the right leg and right ankle. 3. No acute osseous abnormality. Degenerative changes as detailed above. XR KNEE RIGHT (1-2 VIEWS)   Final Result   Right knee:      1. Small joint effusion. Tricompartmental osteoarthrosis. CPPD. 2. No acute fracture or dislocation. 3. Mild soft tissue edema about the right knee. Right tib fib:      1. Moderate to severe edema and soft tissue swelling of the foot. 2. Mild edema in the right leg. 3. No acute osseous abnormality. 4. Tricompartmental osteoarthrosis and CPPD of the right knee. Right ankle:      1. Moderate to severe edema and soft tissue swelling of the right foot. 2. Mild edema of the right leg and right ankle. 3. No acute osseous abnormality. Degenerative changes as detailed above. XR TIBIA FIBULA RIGHT (2 VIEWS)   Final Result   Right knee:      1. Small joint effusion. Tricompartmental osteoarthrosis. CPPD. 2. No acute fracture or dislocation. 3. Mild soft tissue edema about the right knee. Right tib fib:      1. Moderate to severe edema and soft tissue swelling of the foot. 2. Mild edema in the right leg. 3. No acute osseous abnormality. 4. Tricompartmental osteoarthrosis and CPPD of the right knee. Right ankle:      1. Moderate to severe edema and soft tissue swelling of the right foot. 2. Mild edema of the right leg and right ankle. 3. No acute osseous abnormality. Degenerative changes as detailed above. CT CERVICAL SPINE WO CONTRAST   Final Result   No acute fracture or malalignment of the cervical spine. Multilevel degenerative change with left-sided bony foraminal narrowing at   C3-4 and C4-5. FLUORO FOR SURGICAL PROCEDURES   Final Result      MRI HIP RIGHT WO CONTRAST   Final Result   1. Acute intratrochanteric fracture of the right femur with fracture line   extending to the level of the lesser trochanter. No displacement at the   lesser trochanter identified. Only minimal displacement of the right greater   trochanter. Associated surrounding muscular strain as well as partial   tearing of the right gluteus minimus and medius insertion on the greater   trochanter and moderate amount of fluid within the right trochanteric bursa. 2. Note is also made of a nondisplaced fracture of the intratrochanteric left   femur with fracture lines extending to the greater and lesser trochanters. CT FOOT RIGHT WO CONTRAST   Final Result   1. Diffuse osteopenia and degenerative changes as detailed above. 2. No acute osseous abnormality. No aggressive osseous destruction. 3. Severe cellulitis and induration of the subcutaneous fat and skin about   the ankle and foot. No discrete organized fluid collection. 4. Sequela of remote trauma along the inferior aspect of the lateral and   medial malleolus. 5. Irregularity of the 2nd metatarsal head could be related to sequela of   Freiberg's infraction. 6. Mild plantar calcaneal spur. CT FEMUR RIGHT WO CONTRAST   Final Result   1. Acute minimally displaced right greater trochanteric fracture with   adjacent soft tissue edema and fluid in the right greater trochanteric bursa. 2. Diffuse osteopenia. 3. Appearance of the wall of the urinary bladder which can be seen with   cystitis. Correlate with urinalysis. 4. Mild right hip osteoarthrosis.    5. Medicine        10/15/2021, 11:10 AM

## 2021-10-16 PROBLEM — E83.42 HYPOMAGNESEMIA: Status: ACTIVE | Noted: 2021-10-16

## 2021-10-16 LAB
ABSOLUTE EOS #: 0.08 K/UL (ref 0–0.44)
ABSOLUTE IMMATURE GRANULOCYTE: 0.05 K/UL (ref 0–0.3)
ABSOLUTE LYMPH #: 1.29 K/UL (ref 1.1–3.7)
ABSOLUTE MONO #: 0.49 K/UL (ref 0.1–1.2)
ANION GAP SERPL CALCULATED.3IONS-SCNC: 13 MMOL/L (ref 9–17)
BASOPHILS # BLD: 0 % (ref 0–2)
BASOPHILS ABSOLUTE: 0.03 K/UL (ref 0–0.2)
BUN BLDV-MCNC: 18 MG/DL (ref 8–23)
BUN/CREAT BLD: 21 (ref 9–20)
CALCIUM SERPL-MCNC: 7.4 MG/DL (ref 8.6–10.4)
CHLORIDE BLD-SCNC: 99 MMOL/L (ref 98–107)
CO2: 20 MMOL/L (ref 20–31)
CREAT SERPL-MCNC: 0.87 MG/DL (ref 0.5–0.9)
DIFFERENTIAL TYPE: ABNORMAL
EOSINOPHILS RELATIVE PERCENT: 1 % (ref 1–4)
GFR AFRICAN AMERICAN: >60 ML/MIN
GFR NON-AFRICAN AMERICAN: >60 ML/MIN
GFR SERPL CREATININE-BSD FRML MDRD: ABNORMAL ML/MIN/{1.73_M2}
GFR SERPL CREATININE-BSD FRML MDRD: ABNORMAL ML/MIN/{1.73_M2}
GLUCOSE BLD-MCNC: 112 MG/DL (ref 70–99)
HCT VFR BLD CALC: 29.1 % (ref 36.3–47.1)
HEMOGLOBIN: 9.4 G/DL (ref 11.9–15.1)
IMMATURE GRANULOCYTES: 1 %
LYMPHOCYTES # BLD: 14 % (ref 24–43)
MAGNESIUM: 1.1 MG/DL (ref 1.6–2.6)
MCH RBC QN AUTO: 32.1 PG (ref 25.2–33.5)
MCHC RBC AUTO-ENTMCNC: 32.3 G/DL (ref 28.4–34.8)
MCV RBC AUTO: 99.3 FL (ref 82.6–102.9)
MONOCYTES # BLD: 5 % (ref 3–12)
NRBC AUTOMATED: 0 PER 100 WBC
PDW BLD-RTO: 16.2 % (ref 11.8–14.4)
PLATELET # BLD: 216 K/UL (ref 138–453)
PLATELET ESTIMATE: ABNORMAL
PMV BLD AUTO: 10.1 FL (ref 8.1–13.5)
POTASSIUM SERPL-SCNC: 3.4 MMOL/L (ref 3.7–5.3)
RBC # BLD: 2.93 M/UL (ref 3.95–5.11)
RBC # BLD: ABNORMAL 10*6/UL
SEG NEUTROPHILS: 79 % (ref 36–65)
SEGMENTED NEUTROPHILS ABSOLUTE COUNT: 7.19 K/UL (ref 1.5–8.1)
SODIUM BLD-SCNC: 132 MMOL/L (ref 135–144)
VANCOMYCIN RANDOM DATE LAST DOSE: NORMAL
VANCOMYCIN RANDOM DOSE AMOUNT: NORMAL
VANCOMYCIN RANDOM TIME LAST DOSE: NORMAL
VANCOMYCIN RANDOM: 20.9 UG/ML
WBC # BLD: 9.1 K/UL (ref 3.5–11.3)
WBC # BLD: ABNORMAL 10*3/UL

## 2021-10-16 PROCEDURE — 6360000002 HC RX W HCPCS: Performed by: ORTHOPAEDIC SURGERY

## 2021-10-16 PROCEDURE — 6370000000 HC RX 637 (ALT 250 FOR IP): Performed by: ORTHOPAEDIC SURGERY

## 2021-10-16 PROCEDURE — 97110 THERAPEUTIC EXERCISES: CPT

## 2021-10-16 PROCEDURE — 83735 ASSAY OF MAGNESIUM: CPT

## 2021-10-16 PROCEDURE — 97535 SELF CARE MNGMENT TRAINING: CPT

## 2021-10-16 PROCEDURE — 94761 N-INVAS EAR/PLS OXIMETRY MLT: CPT

## 2021-10-16 PROCEDURE — 6370000000 HC RX 637 (ALT 250 FOR IP): Performed by: FAMILY MEDICINE

## 2021-10-16 PROCEDURE — 80048 BASIC METABOLIC PNL TOTAL CA: CPT

## 2021-10-16 PROCEDURE — 2580000003 HC RX 258: Performed by: NURSE PRACTITIONER

## 2021-10-16 PROCEDURE — 1200000000 HC SEMI PRIVATE

## 2021-10-16 PROCEDURE — 85025 COMPLETE CBC W/AUTO DIFF WBC: CPT

## 2021-10-16 PROCEDURE — 6360000002 HC RX W HCPCS: Performed by: FAMILY MEDICINE

## 2021-10-16 PROCEDURE — 36415 COLL VENOUS BLD VENIPUNCTURE: CPT

## 2021-10-16 PROCEDURE — 80202 ASSAY OF VANCOMYCIN: CPT

## 2021-10-16 PROCEDURE — 97530 THERAPEUTIC ACTIVITIES: CPT

## 2021-10-16 PROCEDURE — 2580000003 HC RX 258: Performed by: FAMILY MEDICINE

## 2021-10-16 PROCEDURE — 6370000000 HC RX 637 (ALT 250 FOR IP): Performed by: INTERNAL MEDICINE

## 2021-10-16 PROCEDURE — 6360000002 HC RX W HCPCS: Performed by: NURSE PRACTITIONER

## 2021-10-16 RX ORDER — 0.9 % SODIUM CHLORIDE 0.9 %
500 INTRAVENOUS SOLUTION INTRAVENOUS ONCE
Status: COMPLETED | OUTPATIENT
Start: 2021-10-16 | End: 2021-10-16

## 2021-10-16 RX ORDER — MAGNESIUM SULFATE IN WATER 40 MG/ML
2000 INJECTION, SOLUTION INTRAVENOUS ONCE
Status: COMPLETED | OUTPATIENT
Start: 2021-10-16 | End: 2021-10-16

## 2021-10-16 RX ORDER — POTASSIUM CHLORIDE 7.45 MG/ML
10 INJECTION INTRAVENOUS PRN
Status: DISCONTINUED | OUTPATIENT
Start: 2021-10-16 | End: 2021-10-25 | Stop reason: HOSPADM

## 2021-10-16 RX ORDER — POTASSIUM CHLORIDE 20 MEQ/1
40 TABLET, EXTENDED RELEASE ORAL PRN
Status: DISCONTINUED | OUTPATIENT
Start: 2021-10-16 | End: 2021-10-25 | Stop reason: HOSPADM

## 2021-10-16 RX ADMIN — PRIMIDONE 50 MG: 50 TABLET ORAL at 08:47

## 2021-10-16 RX ADMIN — FERROUS SULFATE TAB 325 MG (65 MG ELEMENTAL FE) 325 MG: 325 (65 FE) TAB at 08:48

## 2021-10-16 RX ADMIN — VANCOMYCIN HYDROCHLORIDE 500 MG: 500 INJECTION, POWDER, LYOPHILIZED, FOR SOLUTION INTRAVENOUS at 02:47

## 2021-10-16 RX ADMIN — POTASSIUM CHLORIDE 40 MEQ: 1500 TABLET, EXTENDED RELEASE ORAL at 11:10

## 2021-10-16 RX ADMIN — SODIUM CHLORIDE: 9 INJECTION, SOLUTION INTRAVENOUS at 11:19

## 2021-10-16 RX ADMIN — METFORMIN HYDROCHLORIDE 500 MG: 500 TABLET ORAL at 08:47

## 2021-10-16 RX ADMIN — HYDROCODONE BITARTRATE AND ACETAMINOPHEN 2 TABLET: 5; 325 TABLET ORAL at 08:53

## 2021-10-16 RX ADMIN — HYDRALAZINE HYDROCHLORIDE 50 MG: 50 TABLET, FILM COATED ORAL at 15:00

## 2021-10-16 RX ADMIN — METFORMIN HYDROCHLORIDE 500 MG: 500 TABLET ORAL at 17:52

## 2021-10-16 RX ADMIN — PANTOPRAZOLE SODIUM 40 MG: 40 TABLET, DELAYED RELEASE ORAL at 08:48

## 2021-10-16 RX ADMIN — HYDROCODONE BITARTRATE AND ACETAMINOPHEN 2 TABLET: 5; 325 TABLET ORAL at 19:37

## 2021-10-16 RX ADMIN — CHOLECALCIFEROL TAB 10 MCG (400 UNIT) 400 UNITS: 10 TAB at 08:47

## 2021-10-16 RX ADMIN — VANCOMYCIN HYDROCHLORIDE 500 MG: 500 INJECTION, POWDER, LYOPHILIZED, FOR SOLUTION INTRAVENOUS at 15:00

## 2021-10-16 RX ADMIN — SODIUM CHLORIDE 500 ML: 9 INJECTION, SOLUTION INTRAVENOUS at 17:13

## 2021-10-16 RX ADMIN — MIRTAZAPINE 15 MG: 15 TABLET, FILM COATED ORAL at 20:50

## 2021-10-16 RX ADMIN — PRIMIDONE 50 MG: 50 TABLET ORAL at 20:50

## 2021-10-16 RX ADMIN — ATORVASTATIN CALCIUM 20 MG: 20 TABLET, FILM COATED ORAL at 20:50

## 2021-10-16 RX ADMIN — ERGOCALCIFEROL 50000 UNITS: 1.25 CAPSULE ORAL at 08:47

## 2021-10-16 RX ADMIN — MORPHINE SULFATE 4 MG: 4 INJECTION, SOLUTION INTRAMUSCULAR; INTRAVENOUS at 11:10

## 2021-10-16 RX ADMIN — ONDANSETRON 4 MG: 2 INJECTION INTRAMUSCULAR; INTRAVENOUS at 19:42

## 2021-10-16 RX ADMIN — CYANOCOBALAMIN TAB 1000 MCG 1000 MCG: 1000 TAB at 08:48

## 2021-10-16 RX ADMIN — CARBAMAZEPINE 200 MG: 200 TABLET ORAL at 08:48

## 2021-10-16 RX ADMIN — CARBAMAZEPINE 200 MG: 200 TABLET ORAL at 20:50

## 2021-10-16 RX ADMIN — MAGNESIUM SULFATE HEPTAHYDRATE 2000 MG: 40 INJECTION, SOLUTION INTRAVENOUS at 11:11

## 2021-10-16 RX ADMIN — HYDROCODONE BITARTRATE AND ACETAMINOPHEN 1 TABLET: 5; 325 TABLET ORAL at 03:04

## 2021-10-16 RX ADMIN — LEVOFLOXACIN 250 MG: 5 INJECTION, SOLUTION INTRAVENOUS at 13:30

## 2021-10-16 RX ADMIN — ATENOLOL 50 MG: 50 TABLET ORAL at 08:48

## 2021-10-16 ASSESSMENT — PAIN DESCRIPTION - ONSET
ONSET: ON-GOING
ONSET: GRADUAL

## 2021-10-16 ASSESSMENT — PAIN DESCRIPTION - ORIENTATION
ORIENTATION: RIGHT;LEFT
ORIENTATION: RIGHT
ORIENTATION: RIGHT;LEFT
ORIENTATION: RIGHT

## 2021-10-16 ASSESSMENT — PAIN DESCRIPTION - PAIN TYPE
TYPE: ACUTE PAIN
TYPE: SURGICAL PAIN
TYPE: SURGICAL PAIN;ACUTE PAIN
TYPE: SURGICAL PAIN
TYPE: ACUTE PAIN

## 2021-10-16 ASSESSMENT — PAIN DESCRIPTION - DESCRIPTORS
DESCRIPTORS: ACHING
DESCRIPTORS: ACHING;SORE
DESCRIPTORS: CRAMPING
DESCRIPTORS: ACHING;DISCOMFORT
DESCRIPTORS: ACHING

## 2021-10-16 ASSESSMENT — PAIN SCALES - GENERAL
PAINLEVEL_OUTOF10: 9
PAINLEVEL_OUTOF10: 2
PAINLEVEL_OUTOF10: 9
PAINLEVEL_OUTOF10: 6
PAINLEVEL_OUTOF10: 10
PAINLEVEL_OUTOF10: 4

## 2021-10-16 ASSESSMENT — PAIN DESCRIPTION - LOCATION
LOCATION: ABDOMEN
LOCATION: HIP

## 2021-10-16 ASSESSMENT — PAIN DESCRIPTION - FREQUENCY
FREQUENCY: INTERMITTENT
FREQUENCY: CONTINUOUS

## 2021-10-16 ASSESSMENT — PAIN DESCRIPTION - PROGRESSION: CLINICAL_PROGRESSION: NOT CHANGED

## 2021-10-16 ASSESSMENT — PAIN - FUNCTIONAL ASSESSMENT: PAIN_FUNCTIONAL_ASSESSMENT: PREVENTS OR INTERFERES WITH MANY ACTIVE NOT PASSIVE ACTIVITIES

## 2021-10-16 NOTE — PROGRESS NOTES
Progress Note  Vinnie Odonnell MD    OBJECTIVE:    Patient seen for f/u of Closed right hip fracture, initial encounter ([facility] [address] She has rt hip  pain  Magnesium 1.1  k 3.4    ROS:   Constitutional: negative  for fevers, and negative for chills. Respiratory: negative for shortness of breath, negative for cough, and negative for wheezing,on oxygen  Cardiovascular: negative for chest pain, and negative for palpitations  Gastrointestinal: negative for abdominal pain, negative for nausea,negative for vomiting, negative for diarrhea, and negative for constipation  Has rt hip pain   All other systems were reviewed with the patient and are negative unless otherwise stated in HPI    OBJECTIVE:  Vitals:   Temp: 97.7 °F (36.5 °C)  BP: (!) 154/56  Resp: 20  Pulse: 84  SpO2: 93 %    24HR INTAKE/OUTPUT:      Intake/Output Summary (Last 24 hours) at 10/16/2021 1123  Last data filed at 10/16/2021 0649  Gross per 24 hour   Intake 6586 ml   Output 1400 ml   Net 5186 ml     -----------------------------------------------------------------  Exam:  GEN:    Awake, alert and oriented x3. EYES:  EOMI, pupils equal   NECK: Supple. No lymphadenopathy. No carotid bruit  CVS:    regular rate and rhythm, 2/6 systolic murmur  PULM:  CTA, no wheezes, rales or rhonchi, no acute respiratory distress  ABD:    Bowels sounds normal.  Abdomen is soft. No distention. no tenderness to palpation. EXT:   trace edema bilaterally . No calf tenderness. NEURO: Moves all extremities. Motor and sensory are grossly intact  SKIN:  No rashes.   No skin lesions.    -----------------------------------------------------------------  Diagnostic Data:    · All available data reviewed  Lab Results   Component Value Date    WBC 9.1 10/16/2021    HGB 9.4 (L) 10/16/2021    MCV 99.3 10/16/2021     10/16/2021      Lab Results   Component Value Date    GLUCOSE 112 (H) 10/16/2021    BUN 18 10/16/2021    CREATININE 0.87 10/16/2021     (L) 10/16/2021    K 3.4 (L) 10/16/2021    CALCIUM 7.4 (L) 10/16/2021    CL 99 10/16/2021    CO2 20 10/16/2021       PROBLEM LIST:  Principal Problem:    Closed right hip fracture, initial encounter ([facility]  Active Problems:    Uncontrolled hypertension    Type 2 diabetes mellitus (HCC)    Dyslipidemia    ASHD (arteriosclerotic heart disease)    Acute cystitis without hematuria    Abnormal ECG    Closed left hip fracture, initial encounter ([facility]    Acute blood loss as cause of postoperative anemia    Hypomagnesemia  Resolved Problems:    * No resolved hospital problems.  *      ASSESSMENT / PLAN:  Closed right hip fracture, initial encounter ([facility])  · Pain control  · Pt and ot  · Acute blood loss anemia-hb 9.4 after transfusion  · ashd- asymptomatic  · Type 2 dm- blood sugars well controlled  · Nutrition status: at risk for malnutrition  · DVT prophylaxis: SCD's   · High risk medications: none   · Disposition:  Discharge plan is ECF    Rafa Flores MD , M.D.  10/16/2021  11:23 AM

## 2021-10-16 NOTE — CONSULTS
Vancomycin Dosing by Pharmacy - Daily Note   Vancomycin Therapy Day:  5  Indication: Osteomyelitis    Allergies:  Penicillins and Penicillin g   Actual Weight:    Wt Readings from Last 1 Encounters:   10/16/21 136 lb 14.4 oz (62.1 kg)       Labs/Ancillary Data  Estimated Creatinine Clearance: 36 mL/min (based on SCr of 0.87 mg/dL). Recent Labs     10/14/21  0630 10/15/21  0330 10/15/21  1147 10/16/21  0600   CREATININE  --   --  0.89 0.87   BUN  --   --  22 18   WBC 8.3 8.5  --  9.1     No results found for: PROCAL    Intake/Output Summary (Last 24 hours) at 10/16/2021 0953  Last data filed at 10/16/2021 0649  Gross per 24 hour   Intake 6586 ml   Output 1400 ml   Net 5186 ml     Temp: 97.8    Culture Date / Source  /  Results  10/12/21          Blood         No growth  Recent vancomycin administrations                     vancomycin (VANCOCIN) 500 mg in dextrose 5 % 100 mL IVPB (mini-bag) (mg) 500 mg New Bag 10/16/21 0247     500 mg New Bag 10/15/21 1512     500 mg New Bag  0244    vancomycin (VANCOCIN) 750 mg in dextrose 5 % 250 mL IVPB ()  Restarted 10/14/21 0725     750 mg New Bag  0535                  MRSA Nasal Swab: N/A. Non-respiratory infection. Mayank Dumont PLAN   Continue current dosing of 500 mg IVPB every 12 hours. Patient also receiving Levaquin. Predicted , Trough 17.8. Appropriate for treatment of osteomyelitis of toe. Vancomycin Target Concentration Parameters  Treatment  Population Target AUC/JERRY Target Trough   Invasive MRSA Infection (bacteremia, pneumonia, meningitis, endocarditis, osteomyelitis)  Sepsis (undifferentiated) 400-600 N/A   Infection due to non-MRSA pathogen  Empiric treatment of non-invasive MRSA infection  (SSTI, UTI) <500 10-15 mg/L   CrCl < 29 mL/min  Rapidly fluctuating serum creatinine   THEODORA N/A < 15 mg/L     Renal replacement therapy is dosed by levels, per hospital protocol. Abbreviations  * Pauc: probability that AUC is >400 (efficacy);  Pconc: probability that Ctrough is above 20 ?g/mL (toxicity); Tox: Probability of nephrotoxicity, based on Shad et al. Clin Infect Dis 2009. Thank you for the consult. Pharmacy will continue to follow.   Tanya Treadwell [facility] 10/16/2021, 9:57 AM

## 2021-10-16 NOTE — PROGRESS NOTES
Physical Therapy  Facility/Department: [facility] AT THE [facility] MED SURG  Daily Treatment Note  NAME: Maine Rivera  : 1931  MRN: [id number]    Date of Service: 10/16/2021    Discharge Recommendations:  Continue to assess pending progress, Subacute/Skilled Nursing Facility, ECF with PT, Patient would benefit from continued therapy after discharge        Assessment   Treatment Diagnosis: generalized weakness, difficulty walking  Prognosis: Good  PT Education: PT Role;Plan of Care;Goals  Patient Education: Educated pt on above and on bed mobility. Pt with fair to good understanding. REQUIRES PT FOLLOW UP: Yes  Activity Tolerance  Activity Tolerance: Patient limited by pain; Patient limited by fatigue     Patient Diagnosis(es): The primary encounter diagnosis was Cellulitis, unspecified cellulitis site. A diagnosis of Acute cystitis without hematuria was also pertinent to this visit. has a past medical history of Anemia, Arthritis of knee, Benign essential tremor, CAD S/P percutaneous coronary angioplasty, Diabetes mellitus type II, controlled ([facility] [address] Gastrointestinal hemorrhage, History of pancreatitis, Humerus fracture, Hyperlipidemia, Hypertension, Major depression, S/P CABG (coronary artery bypass graft), Ventral hernia, and Vitamin B12 deficiency. has a past surgical history that includes Cholecystectomy; Coronary artery bypass graft (); Hysterectomy, vaginal (); Salpingo-oophorectomy (); Colonoscopy (N/A, 2018); hip surgery (Right); and Hip fracture surgery (Right, 10/13/2021). Restrictions  Restrictions/Precautions  Restrictions/Precautions: Weight Bearing, General Precautions, Fall Risk  Lower Extremity Weight Bearing Restrictions  Right Lower Extremity Weight Bearing: Weight Bearing As Tolerated  Left Lower Extremity Weight Bearing: Non Weight Bearing  Subjective   General  Chart Reviewed: Yes  Response To Previous Treatment: Patient with no complaints from previous session.   Family / Caregiver Present: No  Referring Practitioner: Patel Malone DO  Subjective  Subjective: Pt reported 7-8/10 R hip pain and 8/10 R ankle pain with movement. General Comment  Comments: Nursing Machelle Ask) aware of pain. Also pt assisted with brianda care. Orientation  Orientation  Overall Orientation Status: Within Normal Limits  Cognition      Objective   Bed mobility  Rolling to Left: Maximum assistance  Rolling to Right: Maximum assistance  Supine to Sit: Unable to assess  Sit to Supine: Unable to assess  Scooting: Maximal assistance;2 Person assistance  Comment: Cues needed for technique with rolling. Completed multiple rolls and repositioning to complete brianda care. Unable to attempt supine/sit d/t pain. Ambulation  Ambulation?: No        Exercises  Straight Leg Raise: 15x  Quad Sets: 15x  Heelslides: 15x  Gluteal Sets: 15x  Hip Abduction: 15x  Knee Short Arc Quad: 15x  Ankle Pumps: 15x  Comments: Above exercises completed in supine with AAROM as needed. G-Code     OutComes Score    AM-PAC Score    Goals  Short term goals  Time Frame for Short term goals: 10 days  Short term goal 1: Pt will perform bed mobility with CGA to improve functional independence. Short term goal 2: Pt will be reassessed for transfers/gait when appropriate in order to progress functional ambulation. Plan    Plan  Times per week: 7 days per week  Times per day: Twice a day (Daily on weekends)  Current Treatment Recommendations: Strengthening, IADL Training, Neuromuscular Re-education, Home Exercise Program, ROM, Manual Therapy - Soft Tissue Mobilization, Safety Education & Training, Balance Training, Endurance Training, Patient/Caregiver Education & Training, Functional Mobility Training, ADL/Self-care Training  Safety Devices  Type of devices:  All fall risk precautions in place, Bed alarm in place, Call light within reach, Nurse notified, Patient at risk for falls, Left in bed     Therapy Time   Individual Concurrent Group Co-treatment   Time In 0082         Time Out [address] [place], [place]

## 2021-10-16 NOTE — PROGRESS NOTES
Occupational Therapy  Facility/Department: [facility] AT THE [facility] MED SURG  Daily Treatment Note  NAME: Antonio Linares  : 1931  MRN: [id number]    Date of Service: 10/16/2021    Discharge Recommendations:  Continue to assess pending progress, ECF with OT       Assessment      OT Education: OT Role;Plan of Care  Patient Education: education provided on OT role with encouragement to ask for help as needed do to patients concern of 'not wanting to bother any body'  Barriers to Learning: none  Safety Devices  Safety Devices in place: Yes  Type of devices: Call light within reach; Left in bed;Bed alarm in place (nurse present in room upon exit)         Patient Diagnosis(es): The primary encounter diagnosis was Cellulitis, unspecified cellulitis site. A diagnosis of Acute cystitis without hematuria was also pertinent to this visit. has a past medical history of Anemia, Arthritis of knee, Benign essential tremor, CAD S/P percutaneous coronary angioplasty, Diabetes mellitus type II, controlled ([facility] [address] Gastrointestinal hemorrhage, History of pancreatitis, Humerus fracture, Hyperlipidemia, Hypertension, Major depression, S/P CABG (coronary artery bypass graft), Ventral hernia, and Vitamin B12 deficiency. has a past surgical history that includes Cholecystectomy; Coronary artery bypass graft (); Hysterectomy, vaginal (); Salpingo-oophorectomy (); Colonoscopy (N/A, 2018); hip surgery (Right); and Hip fracture surgery (Right, 10/13/2021).     Restrictions  Restrictions/Precautions  Restrictions/Precautions: Weight Bearing, General Precautions, Fall Risk  Lower Extremity Weight Bearing Restrictions  Right Lower Extremity Weight Bearing: Weight Bearing As Tolerated  Left Lower Extremity Weight Bearing: Non Weight Bearing  Subjective   General  Chart Reviewed: Yes  Patient assessed for rehabilitation services?: Yes  Family / Caregiver Present: No  Referring Practitioner: Dr. Reva Barfield DO  Diagnosis: Right Total Hip  Subjective  Subjective: Pt on bed pan with nurse present upon arrival, nurse requests assistance with bed mobility to remove from bed pan and don brief  Pain Assessment  Pain Assessment: 0-10 (pain level not stated, nurse present to provided pain relief medication as needed)  Vital Signs  Patient Currently in Pain: Yes   Orientation     Objective    ADL  Feeding: Setup;Minimal assistance;Scoop assist;Increased time to complete;Bringing food to mouth assist;Beverage management (Min A to stabilize beverages while drinking, min A to guide spoon to mouth occasionally due to tremors.)  Toileting: Dependent/Total  Additional Comments: Patient very painful with movement in BLE- Max A X2 with bed mobility. Pt required min A with feeding this date due to tremors. Bed mobility  Rolling to Left: Maximum assistance  Rolling to Right: Maximum assistance  Comment: Max A for positioning and to roll right and to roll left due to pain                                                                    Plan   Plan  Times per week: 7  Times per day: Daily  Current Treatment Recommendations: Strengthening, Safety Education & Training, Self-Care / ADL, Functional Mobility Training, Balance Training, Neuromuscular Re-education, Endurance Training  G-Code     OutComes Score                                                  AM-PAC Score             Goals  Short term goals  Time Frame for Short term goals: 21  Short term goal 1: Patient to progress to bed mobility with CGA 1-2 to prepare for OOB ADLs. Short term goal 2: Patient to progress to functional transfers (sit-pivot,sit-stand) with min A to improve independence. Short term goal 3: Patient to complete 10 minutes ther-ex/ther-act in order to improve strength/endurance for ADLs  Short term goal 4: Patient to complete UB bathing/dressing with min A.        Therapy Time   Individual Concurrent Group Co-treatment   Time In  8:30 am         Time Out  8:56 am         Minutes [address], [facility] DOT[place]/L

## 2021-10-17 ENCOUNTER — APPOINTMENT (OUTPATIENT)
Dept: GENERAL RADIOLOGY | Age: 86
DRG: 480 | End: 2021-10-17
Payer: MEDICARE

## 2021-10-17 LAB
ABO/RH: NORMAL
ABSOLUTE EOS #: 0.08 K/UL (ref 0–0.44)
ABSOLUTE IMMATURE GRANULOCYTE: 0.05 K/UL (ref 0–0.3)
ABSOLUTE LYMPH #: 0.99 K/UL (ref 1.1–3.7)
ABSOLUTE MONO #: 0.51 K/UL (ref 0.1–1.2)
ANION GAP SERPL CALCULATED.3IONS-SCNC: 12 MMOL/L (ref 9–17)
ANTIBODY SCREEN: NEGATIVE
ARM BAND NUMBER: NORMAL
BASOPHILS # BLD: 0 % (ref 0–2)
BASOPHILS ABSOLUTE: 0.03 K/UL (ref 0–0.2)
BLD PROD TYP BPU: NORMAL
BLD PROD TYP BPU: NORMAL
BUN BLDV-MCNC: 21 MG/DL (ref 8–23)
BUN/CREAT BLD: 21 (ref 9–20)
CALCIUM SERPL-MCNC: 7.8 MG/DL (ref 8.6–10.4)
CHLORIDE BLD-SCNC: 104 MMOL/L (ref 98–107)
CO2: 19 MMOL/L (ref 20–31)
CREAT SERPL-MCNC: 1 MG/DL (ref 0.5–0.9)
CROSSMATCH RESULT: NORMAL
CROSSMATCH RESULT: NORMAL
CULTURE: NORMAL
CULTURE: NORMAL
DIFFERENTIAL TYPE: ABNORMAL
DISPENSE STATUS BLOOD BANK: NORMAL
DISPENSE STATUS BLOOD BANK: NORMAL
EOSINOPHILS RELATIVE PERCENT: 1 % (ref 1–4)
EXPIRATION DATE: NORMAL
GFR AFRICAN AMERICAN: >60 ML/MIN
GFR NON-AFRICAN AMERICAN: 52 ML/MIN
GFR SERPL CREATININE-BSD FRML MDRD: ABNORMAL ML/MIN/{1.73_M2}
GFR SERPL CREATININE-BSD FRML MDRD: ABNORMAL ML/MIN/{1.73_M2}
GLUCOSE BLD-MCNC: 123 MG/DL (ref 70–99)
HCT VFR BLD CALC: 28.3 % (ref 36.3–47.1)
HEMOGLOBIN: 8.8 G/DL (ref 11.9–15.1)
IMMATURE GRANULOCYTES: 1 %
LYMPHOCYTES # BLD: 12 % (ref 24–43)
Lab: NORMAL
Lab: NORMAL
MCH RBC QN AUTO: 31.9 PG (ref 25.2–33.5)
MCHC RBC AUTO-ENTMCNC: 31.1 G/DL (ref 28.4–34.8)
MCV RBC AUTO: 102.5 FL (ref 82.6–102.9)
MONOCYTES # BLD: 6 % (ref 3–12)
NRBC AUTOMATED: 0 PER 100 WBC
PDW BLD-RTO: 16.1 % (ref 11.8–14.4)
PLATELET # BLD: 220 K/UL (ref 138–453)
PLATELET ESTIMATE: ABNORMAL
PMV BLD AUTO: 9.6 FL (ref 8.1–13.5)
POTASSIUM SERPL-SCNC: 4.6 MMOL/L (ref 3.7–5.3)
RBC # BLD: 2.76 M/UL (ref 3.95–5.11)
RBC # BLD: ABNORMAL 10*6/UL
SEG NEUTROPHILS: 80 % (ref 36–65)
SEGMENTED NEUTROPHILS ABSOLUTE COUNT: 6.88 K/UL (ref 1.5–8.1)
SODIUM BLD-SCNC: 135 MMOL/L (ref 135–144)
SPECIMEN DESCRIPTION: NORMAL
SPECIMEN DESCRIPTION: NORMAL
TRANSFUSION STATUS: NORMAL
TRANSFUSION STATUS: NORMAL
UNIT DIVISION: 0
UNIT DIVISION: 0
UNIT NUMBER: NORMAL
UNIT NUMBER: NORMAL
WBC # BLD: 8.5 K/UL (ref 3.5–11.3)
WBC # BLD: ABNORMAL 10*3/UL

## 2021-10-17 PROCEDURE — 36415 COLL VENOUS BLD VENIPUNCTURE: CPT

## 2021-10-17 PROCEDURE — 6360000002 HC RX W HCPCS: Performed by: NURSE PRACTITIONER

## 2021-10-17 PROCEDURE — 6370000000 HC RX 637 (ALT 250 FOR IP): Performed by: ORTHOPAEDIC SURGERY

## 2021-10-17 PROCEDURE — 6360000002 HC RX W HCPCS: Performed by: ORTHOPAEDIC SURGERY

## 2021-10-17 PROCEDURE — 73502 X-RAY EXAM HIP UNI 2-3 VIEWS: CPT

## 2021-10-17 PROCEDURE — 85025 COMPLETE CBC W/AUTO DIFF WBC: CPT

## 2021-10-17 PROCEDURE — 97110 THERAPEUTIC EXERCISES: CPT

## 2021-10-17 PROCEDURE — 6370000000 HC RX 637 (ALT 250 FOR IP): Performed by: INTERNAL MEDICINE

## 2021-10-17 PROCEDURE — 1200000000 HC SEMI PRIVATE

## 2021-10-17 PROCEDURE — 2580000003 HC RX 258: Performed by: NURSE PRACTITIONER

## 2021-10-17 PROCEDURE — 97530 THERAPEUTIC ACTIVITIES: CPT

## 2021-10-17 PROCEDURE — 6360000002 HC RX W HCPCS: Performed by: FAMILY MEDICINE

## 2021-10-17 PROCEDURE — 94761 N-INVAS EAR/PLS OXIMETRY MLT: CPT

## 2021-10-17 PROCEDURE — 80048 BASIC METABOLIC PNL TOTAL CA: CPT

## 2021-10-17 RX ADMIN — PRIMIDONE 50 MG: 50 TABLET ORAL at 10:00

## 2021-10-17 RX ADMIN — ENOXAPARIN SODIUM 40 MG: 40 INJECTION SUBCUTANEOUS at 14:00

## 2021-10-17 RX ADMIN — ATENOLOL 50 MG: 50 TABLET ORAL at 10:00

## 2021-10-17 RX ADMIN — HYDROCODONE BITARTRATE AND ACETAMINOPHEN 2 TABLET: 5; 325 TABLET ORAL at 16:46

## 2021-10-17 RX ADMIN — CHOLECALCIFEROL TAB 10 MCG (400 UNIT) 400 UNITS: 10 TAB at 10:00

## 2021-10-17 RX ADMIN — PANTOPRAZOLE SODIUM 40 MG: 40 TABLET, DELAYED RELEASE ORAL at 10:00

## 2021-10-17 RX ADMIN — LEVOFLOXACIN 250 MG: 5 INJECTION, SOLUTION INTRAVENOUS at 14:55

## 2021-10-17 RX ADMIN — CARBAMAZEPINE 200 MG: 200 TABLET ORAL at 10:00

## 2021-10-17 RX ADMIN — CYANOCOBALAMIN TAB 1000 MCG 1000 MCG: 1000 TAB at 10:00

## 2021-10-17 RX ADMIN — PRIMIDONE 50 MG: 50 TABLET ORAL at 20:52

## 2021-10-17 RX ADMIN — METFORMIN HYDROCHLORIDE 500 MG: 500 TABLET ORAL at 10:00

## 2021-10-17 RX ADMIN — FERROUS SULFATE TAB 325 MG (65 MG ELEMENTAL FE) 325 MG: 325 (65 FE) TAB at 10:00

## 2021-10-17 RX ADMIN — ATORVASTATIN CALCIUM 20 MG: 20 TABLET, FILM COATED ORAL at 20:53

## 2021-10-17 RX ADMIN — METFORMIN HYDROCHLORIDE 500 MG: 500 TABLET ORAL at 16:46

## 2021-10-17 RX ADMIN — MORPHINE SULFATE 4 MG: 4 INJECTION, SOLUTION INTRAMUSCULAR; INTRAVENOUS at 09:52

## 2021-10-17 RX ADMIN — PREDNISONE 5 MG: 5 TABLET ORAL at 10:00

## 2021-10-17 RX ADMIN — CARBAMAZEPINE 200 MG: 200 TABLET ORAL at 20:52

## 2021-10-17 RX ADMIN — VANCOMYCIN HYDROCHLORIDE 500 MG: 500 INJECTION, POWDER, LYOPHILIZED, FOR SOLUTION INTRAVENOUS at 16:21

## 2021-10-17 RX ADMIN — MORPHINE SULFATE 4 MG: 4 INJECTION, SOLUTION INTRAMUSCULAR; INTRAVENOUS at 20:52

## 2021-10-17 RX ADMIN — MIRTAZAPINE 15 MG: 15 TABLET, FILM COATED ORAL at 20:52

## 2021-10-17 RX ADMIN — HYDRALAZINE HYDROCHLORIDE 50 MG: 50 TABLET, FILM COATED ORAL at 14:30

## 2021-10-17 RX ADMIN — VANCOMYCIN HYDROCHLORIDE 500 MG: 500 INJECTION, POWDER, LYOPHILIZED, FOR SOLUTION INTRAVENOUS at 02:31

## 2021-10-17 ASSESSMENT — PAIN DESCRIPTION - DIRECTION
RADIATING_TOWARDS: LEG
RADIATING_TOWARDS: LEG

## 2021-10-17 ASSESSMENT — PAIN DESCRIPTION - FREQUENCY
FREQUENCY: CONTINUOUS

## 2021-10-17 ASSESSMENT — PAIN DESCRIPTION - PAIN TYPE
TYPE: ACUTE PAIN
TYPE: SURGICAL PAIN
TYPE: SURGICAL PAIN

## 2021-10-17 ASSESSMENT — PAIN DESCRIPTION - ONSET
ONSET: ON-GOING

## 2021-10-17 ASSESSMENT — PAIN DESCRIPTION - ORIENTATION
ORIENTATION: RIGHT
ORIENTATION: RIGHT;LEFT

## 2021-10-17 ASSESSMENT — PAIN DESCRIPTION - LOCATION
LOCATION: HIP

## 2021-10-17 ASSESSMENT — PAIN SCALES - GENERAL
PAINLEVEL_OUTOF10: 8
PAINLEVEL_OUTOF10: 8
PAINLEVEL_OUTOF10: 3
PAINLEVEL_OUTOF10: 5
PAINLEVEL_OUTOF10: 8
PAINLEVEL_OUTOF10: 10

## 2021-10-17 ASSESSMENT — PAIN DESCRIPTION - DESCRIPTORS
DESCRIPTORS: ACHING;SHARP;THROBBING
DESCRIPTORS: ACHING
DESCRIPTORS: ACHING;SORE

## 2021-10-17 NOTE — PROGRESS NOTES
Physical Therapy  Facility/Department: [facility] AT THE [facility] MED SURG  Daily Treatment Note  NAME: Indio Kapadia  : 1931  MRN: [id number]    Date of Service: 10/17/2021    Discharge Recommendations:  Continue to assess pending progress, Subacute/Skilled Nursing Facility, ECF with PT, Patient would benefit from continued therapy after discharge        Assessment   Treatment Diagnosis: generalized weakness, difficulty walking  Prognosis: Good  PT Education: PT Role;Plan of Care;Goals  Patient Education: Educated pt on above and on bed mobility. Pt with fair to good understanding. REQUIRES PT FOLLOW UP: Yes  Activity Tolerance  Activity Tolerance: Patient limited by pain; Patient limited by fatigue     Patient Diagnosis(es): The primary encounter diagnosis was Cellulitis, unspecified cellulitis site. A diagnosis of Acute cystitis without hematuria was also pertinent to this visit. has a past medical history of Anemia, Arthritis of knee, Benign essential tremor, CAD S/P percutaneous coronary angioplasty, Diabetes mellitus type II, controlled ([facility] [address] Gastrointestinal hemorrhage, History of pancreatitis, Humerus fracture, Hyperlipidemia, Hypertension, Major depression, S/P CABG (coronary artery bypass graft), Ventral hernia, and Vitamin B12 deficiency. has a past surgical history that includes Cholecystectomy; Coronary artery bypass graft (); Hysterectomy, vaginal (); Salpingo-oophorectomy (); Colonoscopy (N/A, 2018); hip surgery (Right); and Hip fracture surgery (Right, 10/13/2021). Restrictions  Restrictions/Precautions  Restrictions/Precautions: Weight Bearing, General Precautions, Fall Risk  Lower Extremity Weight Bearing Restrictions  Right Lower Extremity Weight Bearing: Weight Bearing As Tolerated  Left Lower Extremity Weight Bearing: Non Weight Bearing  Subjective   General  Chart Reviewed: Yes  Response To Previous Treatment: Patient with no complaints from previous session.   Family / Caregiver Present: No  Referring Practitioner: Erica Jin DO  Subjective  Subjective: Pt reported 7-8/10 R hip pain and 8/10 R ankle pain with movement. General Comment  Comments: Nursing Anastasia Olivier) aware of pain. Orientation  Orientation  Overall Orientation Status: Within Normal Limits  Cognition      Objective   Bed mobility  Rolling to Left: Maximum assistance;2 Person assistance  Rolling to Right: Maximum assistance;2 Person assistance  Comment: Cues needed for technique with rollling. Completed multiple rolls and repositioning to complete brianda care. Unable to attept supine/sit d/t pain and fatigue. Ambulation  Ambulation?: No        Exercises  Straight Leg Raise: 15x  Quad Sets: 15x  Heelslides: 15x  Gluteal Sets: 15x  Knee Short Arc Quad: 15x  Ankle Pumps: 15x  Comments: Above exercises completed in supine with AAROM as needed. G-Code     OutComes Score    AM-PAC Score     Goals  Short term goals  Time Frame for Short term goals: 10 days  Short term goal 1: Pt will perform bed mobility with CGA to improve functional independence. Short term goal 2: Pt will be reassessed for transfers/gait when appropriate in order to progress functional ambulation. Plan    Plan  Times per week: 7 days per week  Times per day: Twice a day (Daily on weekends)  Current Treatment Recommendations: Strengthening, IADL Training, Neuromuscular Re-education, Home Exercise Program, ROM, Manual Therapy - Soft Tissue Mobilization, Safety Education & Training, Balance Training, Endurance Training, Patient/Caregiver Education & Training, Functional Mobility Training, ADL/Self-care Training  Safety Devices  Type of devices:  All fall risk precautions in place, Bed alarm in place, Call light within reach, Nurse notified, Patient at risk for falls, Left in bed     Therapy Time   Individual Concurrent Group Co-treatment   Time In 0923         Time Out 0956         Minutes [address], [place]

## 2021-10-17 NOTE — PROGRESS NOTES
Progress Note  Vinnie Odonnell MD    OBJECTIVE:    Patient seen for f/u of Closed right hip fracture, initial encounter ([address] She has rt hip  pain  k 4.6  Had low urine out put ans started on iv fluids. No out put charted  Weight same  ROS:   Constitutional: negative  for fevers, and negative for chills. Respiratory: negative for shortness of breath, negative for cough, and negative for wheezing,on oxygen  Cardiovascular: negative for chest pain, and negative for palpitations  Gastrointestinal: negative for abdominal pain, negative for nausea,negative for vomiting, negative for diarrhea, and negative for constipation  Has rt hip pain   All other systems were reviewed with the patient and are negative unless otherwise stated in HPI    OBJECTIVE:  Vitals:   Temp: 97.2 °F (36.2 °C)  BP: (!) 110/39  Resp: 18  Pulse: 81  SpO2: 92 %    24HR INTAKE/OUTPUT:      Intake/Output Summary (Last 24 hours) at 10/17/2021 1021  Last data filed at 10/17/2021 0827  Gross per 24 hour   Intake 540 ml   Output 150 ml   Net 390 ml     -----------------------------------------------------------------  Exam:  GEN:    Awake, alert and oriented x3. EYES:  EOMI, pupils equal   NECK: Supple. No lymphadenopathy. No carotid bruit  CVS:    regular rate and rhythm, 2/6 systolic murmur  PULM:  CTA, no wheezes, rales or rhonchi, no acute respiratory distress  ABD:    Bowels sounds normal.  Abdomen is soft. No distention. no tenderness to palpation. EXT:  No edema . No calf tenderness. NEURO: Moves all extremities. Motor and sensory are grossly intact  SKIN:  No rashes.   No skin lesions.    -----------------------------------------------------------------  Diagnostic Data:    · All available data reviewed  Lab Results   Component Value Date    WBC 8.5 10/17/2021    HGB 8.8 (L) 10/17/2021    .5 10/17/2021     10/17/2021      Lab Results   Component Value Date    GLUCOSE 123 (H) 10/17/2021    BUN 21 10/17/2021    CREATININE 1.00 (H) 10/17/2021     10/17/2021    K 4.6 10/17/2021    CALCIUM 7.8 (L) 10/17/2021     10/17/2021    CO2 19 (L) 10/17/2021       PROBLEM LIST:  Principal Problem:    Closed right hip fracture, initial encounter ([facility]  Active Problems:    Uncontrolled hypertension    Type 2 diabetes mellitus (HCC)    Dyslipidemia    ASHD (arteriosclerotic heart disease)    Acute cystitis without hematuria    Abnormal ECG    Closed left hip fracture, initial encounter ([facility]    Acute blood loss as cause of postoperative anemia    Hypomagnesemia  Resolved Problems:    * No resolved hospital problems.  *      ASSESSMENT / PLAN:  Closed right hip fracture, initial encounter ([facility])  · Pain control  · Pt and ot  · Acute blood loss anemia-hb 8.8   · ashd- asymptomatic  · Type 2 dm- blood sugars well controlled  · Nutrition status: at risk for malnutrition  · DVT prophylaxis: SCD's   · High risk medications: none   · Disposition:  Discharge plan is ECF    Alberto Russo MD , M.D.  10/17/2021  10:21 AM

## 2021-10-17 NOTE — PROGRESS NOTES
Time In  11:13 am         Time Out  11:23 am         Minutes  [address] [facility] LIN[place]/L

## 2021-10-18 ENCOUNTER — ANESTHESIA EVENT (OUTPATIENT)
Dept: OPERATING ROOM | Age: 86
DRG: 480 | End: 2021-10-18
Payer: MEDICARE

## 2021-10-18 ENCOUNTER — APPOINTMENT (OUTPATIENT)
Dept: GENERAL RADIOLOGY | Age: 86
DRG: 480 | End: 2021-10-18
Payer: MEDICARE

## 2021-10-18 LAB
ABSOLUTE EOS #: 0.09 K/UL (ref 0–0.44)
ABSOLUTE IMMATURE GRANULOCYTE: 0.03 K/UL (ref 0–0.3)
ABSOLUTE LYMPH #: 1.02 K/UL (ref 1.1–3.7)
ABSOLUTE MONO #: 0.47 K/UL (ref 0.1–1.2)
ANION GAP SERPL CALCULATED.3IONS-SCNC: 11 MMOL/L (ref 9–17)
BASOPHILS # BLD: 0 % (ref 0–2)
BASOPHILS ABSOLUTE: 0.03 K/UL (ref 0–0.2)
BUN BLDV-MCNC: 25 MG/DL (ref 8–23)
BUN/CREAT BLD: 24 (ref 9–20)
CALCIUM SERPL-MCNC: 8.4 MG/DL (ref 8.6–10.4)
CHLORIDE BLD-SCNC: 107 MMOL/L (ref 98–107)
CO2: 19 MMOL/L (ref 20–31)
CREAT SERPL-MCNC: 1.06 MG/DL (ref 0.5–0.9)
DIFFERENTIAL TYPE: ABNORMAL
EKG ATRIAL RATE: 144 BPM
EKG Q-T INTERVAL: 398 MS
EKG QRS DURATION: 120 MS
EKG QTC CALCULATION (BAZETT): 438 MS
EKG R AXIS: 76 DEGREES
EKG T AXIS: -56 DEGREES
EKG VENTRICULAR RATE: 73 BPM
EOSINOPHILS RELATIVE PERCENT: 1 % (ref 1–4)
GFR AFRICAN AMERICAN: 59 ML/MIN
GFR NON-AFRICAN AMERICAN: 49 ML/MIN
GFR SERPL CREATININE-BSD FRML MDRD: ABNORMAL ML/MIN/{1.73_M2}
GFR SERPL CREATININE-BSD FRML MDRD: ABNORMAL ML/MIN/{1.73_M2}
GLUCOSE BLD-MCNC: 107 MG/DL (ref 70–99)
HCT VFR BLD CALC: 27.9 % (ref 36.3–47.1)
HEMOGLOBIN: 8.4 G/DL (ref 11.9–15.1)
IMMATURE GRANULOCYTES: 0 %
LYMPHOCYTES # BLD: 14 % (ref 24–43)
MCH RBC QN AUTO: 31.6 PG (ref 25.2–33.5)
MCHC RBC AUTO-ENTMCNC: 30.1 G/DL (ref 28.4–34.8)
MCV RBC AUTO: 104.9 FL (ref 82.6–102.9)
MONOCYTES # BLD: 7 % (ref 3–12)
NRBC AUTOMATED: 0 PER 100 WBC
PDW BLD-RTO: 15.9 % (ref 11.8–14.4)
PLATELET # BLD: 246 K/UL (ref 138–453)
PLATELET ESTIMATE: ABNORMAL
PMV BLD AUTO: 9.9 FL (ref 8.1–13.5)
POTASSIUM SERPL-SCNC: 5 MMOL/L (ref 3.7–5.3)
RBC # BLD: 2.66 M/UL (ref 3.95–5.11)
RBC # BLD: ABNORMAL 10*6/UL
SEG NEUTROPHILS: 78 % (ref 36–65)
SEGMENTED NEUTROPHILS ABSOLUTE COUNT: 5.58 K/UL (ref 1.5–8.1)
SODIUM BLD-SCNC: 137 MMOL/L (ref 135–144)
TROPONIN INTERP: ABNORMAL
TROPONIN INTERP: ABNORMAL
TROPONIN T: ABNORMAL NG/ML
TROPONIN T: ABNORMAL NG/ML
TROPONIN, HIGH SENSITIVITY: 133 NG/L (ref 0–14)
TROPONIN, HIGH SENSITIVITY: 136 NG/L (ref 0–14)
WBC # BLD: 7.2 K/UL (ref 3.5–11.3)
WBC # BLD: ABNORMAL 10*3/UL

## 2021-10-18 PROCEDURE — 2580000003 HC RX 258: Performed by: NURSE PRACTITIONER

## 2021-10-18 PROCEDURE — 99233 SBSQ HOSP IP/OBS HIGH 50: CPT | Performed by: INTERNAL MEDICINE

## 2021-10-18 PROCEDURE — 6360000002 HC RX W HCPCS: Performed by: NURSE PRACTITIONER

## 2021-10-18 PROCEDURE — 6370000000 HC RX 637 (ALT 250 FOR IP): Performed by: ORTHOPAEDIC SURGERY

## 2021-10-18 PROCEDURE — 93010 ELECTROCARDIOGRAM REPORT: CPT | Performed by: FAMILY MEDICINE

## 2021-10-18 PROCEDURE — 0QS706Z REPOSITION LEFT UPPER FEMUR WITH INTRAMEDULLARY INTERNAL FIXATION DEVICE, OPEN APPROACH: ICD-10-PCS | Performed by: ORTHOPAEDIC SURGERY

## 2021-10-18 PROCEDURE — 80048 BASIC METABOLIC PNL TOTAL CA: CPT

## 2021-10-18 PROCEDURE — 85025 COMPLETE CBC W/AUTO DIFF WBC: CPT

## 2021-10-18 PROCEDURE — 97530 THERAPEUTIC ACTIVITIES: CPT

## 2021-10-18 PROCEDURE — 6370000000 HC RX 637 (ALT 250 FOR IP): Performed by: INTERNAL MEDICINE

## 2021-10-18 PROCEDURE — 94761 N-INVAS EAR/PLS OXIMETRY MLT: CPT

## 2021-10-18 PROCEDURE — 1200000000 HC SEMI PRIVATE

## 2021-10-18 PROCEDURE — 6360000002 HC RX W HCPCS: Performed by: FAMILY MEDICINE

## 2021-10-18 PROCEDURE — 97110 THERAPEUTIC EXERCISES: CPT

## 2021-10-18 PROCEDURE — 93005 ELECTROCARDIOGRAM TRACING: CPT | Performed by: NURSE PRACTITIONER

## 2021-10-18 PROCEDURE — 84484 ASSAY OF TROPONIN QUANT: CPT

## 2021-10-18 PROCEDURE — 36415 COLL VENOUS BLD VENIPUNCTURE: CPT

## 2021-10-18 PROCEDURE — 6360000002 HC RX W HCPCS: Performed by: ORTHOPAEDIC SURGERY

## 2021-10-18 PROCEDURE — 2580000003 HC RX 258: Performed by: FAMILY MEDICINE

## 2021-10-18 PROCEDURE — 71045 X-RAY EXAM CHEST 1 VIEW: CPT

## 2021-10-18 RX ORDER — AMLODIPINE BESYLATE 2.5 MG/1
2.5 TABLET ORAL DAILY
Status: DISCONTINUED | OUTPATIENT
Start: 2021-10-18 | End: 2021-10-25 | Stop reason: HOSPADM

## 2021-10-18 RX ORDER — ASPIRIN 81 MG/1
81 TABLET ORAL DAILY
Status: DISCONTINUED | OUTPATIENT
Start: 2021-10-18 | End: 2021-10-25 | Stop reason: HOSPADM

## 2021-10-18 RX ADMIN — METFORMIN HYDROCHLORIDE 500 MG: 500 TABLET ORAL at 08:11

## 2021-10-18 RX ADMIN — MORPHINE SULFATE 2 MG: 2 INJECTION, SOLUTION INTRAMUSCULAR; INTRAVENOUS at 10:07

## 2021-10-18 RX ADMIN — CYANOCOBALAMIN TAB 1000 MCG 1000 MCG: 1000 TAB at 08:12

## 2021-10-18 RX ADMIN — CARBAMAZEPINE 200 MG: 200 TABLET ORAL at 08:19

## 2021-10-18 RX ADMIN — CARBAMAZEPINE 200 MG: 200 TABLET ORAL at 20:29

## 2021-10-18 RX ADMIN — ATENOLOL 50 MG: 50 TABLET ORAL at 08:12

## 2021-10-18 RX ADMIN — ASPIRIN 81 MG: 81 TABLET, COATED ORAL at 14:20

## 2021-10-18 RX ADMIN — MIRTAZAPINE 15 MG: 15 TABLET, FILM COATED ORAL at 20:30

## 2021-10-18 RX ADMIN — PRIMIDONE 50 MG: 50 TABLET ORAL at 08:12

## 2021-10-18 RX ADMIN — ENOXAPARIN SODIUM 40 MG: 40 INJECTION SUBCUTANEOUS at 08:12

## 2021-10-18 RX ADMIN — ONDANSETRON 4 MG: 2 INJECTION INTRAMUSCULAR; INTRAVENOUS at 10:06

## 2021-10-18 RX ADMIN — CHOLECALCIFEROL TAB 10 MCG (400 UNIT) 400 UNITS: 10 TAB at 08:12

## 2021-10-18 RX ADMIN — FERROUS SULFATE TAB 325 MG (65 MG ELEMENTAL FE) 325 MG: 325 (65 FE) TAB at 08:11

## 2021-10-18 RX ADMIN — LEVOFLOXACIN 250 MG: 5 INJECTION, SOLUTION INTRAVENOUS at 13:03

## 2021-10-18 RX ADMIN — SODIUM CHLORIDE: 9 INJECTION, SOLUTION INTRAVENOUS at 20:48

## 2021-10-18 RX ADMIN — FUROSEMIDE 20 MG: 20 TABLET ORAL at 13:04

## 2021-10-18 RX ADMIN — PANTOPRAZOLE SODIUM 40 MG: 40 TABLET, DELAYED RELEASE ORAL at 08:11

## 2021-10-18 RX ADMIN — SODIUM CHLORIDE: 9 INJECTION, SOLUTION INTRAVENOUS at 05:19

## 2021-10-18 RX ADMIN — HYDROCODONE BITARTRATE AND ACETAMINOPHEN 1 TABLET: 5; 325 TABLET ORAL at 14:20

## 2021-10-18 RX ADMIN — VANCOMYCIN HYDROCHLORIDE 500 MG: 500 INJECTION, POWDER, LYOPHILIZED, FOR SOLUTION INTRAVENOUS at 02:14

## 2021-10-18 RX ADMIN — ATORVASTATIN CALCIUM 20 MG: 20 TABLET, FILM COATED ORAL at 20:29

## 2021-10-18 RX ADMIN — METFORMIN HYDROCHLORIDE 500 MG: 500 TABLET ORAL at 16:42

## 2021-10-18 RX ADMIN — SODIUM CHLORIDE: 9 INJECTION, SOLUTION INTRAVENOUS at 13:01

## 2021-10-18 RX ADMIN — PRIMIDONE 50 MG: 50 TABLET ORAL at 20:29

## 2021-10-18 RX ADMIN — AMLODIPINE BESYLATE 2.5 MG: 2.5 TABLET ORAL at 14:20

## 2021-10-18 ASSESSMENT — PAIN SCALES - GENERAL
PAINLEVEL_OUTOF10: 0
PAINLEVEL_OUTOF10: 10
PAINLEVEL_OUTOF10: 0
PAINLEVEL_OUTOF10: 0
PAINLEVEL_OUTOF10: 4
PAINLEVEL_OUTOF10: 0

## 2021-10-18 NOTE — PROGRESS NOTES
Occupational Therapy  Facility/Department: [facility] AT THE [facility] MED SURG  Daily Treatment Note  NAME: Maine Rivera  : 1931  MRN: [id number]    Date of Service: 10/18/2021    Discharge Recommendations:  Continue to assess pending progress, ECF with OT       Assessment      OT Education: OT Role;Plan of Care  Patient Education: education provided on benefits of ROM excercises  Barriers to Learning: none  Activity Tolerance  Activity Tolerance: Patient limited by fatigue  Safety Devices  Safety Devices in place: Yes  Type of devices: Call light within reach; Left in bed;Bed alarm in place; All fall risk precautions in place         Patient Diagnosis(es): The primary encounter diagnosis was Cellulitis, unspecified cellulitis site. A diagnosis of Acute cystitis without hematuria was also pertinent to this visit. has a past medical history of Anemia, Arthritis of knee, Benign essential tremor, CAD S/P percutaneous coronary angioplasty, Diabetes mellitus type II, controlled ([address] Gastrointestinal hemorrhage, History of pancreatitis, Humerus fracture, Hyperlipidemia, Hypertension, Major depression, S/P CABG (coronary artery bypass graft), Ventral hernia, and Vitamin B12 deficiency. has a past surgical history that includes Cholecystectomy; Coronary artery bypass graft (); Hysterectomy, vaginal (); Salpingo-oophorectomy (); Colonoscopy (N/A, 2018); hip surgery (Right); and Hip fracture surgery (Right, 10/13/2021).     Restrictions  Restrictions/Precautions  Restrictions/Precautions: Weight Bearing, General Precautions, Fall Risk  Lower Extremity Weight Bearing Restrictions  Right Lower Extremity Weight Bearing: Weight Bearing As Tolerated  Left Lower Extremity Weight Bearing: Non Weight Bearing  Subjective   General  Chart Reviewed: Yes  Patient assessed for rehabilitation services?: Yes  Family / Caregiver Present: No  Referring Practitioner: Dr. Feli Silva DO  Diagnosis: Right Total Hip  Subjective  Subjective: Pt reports pain 7/10. General Comment  Comments: Pt supine in bed with HOB elevated upon arrival. Pt agreeable to OT tx. Orientation     Objective                                                                Type of ROM/Therapeutic Exercise  Type of ROM/Therapeutic Exercise: AROM; Free weights  Comment: Pt participated in a combination of AROM and SLOAN UE ROM using a 1# weight to promote strength and endurance for ease with ADLs and to encourage ease with bed mobility. Pt tolerated B shoulder flexion x 5 initially, elbow flexion/extension x10, shoulder abduction x5 initially using 1# weight then continued with AROM. Pt completed forearm prontation supination with 1# weight. Pt verbalized feeling increased tiredness this date. Plan   Plan  Times per week: 7  Times per day: Daily  Current Treatment Recommendations: Strengthening, Safety Education & Training, Self-Care / ADL, Functional Mobility Training, Balance Training, Neuromuscular Re-education, Endurance Training  G-Code     OutComes Score                                                  AM-PAC Score             Goals  Short term goals  Time Frame for Short term goals: 21  Short term goal 1: Patient to progress to bed mobility with CGA 1-2 to prepare for OOB ADLs. Short term goal 2: Patient to progress to functional transfers (sit-pivot,sit-stand) with min A to improve independence. Short term goal 3: Patient to complete 10 minutes ther-ex/ther-act in order to improve strength/endurance for ADLs  Short term goal 4: Patient to complete UB bathing/dressing with min A.        Therapy Time   Individual Concurrent Group Co-treatment   Time In [address] [place] [address]         Time Out 1340         Minutes [address]

## 2021-10-18 NOTE — PROGRESS NOTES
Therapy at bedside at this time, Therapy called out stating pt. Began vomiting, RN was pulling Zofran and Morphine. Pt. Told therapy she was having chest pain.ekg was done, Pt. Given Zofran and morphine, 2 mg of morphine was given d/t pt. Having lower pressures all day. Pt. Vitals assessed, Princess THOMSON notified of pt. Status. Cardiology called, and ekg was faxed to Cardiology.

## 2021-10-18 NOTE — PROGRESS NOTES
Progress Note  Vinnie Odonnell MD    OBJECTIVE:    Patient seen for f/u of Closed right hip fracture, initial encounter ([address] She has rt hip  Pain  Has poor out put  Bun/cr is higher  Gained 4 lbs  ROS:   Constitutional: negative  for fevers, and negative for chills. Respiratory: negative for shortness of breath, negative for cough, and negative for wheezing,on oxygen  Cardiovascular: negative for chest pain, and negative for palpitations  Gastrointestinal: negative for abdominal pain, negative for nausea,negative for vomiting, negative for diarrhea, and negative for constipation  Has rt hip pain   All other systems were reviewed with the patient and are negative unless otherwise stated in HPI    OBJECTIVE:  Vitals:   Temp: 98.8 °F (37.1 °C)  BP: (!) 125/53  Resp: 20  Pulse: 73  SpO2: 93 %    24HR INTAKE/OUTPUT:      Intake/Output Summary (Last 24 hours) at 10/18/2021 0808  Last data filed at 10/18/2021 0524  Gross per 24 hour   Intake 1122 ml   Output 550 ml   Net 572 ml     -----------------------------------------------------------------  Exam:  GEN:    Awake, alert and oriented x3. EYES:  EOMI, pupils equal   NECK: Supple. No lymphadenopathy. No carotid bruit  CVS:    regular rate and rhythm, 2/6 systolic murmur,has trace edema  PULM:  CTA, no wheezes, rales or rhonchi, no acute respiratory distress  ABD:    Bowels sounds normal.  Abdomen is soft. No distention. no tenderness to palpation. EXT:  Has minimal edema . No calf tenderness. NEURO: Moves all extremities. Motor and sensory are grossly intact  SKIN:  No rashes.   No skin lesions.    -----------------------------------------------------------------  Diagnostic Data:    · All available data reviewed  Lab Results   Component Value Date    WBC 7.2 10/18/2021    HGB 8.4 (L) 10/18/2021    .9 (H) 10/18/2021     10/18/2021      Lab Results   Component Value Date    GLUCOSE 107 (H) 10/18/2021    BUN 25 (H) 10/18/2021    CREATININE 1.06 (H) 10/18/2021     10/18/2021    K 5.0 10/18/2021    CALCIUM 8.4 (L) 10/18/2021     10/18/2021    CO2 19 (L) 10/18/2021       PROBLEM LIST:  Principal Problem:    Closed right hip fracture, initial encounter ([facility]  Active Problems:    Uncontrolled hypertension    Type 2 diabetes mellitus ([facility]    Dyslipidemia    ASHD (arteriosclerotic heart disease)    Acute cystitis without hematuria    Abnormal ECG    Closed left hip fracture, initial encounter ([facility]    Acute blood loss as cause of postoperative anemia    Hypomagnesemia  Resolved Problems:    * No resolved hospital problems.  *      ASSESSMENT / PLAN:  Closed right hip fracture, initial encounter ([facility])  · Pain control  · Pt and ot  · Acute blood loss anemia-hb 8.4  · Decreased urine out put- increase iv fluids and monitor for fluid overload   · ashd- cardiology follow up, risk for second surgery high- patine aware  · Type 2 dm- blood sugars well controlled  · Nutrition status: at risk for malnutrition  · DVT prophylaxis: SCD's   · High risk medications: none   · Disposition:  Discharge plan is ECF    Stone Bell MD , M.D.  10/18/2021  8:08 AM

## 2021-10-18 NOTE — PROGRESS NOTES
Physical Therapy  Facility/Department: [facility] AT THE [facility] MED SURG  Daily Treatment Note  NAME: Jeanine Andino  : 1931  MRN: [id number]    Date of Service: 10/18/2021    Discharge Recommendations:  Continue to assess pending progress, Subacute/Skilled Nursing Facility, ECF with PT, Patient would benefit from continued therapy after discharge        Assessment   Treatment Diagnosis: generalized weakness, difficulty walking  Prognosis: Good  Decision Making: High Complexity  PT Education: General Safety;Weight-bearing Education  Patient Education: Educated pt on above with fair to poor understanding. REQUIRES PT FOLLOW UP: Yes  Activity Tolerance  Activity Tolerance: Patient limited by pain;Treatment limited secondary to medical complications (free text)     Patient Diagnosis(es): The primary encounter diagnosis was Cellulitis, unspecified cellulitis site. A diagnosis of Acute cystitis without hematuria was also pertinent to this visit. has a past medical history of Anemia, Arthritis of knee, Benign essential tremor, CAD S/P percutaneous coronary angioplasty, Diabetes mellitus type II, controlled ([facility] [address] Gastrointestinal hemorrhage, History of pancreatitis, Humerus fracture, Hyperlipidemia, Hypertension, Major depression, S/P CABG (coronary artery bypass graft), Ventral hernia, and Vitamin B12 deficiency. has a past surgical history that includes Cholecystectomy; Coronary artery bypass graft (); Hysterectomy, vaginal (); Salpingo-oophorectomy (); Colonoscopy (N/A, 2018); hip surgery (Right); and Hip fracture surgery (Right, 10/13/2021).     Restrictions  Restrictions/Precautions  Restrictions/Precautions: Weight Bearing, General Precautions, Fall Risk  Lower Extremity Weight Bearing Restrictions  Right Lower Extremity Weight Bearing: Weight Bearing As Tolerated  Left Lower Extremity Weight Bearing: Non Weight Bearing  Subjective   General  Chart Reviewed: Yes  Response To Previous Treatment: Patient with no complaints from previous session. Family / Caregiver Present: No  Referring Practitioner: Maria Luisa Perez DO  Subjective  Subjective: Pt reporting to nurse 10/10 pain. General Comment  Comments: Nursing Carole Kendall) aware of pain. Orientation  Orientation  Overall Orientation Status: Within Functional Limits  Cognition      Objective   Bed mobility  Comment: Pt began vomitting when attempting to complete ROM, so was unable to wotk on bed mobility. Exercises  Hip Flexion: 5x  Ankle Pumps: 5x  Comments: Above exercises completed in supine with AAROM as needed. unable to continue with Rom d/t pt beginning to vomit. Called [facility] in and assisted them in cleaning up pt. Pt began c/o chest pain also, so NRSG started EKG. Concluded PT session at that time. Goals  Short term goals  Time Frame for Short term goals: 10 days  Short term goal 1: Pt will perform bed mobility with CGA to improve functional independence. Short term goal 2: Pt will be reassessed for transfers/gait when appropriate in order to progress functional ambulation. Plan    Plan  Times per week: 7 days per week  Times per day: Twice a day (Daily on weekends)  Current Treatment Recommendations: Strengthening, IADL Training, Neuromuscular Re-education, Home Exercise Program, ROM, Manual Therapy - Soft Tissue Mobilization, Safety Education & Training, Balance Training, Endurance Training, Patient/Caregiver Education & Training, Functional Mobility Training, ADL/Self-care Training  Safety Devices  Type of devices:  All fall risk precautions in place, Bed alarm in place, Call light within reach, Nurse notified, Patient at risk for falls, Left in bed     Therapy Time   Individual Concurrent Group Co-treatment   Time In 0952         Time Out 1017         Minutes 25                 Leslie Varghese, MELANIA

## 2021-10-18 NOTE — PROGRESS NOTES
[facility]  Inpatient/Observation/Outpatient Rehabilitation    Date: 10/18/2021  Patient Name: Maddy Black       [x] Inpatient Acute/Observation       []  Outpatient  : 1931       [] Pt no showed for scheduled appointment    [] Pt refused/declined therapy at this time due to:           [x] Pt cancelled due to:  [] No Reason Given   [] Sick/ill   [x] Other:    Anastasia Yun not seen at 2:40pm. Consulted with Fiona Pelaez RN. Nursing wishes patient to rest at this time.       James Fletcher, PTA Date: 10/18/2021

## 2021-10-18 NOTE — PROGRESS NOTES
Diagnosis Date    Anemia 7/201    Arthritis of knee 2018    bilateral knees; \"bone on bone\"; declines surg    Benign essential tremor 04/15/2016    CAD S/P percutaneous coronary angioplasty 1998    done at [facility] Diabetes mellitus type II, controlled ([facility] [address] 1979    Gastrointestinal hemorrhage 2012    Duodenal Ulcer by EGD    History of pancreatitis 2013    after GI bleed    Humerus fracture 2015    right humerus ; [place]    Hyperlipidemia     Hypertension     Major depression     S/P CABG (coronary artery bypass graft) 1996    Ventral hernia 1998    infraumbilical ; incisional    Vitamin B12 deficiency 07/2018    Vitamin B12 270       CURRENT ALLERGIES: Penicillins and Penicillin g REVIEW OF SYSTEMS: Limited review of systems but positive and negatives are mentioned in HPI. Patient also found to have urinary tract infection currently on intravenous antibiotics. Past Surgical History:   Procedure Laterality Date    CHOLECYSTECTOMY      COLONOSCOPY N/A 11/5/2018    COLONOSCOPY DIAGNOSTIC OR SCREENING performed by Andry Hyde MD at [facility] 1284    ? number grafts    HIP FRACTURE SURGERY Right 10/13/2021    HIP OPEN REDUCTION INTERNAL FIXATION-NAILING performed by Sukhjinder Maurice DO at [address] [facility] Right     HYSTERECTOMY, [address] SALPINGO-OOPHORECTOMY  1960    Social History:  Social History     Tobacco Use    Smoking status: Never Smoker    Smokeless tobacco: Never Used   Vaping Use    Vaping Use: Never used   Substance Use Topics    Alcohol use: No    Drug use: No        CURRENT MEDICATIONS:  Outpatient Medications Marked as Taking for the 10/11/21 encounter [facility] Encounter)   Medication Sig Dispense Refill    traMADol (ULTRAM) 50 MG tablet Take 25 mg by mouth 2 times daily.       acetaminophen (TYLENOL) 325 MG tablet Take 650 mg by mouth every 4 hours as needed for Pain or Fever      VITAMIN D PO Take Pulse 73   Temp 98.8 °F (37.1 °C) (Temporal)   Resp 20   Ht 5' (1.524 m)   Wt 140 lb (63.5 kg)   SpO2 93%   BMI 27.34 kg/m²  Body mass index is 27.34 kg/m². Constitutional: She is oriented to person, place, and time. She appears well-developed and well-nourished. In no acute distress. HEENT: Normocephalic and atraumatic. No JVD present. Carotid bruit is not present. No mass and no thyromegaly present. No lymphadenopathy present. Cardiovascular: Normal rate, regular rhythm, normal heart sounds and intact distal pulses. Exam reveals no gallop and no friction rub. Short apical systolic murmur noted. Pulmonary/Chest: Effort normal and breath sounds normal. No respiratory distress. She has no wheezes, rhonchi or rales. Abdominal: Soft, non-tender. Bowel sounds and aorta are normal. She exhibits no organomegaly, mass or bruit. Extremities: S/P ORIF of the right hip fracture. Neurological: She is alert and oriented to person, place, and time. Skin: Skin is warm and dry. There is no rash or diaphoresis. Psychiatric: She has a normal mood and affect.  Her speech is normal and behavior is normal.      MOST RECENT LABS ON RECORD:   Lab Results   Component Value Date    WBC 7.2 10/18/2021    HGB 8.4 (L) 10/18/2021    HCT 27.9 (L) 10/18/2021     10/18/2021    CHOL 119 08/05/2021    TRIG 79 08/05/2021    HDL 57 08/05/2021    LDLDIRECT 41 02/23/2018    ALT 25 10/13/2021    AST 42 (H) 10/13/2021     10/18/2021    K 5.0 10/18/2021     10/18/2021    CREATININE 1.06 (H) 10/18/2021    BUN 25 (H) 10/18/2021    CO2 19 (L) 10/18/2021    TSH 1.41 01/07/2021    INR 1.0 10/12/2021    LABA1C 6.2 (H) 05/07/2021       ASSESSMENT:  Patient Active Problem List    Diagnosis Date Noted    Hypomagnesemia 10/16/2021    Acute blood loss as cause of postoperative anemia 10/14/2021    [facility] 10/13/2021    Closed right hip fracture, initial encounter ([facility] 10/12/2021 [facility], [address], [facility], [facility], [address] [place] [address]  Phone: [phone], Fax: [phone]      Based on the patients current medical conditions, I believe the risk of significant morbidity and mortality is: Intermediate to high.

## 2021-10-18 NOTE — PROGRESS NOTES
[facility] SPECIALTY [facility]  OCCUPATIONAL THERAPY  No Visit Note    [] ICU    [x] Acute   Patient: Duncan Medrano  Room: 8013/9209-13      Duncan Medrano not seen on 10/18/2021 at 10:31 AM due to nursing request. Pt is undergoing further testing for abnormal results. OT tx to resume once given the OK.         Signature: ASHLEE Caldera, OTR/L

## 2021-10-18 NOTE — PROGRESS NOTES
Spoke with nursing yesterday. She states patient appears stable from orthopedic standpoint. Dressings are clean and dry on the right hip. Patient apparently complaining of more left hip pain at this point. MRI showed nondisplaced intertrochanteric fracture. Will plan to get xray to make sure no changes with the left hip. Anticipate taking patient to OR tomorrow afternoon (Tuesday) for cephalomedullary nailing left intertrochanteric hip fracture if patient is medically stable. NPO at midnight tonight.

## 2021-10-18 NOTE — PROGRESS NOTES
Pt. Alert and oriented at time of assessment, pt. Skin is warm and dry. Pt. Denies any pain. Pt. Positioned for comfort in bed, pt. Vitals are assessed, vitals are WDL. Pt. Has 4+ non pitting edema to bilateral lower extremities. Pt. Has call light in reach, will continue to monitor.

## 2021-10-18 NOTE — PROGRESS NOTES
Cardiology called at this time, Per. Cardiology nurse dr. Aamir Leyva not concerned about Ekg, and thinks her trop is elevated d/t anemia.  She stated Dr. Aamir Leyva will be up to assess shortly

## 2021-10-19 ENCOUNTER — ANESTHESIA (OUTPATIENT)
Dept: OPERATING ROOM | Age: 86
DRG: 480 | End: 2021-10-19
Payer: MEDICARE

## 2021-10-19 ENCOUNTER — APPOINTMENT (OUTPATIENT)
Dept: GENERAL RADIOLOGY | Age: 86
DRG: 480 | End: 2021-10-19
Payer: MEDICARE

## 2021-10-19 VITALS
TEMPERATURE: 95.9 F | OXYGEN SATURATION: 96 % | DIASTOLIC BLOOD PRESSURE: 48 MMHG | SYSTOLIC BLOOD PRESSURE: 128 MMHG | RESPIRATION RATE: 20 BRPM

## 2021-10-19 LAB
ABSOLUTE EOS #: 0.08 K/UL (ref 0–0.44)
ABSOLUTE IMMATURE GRANULOCYTE: 0 K/UL (ref 0–0.3)
ABSOLUTE LYMPH #: 0.62 K/UL (ref 1.1–3.7)
ABSOLUTE MONO #: 0.16 K/UL (ref 0.1–1.2)
ANION GAP SERPL CALCULATED.3IONS-SCNC: 8 MMOL/L (ref 9–17)
BASOPHILS # BLD: 0 % (ref 0–2)
BASOPHILS ABSOLUTE: 0 K/UL (ref 0–0.2)
BUN BLDV-MCNC: 25 MG/DL (ref 8–23)
BUN/CREAT BLD: 28 (ref 9–20)
CALCIUM SERPL-MCNC: 7.9 MG/DL (ref 8.6–10.4)
CHLORIDE BLD-SCNC: 111 MMOL/L (ref 98–107)
CO2: 16 MMOL/L (ref 20–31)
CREAT SERPL-MCNC: 0.89 MG/DL (ref 0.5–0.9)
DIFFERENTIAL TYPE: ABNORMAL
EKG ATRIAL RATE: 64 BPM
EKG P AXIS: 72 DEGREES
EKG P-R INTERVAL: 228 MS
EKG Q-T INTERVAL: 460 MS
EKG QRS DURATION: 118 MS
EKG QTC CALCULATION (BAZETT): 474 MS
EKG R AXIS: 74 DEGREES
EKG T AXIS: -63 DEGREES
EKG VENTRICULAR RATE: 64 BPM
EOSINOPHILS RELATIVE PERCENT: 2 % (ref 1–4)
GFR AFRICAN AMERICAN: >60 ML/MIN
GFR NON-AFRICAN AMERICAN: 60 ML/MIN
GFR SERPL CREATININE-BSD FRML MDRD: ABNORMAL ML/MIN/{1.73_M2}
GFR SERPL CREATININE-BSD FRML MDRD: ABNORMAL ML/MIN/{1.73_M2}
GLUCOSE BLD-MCNC: 112 MG/DL (ref 70–99)
HCT VFR BLD CALC: 26.7 % (ref 36.3–47.1)
HCT VFR BLD CALC: 39.8 % (ref 36.3–47.1)
HEMOGLOBIN: 12.2 G/DL (ref 11.9–15.1)
HEMOGLOBIN: 8.3 G/DL (ref 11.9–15.1)
IMMATURE GRANULOCYTES: 0 %
LYMPHOCYTES # BLD: 15 % (ref 24–43)
MCH RBC QN AUTO: 31.5 PG (ref 25.2–33.5)
MCHC RBC AUTO-ENTMCNC: 30.7 G/DL (ref 28.4–34.8)
MCV RBC AUTO: 102.8 FL (ref 82.6–102.9)
MONOCYTES # BLD: 4 % (ref 3–12)
MORPHOLOGY: ABNORMAL
NRBC AUTOMATED: 0 PER 100 WBC
PDW BLD-RTO: 15.5 % (ref 11.8–14.4)
PLATELET # BLD: 158 K/UL (ref 138–453)
PLATELET ESTIMATE: ABNORMAL
PMV BLD AUTO: 9.7 FL (ref 8.1–13.5)
POTASSIUM SERPL-SCNC: 4.2 MMOL/L (ref 3.7–5.3)
RBC # BLD: 3.87 M/UL (ref 3.95–5.11)
RBC # BLD: ABNORMAL 10*6/UL
SEG NEUTROPHILS: 79 % (ref 36–65)
SEGMENTED NEUTROPHILS ABSOLUTE COUNT: 3.24 K/UL (ref 1.5–8.1)
SODIUM BLD-SCNC: 135 MMOL/L (ref 135–144)
TROPONIN INTERP: ABNORMAL
TROPONIN T: ABNORMAL NG/ML
TROPONIN, HIGH SENSITIVITY: 134 NG/L (ref 0–14)
WBC # BLD: 4.1 K/UL (ref 3.5–11.3)
WBC # BLD: ABNORMAL 10*3/UL

## 2021-10-19 PROCEDURE — 2580000003 HC RX 258: Performed by: ORTHOPAEDIC SURGERY

## 2021-10-19 PROCEDURE — 2580000003 HC RX 258: Performed by: FAMILY MEDICINE

## 2021-10-19 PROCEDURE — 88342 IMHCHEM/IMCYTCHM 1ST ANTB: CPT

## 2021-10-19 PROCEDURE — 6360000002 HC RX W HCPCS: Performed by: FAMILY MEDICINE

## 2021-10-19 PROCEDURE — 88307 TISSUE EXAM BY PATHOLOGIST: CPT

## 2021-10-19 PROCEDURE — 85018 HEMOGLOBIN: CPT

## 2021-10-19 PROCEDURE — 2580000003 HC RX 258: Performed by: NURSE PRACTITIONER

## 2021-10-19 PROCEDURE — 88311 DECALCIFY TISSUE: CPT

## 2021-10-19 PROCEDURE — 2580000003 HC RX 258: Performed by: NURSE ANESTHETIST, CERTIFIED REGISTERED

## 2021-10-19 PROCEDURE — 7100000001 HC PACU RECOVERY - ADDTL 15 MIN: Performed by: ORTHOPAEDIC SURGERY

## 2021-10-19 PROCEDURE — 84484 ASSAY OF TROPONIN QUANT: CPT

## 2021-10-19 PROCEDURE — 1200000000 HC SEMI PRIVATE

## 2021-10-19 PROCEDURE — 93005 ELECTROCARDIOGRAM TRACING: CPT | Performed by: INTERNAL MEDICINE

## 2021-10-19 PROCEDURE — C1769 GUIDE WIRE: HCPCS | Performed by: ORTHOPAEDIC SURGERY

## 2021-10-19 PROCEDURE — 6360000002 HC RX W HCPCS: Performed by: NURSE ANESTHETIST, CERTIFIED REGISTERED

## 2021-10-19 PROCEDURE — 6370000000 HC RX 637 (ALT 250 FOR IP): Performed by: INTERNAL MEDICINE

## 2021-10-19 PROCEDURE — 36415 COLL VENOUS BLD VENIPUNCTURE: CPT

## 2021-10-19 PROCEDURE — 3600000004 HC SURGERY LEVEL 4 BASE: Performed by: ORTHOPAEDIC SURGERY

## 2021-10-19 PROCEDURE — 6360000002 HC RX W HCPCS: Performed by: ORTHOPAEDIC SURGERY

## 2021-10-19 PROCEDURE — 93010 ELECTROCARDIOGRAM REPORT: CPT | Performed by: FAMILY MEDICINE

## 2021-10-19 PROCEDURE — 94761 N-INVAS EAR/PLS OXIMETRY MLT: CPT

## 2021-10-19 PROCEDURE — 85025 COMPLETE CBC W/AUTO DIFF WBC: CPT

## 2021-10-19 PROCEDURE — 85014 HEMATOCRIT: CPT

## 2021-10-19 PROCEDURE — 6360000002 HC RX W HCPCS: Performed by: NURSE PRACTITIONER

## 2021-10-19 PROCEDURE — 2700000000 HC OXYGEN THERAPY PER DAY

## 2021-10-19 PROCEDURE — 2709999900 HC NON-CHARGEABLE SUPPLY: Performed by: ORTHOPAEDIC SURGERY

## 2021-10-19 PROCEDURE — 3700000001 HC ADD 15 MINUTES (ANESTHESIA): Performed by: ORTHOPAEDIC SURGERY

## 2021-10-19 PROCEDURE — 2720000010 HC SURG SUPPLY STERILE: Performed by: ORTHOPAEDIC SURGERY

## 2021-10-19 PROCEDURE — C1713 ANCHOR/SCREW BN/BN,TIS/BN: HCPCS | Performed by: ORTHOPAEDIC SURGERY

## 2021-10-19 PROCEDURE — 6370000000 HC RX 637 (ALT 250 FOR IP): Performed by: ORTHOPAEDIC SURGERY

## 2021-10-19 PROCEDURE — 80048 BASIC METABOLIC PNL TOTAL CA: CPT

## 2021-10-19 PROCEDURE — 3209999900 FLUORO FOR SURGICAL PROCEDURES

## 2021-10-19 PROCEDURE — 3600000014 HC SURGERY LEVEL 4 ADDTL 15MIN: Performed by: ORTHOPAEDIC SURGERY

## 2021-10-19 PROCEDURE — 2500000003 HC RX 250 WO HCPCS: Performed by: NURSE ANESTHETIST, CERTIFIED REGISTERED

## 2021-10-19 PROCEDURE — 3700000000 HC ANESTHESIA ATTENDED CARE: Performed by: ORTHOPAEDIC SURGERY

## 2021-10-19 PROCEDURE — 7100000000 HC PACU RECOVERY - FIRST 15 MIN: Performed by: ORTHOPAEDIC SURGERY

## 2021-10-19 PROCEDURE — 64450 NJX AA&/STRD OTHER PN/BRANCH: CPT | Performed by: NURSE ANESTHETIST, CERTIFIED REGISTERED

## 2021-10-19 DEVICE — TRIGEN LOW PROFILE SCREW 5.0MM X 35MM
Type: IMPLANTABLE DEVICE | Site: HIP | Status: FUNCTIONAL
Brand: TRIGEN

## 2021-10-19 DEVICE — INTERTAN LAG/COMPRESSION SCREW KIT                                    85MM / 80MM
Type: IMPLANTABLE DEVICE | Site: HIP | Status: FUNCTIONAL
Brand: TRIGEN

## 2021-10-19 DEVICE — TRIGEN INTERTAN 10S 10MM X 18CM 130DEGREE
Type: IMPLANTABLE DEVICE | Site: HIP | Status: FUNCTIONAL
Brand: TRIGEN

## 2021-10-19 RX ORDER — ONDANSETRON 2 MG/ML
4 INJECTION INTRAMUSCULAR; INTRAVENOUS
Status: DISCONTINUED | OUTPATIENT
Start: 2021-10-19 | End: 2021-10-19 | Stop reason: HOSPADM

## 2021-10-19 RX ORDER — FUROSEMIDE 10 MG/ML
40 INJECTION INTRAMUSCULAR; INTRAVENOUS ONCE
Status: COMPLETED | OUTPATIENT
Start: 2021-10-19 | End: 2021-10-19

## 2021-10-19 RX ORDER — LIDOCAINE HYDROCHLORIDE 20 MG/ML
INJECTION, SOLUTION EPIDURAL; INFILTRATION; INTRACAUDAL; PERINEURAL PRN
Status: DISCONTINUED | OUTPATIENT
Start: 2021-10-19 | End: 2021-10-19 | Stop reason: SDUPTHER

## 2021-10-19 RX ORDER — KETAMINE HCL 50MG/ML(1)
SYRINGE (ML) INTRAVENOUS PRN
Status: DISCONTINUED | OUTPATIENT
Start: 2021-10-19 | End: 2021-10-19 | Stop reason: SDUPTHER

## 2021-10-19 RX ORDER — SODIUM CHLORIDE, SODIUM LACTATE, POTASSIUM CHLORIDE, CALCIUM CHLORIDE 600; 310; 30; 20 MG/100ML; MG/100ML; MG/100ML; MG/100ML
INJECTION, SOLUTION INTRAVENOUS CONTINUOUS PRN
Status: DISCONTINUED | OUTPATIENT
Start: 2021-10-19 | End: 2021-10-19 | Stop reason: SDUPTHER

## 2021-10-19 RX ORDER — FENTANYL CITRATE 50 UG/ML
50 INJECTION, SOLUTION INTRAMUSCULAR; INTRAVENOUS EVERY 5 MIN PRN
Status: DISCONTINUED | OUTPATIENT
Start: 2021-10-19 | End: 2021-10-19 | Stop reason: HOSPADM

## 2021-10-19 RX ORDER — ROPIVACAINE HYDROCHLORIDE 5 MG/ML
INJECTION, SOLUTION EPIDURAL; INFILTRATION; PERINEURAL PRN
Status: DISCONTINUED | OUTPATIENT
Start: 2021-10-19 | End: 2021-10-19 | Stop reason: SDUPTHER

## 2021-10-19 RX ORDER — FENTANYL CITRATE 50 UG/ML
25 INJECTION, SOLUTION INTRAMUSCULAR; INTRAVENOUS EVERY 5 MIN PRN
Status: DISCONTINUED | OUTPATIENT
Start: 2021-10-19 | End: 2021-10-19 | Stop reason: HOSPADM

## 2021-10-19 RX ORDER — METOCLOPRAMIDE HYDROCHLORIDE 5 MG/ML
10 INJECTION INTRAMUSCULAR; INTRAVENOUS
Status: DISCONTINUED | OUTPATIENT
Start: 2021-10-19 | End: 2021-10-19 | Stop reason: HOSPADM

## 2021-10-19 RX ORDER — KETOROLAC TROMETHAMINE 30 MG/ML
INJECTION, SOLUTION INTRAMUSCULAR; INTRAVENOUS PRN
Status: DISCONTINUED | OUTPATIENT
Start: 2021-10-19 | End: 2021-10-19 | Stop reason: SDUPTHER

## 2021-10-19 RX ORDER — DEXAMETHASONE SODIUM PHOSPHATE 10 MG/ML
INJECTION INTRAMUSCULAR; INTRAVENOUS PRN
Status: DISCONTINUED | OUTPATIENT
Start: 2021-10-19 | End: 2021-10-19 | Stop reason: SDUPTHER

## 2021-10-19 RX ORDER — PROPOFOL 10 MG/ML
INJECTION, EMULSION INTRAVENOUS CONTINUOUS PRN
Status: DISCONTINUED | OUTPATIENT
Start: 2021-10-19 | End: 2021-10-19 | Stop reason: SDUPTHER

## 2021-10-19 RX ADMIN — PROPOFOL 50 MCG/KG/MIN: 10 INJECTION, EMULSION INTRAVENOUS at 12:48

## 2021-10-19 RX ADMIN — MIRTAZAPINE 15 MG: 15 TABLET, FILM COATED ORAL at 20:46

## 2021-10-19 RX ADMIN — DEXAMETHASONE SODIUM PHOSPHATE 10 MG: 10 INJECTION INTRAMUSCULAR; INTRAVENOUS at 11:15

## 2021-10-19 RX ADMIN — MORPHINE SULFATE 2 MG: 2 INJECTION, SOLUTION INTRAMUSCULAR; INTRAVENOUS at 07:39

## 2021-10-19 RX ADMIN — PROPOFOL 30 MG: 10 INJECTION, EMULSION INTRAVENOUS at 13:19

## 2021-10-19 RX ADMIN — ATORVASTATIN CALCIUM 20 MG: 20 TABLET, FILM COATED ORAL at 20:46

## 2021-10-19 RX ADMIN — SODIUM CHLORIDE: 9 INJECTION, SOLUTION INTRAVENOUS at 03:17

## 2021-10-19 RX ADMIN — CARBAMAZEPINE 200 MG: 200 TABLET ORAL at 09:26

## 2021-10-19 RX ADMIN — DEXTROSE MONOHYDRATE 1000 MG: 50 INJECTION, SOLUTION INTRAVENOUS at 23:15

## 2021-10-19 RX ADMIN — LIDOCAINE HYDROCHLORIDE 50 MG: 20 INJECTION, SOLUTION EPIDURAL; INFILTRATION; INTRACAUDAL; PERINEURAL at 12:48

## 2021-10-19 RX ADMIN — KETOROLAC TROMETHAMINE 15 MG: 30 INJECTION, SOLUTION INTRAMUSCULAR; INTRAVENOUS at 14:22

## 2021-10-19 RX ADMIN — VANCOMYCIN HYDROCHLORIDE 750 MG: 750 INJECTION, POWDER, LYOPHILIZED, FOR SOLUTION INTRAVENOUS at 03:18

## 2021-10-19 RX ADMIN — ROPIVACAINE HYDROCHLORIDE 30 ML: 5 INJECTION, SOLUTION EPIDURAL; INFILTRATION; PERINEURAL at 11:15

## 2021-10-19 RX ADMIN — SODIUM CHLORIDE, POTASSIUM CHLORIDE, SODIUM LACTATE AND CALCIUM CHLORIDE: 600; 310; 30; 20 INJECTION, SOLUTION INTRAVENOUS at 12:44

## 2021-10-19 RX ADMIN — ATENOLOL 50 MG: 50 TABLET ORAL at 09:26

## 2021-10-19 RX ADMIN — AMLODIPINE BESYLATE 2.5 MG: 2.5 TABLET ORAL at 09:26

## 2021-10-19 RX ADMIN — PHENYLEPHRINE HYDROCHLORIDE 50 MCG: 10 INJECTION INTRAVENOUS at 14:25

## 2021-10-19 RX ADMIN — FUROSEMIDE 40 MG: 10 INJECTION, SOLUTION INTRAMUSCULAR; INTRAVENOUS at 07:39

## 2021-10-19 RX ADMIN — DEXTROSE MONOHYDRATE 1000 MG: 50 INJECTION, SOLUTION INTRAVENOUS at 17:39

## 2021-10-19 RX ADMIN — CARBAMAZEPINE 200 MG: 200 TABLET ORAL at 20:46

## 2021-10-19 RX ADMIN — Medication 25 MG: at 12:46

## 2021-10-19 RX ADMIN — PHENYLEPHRINE HYDROCHLORIDE 50 MCG: 10 INJECTION INTRAVENOUS at 14:17

## 2021-10-19 RX ADMIN — SODIUM CHLORIDE: 9 INJECTION, SOLUTION INTRAVENOUS at 17:30

## 2021-10-19 RX ADMIN — Medication 10 MG: at 13:21

## 2021-10-19 RX ADMIN — PRIMIDONE 50 MG: 50 TABLET ORAL at 20:45

## 2021-10-19 RX ADMIN — PROPOFOL 30 MG: 10 INJECTION, EMULSION INTRAVENOUS at 12:50

## 2021-10-19 RX ADMIN — Medication 15 MG: at 13:54

## 2021-10-19 ASSESSMENT — PAIN DESCRIPTION - PROGRESSION
CLINICAL_PROGRESSION: NOT CHANGED
CLINICAL_PROGRESSION: NOT CHANGED

## 2021-10-19 ASSESSMENT — PAIN DESCRIPTION - ONSET: ONSET: ON-GOING

## 2021-10-19 ASSESSMENT — PAIN SCALES - GENERAL
PAINLEVEL_OUTOF10: 0
PAINLEVEL_OUTOF10: 9
PAINLEVEL_OUTOF10: 0
PAINLEVEL_OUTOF10: 0
PAINLEVEL_OUTOF10: 9
PAINLEVEL_OUTOF10: 0

## 2021-10-19 ASSESSMENT — PAIN DESCRIPTION - PAIN TYPE
TYPE: ACUTE PAIN;SURGICAL PAIN
TYPE: ACUTE PAIN

## 2021-10-19 ASSESSMENT — PAIN - FUNCTIONAL ASSESSMENT
PAIN_FUNCTIONAL_ASSESSMENT: INTOLERABLE, UNABLE TO DO ANY ACTIVE OR PASSIVE ACTIVITIES
PAIN_FUNCTIONAL_ASSESSMENT: PREVENTS OR INTERFERES WITH ALL ACTIVE AND SOME PASSIVE ACTIVITIES

## 2021-10-19 ASSESSMENT — PAIN DESCRIPTION - LOCATION
LOCATION: FOOT
LOCATION: HIP;LEG;FOOT

## 2021-10-19 ASSESSMENT — PAIN DESCRIPTION - DESCRIPTORS: DESCRIPTORS: ACHING;SHOOTING;SHARP

## 2021-10-19 ASSESSMENT — PAIN DESCRIPTION - FREQUENCY: FREQUENCY: CONTINUOUS

## 2021-10-19 NOTE — OP NOTE
Operative Note      Patient: Manny Mendoza  YOB: 1931  MRN: [id number]    Date of Procedure: 10/19/2021    Pre-Op Diagnosis: Left nondisplaced intertrochanteric hip fracture    Post-Op Diagnosis: Same       Procedure: Cephalomedullary nailing Left intertrochanteric hip fracture    Surgeon(s):  Dwayne Moseley DO    Assistant:   * No surgical staff found *    Anesthesia: Pre-operative block, MAC    Estimated Blood Loss (mL): less than 837     Complications: None    Specimens:   ID Type Source Tests Collected by Time Destination   A :  Tissue Leg SURGICAL PATHOLOGY Dwayne Moseley DO 10/19/2021 1336        Implants:  Implant Name Type Inv. Item Serial No.  Lot No. LRB No. Used Action   NAIL IM L18CM [id number] 130DEG SH INTERTROCHANTERIC LAVERN TI  NAIL IM L18CM [id number] 130DEG SH INTERTROCHANTERIC LAVERN TI  [employer]- [id number] Left 1 Implanted   KIT SCR LAG L85MM [id number] L80MM DIA7MM COMPR INTEGR INTLOK  KIT SCR LAG L85MM [id number] L80MM DIA7MM COMPR INTEGR INTLOK  [employer]- [id number] Left 1 Implanted   SCREW BNE L35MM DIA5MM DULCE TI INT CAPT HEX LO PROF FOR IM  SCREW BNE L35MM DIA5MM DULCE TI INT CAPT HEX LO PROF FOR IM  [employer] Kenneth Dakin [id number] Left 1 Implanted         Drains:   Urethral Catheter 16 fr (Active)   $ Urethral catheter insertion Inserted for procedure 10/16/21 0815   Catheter Indications Perioperative use for selected surgical procedures 10/19/21 1215   Securement Device Date Changed 10/19/21 10/19/21 0724   Site Assessment Pink;Moist 10/19/21 0724   Urine Color Yellow 10/19/21 1215   Urine Appearance Clear 10/19/21 1215   Output (mL) 1150 mL 10/19/21 1128       Findings: As above    Indications:  Patient is an 55-year-old female who sustained a fall at her assisted living facility. Patient had continued severe right hip pain, prompting ER visit.   Imaging in the ER demonstrated nondisplaced isolated greater trochanter fracture. Patient was subsequently admitted. Orthopedics was consulted. Further imaging with an MRI was performed, and this demonstrated nondisplaced intertrochanteric right hip fracture and incidentally nondisplaced intertrochanteric fracture of the left hip as well. We discussed treatment options and the patient elected to proceed with cephalomedullary nailing of the right intertrochanteric hip fracture. She was able to undergo this procedure several days ago without any complications. She did require postoperative blood transfusion due to anemia. Patient has been able to recover, and her hemoglobin levels have been stable. Patient has been followed by the medicine team and cardiology. In order to allow the patient to mobilize more effectively and decrease the risk of worsening left hip fracture, we discussed further care of the left hip with intramedullary nailing. We discussed risks, benefits, and alternatives to the surgery. Risks to the surgery include bleeding, infection, damage to surrounding neurovascular and soft tissue structures, pain, stiffness, weakness, further fracture, implant failure, need for further treatment, DVT/PE, stroke, loss of limb, loss of life. The patient had all questions answered to her satisfaction, and she elected to proceed with the surgery. Written and informed consent was obtained. Patient understood that she is very high risk for surgery and she still elected to proceed. Detailed Description of Procedure:   Patient was brought down to the operative area. She was greeted by the surgery staff and anesthesia. She was also greeted by myself, and I initialed the operative extremity. The patient had all questions answered to her satisfaction, and she elected to proceed with the surgery. Written and informed consent was obtained.     Patient underwent a fascia iliaca block by the anesthesia staff. She was taken to the operative suite.   She was laid supine on the operating table. All bony prominences were well-padded. She underwent MAC anesthesia by the anesthesia staff. Preoperative antibiotics were administered. The left foot was well-padded and placed into the fracture boot. The right lower extremity was padded and placed into a leg stevenson. Preoperative imaging was obtained. The left lower extremity was then prepped and draped in normal sterile orthopedic fashion. A timeout was performed, and all present in the room were in agreement with the correct patient, procedure, and operative extremity. There were no voiced concerns.     Proximal femoral anatomy was identified under fluoroscopy. An incision was made just proximal to the greater trochanter. Dissection was carried down to the iliotibial band, which was incised longitudinally with the skin incision. Greater trochanter was easily palpable. Guide pin was placed in the appropriate starting position on both the AP and Lateral views. Guide pin was advanced. Proximal open reamer was placed over the guide pin and proximal femur was opened. Reamings were collected as specimen. Long ball-tipped guidewire was then fed down through the femur and the intramedullary canal. The proximal femoral shaft was then reamed with a 11.5 mm reamer. A [employer] InterTAN short 18 cm x 10 mm x 125 degree cephalomedullary nail was selected. This was advanced over the long ball-tipped guidewire into the appropriate position and depth. Attempt was made to pull the long ball-tipped guidewire, however, due to the tight intramedullary canal, the ball-tipped guidewire was unable to be pulled through the nail. The nail was subsequently malleted back out of the proximal femur, however, in doing so, the nail and guide fell onto the floor. The wound was copiously irrigated and packed with a sterile sponge. The instruments the fell were disassembled, cleaned, and then flashed for sterilization.   Patient was stable throughout this process and monitor closely by anesthesia. Instruments were able to be resterilized. A new [employer] InterTAN short 18 cm x 10 mm x 130 degree cephalomedullary nail was selected and attached to the guide. This was advanced into the femur to the appropriate depth. A 130 degree implant was selected as another 125 degree one was not available and I did not want to increase the diameter of the nail due to the significantly narrow femoral canal.  A second incision was made laterally to accommodate the lag/compression screw drill guide. A guidewire was placed into the center center position of the femoral head and neck on the AP and lateral views, obtaining appropriate tip to apex distance. Sequential reaming was performed for the compression screw in the derotational bar was applied. Reaming for the lag screw was then performed. An 85 mm lag screw was then placed over the guidewire to the appropriate depth, obtaining satisfactory tip to apex distance. The derotational bar was removed and the compression screw was then placed. Satisfactory alignment and placement was appreciated on both AP and lateral views. A small third incision was made further distal to accommodate the static distal interlocking screw. The distal interlocking hole was drilled, measured, and filled with a 5.0 mm x 35 mm distal interlocking screw. The set screw was placed proximally. The outer guide was removed. Final imaging revealed satisfactory placement of a short cephalomedullary nail with no evidence for any acute concerns.     Wounds were copiously irrigated with normal saline. Iliotibial band was repaired with 0 Vicryl suture. Subcutaneous closure was performed with 2-0 Vicryl suture. Skin was closed with staples. Sterile adhesive dressing was then applied over the incisions. Patient was reversed of anesthesia. She tolerated the procedure well and without complications.   She was taken to recovery in stable condition. H&H will be obtained in recovery.       Electronically signed by Fili Bustos DO on 10/19/2021 at 2:57 PM

## 2021-10-19 NOTE — ANESTHESIA PRE PROCEDURE
Department of Anesthesiology  Preprocedure Note       Name:  Maddy Black   Age:  80 y.o.  :  1931                                          MRN:  [id number]         Date:  10/19/2021      Surgeon: Gary Query):  Watson Carlos DO    Procedure: HIP OPEN REDUCTION INTERNAL FIXATION - IM NAIL (Left )    Medications prior to admission:   Prior to Admission medications    Medication Sig Start Date End Date Taking? Authorizing Provider   traMADol (ULTRAM) 50 MG tablet Take 25 mg by mouth 2 times daily.     Historical Provider, MD   acetaminophen (TYLENOL) 325 MG tablet Take 650 mg by mouth every 4 hours as needed for Pain or Fever    Historical Provider, MD   VITAMIN D PO Take 400 mg by mouth    Historical Provider, MD   furosemide (LASIX) 20 MG tablet Take 1 tablet by mouth every morning (before breakfast) On Monday, Wednesday, and Friday only 21   Emily Morales MD   atenolol (TENORMIN) 50 MG tablet Take 1 tablet by mouth daily 21   Emily Morales MD   atorvastatin (LIPITOR) 20 MG tablet Take 1 tablet by mouth nightly 21   Emily Morales MD   carBAMazepine (TEGRETOL) 200 MG tablet Take 1 tablet by mouth 2 times daily 21   Emily Morales MD   citalopram (CELEXA) 20 MG tablet Take 1 tablet by mouth daily 21   Emily Morales MD   ferrous sulfate (IRON 325) 325 (65 Fe) MG tablet Take 1 tablet by mouth daily (with breakfast) 21   Emily Morales MD   metFORMIN (GLUCOPHAGE) 500 MG tablet Take 1 tablet by mouth 2 times daily (with meals) 21   Emily Morales MD   mirtazapine (REMERON) 15 MG tablet Take 1 tablet by mouth nightly 21   Emily Morales MD   pantoprazole (PROTONIX) 40 MG tablet Take 1 tablet by mouth daily 21   Emily Morales MD   vitamin B-12 (CYANOCOBALAMIN) 1000 MCG tablet Take 1 tablet by mouth daily 21   Emily Morales MD   Ergocalciferol (VITAMIN D2) 50 MCG (2000 UT) TABS Take 50,000 Units by mouth once a week 21   Emily Morales MD   aspirin EC 81 MG EC tablet Take 1 tablet by mouth daily 8/2/21   Masoud Richards MD   citalopram (CELEXA) 10 MG tablet Take 1 tablet by mouth daily 8/2/21   Masoud Richards MD   melatonin 5 MG TABS tablet Take 1 tablet by mouth daily  Patient taking differently: Take 5 mg by mouth nightly  8/2/21   Masoud Richards MD   Pediatric Multiple Vitamins (MULTIVITAMIN CHILDRENS) CHEW Take 1 tablet by mouth daily 8/2/21   Masoud Richards MD   mycophenolate (CELLCEPT) 500 MG tablet Take 2 tabs qam and 1 tab every evening 8/2/21   Masoud Richards MD   primidone (MYSOLINE) 50 MG tablet Take 1 tablet by mouth 2 times daily 8/2/21   Masoud Richards MD   Ergocalciferol (VITAMIN D2) 10 MCG (400 UNIT) TABS Take 1 tablet by mouth daily 8/2/21   Masoud Richards MD   predniSONE (DELTASONE) 5 MG tablet Take 1 tablet by mouth every other day 8/2/21 10/31/21  Masoud Richards MD   nitroGLYCERIN (NITROSTAT) 0.4 MG SL tablet Place 1 tablet under the tongue every 5 minutes as needed for Chest pain up to max of 3 total doses. If no relief after 1 dose, call 911. 4/16/19   Ar Mancera MD       Current medications:    No current facility-administered medications for this visit. No current outpatient medications on file.      Facility-Administered Medications Ordered in Other Visits   Medication Dose Route Frequency Provider Last Rate Last Admin    vancomycin (VANCOCIN) 750 mg in dextrose 5 % 250 mL IVPB  750 mg IntraVENous Q24H Shaniqua Rai MD   Stopped at 10/19/21 0418    amLODIPine (NORVASC) tablet 2.5 mg  2.5 mg Oral Daily J Luis Valdovinos MD   2.5 mg at 10/19/21 0926    [Held by provider] aspirin EC tablet 81 mg  81 mg Oral Daily J Luis Valdovinos MD   81 mg at 10/18/21 1420    potassium chloride (KLOR-CON M) extended release tablet 40 mEq  40 mEq Oral PRN Vinnie Maldonado MD   40 mEq at 10/16/21 1110    Or    potassium bicarb-citric acid (EFFER-K) effervescent tablet 40 mEq  40 mEq Oral PRN Shaniqua Rai MD        Or    potassium chloride 10 mEq/100 mL IVPB (Peripheral Line)  10 mEq IntraVENous PRN Tiagoknicole Serrano MD        0.9 % sodium chloride infusion   IntraVENous PRN Cesar Puri MD        atenolol (TENORMIN) tablet 50 mg  50 mg Oral Daily Nayla Williamson MD   50 mg at 10/19/21 6681    atorvastatin (LIPITOR) tablet 20 mg  20 mg Oral Nightly Mechele Havers, DO   20 mg at 10/18/21 2029    carBAMazepine (TEGRETOL) tablet 200 mg  200 mg Oral BID Mechele Havers, DO   200 mg at 10/19/21 0373    vitamin D3 (CHOLECALCIFEROL) tablet 400 Units  400 Units Oral Daily Mechele Havers, DO   400 Units at 10/18/21 1418    ferrous sulfate (IRON 325) tablet 325 mg  325 mg Oral Daily with breakfast Mechele Havers, DO   325 mg at 10/18/21 6928    furosemide (LASIX) tablet 20 mg  20 mg Oral Q MWF Mechele Havers, DO   20 mg at 10/18/21 1304    metFORMIN (GLUCOPHAGE) tablet 500 mg  500 mg Oral BID WC Mechele Havers, DO   500 mg at 10/18/21 1642    mirtazapine (REMERON) tablet 15 mg  15 mg Oral Nightly Mechele Havers, DO   15 mg at 10/18/21 2030    pantoprazole (PROTONIX) tablet 40 mg  40 mg Oral Daily Mechele Havers, DO   40 mg at 10/18/21 6667    predniSONE (DELTASONE) tablet 5 mg  5 mg Oral Every Other Day Mechele Havers, DO   5 mg at 10/17/21 1000    primidone (MYSOLINE) tablet 50 mg  50 mg Oral BID Mechele Havers, DO   50 mg at 10/18/21 2029    vitamin B-12 (CYANOCOBALAMIN) tablet 1,000 mcg  1,000 mcg Oral Daily Mechele Havers, DO   1,000 mcg at 10/18/21 7176    0.9 % sodium chloride infusion   IntraVENous Continuous SIMBA Mc - CNP 75 mL/hr at 10/19/21 0737 Rate Change at 10/19/21 0737    sodium chloride flush 0.9 % injection 5-40 mL  5-40 mL IntraVENous 2 times per day Mechele Havers, DO   10 mL at 10/12/21 1419    sodium chloride flush 0.9 % injection 5-40 mL  5-40 mL IntraVENous PRN Mechele Havers, DO        0.9 % sodium chloride infusion  25 mL IntraVENous PRN Mechele Havers, DO        polyethylene glycol Diagnosis Date    Anemia 7/201    Arthritis of knee 2018    bilateral knees; \"bone on bone\"; declines surg    Benign essential tremor 04/15/2016    CAD S/P percutaneous coronary angioplasty 1998    [facility] at [facility] Diabetes mellitus type II, controlled ([facility] [address] 1979    Gastrointestinal hemorrhage 2012    Duodenal Ulcer by EGD    History of pancreatitis 2013    after GI bleed    Humerus fracture 2015    right humerus ; [place]    Hyperlipidemia     Hypertension     Major depression     S/P CABG (coronary artery bypass graft) 1996    Ventral hernia 5173    infraumbilical ; incisional    Vitamin B12 deficiency 07/2018    Vitamin B12 270       Past Surgical History:        Procedure Laterality Date    CHOLECYSTECTOMY      COLONOSCOPY N/A 11/5/2018    COLONOSCOPY DIAGNOSTIC OR SCREENING performed by Kerwin Cruz MD at [facility] [address]    ? number grafts    HIP FRACTURE SURGERY Right 10/13/2021    HIP OPEN REDUCTION INTERNAL FIXATION-NAILING performed by Dwayne Moseley DO at [address] Right     HYSTERECTOMY, [address] SALPINGO-OOPHORECTOMY  1960       Social History:    Social History     Tobacco Use    Smoking status: Never Smoker    Smokeless tobacco: Never Used   Substance Use Topics    Alcohol use: No                                Counseling given: Not Answered      Vital Signs (Current): There were no vitals filed for this visit.                                            BP Readings from Last 3 Encounters:   10/19/21 (!) 134/49   10/13/21 (!) 109/24   03/02/21 (!) 120/37       NPO Status:                                                                                 BMI:   Wt Readings from Last 3 Encounters:   10/18/21 140 lb (63.5 kg)   03/02/21 130 lb (59 kg)   10/30/19 148 lb 2.4 oz (67.2 kg)     There is no height or weight on file to calculate BMI.    CBC:   Lab Results   Component Value Date    WBC 4.1 10/19/2021    RBC 3.87 10/19/2021    HGB 8.3 10/19/2021    HCT 26.7 10/19/2021    .8 10/19/2021    RDW 15.5 10/19/2021     10/19/2021       CMP:   Lab Results   Component Value Date     10/19/2021    K 4.2 10/19/2021    K 4.2 02/23/2018     10/19/2021    CO2 16 10/19/2021    BUN 25 10/19/2021    CREATININE 0.89 10/19/2021    GFRAA >60 10/19/2021    AGRATIO 1.9 07/02/2018    LABGLOM 60 10/19/2021    GLUCOSE 112 10/19/2021    PROT 5.5 10/13/2021    CALCIUM 7.9 10/19/2021    BILITOT 0.18 10/13/2021    ALKPHOS 124 10/13/2021    AST 42 10/13/2021    ALT 25 10/13/2021       POC Tests:   No results for input(s): POCGLU, POCNA, POCK, POCCL, POCBUN, POCHEMO, POCHCT in the last 72 hours.     Coags:   Lab Results   Component Value Date    PROTIME 13.3 10/12/2021    INR 1.0 10/12/2021    APTT 33.0 10/12/2021    APTT 26.7 11/03/2018       HCG (If Applicable): No results found for: PREGTESTUR, PREGSERUM, HCG, HCGQUANT     ABGs: No results found for: PHART, PO2ART, SOX6IDB, ZIW4BIG, BEART, T3TAVWQB     Type & Screen (If Applicable):  No results found for: LABABO, [address]    Anesthesia Evaluation  Patient summary reviewed and Nursing notes reviewed no history of anesthetic complications:   Airway: Mallampati: II  TM distance: >3 FB   Neck ROM: full  Mouth opening: > = 3 FB Dental: normal exam         Pulmonary:Negative Pulmonary ROS and normal exam  breath sounds clear to auscultation                             Cardiovascular:  Exercise tolerance: poor (<4 METS),   (+) hypertension:, CAD:, CABG/stent:, hyperlipidemia      ECG reviewed  Rhythm: regular  Rate: normal  Echocardiogram reviewed    Cleared by cardiology     Beta Blocker:  Dose within 24 Hrs      ROS comment: 10-  Sinus rhythm with 1st degree A-V block  Right bundle branch block  Possible Inferior infarct (cited on or before 02-MAR-2021)  T wave abnormality, consider lateral ischemia  Abnormal ECG  When compared with ECG of 02-MAR-2021 14:43,  QT has shortened    ECHO: Summary  Global left ventricular systolic function appears preserved with an  estimated ejection fraction of >55%. Mildly increased left ventricular wall thickness with a normal left  ventricular cavity size. No definite specific wall motion abnormalities were identified. Aortic leaflets show calcification without restriction of motion. Mild tricuspid regurgitation. Evidence of mild diastolic dysfunction is seen.       MD. Alvarado Javed reports the patient is intermediate risk for complications in regards to anesthesia but that the patient is fully optimized. Please refer to his note. PE comment: HTN   Neuro/Psych:   (+) psychiatric history:depression/anxiety              ROS comment: Tremor. 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 Endo/Other:    (+) DiabetesType II DM, using insulin, blood dyscrasia: anemia, arthritis (bilat knees):., electrolyte abnormalities (slight hyponatremia.), . Abdominal:             Vascular: negative vascular ROS. Other Findings:             Anesthesia Plan      spinal     ASA 3       Induction: intravenous. Anesthetic plan and risks discussed with patient. Plan discussed with CRNA. Pre Anesthesia Evaluation complete. Anesthesia plan, risks, benefits, alternatives, and personnel discussed with patient and/or legal guardian. Patient and/or legal guardian verbalized an understanding and agreed to proceed. Anesthesia plan discussed with care team members and agreed upon.   SIMBA Mckinney - CRNA  10/19/2021

## 2021-10-19 NOTE — PROGRESS NOTES
[facility]  OCCUPATIONAL THERAPY  No Visit Note    [] ICU    [x] Acute   Patient: David Rubio  Room: 59/2074-63      David Rubio not seen on 10/19/2021 at 4:44 PM. Orders for activity to begin POD #1. OT to attempt evaluation at earliest opportunity.         Signature: ASHLEE Zhao, OTR/L

## 2021-10-19 NOTE — ANESTHESIA POSTPROCEDURE EVALUATION
Department of Anesthesiology  Postprocedure Note    Patient: Addi Ocampo  MRN: [id number]  YOB: 1931  Date of evaluation: 10/19/2021  Time:  3:43 PM     Procedure Summary     Date: 10/19/21 Room / Location: [address] [place] [address]    Anesthesia Start: 1244 Anesthesia Stop: 5270    Procedure: HIP OPEN REDUCTION INTERNAL FIXATION - IM NAIL (Left ) Diagnosis: (LEFT HIP FRACTURE)    Surgeons: Sujata Cardenas DO Responsible Provider: SIMBA Roblero - CRNA    Anesthesia Type: spinal ASA Status: 3          Anesthesia Type: spinal    Freddy Phase I: Freddy Score: 9    Freddy Phase II:      Last vitals: Reviewed and per EMR flowsheets.        Anesthesia Post Evaluation    Patient location during evaluation: PACU  Patient participation: complete - patient participated  Level of consciousness: awake and alert  Pain score: 0  Airway patency: patent  Nausea & Vomiting: no vomiting and no nausea  Complications: no  Cardiovascular status: blood pressure returned to baseline  Respiratory status: acceptable  Hydration status: stable  Multimodal analgesia pain management approach

## 2021-10-19 NOTE — ANESTHESIA PROCEDURE NOTES
Peripheral Block    Patient location during procedure: pre-op  Start time: 10/19/2021 11:05 AM  End time: 10/19/2021 11:15 AM  Staffing  Performed: resident/CRNA   Resident/CRNA: SIMBA Mendes CRNA  Other anesthesia staff: SIMBA Cornejo CRNA  Preanesthetic Checklist  Completed: patient identified, IV checked, site marked, risks and benefits discussed, surgical consent, monitors and equipment checked, pre-op evaluation, timeout performed, anesthesia consent given, oxygen available and patient being monitored  Peripheral Block  Patient position: supine  Prep: ChloraPrep  Patient monitoring: continuous pulse ox and cardiac monitor  Block type: Fascia iliaca  Laterality: left  Injection technique: single-shot  Guidance: ultrasound guided  Local infiltration: decadron and ropivacaine  Infiltration strength: 0.5 %  Dose: 38 mL  Provider prep: sterile gloves and mask  Local infiltration: decadron and ropivacaine  Needle  Needle type:  Other   Needle gauge: 22 G  Needle length: 8 cm  Needle localization: ultrasound guidanceOther needle type: pajunk  Assessment  Injection assessment: negative aspiration for heme, no paresthesia on injection and local visualized surrounding nerve on ultrasound  Paresthesia pain: none  Slow fractionated injection: yes  Hemodynamics: stable  Additional Notes  0.5% Ropivacaine 38ml, 2ml PFNS, 10mg Decadron  Reason for block: post-op pain management and at surgeon's request

## 2021-10-19 NOTE — PROGRESS NOTES
hgb 12.2 today, clarified with darvin that pt did not receive blood today and so a repeat H &H was ordered before surgery. Repeat hgb 8.3      Pt in bed, and alert but tired, shes oriented to everything but time and keeps saying she cant spend another day here and repeatedly asking about her surgery. She was in 9/10 pain, morphine was given and pt was repositioned and vitals were obtained. During assessment pt had +3 pitting edema in her BLE and +2 in her LUE, her lungs sounds were diminished greater on the RT with bilateral faint crackles. Pt was told several times her surgery was today and what time it was, family was also called to clarify that they knew.

## 2021-10-19 NOTE — PROGRESS NOTES
[facility]  Inpatient/Observation/Outpatient Rehabilitation    Date: 10/19/2021  Patient Name: Shamir Luna       [] Inpatient Acute/Observation       []  Outpatient  : 1931       [] Pt no showed for scheduled appointment    [] Pt refused/declined therapy at this time due to:           [x] Pt cancelled due to:  [] No Reason Given   [] Sick/ill   [] Other: Pt.  Taken for surgery on L Le, will need re-eval d/t change in medical status          Argelia Cullen Date: 10/19/2021

## 2021-10-19 NOTE — PROGRESS NOTES
Comprehensive Nutrition Assessment    Type and Reason for Visit:  Reassess    Nutrition Recommendations/Plan: continue NPO diet, resume Glucerna 4 oz TID with meals. Nutrition Assessment:  Continued increased nutrient needs aeb surgery planned for today. Pt was on Glucerna TID, prior to NPO for surgery. Malnutrition Assessment:  Malnutrition Status: At risk for malnutrition (Comment)    Context:  Acute Illness     Findings of the 6 clinical characteristics of malnutrition:  Energy Intake:  Unable to assess  Weight Loss:  No significant weight loss     Body Fat Loss:  No significant body fat loss     Muscle Mass Loss:  No significant muscle mass loss    Fluid Accumulation:  7 - Moderate to Severe Extremities (+ 2 pitting R/L LE)   Strength:  Not Performed    Estimated Daily Nutrient Needs:  Energy (kcal):  6561-4660 (20-23/kg); Weight Used for Energy Requirements:  Current     Protein (g):  59-72g (1.3-1.6g/kg); Weight Used for Protein Requirements:  Ideal        Fluid (ml/day):  1764 ml; Method Used for Fluid Requirements:  1 ml/kcal      Nutrition Related Findings:  appears well nourished      Wounds:  Surgical Incision       Current Nutrition Therapies:    Diet NPO    Anthropometric Measures:  · Height: 5' (152.4 cm)  · Current Body Weight: 140 lb (63.5 kg)   · Admission Body Weight: 135 lb 3.2 oz (61.3 kg)    · Usual Body Weight: 130 lb (59 kg) (3/2/21)     · Ideal Body Weight: 100 lbs; % Ideal Body Weight 137.9 %   · BMI: 27.3  · Adjusted Body Weight:  ; No Adjustment   · BMI Categories: Overweight (BMI 25.0-29. 9)       Nutrition Diagnosis:   · Increased nutrient needs related to acute injury/trauma as evidenced by other (comment) (healing needs from Fx)      Nutrition Interventions:   Food and/or Nutrient Delivery:  Continue NPO  Nutrition Education/Counseling:  No recommendation at this time   Coordination of Nutrition Care:  Continue to monitor while inpatient    Goals:  PO > 75% of meals and supplements        Recent Labs     10/17/21  0742 10/17/21  0742 10/18/21  0650 10/19/21  0600     --  137 135   K 4.6  --  5.0 4.2     --  107 111*   CO2 19*  --  19* 16*   BUN 21  --  25* 25*   CREATININE 1.00*  --  1.06* 0.89   GLUCOSE 123*  --  107* 112*   GFR              < >                          < > = values in this interval not displayed. Lab Results   Component Value Date    LABALBU 3.4 10/13/2021      Nutrition Monitoring and Evaluation:   Behavioral-Environmental Outcomes:  None Identified   Food/Nutrient Intake Outcomes:  Food and Nutrient Intake, Supplement Intake  Physical Signs/Symptoms Outcomes:  Biochemical Data, Weight     Discharge Planning:     Too soon to determine     Electronically signed by Gabo Clarke RD, LD on 10/19/21 at 11:06 AM EDT    Contact: [phone]

## 2021-10-19 NOTE — PROGRESS NOTES
[facility] SPECIAL[facility]  OCCUPATIONAL THERAPY  No Visit Note    [] ICU    [x] Acute   Patient: Emre   Room: [address] [place] [address] not seen on 10/19/2021 at 12:49 PM due to patient currently in surgery. Patient will need new orders for re-evaluation and resumption of OT services. Signature:  Bell Holley, OTD, OTR/L

## 2021-10-19 NOTE — PROGRESS NOTES
Progress Note  Vinnie Odonnell MD    OBJECTIVE:    Patient seen for f/u of Closed right hip fracture, initial encounter ([facility] [address] She has rt hip  Pain ,awaiting suegery  Has some edema  hb12  ROS:   Constitutional: negative  for fevers, and negative for chills. Respiratory: negative for shortness of breath, negative for cough, and negative for wheezing,on oxygen  Cardiovascular: negative for chest pain, and negative for palpitations  Gastrointestinal: negative for abdominal pain, negative for nausea,negative for vomiting, negative for diarrhea, and negative for constipation  Has rt hip pain   All other systems were reviewed with the patient and are negative unless otherwise stated in HPI    OBJECTIVE:  Vitals:   Temp: 97 °F (36.1 °C)  BP: (!) 129/44  Resp: 18  Pulse: 67  SpO2: 94 %    24HR INTAKE/OUTPUT:      Intake/Output Summary (Last 24 hours) at 10/19/2021 0712  Last data filed at 10/19/2021 0415  Gross per 24 hour   Intake 3446 ml   Output 1000 ml   Net 2446 ml     -----------------------------------------------------------------  Exam:  GEN:    Awake, alert and oriented x3. EYES:  EOMI, pupils equal   NECK: Supple. No lymphadenopathy. No carotid bruit  CVS:    regular rate and rhythm, 2/6 systolic murmur,has bilat leg  edema  PULM:  Has basal rales, no acute respiratory distress  ABD:    Bowels sounds normal.  Abdomen is soft. No distention. no tenderness to palpation. EXT:  Has bilat leg edema . No calf tenderness. NEURO: Moves all extremities. Motor and sensory are grossly intact  SKIN:  No rashes.   Has bruising   -----------------------------------------------------------------  Diagnostic Data:    · All available data reviewed  Lab Results   Component Value Date    WBC 4.1 10/19/2021    HGB 12.2 10/19/2021    .8 10/19/2021     10/19/2021      Lab Results   Component Value Date    GLUCOSE 112 (H) 10/19/2021    BUN 25 (H) 10/19/2021    CREATININE 0.89 10/19/2021     10/19/2021 K 4.2 10/19/2021    CALCIUM 7.9 (L) 10/19/2021     (H) 10/19/2021    CO2 16 (L) 10/19/2021       PROBLEM LIST:  Principal Problem:    Closed right hip fracture, initial encounter ([facility] [address]  Active Problems:    Uncontrolled hypertension    Type 2 diabetes mellitus (HCC)    Dyslipidemia    ASHD (arteriosclerotic heart disease)    Acute cystitis without hematuria    Abnormal ECG    Closed left hip fracture, initial encounter ([facility] [address]    Acute blood loss as cause of postoperative anemia    Hypomagnesemia  Resolved Problems:    * No resolved hospital problems.  *      ASSESSMENT / PLAN:  Closed right hip fracture, initial encounter ([facility])  · Pain control  · Pt and ot  · Acute blood loss anemia-hb 12- willneed to confirm  · Lt hip Fr- await surgery, high risk  · Has fluid overload- lasix iv,decrease iv to 75 cc  · ashd- cardiology follow up, risk for second surgery high- patine aware  · Type 2 dm- blood sugars well controlled  · Nutrition status: at risk for malnutrition  · DVT prophylaxis: SCD's   · High risk medications: none   · Disposition:  Discharge plan is ECF    Gurdeep Lopes MD , M.D.  10/19/2021  7:34 AM

## 2021-10-19 NOTE — PROGRESS NOTES
[facility]  Inpatient/Observation/Outpatient Rehabilitation    Date: 10/19/2021  Patient Name: Alec Paulson       [x] Inpatient Acute/Observation       []  Outpatient  : 1931       [] Pt no showed for scheduled appointment    [x] Pt refused/declined therapy at this time due to:           [] Pt cancelled due to:  [] No Reason Given   [] Sick/ill   [] Other:   Pt is having surgery today at 12:30 roughly, states she is in a lot of pain. Will attempt evaluation at our earliest opportunity.        Jinny Shanks, PTA Date: 10/19/2021

## 2021-10-19 NOTE — PROGRESS NOTES
Department of Orthopedic Surgery  Attending Progress Note      SUBJECTIVE      Patient seen and examined. Patient is 6 days out from undergoing cephalomedullary nailing of the right intertrochanteric hip fracture. Patient continues to complain of some right hip pain, but feels that it may be secondary to lack of movement. Patient admits to minimal left hip pain at this point, however, previous MRI demonstrated evidence for a nondisplaced left intertrochanteric hip fracture as well. Patient remains high risk for any further surgical intervention. However, patient would like to proceed with cephalomedullary nailing of the left intertrochanteric hip fracture so that she will hopefully be able to mobilize more effectively and safely. Patient has had to receive blood transfusion on postoperative day #1 from her last procedure. Hemoglobin has remained stable. Patient has been followed by medicine and cardiology. OBJECTIVE    Physical    VITALS:  BP (!) 159/41   Pulse 68   Temp 97 °F (36.1 °C) (Temporal)   Resp 20   Ht 5' (1.524 m)   Wt 140 lb (63.5 kg)   SpO2 98%   BMI 27.34 kg/m²       General: Patient is alert and oriented. No acute distress. Resting comfortably in bed. Right lower extremity: Dressings have dry bloody show, but no significant saturation. Right hip remains mildly swollen and ecchymotic, but compartments are soft and compressible. Patient has edema in the lower leg, foot, and ankle. Mild ecchymosis in the ankle and the patient admits to some pain in the foot and ankle region. Sensation is intact in the saphenous, sural, tibial, superficial/deep peroneal nerves. Distal pulses are faintly intact. Patient able to dorsiflex and plantarflex at the ankle. Left lower extremity: Patient has undergone fascia iliaca block so she currently has no significant pain to the hip. Sensation is intact distally in the saphenous, sural, tibial, superficial/deep peroneal nerves.  Distal pulses are faintly sodium chloride infusion, , IntraVENous, PRN  [MAR Hold] atenolol (TENORMIN) tablet 50 mg, 50 mg, Oral, Daily  [MAR Hold] atorvastatin (LIPITOR) tablet 20 mg, 20 mg, Oral, Nightly  [MAR Hold] carBAMazepine (TEGRETOL) tablet 200 mg, 200 mg, Oral, BID  [MAR Hold] vitamin D3 (CHOLECALCIFEROL) tablet 400 Units, 400 Units, Oral, Daily  [MAR Hold] ferrous sulfate (IRON 325) tablet 325 mg, 325 mg, Oral, Daily with breakfast  [MAR Hold] furosemide (LASIX) tablet 20 mg, 20 mg, Oral, Q MWF  [MAR Hold] metFORMIN (GLUCOPHAGE) tablet 500 mg, 500 mg, Oral, BID WC  [MAR Hold] mirtazapine (REMERON) tablet 15 mg, 15 mg, Oral, Nightly  [MAR Hold] pantoprazole (PROTONIX) tablet 40 mg, 40 mg, Oral, Daily  [MAR Hold] predniSONE (DELTASONE) tablet 5 mg, 5 mg, Oral, Every Other Day  [MAR Hold] primidone (MYSOLINE) tablet 50 mg, 50 mg, Oral, BID  [MAR Hold] vitamin B-12 (CYANOCOBALAMIN) tablet 1,000 mcg, 1,000 mcg, Oral, Daily  [MAR Hold] 0.9 % sodium chloride infusion, , IntraVENous, Continuous  [MAR Hold] sodium chloride flush 0.9 % injection 5-40 mL, 5-40 mL, IntraVENous, 2 times per day  [facility] Hold] sodium chloride flush 0.9 % injection 5-40 mL, 5-40 mL, IntraVENous, PRN  [MAR Hold] 0.9 % sodium chloride infusion, 25 mL, IntraVENous, PRN  [MAR Hold] polyethylene glycol (GLYCOLAX) packet 17 g, 17 g, Oral, Daily PRN  [MAR Hold] ondansetron (ZOFRAN) injection 4 mg, 4 mg, IntraVENous, Q6H PRN  [MAR Hold] HYDROcodone-acetaminophen (NORCO) 5-325 MG per tablet 1 tablet, 1 tablet, Oral, Q4H PRN **OR** [MAR Hold] HYDROcodone-acetaminophen (NORCO) 5-325 MG per tablet 2 tablet, 2 tablet, Oral, Q4H PRN  [MAR Hold] morphine (PF) injection 2 mg, 2 mg, IntraVENous, Q2H PRN **OR** [MAR Hold] morphine injection 4 mg, 4 mg, IntraVENous, Q2H PRN  [COMPLETED] levoFLOXacin (LEVAQUIN) 500 MG/100ML infusion 500 mg, 500 mg, IntraVENous, Once **AND** [MAR Hold] levoFLOXacin (LEVAQUIN) 250 MG/50ML infusion 250 mg, 250 mg, IntraVENous, Q24H  [MAR Hold] vitamin D (ERGOCALCIFEROL) capsule 50,000 Units, 50,000 Units, Oral, Weekly  [MAR Hold] vancomycin (VANCOCIN) intermittent dosing (placeholder), , Other, RX Placeholder  Facility-Administered Medications Ordered in Other Encounters: ropivacaine (NAROPIN) 0.5% injection, , Spinal/Regional, PRN  dexamethasone (DECADRON) injection, , Other, PRN    ASSESSMENT AND PLAN      Postoperative day #6 cephalomedullary nailing right intertrochanteric hip fracture  -Weightbearing as tolerated    Left intertrochanteric hip fracture  -I discussed the patient's injury with her in depth. We discussed both surgical and nonsurgical treatment options. Nonsurgical treatment would involve minimal mobilization to help prevent a worsening left hip fracture. Surgical treatment would involve short cephalomedullary nailing to allow for early weightbearing and mobilization. We discussed risks, benefits, and alternatives. Risks to surgical treatment include bleeding, infection, damage to surrounding neurovascular and soft tissue structures, pain, stiffness, weakness, need for further treatment, fracture, implant failure, DVT/PE, stroke, adverse reaction to anesthesia, loss of limb, loss of life. Patient had all questions answered to her satisfaction, and she would like to proceed with surgical treatment. Patient understands that she is at high risk for perioperative complications. Patient verbalizes her understanding of this and states that she would still like to proceed with surgical treatment. Written and informed consent was obtained to proceed with surgery. Patient also consented to lift her DNR status during the perioperative period. Patient has been seen by medicine and cardiology and deemed high risk for surgery. Hemoglobin is stable at 8.3 but will monitor closely postoperatively. We will plan to proceed with surgical treatment as scheduled today.

## 2021-10-19 NOTE — PROGRESS NOTES
Emmanuelle Sutherland is resting in bed for our discussion. States that she is ready to get her surgery for her other hip \"over with. \" Voices frustration that it has been \"up in the air\" for so many days. Denies anxiety over procedure. States that she knows that she has a long road to recovery ahead of her and she is \"old. \" Support provided. She shares stories about her brother with writer. She becomes tearful when discussing her daughter and states \"I wanted to do things and she just tells me that I can do it because I am too old. I know I am old. I don't need her to tell me that! \" Support provided. States that she will be going to [facility] after surgery for therapy and wants to find another facility to move into while she is there. Denies further needs at this time.      [address] Miranda Nurse Coordinator  10/19/2021 11:40 AM

## 2021-10-20 LAB
ABSOLUTE EOS #: 0.05 K/UL (ref 0–0.44)
ABSOLUTE IMMATURE GRANULOCYTE: 0.03 K/UL (ref 0–0.3)
ABSOLUTE LYMPH #: 1.09 K/UL (ref 1.1–3.7)
ABSOLUTE MONO #: 0.62 K/UL (ref 0.1–1.2)
ANION GAP SERPL CALCULATED.3IONS-SCNC: 13 MMOL/L (ref 9–17)
BASOPHILS # BLD: 0 % (ref 0–2)
BASOPHILS ABSOLUTE: 0.03 K/UL (ref 0–0.2)
BUN BLDV-MCNC: 23 MG/DL (ref 8–23)
BUN/CREAT BLD: 26 (ref 9–20)
CALCIUM SERPL-MCNC: 8.3 MG/DL (ref 8.6–10.4)
CHLORIDE BLD-SCNC: 108 MMOL/L (ref 98–107)
CO2: 17 MMOL/L (ref 20–31)
CREAT SERPL-MCNC: 0.88 MG/DL (ref 0.5–0.9)
DIFFERENTIAL TYPE: ABNORMAL
EOSINOPHILS RELATIVE PERCENT: 1 % (ref 1–4)
GFR AFRICAN AMERICAN: >60 ML/MIN
GFR NON-AFRICAN AMERICAN: >60 ML/MIN
GFR SERPL CREATININE-BSD FRML MDRD: ABNORMAL ML/MIN/{1.73_M2}
GFR SERPL CREATININE-BSD FRML MDRD: ABNORMAL ML/MIN/{1.73_M2}
GLUCOSE BLD-MCNC: 124 MG/DL (ref 74–100)
GLUCOSE BLD-MCNC: 144 MG/DL (ref 70–99)
HCT VFR BLD CALC: 25.4 % (ref 36.3–47.1)
HEMOGLOBIN: 8 G/DL (ref 11.9–15.1)
IMMATURE GRANULOCYTES: 0 %
LYMPHOCYTES # BLD: 13 % (ref 24–43)
MCH RBC QN AUTO: 31.7 PG (ref 25.2–33.5)
MCHC RBC AUTO-ENTMCNC: 31.5 G/DL (ref 28.4–34.8)
MCV RBC AUTO: 100.8 FL (ref 82.6–102.9)
MONOCYTES # BLD: 7 % (ref 3–12)
NRBC AUTOMATED: 0 PER 100 WBC
PDW BLD-RTO: 15 % (ref 11.8–14.4)
PLATELET # BLD: 270 K/UL (ref 138–453)
PLATELET ESTIMATE: ABNORMAL
PMV BLD AUTO: 10 FL (ref 8.1–13.5)
POTASSIUM SERPL-SCNC: 3.8 MMOL/L (ref 3.7–5.3)
RBC # BLD: 2.52 M/UL (ref 3.95–5.11)
RBC # BLD: ABNORMAL 10*6/UL
SEG NEUTROPHILS: 78 % (ref 36–65)
SEGMENTED NEUTROPHILS ABSOLUTE COUNT: 6.55 K/UL (ref 1.5–8.1)
SODIUM BLD-SCNC: 138 MMOL/L (ref 135–144)
WBC # BLD: 8.4 K/UL (ref 3.5–11.3)
WBC # BLD: ABNORMAL 10*3/UL

## 2021-10-20 PROCEDURE — 2580000003 HC RX 258: Performed by: ORTHOPAEDIC SURGERY

## 2021-10-20 PROCEDURE — 6370000000 HC RX 637 (ALT 250 FOR IP): Performed by: ORTHOPAEDIC SURGERY

## 2021-10-20 PROCEDURE — 36415 COLL VENOUS BLD VENIPUNCTURE: CPT

## 2021-10-20 PROCEDURE — 94761 N-INVAS EAR/PLS OXIMETRY MLT: CPT

## 2021-10-20 PROCEDURE — 97530 THERAPEUTIC ACTIVITIES: CPT

## 2021-10-20 PROCEDURE — 1200000000 HC SEMI PRIVATE

## 2021-10-20 PROCEDURE — 2700000000 HC OXYGEN THERAPY PER DAY

## 2021-10-20 PROCEDURE — 97110 THERAPEUTIC EXERCISES: CPT

## 2021-10-20 PROCEDURE — 82947 ASSAY GLUCOSE BLOOD QUANT: CPT

## 2021-10-20 PROCEDURE — 80048 BASIC METABOLIC PNL TOTAL CA: CPT

## 2021-10-20 PROCEDURE — 6360000002 HC RX W HCPCS: Performed by: ORTHOPAEDIC SURGERY

## 2021-10-20 PROCEDURE — 85025 COMPLETE CBC W/AUTO DIFF WBC: CPT

## 2021-10-20 RX ADMIN — PREDNISONE 5 MG: 5 TABLET ORAL at 08:24

## 2021-10-20 RX ADMIN — FUROSEMIDE 20 MG: 20 TABLET ORAL at 12:18

## 2021-10-20 RX ADMIN — HYDROCODONE BITARTRATE AND ACETAMINOPHEN 2 TABLET: 5; 325 TABLET ORAL at 12:18

## 2021-10-20 RX ADMIN — CARBAMAZEPINE 200 MG: 200 TABLET ORAL at 08:23

## 2021-10-20 RX ADMIN — VANCOMYCIN HYDROCHLORIDE 750 MG: 750 INJECTION, POWDER, LYOPHILIZED, FOR SOLUTION INTRAVENOUS at 03:36

## 2021-10-20 RX ADMIN — CHOLECALCIFEROL TAB 10 MCG (400 UNIT) 400 UNITS: 10 TAB at 08:23

## 2021-10-20 RX ADMIN — PRIMIDONE 50 MG: 50 TABLET ORAL at 20:05

## 2021-10-20 RX ADMIN — SODIUM CHLORIDE: 9 INJECTION, SOLUTION INTRAVENOUS at 19:45

## 2021-10-20 RX ADMIN — HYDROCODONE BITARTRATE AND ACETAMINOPHEN 2 TABLET: 5; 325 TABLET ORAL at 19:41

## 2021-10-20 RX ADMIN — ATENOLOL 50 MG: 50 TABLET ORAL at 08:23

## 2021-10-20 RX ADMIN — SODIUM CHLORIDE: 9 INJECTION, SOLUTION INTRAVENOUS at 07:40

## 2021-10-20 RX ADMIN — HYDROCODONE BITARTRATE AND ACETAMINOPHEN 2 TABLET: 5; 325 TABLET ORAL at 08:22

## 2021-10-20 RX ADMIN — CYANOCOBALAMIN TAB 1000 MCG 1000 MCG: 1000 TAB at 08:24

## 2021-10-20 RX ADMIN — AMLODIPINE BESYLATE 2.5 MG: 2.5 TABLET ORAL at 08:24

## 2021-10-20 RX ADMIN — LEVOFLOXACIN 250 MG: 5 INJECTION, SOLUTION INTRAVENOUS at 12:18

## 2021-10-20 RX ADMIN — ATORVASTATIN CALCIUM 20 MG: 20 TABLET, FILM COATED ORAL at 20:05

## 2021-10-20 RX ADMIN — METFORMIN HYDROCHLORIDE 500 MG: 500 TABLET ORAL at 08:23

## 2021-10-20 RX ADMIN — PANTOPRAZOLE SODIUM 40 MG: 40 TABLET, DELAYED RELEASE ORAL at 08:24

## 2021-10-20 RX ADMIN — SODIUM CHLORIDE, PRESERVATIVE FREE 10 ML: 5 INJECTION INTRAVENOUS at 07:38

## 2021-10-20 RX ADMIN — ENOXAPARIN SODIUM 40 MG: 40 INJECTION SUBCUTANEOUS at 08:21

## 2021-10-20 RX ADMIN — CARBAMAZEPINE 200 MG: 200 TABLET ORAL at 20:05

## 2021-10-20 RX ADMIN — METFORMIN HYDROCHLORIDE 500 MG: 500 TABLET ORAL at 17:34

## 2021-10-20 RX ADMIN — MIRTAZAPINE 15 MG: 15 TABLET, FILM COATED ORAL at 20:05

## 2021-10-20 RX ADMIN — FERROUS SULFATE TAB 325 MG (65 MG ELEMENTAL FE) 325 MG: 325 (65 FE) TAB at 08:22

## 2021-10-20 RX ADMIN — PRIMIDONE 50 MG: 50 TABLET ORAL at 08:22

## 2021-10-20 ASSESSMENT — PAIN SCALES - GENERAL
PAINLEVEL_OUTOF10: 8
PAINLEVEL_OUTOF10: 6
PAINLEVEL_OUTOF10: 9
PAINLEVEL_OUTOF10: 10

## 2021-10-20 NOTE — PROGRESS NOTES
Occupational Therapy   Occupational Therapy Initial Assessment  Date: 10/20/2021   Patient Name: Emre   MRN: [id number]     : 1931    Date of Service: 10/20/2021    Discharge Recommendations:  Continue to assess pending progress, ECF with OT  OT Equipment Recommendations  Equipment Needed: Yes  Mobility Devices: Walker    Assessment   Performance deficits / Impairments: Decreased functional mobility ; Decreased ADL status; Decreased strength;Decreased endurance;Decreased safe awareness;Decreased high-level IADLs  Assessment: Patient is a 79 y/o female seen two days s/p right hip fracture/surgery. Patient is currently WBAT for BLE. She presents with significant pain and deconditioning, limiting independence with ADLs. Patient to benefit from OT services in order to address these deficits. Treatment Diagnosis: M62.81-Generalized Weakness  Prognosis: Fair  Decision Making: High Complexity  OT Education: OT Role;Plan of Care;Transfer Training  REQUIRES OT FOLLOW UP: Yes  Activity Tolerance  Activity Tolerance: Patient limited by pain; Patient limited by fatigue  Safety Devices  Safety Devices in place: Yes  Type of devices: Call light within reach; Left in bed;Bed alarm in place; All fall risk precautions in place           Patient Diagnosis(es): The primary encounter diagnosis was Cellulitis, unspecified cellulitis site. A diagnosis of Acute cystitis without hematuria was also pertinent to this visit. has a past medical history of Anemia, Arthritis of knee, Benign essential tremor, CAD S/P percutaneous coronary angioplasty, Diabetes mellitus type II, controlled ([facility] [address] Gastrointestinal hemorrhage, History of pancreatitis, Humerus fracture, Hyperlipidemia, Hypertension, Major depression, S/P CABG (coronary artery bypass graft), Ventral hernia, and Vitamin B12 deficiency. has a past surgical history that includes Cholecystectomy; Coronary artery bypass graft ();  Hysterectomy, vaginal (); Salpingo-oophorectomy (1960); Colonoscopy (N/A, 11/5/2018); hip surgery (Right); and Hip fracture surgery (Right, 10/13/2021). Treatment Diagnosis: M62.81-Generalized Weakness      Restrictions  Restrictions/Precautions  Restrictions/Precautions: Weight Bearing, General Precautions, Fall Risk  Lower Extremity Weight Bearing Restrictions  Right Lower Extremity Weight Bearing: Weight Bearing As Tolerated  Left Lower Extremity Weight Bearing: Weight Bearing As Tolerated    Subjective   General  Chart Reviewed: Yes  Patient assessed for rehabilitation services?: Yes  Family / Caregiver Present: No  Referring Practitioner: Harvey Blanc  Diagnosis: Right Total Hip  Subjective  Subjective: Pt reports pain 7/10. General Comment  Comments: Pt supine in bed with HOB elevated upon arrival. Pt agreeable to OT re-evaluation. Patient Currently in Pain: Yes  Vital Signs  Temp: 97.9 °F (36.6 °C)  Temp Source: Oral  Pulse: 71  Heart Rate Source: Monitor; Apical  Resp: 20  BP: (!) 139/49  BP Location: Right upper arm  MAP (mmHg): 76  Patient Position: Lying left side;Semi fowlers  Level of Consciousness: Alert (0)  MEWS Score: 1  Patient Currently in Pain: Yes  Oxygen Therapy  SpO2: 97 %  Pulse Oximeter Device Mode: Continuous  Pulse Oximeter Device Location: Finger  O2 Device: Nasal cannula  O2 Flow Rate (L/min): 1 L/min  Social/Functional History  Social/Functional History  Lives With: Alone  Type of Home: Assisted living  Home Layout: One level  Home Access: Level entry  Bathroom Shower/Tub: Walk-in shower  Bathroom Equipment: Grab bars in shower, Built-in shower seat  Home Equipment: Rolling walker  Receives Help From: Family  ADL Assistance: Independent  Homemaking Assistance: Needs assistance  Ambulation Assistance: Independent  Transfer Assistance: Independent  Additional Comments: Patient reports she was independent with ADLs- reports she had assistance with meals and cooking       Objective   Vision: Impaired  Vision Exceptions: Wears glasses at all times  Hearing: Within functional limits          Balance  Sitting Balance: Minimal assistance  Standing Balance  Comment: Pt attempted to stand x 2 trials with max A x 2 . Pt unable to fully stand due to increased pain. Functional Mobility  Functional - Mobility Device: Rolling Walker  ADL  Feeding: Setup;Minimal assistance;Scoop assist;Increased time to complete;Bringing food to mouth assist;Beverage management  Grooming: Minimal assistance  UE Bathing: Moderate assistance  LE Bathing: Dependent/Total  UE Dressing: Moderate assistance  LE Dressing: Dependent/Total  Toileting: Dependent/Total        Bed mobility  Rolling to Left: Maximum assistance;2 Person assistance  Rolling to Right: Maximum assistance;2 Person assistance  Supine to Sit: Moderate assistance  Sit to Supine: Maximum assistance;2 Person assistance  Scooting: Maximal assistance;2 Person assistance  Comment: Patient requires vc's for hand placement and safe technique. Transfers  Sit to stand: 2 Person assistance;Maximum assistance  Stand to sit: 2 Person assistance; Moderate assistance        Perception  Overall Perceptual Status: WFL     Sensation  Overall Sensation Status: WFL        LUE AROM (degrees)  LUE AROM : WFL  Left Hand AROM (degrees)  Left Hand AROM: WFL  RUE AROM (degrees)  RUE AROM : WFL  Right Hand AROM (degrees)  Right Hand AROM: WFL  LUE Strength  Gross LUE Strength: WFL  RUE Strength  Gross RUE Strength: WFL                   Plan   Plan  Times per week: 7x  Times per day: Daily  Current Treatment Recommendations: Strengthening, Safety Education & Training, Self-Care / ADL, Functional Mobility Training, Balance Training, Neuromuscular Re-education, Endurance Training    G-Code     OutComes Score                                                  AM-PAC Score        AM-[facility] Inpatient Daily Activity Raw Score: 10 (10/20/21 1158)  AM-PAC Inpatient ADL T-Scale Score : 27.31 (10/20/21 1158)  ADL Inpatient CMS 0-100% Score: 74.7 (10/20/21 1158)  ADL Inpatient CMS G-Code Modifier : CL (10/20/21 1158)    Goals  Short term goals  Time Frame for Short term goals: 21  Short term goal 1: Patient to progress to bed mobility with CGA 1-2 to prepare for OOB ADLs. Short term goal 2: Patient to progress to functional transfers (sit-pivot,sit-stand) with min A to improve independence. Short term goal 3: Patient to complete 10 minutes ther-ex/ther-act in order to improve strength/endurance for ADLs  Short term goal 4: Patient to complete UB bathing/dressing with min A. Short term goal 5: Patient to complete LB dressing/bathing with min A.        Therapy Time   Individual Concurrent Group Co-treatment   Time In 1108         Time Out 1128         Minutes Dodie Dow

## 2021-10-20 NOTE — PROGRESS NOTES
Physical Therapy  Facility/Department: [facility] AT THE [facility] MED SURG  Daily Treatment Note  NAME: Manfred Grant  : 1931  MRN: [id number]    Date of Service: 10/20/2021    Discharge Recommendations:  Continue to assess pending progress, Subacute/Skilled Nursing Facility, ECF with PT, Patient would benefit from continued therapy after discharge        Assessment   Body structures, Functions, Activity limitations: Decreased functional mobility ; Increased pain;Decreased ADL status; Decreased balance;Decreased ROM; Decreased strength;Decreased high-level IADLs;Decreased endurance  Assessment: Supine exercises B LE x15 AAROM. EOB exercises B LE x15 noted posterior lean with seated exercises, CGA/Mod A to maintain balance with increased activity. Treatment Diagnosis: generalized weakness, difficulty walking  Prognosis: Good  Decision Making: High Complexity  PT Education: General Safety;Weight-bearing Education  Patient Education: Educated pt on above with fair to poor understanding. REQUIRES PT FOLLOW UP: Yes  Activity Tolerance  Activity Tolerance: Patient Tolerated treatment well;Patient limited by fatigue;Patient limited by pain     Patient Diagnosis(es): The primary encounter diagnosis was Cellulitis, unspecified cellulitis site. A diagnosis of Acute cystitis without hematuria was also pertinent to this visit. has a past medical history of Anemia, Arthritis of knee, Benign essential tremor, CAD S/P percutaneous coronary angioplasty, Diabetes mellitus type II, controlled ([facility] [address] Gastrointestinal hemorrhage, History of pancreatitis, Humerus fracture, Hyperlipidemia, Hypertension, Major depression, S/P CABG (coronary artery bypass graft), Ventral hernia, and Vitamin B12 deficiency. has a past surgical history that includes Cholecystectomy; Coronary artery bypass graft (); Hysterectomy, vaginal (); Salpingo-oophorectomy ();  Colonoscopy (N/A, 2018); hip surgery (Right); and Hip fracture surgery (Right, 10/13/2021). Restrictions  Restrictions/Precautions  Restrictions/Precautions: Weight Bearing, General Precautions, Fall Risk  Lower Extremity Weight Bearing Restrictions  Right Lower Extremity Weight Bearing: Weight Bearing As Tolerated  Left Lower Extremity Weight Bearing: Weight Bearing As Tolerated  Subjective   General  Chart Reviewed: Yes  Response To Previous Treatment: Patient with no complaints from previous session. Family / Caregiver Present: No  Referring Practitioner: Zoë Terry,   Subjective  Subjective: Pt. unable to rate pain when asked. Agreeable to exercises and sitting EOB at this time. Orientation  Orientation  Overall Orientation Status: Within Functional Limits  Cognition      Objective   Bed mobility  Rolling to Right: Moderate assistance;Maximum assistance  Supine to Sit: Moderate assistance;2 Person assistance  Sit to Supine: Maximum assistance;2 Person assistance  Scooting: Maximal assistance;2 Person assistance  Comment: V/c required for hand placmenet with transfers. Ambulation  Ambulation?: No     Balance  Sitting - Static: Fair;-  Sitting - Dynamic: Poor;+  Standing - Static: Poor  Standing - Dynamic: Poor  Exercises  Comments: Supine exercises B LE AAROM x15. EOB exercises B LE x15 with noted posterior lean and required CGA/Mod A to maintain balance with increased activty. G-Code     OutComes Score    AM-PAC Score    Goals  Short term goals  Time Frame for Short term goals: 20 days  Short term goal 1: Pt will perform bed mobility with CGA to improve functional independence. Short term goal 2: Patient to complete sit/stand and stand pivot transfers with modAx2 and no LOB to improve mobility and decrease fall risk. Short term goal 3: Patient to amb 20ft with FWW and CGA/minAx1, WBAT B LE's with no LOB to improve mobility. Short term goal 4: Patient to tolerate 20-30 min of ther ex/act to improve functional strength.     Plan    Plan  Times per week: 7 days per week  Times per day: Twice a day  Current Treatment Recommendations: Strengthening, IADL Training, Neuromuscular Re-education, Home Exercise Program, ROM, Manual Therapy - Soft Tissue Mobilization, Safety Education & Training, Balance Training, Endurance Training, Patient/Caregiver Education & Training, Functional Mobility Training, ADL/Self-care Training, Transfer Training, Gait Training  Safety Devices  Type of devices:  All fall risk precautions in place, Bed alarm in place, Call light within reach, Nurse notified, Patient at risk for falls, Left in bed, Gait belt     Therapy Time   Individual Concurrent Group Co-treatment   Time In 1455         Time Out 1520         Minutes 25         Timed Code Treatment Minutes: [address], PTA

## 2021-10-20 NOTE — PROGRESS NOTES
Progress Note  Vinnie Odonnell MD    OBJECTIVE:    Patient seen for f/u of Closed right hip fracture, initial encounter ([address] She has rt hip  Pain ,had suegery for left yesterday  Has some edema  Hb 8  ROS:   Constitutional: negative  for fevers, and negative for chills. Respiratory: negative for shortness of breath, negative for cough, and negative for wheezing,on oxygen  Cardiovascular: negative for chest pain, and negative for palpitations, has edema of legs  Gastrointestinal: negative for abdominal pain, negative for nausea,negative for vomiting, negative for diarrhea, and negative for constipation  Has bilat hip pain   All other systems were reviewed with the patient and are negative unless otherwise stated in HPI    OBJECTIVE:  Vitals:   Temp: 98.3 °F (36.8 °C)  BP: (!) 157/52  Resp: 25  Pulse: 79  SpO2: 99 %    24HR INTAKE/OUTPUT:      Intake/Output Summary (Last 24 hours) at 10/20/2021 0958  Last data filed at 10/20/2021 0733  Gross per 24 hour   Intake 1346 ml   Output 2150 ml   Net -804 ml     -----------------------------------------------------------------  Exam:  GEN:    Awake, alert and oriented x3. EYES:  EOMI, pupils equal   NECK: Supple. No lymphadenopathy. No carotid bruit  CVS:    regular rate and rhythm, 2/6 systolic murmur,has bilat leg  edema  PULM:  Has basal rales, no acute respiratory distress  ABD:    Bowels sounds normal.  Abdomen is soft. No distention. no tenderness to palpation. EXT:  Has bilat leg edema . No calf tenderness. NEURO: Moves all extremities. Motor and sensory are grossly intact  SKIN:  No rashes.   Has multiple bruises  -----------------------------------------------------------------  Diagnostic Data:    · All available data reviewed  Lab Results   Component Value Date    WBC 8.4 10/20/2021    HGB 8.0 (L) 10/20/2021    .8 10/20/2021     10/20/2021      Lab Results   Component Value Date    GLUCOSE 144 (H) 10/20/2021    BUN 23 10/20/2021 CREATININE 0.88 10/20/2021     10/20/2021    K 3.8 10/20/2021    CALCIUM 8.3 (L) 10/20/2021     (H) 10/20/2021    CO2 17 (L) 10/20/2021       PROBLEM LIST:  Principal Problem:    Closed right hip fracture, initial encounter ([facility]  Active Problems:    Uncontrolled hypertension    Type 2 diabetes mellitus (HCC)    Dyslipidemia    ASHD (arteriosclerotic heart disease)    Acute cystitis without hematuria    Abnormal ECG    Closed left hip fracture, initial encounter ([facility]    Acute blood loss as cause of postoperative anemia    Hypomagnesemia  Resolved Problems:    * No resolved hospital problems.  *      ASSESSMENT / PLAN:  Closed right hip fracture, initial encounter (formerly [facility])  · Pain control  · Pt and ot  · Acute blood loss anemia-hb -8,monitor transfuse if less than 8  · Lt hip Fr- had surgery,   · Has fluid overload- improving,monitor  · ashd- cardiology follow up,   · Type 2 dm- blood sugars well controlled  · Nutrition status: at risk for malnutrition  · DVT prophylaxis: SCD's ,lovenox  · High risk medications: none   · Disposition:  Discharge plan is ECF    Emma Powell MD , M.D.  10/20/2021  9:58 AM

## 2021-10-20 NOTE — PROGRESS NOTES
212Physical Therapy    Facility/Department: [facility] AT THE [facility] MED SURG  Re-Assessment    NAME: Maine Rivera  : 1931  MRN: [id number]    Date of Service: 10/20/2021    Discharge Recommendations:  Continue to assess pending progress, Subacute/Skilled Nursing Facility, ECF with PT, Patient would benefit from continued therapy after discharge        Assessment   Assessment: Patient re-assessed for transfers and gait following surgery on L hip fracture. patient is now WBAT B LE's and able to sit EOB with min/modAx1; still maxAx2 for sit/stand transfers and unable to ambulate d/t weakness at this time. Patient to benefit from physical therapy to address all concerns and safely return to OF. Treatment Diagnosis: generalized weakness, difficulty walking  Prognosis: Good  Decision Making: High Complexity  REQUIRES PT FOLLOW UP: Yes  Activity Tolerance  Activity Tolerance: Patient Tolerated treatment well;Patient limited by fatigue;Patient limited by pain       Patient Diagnosis(es): The primary encounter diagnosis was Cellulitis, unspecified cellulitis site. A diagnosis of Acute cystitis without hematuria was also pertinent to this visit. has a past medical history of Anemia, Arthritis of knee, Benign essential tremor, CAD S/P percutaneous coronary angioplasty, Diabetes mellitus type II, controlled ([facility] [address] Gastrointestinal hemorrhage, History of pancreatitis, Humerus fracture, Hyperlipidemia, Hypertension, Major depression, S/P CABG (coronary artery bypass graft), Ventral hernia, and Vitamin B12 deficiency. has a past surgical history that includes Cholecystectomy; Coronary artery bypass graft (); Hysterectomy, vaginal (); Salpingo-oophorectomy (); Colonoscopy (N/A, 2018); hip surgery (Right); and Hip fracture surgery (Right, 10/13/2021).     Restrictions  Restrictions/Precautions  Restrictions/Precautions: Weight Bearing, General Precautions, Fall Risk  Lower Extremity Weight Bearing Restrictions  Right Lower Extremity Weight Bearing: Weight Bearing As Tolerated  Left Lower Extremity Weight Bearing: Weight Bearing As Tolerated  Vision/Hearing  Vision: Impaired  Vision Exceptions: Wears glasses at all times  Hearing: Within functional limits     Subjective  General  Chart Reviewed: Yes  Patient assessed for rehabilitation services?: Yes  Response To Previous Treatment: Patient with no complaints from previous session. Family / Caregiver Present: No  Referring Practitioner: Maria Luisa Perez DO  Referral Date : 10/19/21  Diagnosis: Acute cystitis without hematuria, N30.00  Follows Commands: Within Functional Limits  Subjective  Subjective: Patient denies pain at rest, unable to rate pain with transfer. Pain Screening  Patient Currently in Pain: Yes          Orientation  Orientation  Overall Orientation Status: Within Functional Limits  Social/Functional History  Social/Functional History  Lives With: Alone  Type of Home: Assisted living  Home Layout: One level  Home Access: Level entry  Bathroom Shower/Tub: Walk-in shower  Bathroom Equipment: Grab bars in shower, Built-in shower seat  Home Equipment: Rolling walker  Receives Help From: Family  ADL Assistance: Independent  Homemaking Assistance: Needs assistance  Ambulation Assistance: Independent  Transfer Assistance: Independent  Additional Comments: Patient reports she was independent with ADLs- reports she had assistance with meals and cooking  Cognition        Objective             Strength RLE  Comment: Grossly 2+/5 -- limited by pain  Strength LLE  Comment: Grossly 3-/5 -- limited by pain        Bed mobility  Supine to Sit: Moderate assistance  Sit to Supine: Maximum assistance;2 Person assistance  Scooting: Maximal assistance;2 Person assistance  Comment: Patient requires vc's for hand placement and safe technique.   Transfers  Sit to Stand: Maximum Assistance;2 Person Assistance  Stand to sit: Maximum Assistance;2 Person Assistance  Comment: maxAx2 for sit/stand from elevated EOB but unable to clear buttocks for more than 5 seconds. Ambulation  Ambulation?: No     Balance  Sitting - Static: Fair;-  Sitting - Dynamic: Poor;+  Standing - Static: Poor  Standing - Dynamic: Poor        Plan   Plan  Times per week: 7 days per week  Times per day: Twice a day (daily on weekends)  Current Treatment Recommendations: Strengthening, IADL Training, Neuromuscular Re-education, Home Exercise Program, ROM, Manual Therapy - Soft Tissue Mobilization, Safety Education & Training, Balance Training, Endurance Training, Patient/Caregiver Education & Training, Functional Mobility Training, ADL/Self-care Training, Transfer Training, Gait Training  Safety Devices  Type of devices: All fall risk precautions in place, Bed alarm in place, Call light within reach, Nurse notified, Patient at risk for falls, Left in bed, Gait belt    Goals  Short term goals  Time Frame for Short term goals: 20 days  Short term goal 1: Pt will perform bed mobility with CGA to improve functional independence. Short term goal 2: Patient to complete sit/stand and stand pivot transfers with modAx2 and no LOB to improve mobility and decrease fall risk. Short term goal 3: Patient to amb 20ft with FWW and CGA/minAx1, WBAT B LE's with no LOB to improve mobility. Short term goal 4: Patient to tolerate 20-30 min of ther ex/act to improve functional strength.        Therapy Time   Individual Concurrent Group Co-treatment   Time In 1478         Time Out 1146         Minutes 20         Timed Code Treatment Minutes: Pj Soni, PT, DPT

## 2021-10-21 LAB
ABSOLUTE EOS #: 0.15 K/UL (ref 0–0.44)
ABSOLUTE IMMATURE GRANULOCYTE: 0.04 K/UL (ref 0–0.3)
ABSOLUTE LYMPH #: 1 K/UL (ref 1.1–3.7)
ABSOLUTE MONO #: 0.56 K/UL (ref 0.1–1.2)
ANION GAP SERPL CALCULATED.3IONS-SCNC: 11 MMOL/L (ref 9–17)
BASOPHILS # BLD: 1 % (ref 0–2)
BASOPHILS ABSOLUTE: 0.04 K/UL (ref 0–0.2)
BUN BLDV-MCNC: 18 MG/DL (ref 8–23)
BUN/CREAT BLD: 22 (ref 9–20)
CALCIUM SERPL-MCNC: 8 MG/DL (ref 8.6–10.4)
CHLORIDE BLD-SCNC: 106 MMOL/L (ref 98–107)
CO2: 19 MMOL/L (ref 20–31)
CREAT SERPL-MCNC: 0.82 MG/DL (ref 0.5–0.9)
DIFFERENTIAL TYPE: ABNORMAL
EOSINOPHILS RELATIVE PERCENT: 2 % (ref 1–4)
GFR AFRICAN AMERICAN: >60 ML/MIN
GFR NON-AFRICAN AMERICAN: >60 ML/MIN
GFR SERPL CREATININE-BSD FRML MDRD: ABNORMAL ML/MIN/{1.73_M2}
GFR SERPL CREATININE-BSD FRML MDRD: ABNORMAL ML/MIN/{1.73_M2}
GLUCOSE BLD-MCNC: 114 MG/DL (ref 70–99)
HCT VFR BLD CALC: 23.3 % (ref 36.3–47.1)
HCT VFR BLD CALC: 28.5 % (ref 36.3–47.1)
HEMOGLOBIN: 7.2 G/DL (ref 11.9–15.1)
HEMOGLOBIN: 9.3 G/DL (ref 11.9–15.1)
IMMATURE GRANULOCYTES: 1 %
LYMPHOCYTES # BLD: 16 % (ref 24–43)
MCH RBC QN AUTO: 31.9 PG (ref 25.2–33.5)
MCHC RBC AUTO-ENTMCNC: 30.9 G/DL (ref 28.4–34.8)
MCV RBC AUTO: 103.1 FL (ref 82.6–102.9)
MONOCYTES # BLD: 9 % (ref 3–12)
NRBC AUTOMATED: 0 PER 100 WBC
PDW BLD-RTO: 15.3 % (ref 11.8–14.4)
PLATELET # BLD: 243 K/UL (ref 138–453)
PLATELET ESTIMATE: ABNORMAL
PMV BLD AUTO: 10.3 FL (ref 8.1–13.5)
POTASSIUM SERPL-SCNC: 3.8 MMOL/L (ref 3.7–5.3)
RBC # BLD: 2.26 M/UL (ref 3.95–5.11)
RBC # BLD: ABNORMAL 10*6/UL
SEG NEUTROPHILS: 71 % (ref 36–65)
SEGMENTED NEUTROPHILS ABSOLUTE COUNT: 4.54 K/UL (ref 1.5–8.1)
SODIUM BLD-SCNC: 136 MMOL/L (ref 135–144)
WBC # BLD: 6.3 K/UL (ref 3.5–11.3)
WBC # BLD: ABNORMAL 10*3/UL

## 2021-10-21 PROCEDURE — 2580000003 HC RX 258: Performed by: ORTHOPAEDIC SURGERY

## 2021-10-21 PROCEDURE — 2700000000 HC OXYGEN THERAPY PER DAY

## 2021-10-21 PROCEDURE — 6360000002 HC RX W HCPCS: Performed by: ORTHOPAEDIC SURGERY

## 2021-10-21 PROCEDURE — 86850 RBC ANTIBODY SCREEN: CPT

## 2021-10-21 PROCEDURE — 85025 COMPLETE CBC W/AUTO DIFF WBC: CPT

## 2021-10-21 PROCEDURE — 97110 THERAPEUTIC EXERCISES: CPT

## 2021-10-21 PROCEDURE — 1200000000 HC SEMI PRIVATE

## 2021-10-21 PROCEDURE — 86920 COMPATIBILITY TEST SPIN: CPT

## 2021-10-21 PROCEDURE — 2580000003 HC RX 258: Performed by: FAMILY MEDICINE

## 2021-10-21 PROCEDURE — 36415 COLL VENOUS BLD VENIPUNCTURE: CPT

## 2021-10-21 PROCEDURE — 80048 BASIC METABOLIC PNL TOTAL CA: CPT

## 2021-10-21 PROCEDURE — 6370000000 HC RX 637 (ALT 250 FOR IP): Performed by: ORTHOPAEDIC SURGERY

## 2021-10-21 PROCEDURE — 86901 BLOOD TYPING SEROLOGIC RH(D): CPT

## 2021-10-21 PROCEDURE — 85014 HEMATOCRIT: CPT

## 2021-10-21 PROCEDURE — 94761 N-INVAS EAR/PLS OXIMETRY MLT: CPT

## 2021-10-21 PROCEDURE — 36430 TRANSFUSION BLD/BLD COMPNT: CPT

## 2021-10-21 PROCEDURE — 86900 BLOOD TYPING SEROLOGIC ABO: CPT

## 2021-10-21 PROCEDURE — 6370000000 HC RX 637 (ALT 250 FOR IP): Performed by: NURSE PRACTITIONER

## 2021-10-21 PROCEDURE — P9016 RBC LEUKOCYTES REDUCED: HCPCS

## 2021-10-21 PROCEDURE — 85018 HEMOGLOBIN: CPT

## 2021-10-21 PROCEDURE — 6360000002 HC RX W HCPCS: Performed by: NURSE PRACTITIONER

## 2021-10-21 RX ORDER — FERROUS SULFATE 325(65) MG
325 TABLET ORAL 2 TIMES DAILY WITH MEALS
Status: DISCONTINUED | OUTPATIENT
Start: 2021-10-21 | End: 2021-10-25 | Stop reason: HOSPADM

## 2021-10-21 RX ORDER — FUROSEMIDE 10 MG/ML
40 INJECTION INTRAMUSCULAR; INTRAVENOUS ONCE
Status: COMPLETED | OUTPATIENT
Start: 2021-10-21 | End: 2021-10-21

## 2021-10-21 RX ORDER — SODIUM CHLORIDE 9 MG/ML
INJECTION, SOLUTION INTRAVENOUS PRN
Status: COMPLETED | OUTPATIENT
Start: 2021-10-21 | End: 2021-10-21

## 2021-10-21 RX ADMIN — SODIUM CHLORIDE, PRESERVATIVE FREE 10 ML: 5 INJECTION INTRAVENOUS at 09:08

## 2021-10-21 RX ADMIN — CARBAMAZEPINE 200 MG: 200 TABLET ORAL at 09:05

## 2021-10-21 RX ADMIN — HYDROCODONE BITARTRATE AND ACETAMINOPHEN 2 TABLET: 5; 325 TABLET ORAL at 19:43

## 2021-10-21 RX ADMIN — ATORVASTATIN CALCIUM 20 MG: 20 TABLET, FILM COATED ORAL at 21:12

## 2021-10-21 RX ADMIN — CHOLECALCIFEROL TAB 10 MCG (400 UNIT) 400 UNITS: 10 TAB at 09:03

## 2021-10-21 RX ADMIN — METFORMIN HYDROCHLORIDE 500 MG: 500 TABLET ORAL at 07:21

## 2021-10-21 RX ADMIN — SODIUM CHLORIDE: 9 INJECTION, SOLUTION INTRAVENOUS at 23:29

## 2021-10-21 RX ADMIN — PRIMIDONE 50 MG: 50 TABLET ORAL at 09:04

## 2021-10-21 RX ADMIN — AMLODIPINE BESYLATE 2.5 MG: 2.5 TABLET ORAL at 09:05

## 2021-10-21 RX ADMIN — VANCOMYCIN HYDROCHLORIDE 750 MG: 750 INJECTION, POWDER, LYOPHILIZED, FOR SOLUTION INTRAVENOUS at 01:30

## 2021-10-21 RX ADMIN — CYANOCOBALAMIN TAB 1000 MCG 1000 MCG: 1000 TAB at 09:06

## 2021-10-21 RX ADMIN — SODIUM CHLORIDE: 9 INJECTION, SOLUTION INTRAVENOUS at 17:17

## 2021-10-21 RX ADMIN — MORPHINE SULFATE 2 MG: 2 INJECTION, SOLUTION INTRAMUSCULAR; INTRAVENOUS at 07:21

## 2021-10-21 RX ADMIN — FERROUS SULFATE TAB 325 MG (65 MG ELEMENTAL FE) 325 MG: 325 (65 FE) TAB at 07:21

## 2021-10-21 RX ADMIN — FUROSEMIDE 40 MG: 10 INJECTION, SOLUTION INTRAMUSCULAR; INTRAVENOUS at 15:32

## 2021-10-21 RX ADMIN — SODIUM CHLORIDE: 9 INJECTION, SOLUTION INTRAVENOUS at 07:23

## 2021-10-21 RX ADMIN — FERROUS SULFATE TAB 325 MG (65 MG ELEMENTAL FE) 325 MG: 325 (65 FE) TAB at 15:33

## 2021-10-21 RX ADMIN — PANTOPRAZOLE SODIUM 40 MG: 40 TABLET, DELAYED RELEASE ORAL at 09:04

## 2021-10-21 RX ADMIN — HYDROCODONE BITARTRATE AND ACETAMINOPHEN 2 TABLET: 5; 325 TABLET ORAL at 04:28

## 2021-10-21 RX ADMIN — PREDNISONE 5 MG: 5 TABLET ORAL at 07:21

## 2021-10-21 RX ADMIN — LEVOFLOXACIN 250 MG: 5 INJECTION, SOLUTION INTRAVENOUS at 13:20

## 2021-10-21 RX ADMIN — CARBAMAZEPINE 200 MG: 200 TABLET ORAL at 21:12

## 2021-10-21 RX ADMIN — PRIMIDONE 50 MG: 50 TABLET ORAL at 21:12

## 2021-10-21 RX ADMIN — ATENOLOL 50 MG: 50 TABLET ORAL at 09:05

## 2021-10-21 RX ADMIN — HYDROCODONE BITARTRATE AND ACETAMINOPHEN 2 TABLET: 5; 325 TABLET ORAL at 09:05

## 2021-10-21 RX ADMIN — MIRTAZAPINE 15 MG: 15 TABLET, FILM COATED ORAL at 21:12

## 2021-10-21 RX ADMIN — METFORMIN HYDROCHLORIDE 500 MG: 500 TABLET ORAL at 15:33

## 2021-10-21 ASSESSMENT — PAIN SCALES - GENERAL
PAINLEVEL_OUTOF10: 10
PAINLEVEL_OUTOF10: 0
PAINLEVEL_OUTOF10: 9
PAINLEVEL_OUTOF10: 10
PAINLEVEL_OUTOF10: 10
PAINLEVEL_OUTOF10: 0
PAINLEVEL_OUTOF10: 9
PAINLEVEL_OUTOF10: 9

## 2021-10-21 NOTE — PROGRESS NOTES
[facility]  Inpatient/Observation/Outpatient Rehabilitation    Date: 10/21/2021  Patient Name: Naty Berry       [] Inpatient Acute/Observation       []  Outpatient  : 1931       [] Pt no showed for scheduled appointment    [x] Pt refused/declined therapy at this time due to: Attempted pt., pt. Declined stating \" I have already done enough today and don't need to do anymore. \" Informed pt. At this time blood levels are to low to perform transfers but would check back this afternoon to attempt Supine exercises.     [] Pt cancelled due to:  [] No Reason Given   [] Sick/ill   [] Other:          Eric Blanton, PTA Date: 10/21/2021

## 2021-10-21 NOTE — PROGRESS NOTES
Pt hgb was 7.2 this morning a unit of PRBC were ordered and pt refused she said shes done with everything and doesn't want the blood, even after explaining to her, darvin was notified and  Oral iron was added to pts meds

## 2021-10-21 NOTE — PROGRESS NOTES
Physical Therapy  Facility/Department: [facility] AT THE [facility] MED SURG  Daily Treatment Note  NAME: Noa Thao  : 1931  MRN: [id number]    Date of Service: 10/21/2021    Discharge Recommendations:  Continue to assess pending progress, Subacute/Skilled Nursing Facility, ECF with PT, Patient would benefit from continued therapy after discharge        Assessment   Treatment Diagnosis: generalized weakness, difficulty walking  Prognosis: Good  PT Education: PT Role;Goals  REQUIRES PT FOLLOW UP: Yes  Activity Tolerance  Activity Tolerance: Treatment limited secondary to medical complications (free text)  Activity Tolerance: Hemoglobin 7.2     Patient Diagnosis(es): The primary encounter diagnosis was Cellulitis, unspecified cellulitis site. A diagnosis of Acute cystitis without hematuria was also pertinent to this visit. has a past medical history of Anemia, Arthritis of knee, Benign essential tremor, CAD S/P percutaneous coronary angioplasty, Diabetes mellitus type II, controlled ([facility] [address] Gastrointestinal hemorrhage, History of pancreatitis, Humerus fracture, Hyperlipidemia, Hypertension, Major depression, S/P CABG (coronary artery bypass graft), Ventral hernia, and Vitamin B12 deficiency. has a past surgical history that includes Cholecystectomy; Coronary artery bypass graft (); Hysterectomy, vaginal (); Salpingo-oophorectomy (); Colonoscopy (N/A, 2018); hip surgery (Right); Hip fracture surgery (Right, 10/13/2021); and Hip fracture surgery (Left, 10/19/2021).     Restrictions  Restrictions/Precautions  Restrictions/Precautions: Weight Bearing, General Precautions, Fall Risk  Lower Extremity Weight Bearing Restrictions  Right Lower Extremity Weight Bearing: Weight Bearing As Tolerated  Left Lower Extremity Weight Bearing: Weight Bearing As Tolerated  Subjective   General  Chart Reviewed: Yes  Response To Previous Treatment: Not applicable  Family / Caregiver Present: No  Referring Practitioner: Lacho DO Renato  Subjective  Subjective: Pt denies pain, reports fatigue. General Comment  Comments: Pt required encourgement for participation. Kept eyes closed most of the time. Orientation  Orientation  Overall Orientation Status: Within Functional Limits  Cognition      Objective      Transfers  Comment: Inapropriate at this time d/t low hemoglobin  Ambulation  Ambulation?:  (Inappropriate at this time d/t low hemoglobin)        Exercises  Comments: Completed gentle PROM/AAROM to BLE ex, all planes x 10 reps. (Pt with minimal particpation)     G-Code     OutComes Score    AM-PAC Score    Goals  Short term goals  Time Frame for Short term goals: 20 days  Short term goal 1: Pt will perform bed mobility with CGA to improve functional independence. Short term goal 2: Patient to complete sit/stand and stand pivot transfers with modAx2 and no LOB to improve mobility and decrease fall risk. Short term goal 3: Patient to amb 20ft with FWW and CGA/minAx1, WBAT B LE's with no LOB to improve mobility. Short term goal 4: Patient to tolerate 20-30 min of ther ex/act to improve functional strength. Plan    Plan  Times per week: 7 days per week  Times per day: Twice a day  Current Treatment Recommendations: Strengthening, IADL Training, Neuromuscular Re-education, Home Exercise Program, ROM, Manual Therapy - Soft Tissue Mobilization, Safety Education & Training, Balance Training, Endurance Training, Patient/Caregiver Education & Training, Functional Mobility Training, ADL/Self-care Training, Transfer Training, Gait Training  Safety Devices  Type of devices:  All fall risk precautions in place, Call light within reach, Nurse notified, Left in bed, Bed alarm in place     Therapy Time   Individual Concurrent Group Co-treatment   Time In 1429         Time Out 1445         Minutes 1 [place], [place]

## 2021-10-21 NOTE — PROGRESS NOTES
Progress Note  Vinnie Odonnell MD    OBJECTIVE:    Patient seen for f/u of Closed right hip fracture, initial encounter ([facility] [address] She has some  hip  Pain ,  Has minimal edema  Hb 7.2  ROS:   Constitutional: negative  for fevers, and negative for chills. Respiratory: negative for shortness of breath, negative for cough, and negative for wheezing,on oxygen  Cardiovascular: negative for chest pain, and negative for palpitations, has edema of legs  Gastrointestinal: negative for abdominal pain, negative for nausea,negative for vomiting, negative for diarrhea, and negative for constipation  Has bilat hip pain   All other systems were reviewed with the patient and are negative unless otherwise stated in HPI    OBJECTIVE:  Vitals:   Temp: 97.6 °F (36.4 °C)  BP: (!) 130/44  Resp: 20  Pulse: 68  SpO2: 96 %    24HR INTAKE/OUTPUT:      Intake/Output Summary (Last 24 hours) at 10/21/2021 0810  Last data filed at 10/21/2021 0723  Gross per 24 hour   Intake 8351.96 ml   Output 1500 ml   Net 6851.96 ml     -----------------------------------------------------------------  Exam:  GEN:    Awake, alert and oriented x3. EYES:  EOMI, pupils equal   NECK: Supple. No lymphadenopathy. No carotid bruit  CVS:    regular rate and rhythm, 2/6 systolic murmur,has bilat leg  edema  PULM:  Has basal rales, no acute respiratory distress  ABD:    Bowels sounds normal.  Abdomen is soft. No distention. no tenderness to palpation. EXT:  Has bilat leg edema . No calf tenderness. NEURO: Moves all extremities. Motor and sensory are grossly intact  SKIN:  No rashes.   Has multiple bruises  -----------------------------------------------------------------  Diagnostic Data:    · All available data reviewed  Lab Results   Component Value Date    WBC 6.3 10/21/2021    HGB 7.2 (L) 10/21/2021    .1 (H) 10/21/2021     10/21/2021      Lab Results   Component Value Date    GLUCOSE 114 (H) 10/21/2021    BUN 18 10/21/2021    CREATININE 0.82 10/21/2021     10/21/2021    K 3.8 10/21/2021    CALCIUM 8.0 (L) 10/21/2021     10/21/2021    CO2 19 (L) 10/21/2021       PROBLEM LIST:  Principal Problem:    Closed right hip fracture, initial encounter ([facility]  Active Problems:    Uncontrolled hypertension    Type 2 diabetes mellitus (HCC)    Dyslipidemia    ASHD (arteriosclerotic heart disease)    Acute cystitis without hematuria    Abnormal ECG    Closed left hip fracture, initial encounter ([facility]    Acute blood loss as cause of postoperative anemia    Hypomagnesemia  Resolved Problems:    * No resolved hospital problems.  *      ASSESSMENT / PLAN:  Closed right hip fracture, initial encounter ([facility])  · Pain control  · Pt and ot  · Acute blood loss anemia-hb -7.2,monitor transfuse   · Lt hip Fr- had surgery,   · Has fluid overload- improving,monitor  · ashd- cardiology follow up,   · Type 2 dm- blood sugars well controlled  · Nutrition status: at risk for malnutrition  · DVT prophylaxis: SCD's ,lovenox  · High risk medications: none   · Disposition:  Discharge plan is ECF    Amil Gitelman, MD , M.D.  10/21/2021  8:10 AM

## 2021-10-21 NOTE — PROGRESS NOTES
Initial shift assessment done and VS obtained. Pt is A&Ox4. Pt complains of pain rated \"10\" on a 0-10 pain scale, prn Norco administered at this time. Warm blanket provided. Bilateral hip dressings remain dry and intact with scant amount of old drainage noted. Pt denies any other needs at this time. Call light and bedside table remain within reach. Will monitor.

## 2021-10-21 NOTE — PROGRESS NOTES
This morning pt got an entire bedbath this morning and was encouraged to work with therapy and use her incentive spirometer, however pt refused therapy in the afternoon and refused to eat lunch she stated \" she just wants to get some rest\". When I tried to at least get the pt boosted up in bed and turned off of her bottom she repeated again that \" she is 80years old and just wants to get some rest, and that its so hard to get good sleep here,\" Also I reported to darvin that pts urine output in the last 12 hrs was only 300ml so we are ordering a one time dose of iv lasix.

## 2021-10-21 NOTE — PROGRESS NOTES
[facility] THERAPY  No Visit Note    [] ICU    [x] Acute   Patient: Mary Jo Moss  Room: 5718/6544-04      Mary Jo Moss not seen on 10/21/2021 at 3:05 PM due to attempted x3 pt unable to wake at this time. Nurse Henrry Solo states \"it is up to pt if she wants to wake up, either is fine\". Will re-attempt at earliest convenience. PT previously with pt stating difficulty remaining awake.          Signature: Luis Padron, DOTSON/L

## 2021-10-22 LAB
ABSOLUTE EOS #: 0.1 K/UL (ref 0–0.44)
ABSOLUTE IMMATURE GRANULOCYTE: 0.06 K/UL (ref 0–0.3)
ABSOLUTE LYMPH #: 1.24 K/UL (ref 1.1–3.7)
ABSOLUTE MONO #: 0.58 K/UL (ref 0.1–1.2)
ANION GAP SERPL CALCULATED.3IONS-SCNC: 12 MMOL/L (ref 9–17)
BASOPHILS # BLD: 1 % (ref 0–2)
BASOPHILS ABSOLUTE: 0.05 K/UL (ref 0–0.2)
BUN BLDV-MCNC: 18 MG/DL (ref 8–23)
BUN/CREAT BLD: 24 (ref 9–20)
CALCIUM SERPL-MCNC: 8.1 MG/DL (ref 8.6–10.4)
CHLORIDE BLD-SCNC: 104 MMOL/L (ref 98–107)
CO2: 19 MMOL/L (ref 20–31)
CREAT SERPL-MCNC: 0.75 MG/DL (ref 0.5–0.9)
DIFFERENTIAL TYPE: ABNORMAL
EOSINOPHILS RELATIVE PERCENT: 1 % (ref 1–4)
GFR AFRICAN AMERICAN: >60 ML/MIN
GFR NON-AFRICAN AMERICAN: >60 ML/MIN
GFR SERPL CREATININE-BSD FRML MDRD: ABNORMAL ML/MIN/{1.73_M2}
GFR SERPL CREATININE-BSD FRML MDRD: ABNORMAL ML/MIN/{1.73_M2}
GLUCOSE BLD-MCNC: 109 MG/DL (ref 70–99)
HCT VFR BLD CALC: 29.4 % (ref 36.3–47.1)
HEMOGLOBIN: 9.5 G/DL (ref 11.9–15.1)
IMMATURE GRANULOCYTES: 1 %
LYMPHOCYTES # BLD: 17 % (ref 24–43)
MCH RBC QN AUTO: 31.5 PG (ref 25.2–33.5)
MCHC RBC AUTO-ENTMCNC: 32.3 G/DL (ref 28.4–34.8)
MCV RBC AUTO: 97.4 FL (ref 82.6–102.9)
MONOCYTES # BLD: 8 % (ref 3–12)
NRBC AUTOMATED: 0 PER 100 WBC
PDW BLD-RTO: 16.7 % (ref 11.8–14.4)
PLATELET # BLD: 235 K/UL (ref 138–453)
PLATELET ESTIMATE: ABNORMAL
PMV BLD AUTO: 10.5 FL (ref 8.1–13.5)
POTASSIUM SERPL-SCNC: 3.8 MMOL/L (ref 3.7–5.3)
RBC # BLD: 3.02 M/UL (ref 3.95–5.11)
RBC # BLD: ABNORMAL 10*6/UL
SEG NEUTROPHILS: 73 % (ref 36–65)
SEGMENTED NEUTROPHILS ABSOLUTE COUNT: 5.43 K/UL (ref 1.5–8.1)
SODIUM BLD-SCNC: 135 MMOL/L (ref 135–144)
SURGICAL PATHOLOGY REPORT: NORMAL
WBC # BLD: 7.5 K/UL (ref 3.5–11.3)
WBC # BLD: ABNORMAL 10*3/UL

## 2021-10-22 PROCEDURE — 36415 COLL VENOUS BLD VENIPUNCTURE: CPT

## 2021-10-22 PROCEDURE — 85025 COMPLETE CBC W/AUTO DIFF WBC: CPT

## 2021-10-22 PROCEDURE — 97530 THERAPEUTIC ACTIVITIES: CPT

## 2021-10-22 PROCEDURE — 6360000002 HC RX W HCPCS: Performed by: ORTHOPAEDIC SURGERY

## 2021-10-22 PROCEDURE — 2580000003 HC RX 258: Performed by: ORTHOPAEDIC SURGERY

## 2021-10-22 PROCEDURE — 51702 INSERT TEMP BLADDER CATH: CPT

## 2021-10-22 PROCEDURE — 6370000000 HC RX 637 (ALT 250 FOR IP): Performed by: ORTHOPAEDIC SURGERY

## 2021-10-22 PROCEDURE — 1200000000 HC SEMI PRIVATE

## 2021-10-22 PROCEDURE — 2700000000 HC OXYGEN THERAPY PER DAY

## 2021-10-22 PROCEDURE — 6370000000 HC RX 637 (ALT 250 FOR IP): Performed by: NURSE PRACTITIONER

## 2021-10-22 PROCEDURE — 80048 BASIC METABOLIC PNL TOTAL CA: CPT

## 2021-10-22 PROCEDURE — 94761 N-INVAS EAR/PLS OXIMETRY MLT: CPT

## 2021-10-22 PROCEDURE — 97110 THERAPEUTIC EXERCISES: CPT

## 2021-10-22 RX ADMIN — VANCOMYCIN HYDROCHLORIDE 750 MG: 750 INJECTION, POWDER, LYOPHILIZED, FOR SOLUTION INTRAVENOUS at 01:40

## 2021-10-22 RX ADMIN — CHOLECALCIFEROL TAB 10 MCG (400 UNIT) 400 UNITS: 10 TAB at 08:38

## 2021-10-22 RX ADMIN — AMLODIPINE BESYLATE 2.5 MG: 2.5 TABLET ORAL at 08:38

## 2021-10-22 RX ADMIN — FUROSEMIDE 20 MG: 20 TABLET ORAL at 11:55

## 2021-10-22 RX ADMIN — PANTOPRAZOLE SODIUM 40 MG: 40 TABLET, DELAYED RELEASE ORAL at 08:37

## 2021-10-22 RX ADMIN — METFORMIN HYDROCHLORIDE 500 MG: 500 TABLET ORAL at 08:38

## 2021-10-22 RX ADMIN — MIRTAZAPINE 15 MG: 15 TABLET, FILM COATED ORAL at 20:44

## 2021-10-22 RX ADMIN — METFORMIN HYDROCHLORIDE 500 MG: 500 TABLET ORAL at 17:44

## 2021-10-22 RX ADMIN — FERROUS SULFATE TAB 325 MG (65 MG ELEMENTAL FE) 325 MG: 325 (65 FE) TAB at 08:38

## 2021-10-22 RX ADMIN — CARBAMAZEPINE 200 MG: 200 TABLET ORAL at 08:38

## 2021-10-22 RX ADMIN — HYDROCODONE BITARTRATE AND ACETAMINOPHEN 2 TABLET: 5; 325 TABLET ORAL at 20:44

## 2021-10-22 RX ADMIN — CARBAMAZEPINE 200 MG: 200 TABLET ORAL at 20:44

## 2021-10-22 RX ADMIN — ATENOLOL 50 MG: 50 TABLET ORAL at 08:38

## 2021-10-22 RX ADMIN — ATORVASTATIN CALCIUM 20 MG: 20 TABLET, FILM COATED ORAL at 20:44

## 2021-10-22 RX ADMIN — MORPHINE SULFATE 2 MG: 2 INJECTION, SOLUTION INTRAMUSCULAR; INTRAVENOUS at 22:22

## 2021-10-22 RX ADMIN — LEVOFLOXACIN 250 MG: 5 INJECTION, SOLUTION INTRAVENOUS at 12:04

## 2021-10-22 RX ADMIN — PRIMIDONE 50 MG: 50 TABLET ORAL at 08:39

## 2021-10-22 RX ADMIN — HYDROCODONE BITARTRATE AND ACETAMINOPHEN 2 TABLET: 5; 325 TABLET ORAL at 07:17

## 2021-10-22 RX ADMIN — ENOXAPARIN SODIUM 40 MG: 40 INJECTION SUBCUTANEOUS at 08:37

## 2021-10-22 RX ADMIN — PRIMIDONE 50 MG: 50 TABLET ORAL at 20:44

## 2021-10-22 RX ADMIN — FERROUS SULFATE TAB 325 MG (65 MG ELEMENTAL FE) 325 MG: 325 (65 FE) TAB at 17:44

## 2021-10-22 RX ADMIN — CYANOCOBALAMIN TAB 1000 MCG 1000 MCG: 1000 TAB at 08:38

## 2021-10-22 RX ADMIN — HYDROCODONE BITARTRATE AND ACETAMINOPHEN 2 TABLET: 5; 325 TABLET ORAL at 14:35

## 2021-10-22 RX ADMIN — SODIUM CHLORIDE, PRESERVATIVE FREE 10 ML: 5 INJECTION INTRAVENOUS at 21:01

## 2021-10-22 ASSESSMENT — PAIN DESCRIPTION - ONSET: ONSET: ON-GOING

## 2021-10-22 ASSESSMENT — PAIN SCALES - GENERAL
PAINLEVEL_OUTOF10: 4
PAINLEVEL_OUTOF10: 7
PAINLEVEL_OUTOF10: 10
PAINLEVEL_OUTOF10: 6
PAINLEVEL_OUTOF10: 10

## 2021-10-22 ASSESSMENT — PAIN DESCRIPTION - PROGRESSION: CLINICAL_PROGRESSION: NOT CHANGED

## 2021-10-22 ASSESSMENT — PAIN DESCRIPTION - DESCRIPTORS: DESCRIPTORS: ACHING;SHOOTING

## 2021-10-22 ASSESSMENT — PAIN DESCRIPTION - PAIN TYPE: TYPE: SURGICAL PAIN

## 2021-10-22 ASSESSMENT — PAIN DESCRIPTION - ORIENTATION: ORIENTATION: RIGHT;LEFT

## 2021-10-22 ASSESSMENT — PAIN DESCRIPTION - FREQUENCY: FREQUENCY: CONTINUOUS

## 2021-10-22 NOTE — PROGRESS NOTES
Physical Therapy  Facility/Department: [facility] AT THE [facility] MED SURG  Daily Treatment Note  NAME: Dorys Carty  : 1931  MRN: [id number]    Date of Service: 10/22/2021    Discharge Recommendations:  Continue to assess pending progress, Subacute/Skilled Nursing Facility, ECF with PT, Patient would benefit from continued therapy after discharge        Assessment   Treatment Diagnosis: generalized weakness, difficulty walking  Prognosis: Good  PT Education: PT Role;Goals;Plan of Care;General Safety;Transfer Training  Patient Education: Educated pt on above with fair to poor understanding. REQUIRES PT FOLLOW UP: Yes  Activity Tolerance  Activity Tolerance: Patient Tolerated treatment well;Patient limited by pain; Patient limited by fatigue     Patient Diagnosis(es): The primary encounter diagnosis was Cellulitis, unspecified cellulitis site. A diagnosis of Acute cystitis without hematuria was also pertinent to this visit. has a past medical history of Anemia, Arthritis of knee, Benign essential tremor, CAD S/P percutaneous coronary angioplasty, Diabetes mellitus type II, controlled ([facility] [address] Gastrointestinal hemorrhage, History of pancreatitis, Humerus fracture, Hyperlipidemia, Hypertension, Major depression, S/P CABG (coronary artery bypass graft), Ventral hernia, and Vitamin B12 deficiency. has a past surgical history that includes Cholecystectomy; Coronary artery bypass graft (); Hysterectomy, vaginal (); Salpingo-oophorectomy (); Colonoscopy (N/A, 2018); hip surgery (Right); Hip fracture surgery (Right, 10/13/2021); and Hip fracture surgery (Left, 10/19/2021).     Restrictions  Restrictions/Precautions  Restrictions/Precautions: Weight Bearing, General Precautions, Fall Risk  Lower Extremity Weight Bearing Restrictions  Right Lower Extremity Weight Bearing: Weight Bearing As Tolerated  Left Lower Extremity Weight Bearing: Weight Bearing As Tolerated  Subjective   General  Chart Reviewed: Yes  Response To Previous Treatment: Patient with no complaints from previous session. Family / Caregiver Present: No  Referring Practitioner: Harlee Mortimer, DO  Subjective  Subjective: Pt reported \"12/10\" bilat hip pain that decreased to 7/10 once supine in bed. General Comment  Comments: Nursing Orma Raghu) aware of pain. Orientation  Orientation  Overall Orientation Status: Within Functional Limits  Cognition      Objective   Bed mobility  Rolling to Left: Maximum assistance  Rolling to Right: Maximum assistance  Sit to Supine: Maximum assistance;2 Person assistance  Comment: Cues needed for technique with fair understanding noted. Transfers  Sit to Stand: Maximum Assistance;2 Person Assistance  Stand to sit: Maximum Assistance;2 Person Assistance  Stand Pivot Transfers: Maximum Assistance;2 Person Assistance  Comment: Frequent cues for technique with poor to fair understanding. Ambulation  Ambulation?: No     Balance  Posture: Poor  Sitting - Static: Fair  Sitting - Dynamic: Fair  Standing - Static: Poor  Standing - Dynamic: Poor  Exercises  Comments: Held per pt request d/t pain. G-Code     OutComes Score    AM-PAC Score    Goals  Short term goals  Time Frame for Short term goals: 20 days  Short term goal 1: Pt will perform bed mobility with CGA to improve functional independence. Short term goal 2: Patient to complete sit/stand and stand pivot transfers with modAx2 and no LOB to improve mobility and decrease fall risk. Short term goal 3: Patient to amb 20ft with FWW and CGA/minAx1, WBAT B LE's with no LOB to improve mobility. Short term goal 4: Patient to tolerate 20-30 min of ther ex/act to improve functional strength.     Plan    Plan  Times per week: 7 days per week  Times per day: Twice a day  Current Treatment Recommendations: Strengthening, IADL Training, Neuromuscular Re-education, Home Exercise Program, ROM, Manual Therapy - Soft Tissue Mobilization, Safety Education & Training, Balance Training, Endurance Training, Patient/Caregiver Education & Training, Functional Mobility Training, ADL/Self-care Training, Transfer Training, Gait Training  Safety Devices  Type of devices:  All fall risk precautions in place, Bed alarm in place, Call light within reach, Nurse notified, Gait belt, Patient at risk for falls, Left in bed     Therapy Time   Individual Concurrent Group Co-treatment   Time In 1348         Time Out 1411         Minutes [address] [place] [address]

## 2021-10-22 NOTE — PROGRESS NOTES
Progress Note  Vinnie Odonnell MD    OBJECTIVE:    Patient seen for f/u of Closed right hip fracture, initial encounter ([facility] [address] She has some  hip  Pain , weakness. Has minimal edema  Hb 9.5  ROS:   Constitutional: negative  for fevers, and negative for chills. Respiratory: negative for shortness of breath, negative for cough, and negative for wheezing,on oxygen  Cardiovascular: negative for chest pain, and negative for palpitations, has edema of legs  Gastrointestinal: negative for abdominal pain, negative for nausea,negative for vomiting, negative for diarrhea, and negative for constipation  Has bilat hip pain   All other systems were reviewed with the patient and are negative unless otherwise stated in HPI    OBJECTIVE:  Vitals:   Temp: 96.9 °F (36.1 °C)  BP: (!) 145/52  Resp: 18  Pulse: 72  SpO2: 98 %    24HR INTAKE/OUTPUT:      Intake/Output Summary (Last 24 hours) at 10/22/2021 0811  Last data filed at 10/22/2021 8354  Gross per 24 hour   Intake 2671.76 ml   Output 2250 ml   Net 421.76 ml     -----------------------------------------------------------------  Exam:  GEN:    Awake, alert and oriented x3. EYES:  EOMI, pupils equal   NECK: Supple. No lymphadenopathy. No carotid bruit  CVS:    regular rate and rhythm, 2/6 systolic murmur,has bilat leg  edema  PULM:  Has basal rales, no acute respiratory distress  ABD:    Bowels sounds normal.  Abdomen is soft. No distention. no tenderness to palpation. EXT:  Has minimal bilat leg edema . No calf tenderness. NEURO: Moves all extremities. Motor and sensory are grossly intact  SKIN:  No rashes.   Has multiple bruises  -----------------------------------------------------------------  Diagnostic Data:    · All available data reviewed  Lab Results   Component Value Date    WBC 7.5 10/22/2021    HGB 9.5 (L) 10/22/2021    MCV 97.4 10/22/2021     10/22/2021      Lab Results   Component Value Date    GLUCOSE 109 (H) 10/22/2021    BUN 18 10/22/2021 CREATININE 0.75 10/22/2021     10/22/2021    K 3.8 10/22/2021    CALCIUM 8.1 (L) 10/22/2021     10/22/2021    CO2 19 (L) 10/22/2021       PROBLEM LIST:  Principal Problem:    Closed right hip fracture, initial encounter ([facility]  Active Problems:    Uncontrolled hypertension    Type 2 diabetes mellitus (HCC)    Dyslipidemia    ASHD (arteriosclerotic heart disease)    Acute cystitis without hematuria    Abnormal ECG    Closed left hip fracture, initial encounter ([facility]    Acute blood loss as cause of postoperative anemia    Hypomagnesemia  Resolved Problems:    * No resolved hospital problems.  *      ASSESSMENT / PLAN:  Closed right hip fracture, initial encounter ([facility])  · Pain control  · Pt and ot  · Acute blood loss anemia-hb 9.5 after transfusion, monitor  · Lt hip Fr- pain control  · Had fluid overload- improving,monitor,d/c iv  · ashd- cardiology follow up,   · Type 2 dm- blood sugars well controlled  · Nutrition status: at risk for malnutrition  · DVT prophylaxis: SCD's ,lovenox  · High risk medications: none   · Disposition:  Discharge plan is F    Nic Almonte MD , M.D.  10/22/2021  8:11 AM

## 2021-10-22 NOTE — PROGRESS NOTES
Previous shift nurse Ursula Soliz in room for half hour attempting to find new IV site as previous site started leaking when blood transfusion started. Unable to find any IV access. Pt is tearful at this time, states \"I don't really have anything to live for anyway\" and \"Why do you have to do this anyway\". Pt reminded of importance of having transfusion and that staff was trying their best to find new site but challenging with swelling noted in bilateral arms. Reassurance provided. Supervisor [facility] RN made aware and is coming up to floor to attempt to find IV access.

## 2021-10-22 NOTE — PROGRESS NOTES
Occupational Therapy  Facility/Department: [facility] AT THE [facility] MED SURG  Daily Treatment Note  NAME: Ofe Moyer  : 1931  MRN: [id number]    Date of Service: 10/22/2021    Discharge Recommendations:  Continue to assess pending progress, ECF with OT       Assessment      OT Education: OT Role  Patient Education: Pt educated on importance of therapeutic participation to increase health and well-being,  Safety Devices  Safety Devices in place: Yes  Type of devices: Call light within reach; Left in bed;Bed alarm in place; All fall risk precautions in place         Patient Diagnosis(es): The primary encounter diagnosis was Cellulitis, unspecified cellulitis site. A diagnosis of Acute cystitis without hematuria was also pertinent to this visit. has a past medical history of Anemia, Arthritis of knee, Benign essential tremor, CAD S/P percutaneous coronary angioplasty, Diabetes mellitus type II, controlled ([facility] [address] Gastrointestinal hemorrhage, History of pancreatitis, Humerus fracture, Hyperlipidemia, Hypertension, Major depression, S/P CABG (coronary artery bypass graft), Ventral hernia, and Vitamin B12 deficiency. has a past surgical history that includes Cholecystectomy; Coronary artery bypass graft (); Hysterectomy, vaginal (); Salpingo-oophorectomy (); Colonoscopy (N/A, 2018); hip surgery (Right); Hip fracture surgery (Right, 10/13/2021); and Hip fracture surgery (Left, 10/19/2021).     Restrictions  Restrictions/Precautions  Restrictions/Precautions: Weight Bearing, General Precautions, Fall Risk  Lower Extremity Weight Bearing Restrictions  Right Lower Extremity Weight Bearing: Weight Bearing As Tolerated  Left Lower Extremity Weight Bearing: Weight Bearing As Tolerated  Subjective   General  Chart Reviewed: Yes  Patient assessed for rehabilitation services?: Yes  Response to previous treatment: Patient with no complaints from previous session  Family / Caregiver Present: No  Referring Practitioner: Odonnell  Diagnosis: Right Total Hip  Subjective  Subjective: Pt complains of 10/10 constant pain. Required max encouragement to complete B UE ther ex in OT. General Comment  Comments: Pt supine in bed with HOB elevated upon arrival.  Agreeable to B UE ther ex in supine with max encouragement. Orientation     Objective                                                                Type of ROM/Therapeutic Exercise  Type of ROM/Therapeutic Exercise: Free weights  Comment: Pt completed B UE ther ex with 1# dumbell while in supine with max encouragement, visual demonstration, and tactile cues, 1 set x 10 reps x 5 planes, to increase B UE strength and facilitate bed mobility. Exercises  Other: Pt provided with and educated on discharge folder. Plan   Plan  Times per week: 7x  Times per day: Daily  Current Treatment Recommendations: Strengthening, Safety Education & Training, Self-Care / ADL, Functional Mobility Training, Balance Training, Neuromuscular Re-education, Endurance Training  G-Code     OutComes Score                                                  AM-PAC Score             Goals  Short term goals  Time Frame for Short term goals: 21  Short term goal 1: Patient to progress to bed mobility with CGA 1-2 to prepare for OOB ADLs. Short term goal 2: Patient to progress to functional transfers (sit-pivot,sit-stand) with min A to improve independence. Short term goal 3: Patient to complete 10 minutes ther-ex/ther-act in order to improve strength/endurance for ADLs  Short term goal 4: Patient to complete UB bathing/dressing with min A. Short term goal 5: Patient to complete LB dressing/bathing with min A.        Therapy Time   Individual Concurrent Group Co-treatment   Time In 0731         Time Out 0744         Minutes 13                 NILA Grove/TONIA

## 2021-10-22 NOTE — PROGRESS NOTES
Physical Therapy  Facility/Department: [facility] AT THE [facility] MED SURG  Daily Treatment Note  NAME: Alexey Bess  : 1931  MRN: [id number]    Date of Service: 10/22/2021    Discharge Recommendations:  Continue to assess pending progress, Subacute/Skilled Nursing Facility, ECF with PT, Patient would benefit from continued therapy after discharge        Assessment   Treatment Diagnosis: generalized weakness, difficulty walking  Prognosis: Good  PT Education: PT Role;Goals;Plan of Care;General Safety;Transfer Training  Patient Education: Educated pt on above with fair to poor understanding. REQUIRES PT FOLLOW UP: Yes  Activity Tolerance  Activity Tolerance: Patient Tolerated treatment well;Patient limited by pain; Patient limited by fatigue     Patient Diagnosis(es): The primary encounter diagnosis was Cellulitis, unspecified cellulitis site. A diagnosis of Acute cystitis without hematuria was also pertinent to this visit. has a past medical history of Anemia, Arthritis of knee, Benign essential tremor, CAD S/P percutaneous coronary angioplasty, Diabetes mellitus type II, controlled ([facility] [address] Gastrointestinal hemorrhage, History of pancreatitis, Humerus fracture, Hyperlipidemia, Hypertension, Major depression, S/P CABG (coronary artery bypass graft), Ventral hernia, and Vitamin B12 deficiency. has a past surgical history that includes Cholecystectomy; Coronary artery bypass graft (); Hysterectomy, vaginal (); Salpingo-oophorectomy (); Colonoscopy (N/A, 2018); hip surgery (Right); Hip fracture surgery (Right, 10/13/2021); and Hip fracture surgery (Left, 10/19/2021).     Restrictions  Restrictions/Precautions  Restrictions/Precautions: Weight Bearing, General Precautions, Fall Risk  Lower Extremity Weight Bearing Restrictions  Right Lower Extremity Weight Bearing: Weight Bearing As Tolerated  Left Lower Extremity Weight Bearing: Weight Bearing As Tolerated  Subjective   General  Chart Reviewed: Yes  Response To Previous Treatment: Patient with no complaints from previous session. Family / Caregiver Present: No  Referring Practitioner: Sherron Peterson DO  Subjective  Subjective: Pt reported 6/10 L hip pain and 7/10 R hip pain. General Comment  Comments: Nursing Celeste Wall) aware of pain. Orientation  Orientation  Overall Orientation Status: Within Functional Limits  Cognition      Objective   Bed mobility  Rolling to Left: Maximum assistance  Rolling to Right: Moderate assistance;Maximum assistance  Supine to Sit: Moderate assistance;2 Person assistance  Scooting: Moderate assistance;Maximal assistance;2 Person assistance (To scoot to EOB.)  Comment: Cues needed for technique with fair understanding noted. Transfers  Sit to Stand: Maximum Assistance;2 Person Assistance  Stand to sit: Maximum Assistance;2 Person Assistance  Bed to Chair: Maximum assistance;2 Person Assistance  Squat Pivot Transfers: Maximum Assistance;2 Person Assistance  Comment: Frequent cues for technique with poor to fair understanding. Ambulation  Ambulation?: No     Balance  Posture: Poor  Sitting - Static: Fair  Standing - Static: Poor  Standing - Dynamic: Poor     G-Code     OutComes Score    AM-PAC Score    Goals  Short term goals  Time Frame for Short term goals: 20 days  Short term goal 1: Pt will perform bed mobility with CGA to improve functional independence. Short term goal 2: Patient to complete sit/stand and stand pivot transfers with modAx2 and no LOB to improve mobility and decrease fall risk. Short term goal 3: Patient to amb 20ft with FWW and CGA/minAx1, WBAT B LE's with no LOB to improve mobility. Short term goal 4: Patient to tolerate 20-30 min of ther ex/act to improve functional strength.     Plan    Plan  Times per week: 7 days per week  Times per day: Twice a day  Current Treatment Recommendations: Strengthening, IADL Training, Neuromuscular Re-education, Home Exercise Program, ROM, Manual Therapy - Soft Tissue Mobilization, Safety Education & Training, Balance Training, Endurance Training, Patient/Caregiver Education & Training, Functional Mobility Training, ADL/Self-care Training, Transfer Training, Gait Training  Safety Devices  Type of devices:  All fall risk precautions in place, Left in chair, Call light within reach, Nurse notified, Gait belt, Patient at risk for falls (No alarm upon entry and none needed per Nicolasa Melchor RN.)     Therapy Time   Individual Concurrent Group Co-treatment   Time In 1055         Time Out 1121         Minutes [address]

## 2021-10-22 NOTE — PROGRESS NOTES
Pt has tolerated transfusion well. Completed at this time and switched to NS to flush line. Will recheck H&H in one hour post transfusion. Pt is currently resting in bed with eyes closed. Call light and bedside table within reach.

## 2021-10-22 NOTE — PROGRESS NOTES
Occupational Therapy  Facility/Department: [facility] AT THE [facility] MED SURG  Daily Treatment Note  NAME: Ofe Moyer  : 1931  MRN: [id number]    Date of Service: 10/22/2021    Discharge Recommendations:  Continue to assess pending progress, ECF with OT       Assessment      OT Education: Transfer Training  Patient Education: educated on safety with transfer techniques, F understanding noted  Barriers to Learning: none  Activity Tolerance  Activity Tolerance: Patient limited by fatigue  Safety Devices  Type of devices: Left in chair;Nurse notified         Patient Diagnosis(es): The primary encounter diagnosis was Cellulitis, unspecified cellulitis site. A diagnosis of Acute cystitis without hematuria was also pertinent to this visit. has a past medical history of Anemia, Arthritis of knee, Benign essential tremor, CAD S/P percutaneous coronary angioplasty, Diabetes mellitus type II, controlled ([facility] [address] Gastrointestinal hemorrhage, History of pancreatitis, Humerus fracture, Hyperlipidemia, Hypertension, Major depression, S/P CABG (coronary artery bypass graft), Ventral hernia, and Vitamin B12 deficiency. has a past surgical history that includes Cholecystectomy; Coronary artery bypass graft (); Hysterectomy, vaginal (); Salpingo-oophorectomy (); Colonoscopy (N/A, 2018); hip surgery (Right); Hip fracture surgery (Right, 10/13/2021); and Hip fracture surgery (Left, 10/19/2021).     Restrictions  Restrictions/Precautions  Restrictions/Precautions: Weight Bearing, General Precautions, Fall Risk  Lower Extremity Weight Bearing Restrictions  Right Lower Extremity Weight Bearing: Weight Bearing As Tolerated  Left Lower Extremity Weight Bearing: Weight Bearing As Tolerated  Subjective   General  Chart Reviewed: Yes  Patient assessed for rehabilitation services?: Yes  Response to previous treatment: Patient with no complaints from previous session  Family / Caregiver Present: No  Referring Practitioner: Co-treatment   Time In 1055         Time Out 1121         Minutes [address] [facility], OTR/L

## 2021-10-23 LAB
ABSOLUTE EOS #: 0.1 K/UL (ref 0–0.44)
ABSOLUTE IMMATURE GRANULOCYTE: 0.1 K/UL (ref 0–0.3)
ABSOLUTE LYMPH #: 0.91 K/UL (ref 1.1–3.7)
ABSOLUTE MONO #: 0.61 K/UL (ref 0.1–1.2)
ANION GAP SERPL CALCULATED.3IONS-SCNC: 14 MMOL/L (ref 9–17)
BASOPHILS # BLD: 0 % (ref 0–2)
BASOPHILS ABSOLUTE: 0.03 K/UL (ref 0–0.2)
BUN BLDV-MCNC: 18 MG/DL (ref 8–23)
BUN/CREAT BLD: 23 (ref 9–20)
CALCIUM SERPL-MCNC: 7.9 MG/DL (ref 8.6–10.4)
CHLORIDE BLD-SCNC: 105 MMOL/L (ref 98–107)
CO2: 18 MMOL/L (ref 20–31)
CREAT SERPL-MCNC: 0.77 MG/DL (ref 0.5–0.9)
DIFFERENTIAL TYPE: ABNORMAL
EOSINOPHILS RELATIVE PERCENT: 1 % (ref 1–4)
GFR AFRICAN AMERICAN: >60 ML/MIN
GFR NON-AFRICAN AMERICAN: >60 ML/MIN
GFR SERPL CREATININE-BSD FRML MDRD: ABNORMAL ML/MIN/{1.73_M2}
GFR SERPL CREATININE-BSD FRML MDRD: ABNORMAL ML/MIN/{1.73_M2}
GLUCOSE BLD-MCNC: 133 MG/DL (ref 70–99)
HCT VFR BLD CALC: 30 % (ref 36.3–47.1)
HEMOGLOBIN: 9.7 G/DL (ref 11.9–15.1)
IMMATURE GRANULOCYTES: 1 %
LYMPHOCYTES # BLD: 11 % (ref 24–43)
MCH RBC QN AUTO: 32.1 PG (ref 25.2–33.5)
MCHC RBC AUTO-ENTMCNC: 32.3 G/DL (ref 28.4–34.8)
MCV RBC AUTO: 99.3 FL (ref 82.6–102.9)
MONOCYTES # BLD: 8 % (ref 3–12)
NRBC AUTOMATED: 0 PER 100 WBC
PDW BLD-RTO: 16 % (ref 11.8–14.4)
PLATELET # BLD: 214 K/UL (ref 138–453)
PLATELET ESTIMATE: ABNORMAL
PMV BLD AUTO: 9.9 FL (ref 8.1–13.5)
POTASSIUM SERPL-SCNC: 3.7 MMOL/L (ref 3.7–5.3)
RBC # BLD: 3.02 M/UL (ref 3.95–5.11)
RBC # BLD: ABNORMAL 10*6/UL
SEG NEUTROPHILS: 79 % (ref 36–65)
SEGMENTED NEUTROPHILS ABSOLUTE COUNT: 6.4 K/UL (ref 1.5–8.1)
SODIUM BLD-SCNC: 137 MMOL/L (ref 135–144)
WBC # BLD: 8.2 K/UL (ref 3.5–11.3)
WBC # BLD: ABNORMAL 10*3/UL

## 2021-10-23 PROCEDURE — 97110 THERAPEUTIC EXERCISES: CPT

## 2021-10-23 PROCEDURE — 80048 BASIC METABOLIC PNL TOTAL CA: CPT

## 2021-10-23 PROCEDURE — 6370000000 HC RX 637 (ALT 250 FOR IP): Performed by: NURSE PRACTITIONER

## 2021-10-23 PROCEDURE — 1200000000 HC SEMI PRIVATE

## 2021-10-23 PROCEDURE — 6370000000 HC RX 637 (ALT 250 FOR IP): Performed by: ORTHOPAEDIC SURGERY

## 2021-10-23 PROCEDURE — 36415 COLL VENOUS BLD VENIPUNCTURE: CPT

## 2021-10-23 PROCEDURE — 51702 INSERT TEMP BLADDER CATH: CPT

## 2021-10-23 PROCEDURE — 2580000003 HC RX 258: Performed by: ORTHOPAEDIC SURGERY

## 2021-10-23 PROCEDURE — 97535 SELF CARE MNGMENT TRAINING: CPT

## 2021-10-23 PROCEDURE — 6360000002 HC RX W HCPCS: Performed by: ORTHOPAEDIC SURGERY

## 2021-10-23 PROCEDURE — 85025 COMPLETE CBC W/AUTO DIFF WBC: CPT

## 2021-10-23 PROCEDURE — 2700000000 HC OXYGEN THERAPY PER DAY

## 2021-10-23 PROCEDURE — 94761 N-INVAS EAR/PLS OXIMETRY MLT: CPT

## 2021-10-23 PROCEDURE — 97530 THERAPEUTIC ACTIVITIES: CPT

## 2021-10-23 RX ADMIN — HYDROCODONE BITARTRATE AND ACETAMINOPHEN 2 TABLET: 5; 325 TABLET ORAL at 17:27

## 2021-10-23 RX ADMIN — ATENOLOL 50 MG: 50 TABLET ORAL at 08:24

## 2021-10-23 RX ADMIN — PRIMIDONE 50 MG: 50 TABLET ORAL at 08:24

## 2021-10-23 RX ADMIN — METFORMIN HYDROCHLORIDE 500 MG: 500 TABLET ORAL at 08:23

## 2021-10-23 RX ADMIN — MORPHINE SULFATE 2 MG: 2 INJECTION, SOLUTION INTRAMUSCULAR; INTRAVENOUS at 19:01

## 2021-10-23 RX ADMIN — AMLODIPINE BESYLATE 2.5 MG: 2.5 TABLET ORAL at 08:23

## 2021-10-23 RX ADMIN — PRIMIDONE 50 MG: 50 TABLET ORAL at 21:48

## 2021-10-23 RX ADMIN — ONDANSETRON 4 MG: 2 INJECTION INTRAMUSCULAR; INTRAVENOUS at 17:28

## 2021-10-23 RX ADMIN — MIRTAZAPINE 15 MG: 15 TABLET, FILM COATED ORAL at 21:48

## 2021-10-23 RX ADMIN — ATORVASTATIN CALCIUM 20 MG: 20 TABLET, FILM COATED ORAL at 21:48

## 2021-10-23 RX ADMIN — LEVOFLOXACIN 250 MG: 5 INJECTION, SOLUTION INTRAVENOUS at 12:43

## 2021-10-23 RX ADMIN — SODIUM CHLORIDE, PRESERVATIVE FREE 10 ML: 5 INJECTION INTRAVENOUS at 08:29

## 2021-10-23 RX ADMIN — ERGOCALCIFEROL 50000 UNITS: 1.25 CAPSULE ORAL at 08:32

## 2021-10-23 RX ADMIN — HYDROCODONE BITARTRATE AND ACETAMINOPHEN 2 TABLET: 5; 325 TABLET ORAL at 08:23

## 2021-10-23 RX ADMIN — FERROUS SULFATE TAB 325 MG (65 MG ELEMENTAL FE) 325 MG: 325 (65 FE) TAB at 08:23

## 2021-10-23 RX ADMIN — MORPHINE SULFATE 2 MG: 2 INJECTION, SOLUTION INTRAMUSCULAR; INTRAVENOUS at 01:23

## 2021-10-23 RX ADMIN — FERROUS SULFATE TAB 325 MG (65 MG ELEMENTAL FE) 325 MG: 325 (65 FE) TAB at 16:27

## 2021-10-23 RX ADMIN — CARBAMAZEPINE 200 MG: 200 TABLET ORAL at 08:24

## 2021-10-23 RX ADMIN — METFORMIN HYDROCHLORIDE 500 MG: 500 TABLET ORAL at 16:27

## 2021-10-23 RX ADMIN — PANTOPRAZOLE SODIUM 40 MG: 40 TABLET, DELAYED RELEASE ORAL at 08:24

## 2021-10-23 RX ADMIN — PREDNISONE 5 MG: 5 TABLET ORAL at 08:24

## 2021-10-23 RX ADMIN — CARBAMAZEPINE 200 MG: 200 TABLET ORAL at 21:48

## 2021-10-23 RX ADMIN — CYANOCOBALAMIN TAB 1000 MCG 1000 MCG: 1000 TAB at 08:24

## 2021-10-23 RX ADMIN — CHOLECALCIFEROL TAB 10 MCG (400 UNIT) 400 UNITS: 10 TAB at 08:23

## 2021-10-23 RX ADMIN — ENOXAPARIN SODIUM 40 MG: 40 INJECTION SUBCUTANEOUS at 08:23

## 2021-10-23 RX ADMIN — VANCOMYCIN HYDROCHLORIDE 750 MG: 750 INJECTION, POWDER, LYOPHILIZED, FOR SOLUTION INTRAVENOUS at 01:28

## 2021-10-23 ASSESSMENT — PAIN SCALES - GENERAL
PAINLEVEL_OUTOF10: 10
PAINLEVEL_OUTOF10: 5
PAINLEVEL_OUTOF10: 9
PAINLEVEL_OUTOF10: 10
PAINLEVEL_OUTOF10: 0
PAINLEVEL_OUTOF10: 7
PAINLEVEL_OUTOF10: 10

## 2021-10-23 ASSESSMENT — PAIN DESCRIPTION - ORIENTATION: ORIENTATION: RIGHT;LEFT

## 2021-10-23 ASSESSMENT — PAIN DESCRIPTION - LOCATION: LOCATION: HIP;LEG

## 2021-10-23 ASSESSMENT — PAIN DESCRIPTION - PAIN TYPE: TYPE: SURGICAL PAIN

## 2021-10-23 ASSESSMENT — PAIN DESCRIPTION - DIRECTION: RADIATING_TOWARDS: BOTH LEGS

## 2021-10-23 NOTE — PROGRESS NOTES
Patient called out to use the bed pan. She was able to have a small soft BM. Patient was positioned with heels off bed and pillow under her right hip. Vitals and assessment completed at this time. Dressings remain in place. Left aquacel has a moderate amount of drainage and right aquacel a scant amount of old drainage. Patient states she is requesting to have the oxygen off because it bothers her. She is 98% on 1L. Oxygen removed at this time. Patient remains on continuous pulse ox. Will continue to monitor and assess.

## 2021-10-23 NOTE — PROGRESS NOTES
Patient repositioned in the bed at this time. She is still refusing to have the pillows placed to relieve pressure but writer did reposition her as best possible.

## 2021-10-23 NOTE — PROGRESS NOTES
Barbara Flores M.D. Internal Medicine Progress Note    Patient: Kevan Aleman  Date of Admission: 10/11/2021 10:56 PM  Hospital Day # 11  Date of Evaluation: 10/23/2021      SUBJECTIVE:    Patient seen for f/u of bilateral hip fractures. She is resting in bed with eyes closed but arouses easily. Pain is controlled. Tolerating diet without n/v/d/      ROS:   Constitutional: negative  for fevers, and negative for chills. Respiratory: negative for shortness of breath, negative for cough, and negative for wheezing  Cardiovascular: negative for chest pain, and negative for palpitations  Gastrointestinal: negative for abdominal pain, negative for nausea,negative for vomiting, negative for diarrhea, and negative for constipation     All other systems were reviewed with the patient and are negative unless otherwise stated in HPI    -----------------------------------------------------------------  OBJECTIVE:  Vitals:   Temp: 98 °F (36.7 °C)  BP: (!) 125/46  Resp: 16  Pulse: 73  SpO2: 93 % on room air    Weight  Wt Readings from Last 3 Encounters:   10/23/21 153 lb 11.2 oz (69.7 kg)   03/02/21 130 lb (59 kg)   10/30/19 148 lb 2.4 oz (67.2 kg)     Body mass index is 30.02 kg/m². 24HR INTAKE/OUTPUT:      Intake/Output Summary (Last 24 hours) at 10/23/2021 1314  Last data filed at 10/23/2021 1252  Gross per 24 hour   Intake 702 ml   Output 1550 ml   Net -848 ml       Exam:  GEN:    Awake, alert and oriented x3. EYES:  EOMI, pupils equal   NECK: Supple. No lymphadenopathy. No carotid bruit  CVS:    regular rate and rhythm,  2/6 systolic murmur  PULM:  CTA, no wheezes, rales or rhonchi, no acute respiratory distress  ABD:    Bowels sounds normal.  Abdomen is soft. No distention. no tenderness to palpation. EXT:   trace edema bilaterally . No calf tenderness. NEURO: Moves all extremities. Motor and sensory are grossly intact  SKIN:  hip incisions dressed / clean.   Scattered to severe edema and soft tissue swelling of the right foot. 2. Mild edema of the right leg and right ankle. 3. No acute osseous abnormality. Degenerative changes as detailed above. XR KNEE RIGHT (1-2 VIEWS)   Final Result   Right knee:      1. Small joint effusion. Tricompartmental osteoarthrosis. CPPD. 2. No acute fracture or dislocation. 3. Mild soft tissue edema about the right knee. Right tib fib:      1. Moderate to severe edema and soft tissue swelling of the foot. 2. Mild edema in the right leg. 3. No acute osseous abnormality. 4. Tricompartmental osteoarthrosis and CPPD of the right knee. Right ankle:      1. Moderate to severe edema and soft tissue swelling of the right foot. 2. Mild edema of the right leg and right ankle. 3. No acute osseous abnormality. Degenerative changes as detailed above. XR TIBIA FIBULA RIGHT (2 VIEWS)   Final Result   Right knee:      1. Small joint effusion. Tricompartmental osteoarthrosis. CPPD. 2. No acute fracture or dislocation. 3. Mild soft tissue edema about the right knee. Right tib fib:      1. Moderate to severe edema and soft tissue swelling of the foot. 2. Mild edema in the right leg. 3. No acute osseous abnormality. 4. Tricompartmental osteoarthrosis and CPPD of the right knee. Right ankle:      1. Moderate to severe edema and soft tissue swelling of the right foot. 2. Mild edema of the right leg and right ankle. 3. No acute osseous abnormality. Degenerative changes as detailed above. CT CERVICAL SPINE WO CONTRAST   Final Result   No acute fracture or malalignment of the cervical spine. Multilevel degenerative change with left-sided bony foraminal narrowing at   C3-4 and C4-5. FLUORO FOR SURGICAL PROCEDURES   Final Result      MRI HIP RIGHT WO CONTRAST   Final Result   1.  Acute intratrochanteric fracture of the right femur with fracture line   extending to the level of the lesser trochanter. No displacement at the   lesser trochanter identified. Only minimal displacement of the right greater   trochanter. Associated surrounding muscular strain as well as partial   tearing of the right gluteus minimus and medius insertion on the greater   trochanter and moderate amount of fluid within the right trochanteric bursa. 2. Note is also made of a nondisplaced fracture of the intratrochanteric left   femur with fracture lines extending to the greater and lesser trochanters. CT FOOT RIGHT WO CONTRAST   Final Result   1. Diffuse osteopenia and degenerative changes as detailed above. 2. No acute osseous abnormality. No aggressive osseous destruction. 3. Severe cellulitis and induration of the subcutaneous fat and skin about   the ankle and foot. No discrete organized fluid collection. 4. Sequela of remote trauma along the inferior aspect of the lateral and   medial malleolus. 5. Irregularity of the 2nd metatarsal head could be related to sequela of   Freiberg's infraction. 6. Mild plantar calcaneal spur. CT FEMUR RIGHT WO CONTRAST   Final Result   1. Acute minimally displaced right greater trochanteric fracture with   adjacent soft tissue edema and fluid in the right greater trochanteric bursa. 2. Diffuse osteopenia. 3. Appearance of the wall of the urinary bladder which can be seen with   cystitis. Correlate with urinalysis. 4. Mild right hip osteoarthrosis. 5. Mild-to-moderate tricompartmental osteoarthrosis of the right knee. CPPD. 6. Small suprapatellar joint effusion. XR CHEST PORTABLE   Final Result   No acute process. Elevated right hemidiaphragm unchanged. Calcified nodules   left upper lung. XR HIP 2-3 VW W PELVIS RIGHT   Final Result   No acute abnormality identified. XR FEMUR RIGHT (MIN 2 VIEWS)   Final Result   Artifact versus nondisplaced fracture of the greater trochanter.          XR ANKLE RIGHT (MIN 3 VIEWS)   Final Result   Findings suggestive of osteomyelitis focally involving the posterior tip of   the base of the 5th metatarsal and the lateral aspect of the lateral   cuneiform posteriorly. Consider follow-up CT or MRI. Diffuse edema of the visualized lower extremity likely representing dependent   edema. CT Head WO Contrast   Final Result   No acute intracranial abnormality.                  ASSESSMENT / PLAN:  Bilateral hip fractures  · POD #10 s/p cephalomedullary nailing right intertrochanteric hip fracture  · POD #4 s/p cephalomedullary nailing left intertrochanteric hip fracture  · Appreciate ortho assistance  · Pain control: norco  · Acute blood loss anemia  · S/p  PRBC transfusion  · Monitor H/H  · Acute CHF  · Continue Lasix  · Appreciate cardiology  · Diabetes mellitus  · Continue Metformin  · Hypertension  · Continue Amlodipine, Atenolol  · Hospital Prophylaxis:   · DVT: Lovenox   · Stress Ulcer: H2 Blocker   · High risk medications: none   · Disposition:    · Discharge plan is Chikis Dove MD , M.D.  10/23/2021  1:14 PM

## 2021-10-23 NOTE — PROGRESS NOTES
occOccupational Therapy  Facility/Department: [facility] AT THE [facility] MED SURG  Daily Treatment Note  NAME: Manfred Grant  : 1931  MRN: [id number]    Date of Service: 10/23/2021    Discharge Recommendations:  Continue to assess pending progress, ECF with OT       Assessment      Activity Tolerance  Activity Tolerance: Patient limited by fatigue  Safety Devices  Safety Devices in place: Yes  Type of devices: Left in chair;Nurse notified; Chair alarm in place         Patient Diagnosis(es): The primary encounter diagnosis was Cellulitis, unspecified cellulitis site. A diagnosis of Acute cystitis without hematuria was also pertinent to this visit. has a past medical history of Anemia, Arthritis of knee, Benign essential tremor, CAD S/P percutaneous coronary angioplasty, Diabetes mellitus type II, controlled ([facility] [address] Gastrointestinal hemorrhage, History of pancreatitis, Humerus fracture, Hyperlipidemia, Hypertension, Major depression, S/P CABG (coronary artery bypass graft), Ventral hernia, and Vitamin B12 deficiency. has a past surgical history that includes Cholecystectomy; Coronary artery bypass graft (); Hysterectomy, vaginal (); Salpingo-oophorectomy (); Colonoscopy (N/A, 2018); hip surgery (Right); Hip fracture surgery (Right, 10/13/2021); and Hip fracture surgery (Left, 10/19/2021).     Restrictions  Restrictions/Precautions  Restrictions/Precautions: Weight Bearing, General Precautions, Fall Risk  Lower Extremity Weight Bearing Restrictions  Right Lower Extremity Weight Bearing: Weight Bearing As Tolerated  Left Lower Extremity Weight Bearing: Weight Bearing As Tolerated  Subjective   General  Chart Reviewed: Yes  Patient assessed for rehabilitation services?: Yes  Response to previous treatment: Patient with no complaints from previous session  Family / Caregiver Present: No  Referring Practitioner: Dede Morrow  Diagnosis: Right Total Hip  Subjective  Subjective: Pt reported pain in both hips stating \"it's like a 50\". General Comment  Comments: Pt lying in bed upon arrival. Pt agreed to participate in therapy session this date. Orientation     Objective    ADL  Toileting: Dependent/Total (Dep to use bed pan)           Bed mobility  Supine to Sit: 2 Person assistance;Maximum assistance (Simultaneous filing. User may not have seen previous data.)  Transfers  Sit to stand: 2 Person assistance;Maximum assistance (stand pivot txfr from EOB to bedside chair)                                            Type of ROM/Therapeutic Exercise  Type of ROM/Therapeutic Exercise: AROM  Comment: Pt tolerated BUE AROM x 3 planes x 10 reps x 1 set to increase UE strength and endurance in order to increase Ind with ADL tasks. pt required RBs as needed secondary to fatigue. Plan   Plan  Times per week: 7x  Times per day: Daily  Current Treatment Recommendations: Strengthening, Safety Education & Training, Self-Care / ADL, Functional Mobility Training, Balance Training, Neuromuscular Re-education, Endurance Training  G-Code     OutComes Score                                                  AM-PAC Score             Goals  Short term goals  Time Frame for Short term goals: 21  Short term goal 1: Patient to progress to bed mobility with CGA 1-2 to prepare for OOB ADLs. Short term goal 2: Patient to progress to functional transfers (sit-pivot,sit-stand) with min A to improve independence. Short term goal 3: Patient to complete 10 minutes ther-ex/ther-act in order to improve strength/endurance for ADLs  Short term goal 4: Patient to complete UB bathing/dressing with min A. Short term goal 5: Patient to complete LB dressing/bathing with min A.        Therapy Time   Individual Concurrent Group Co-treatment   Time In 1002         Time Out 1025         Minutes 23                 NAVJOT Betancourt

## 2021-10-23 NOTE — PROGRESS NOTES
Physical Therapy  Facility/Department: [facility] AT THE [facility] MED SURG  Daily Treatment Note  NAME: Jenise Martinez  : 1931  MRN: [id number]    Date of Service: 10/23/2021    Discharge Recommendations:  Continue to assess pending progress, Subacute/Skilled Nursing Facility, ECF with PT, Patient would benefit from continued therapy after discharge        Assessment   Treatment Diagnosis: generalized weakness, difficulty walking  Prognosis: Good  Activity Tolerance  Activity Tolerance: Patient Tolerated treatment well;Patient limited by pain; Patient limited by fatigue     Patient Diagnosis(es): The primary encounter diagnosis was Cellulitis, unspecified cellulitis site. A diagnosis of Acute cystitis without hematuria was also pertinent to this visit. has a past medical history of Anemia, Arthritis of knee, Benign essential tremor, CAD S/P percutaneous coronary angioplasty, Diabetes mellitus type II, controlled ([facility] [address] Gastrointestinal hemorrhage, History of pancreatitis, Humerus fracture, Hyperlipidemia, Hypertension, Major depression, S/P CABG (coronary artery bypass graft), Ventral hernia, and Vitamin B12 deficiency. has a past surgical history that includes Cholecystectomy; Coronary artery bypass graft (); Hysterectomy, vaginal (); Salpingo-oophorectomy (); Colonoscopy (N/A, 2018); hip surgery (Right); Hip fracture surgery (Right, 10/13/2021); and Hip fracture surgery (Left, 10/19/2021). Restrictions  Restrictions/Precautions  Restrictions/Precautions: Weight Bearing, General Precautions, Fall Risk  Lower Extremity Weight Bearing Restrictions  Right Lower Extremity Weight Bearing: Weight Bearing As Tolerated  Left Lower Extremity Weight Bearing: Weight Bearing As Tolerated  Subjective   General  Chart Reviewed: Yes  Response To Previous Treatment: Patient with no complaints from previous session.   Family / Caregiver Present: No  Referring Practitioner: Guy Adams DO  Subjective  Subjective: Pt reports pain in B hips however unable to rate on pain scale. Orientation  Orientation  Overall Orientation Status: Within Functional Limits  Cognition      Objective   Bed mobility  Rolling to Left: Maximum assistance  Rolling to Right: Maximum assistance  Sit to Supine: Maximum assistance;2 Person assistance  Scooting: Moderate assistance;Maximal assistance;2 Person assistance  Transfers  Sit to Stand: Maximum Assistance;2 Person Assistance  Stand to sit: Maximum Assistance;2 Person Assistance  Bed to Chair: Maximum assistance;2 Person Assistance  Stand Pivot Transfers: Maximum Assistance;2 Person Assistance  Squat Pivot Transfers: Maximum Assistance;2 Person Assistance  Ambulation  Ambulation?: No     Balance  Posture: Poor  Sitting - Static: Fair  Sitting - Dynamic: Fair  Standing - Static: Poor  Standing - Dynamic: Poor  Exercises  Heelslides: x15  Hip Flexion: x15  Hip Abduction: x15  Ankle Pumps: x15  Comments: Above completed in sitting with AAROM. Goals  Short term goals  Time Frame for Short term goals: 20 days  Short term goal 1: Pt will perform bed mobility with CGA to improve functional independence. Short term goal 2: Patient to complete sit/stand and stand pivot transfers with modAx2 and no LOB to improve mobility and decrease fall risk. Short term goal 3: Patient to amb 20ft with FWW and CGA/minAx1, WBAT B LE's with no LOB to improve mobility. Short term goal 4: Patient to tolerate 20-30 min of ther ex/act to improve functional strength.     Plan    Plan  Times per week: 7 days per week  Times per day: Twice a day  Current Treatment Recommendations: Strengthening, IADL Training, Neuromuscular Re-education, Home Exercise Program, ROM, Manual Therapy - Soft Tissue Mobilization, Safety Education & Training, Balance Training, Endurance Training, Patient/Caregiver Education & Training, Functional Mobility Training, ADL/Self-care Training, Transfer Training, Gait Training  Safety Devices  Type of devices:  All fall risk precautions in place, Left in chair, Call light within reach, Chair alarm in place, Nurse notified, Patient at risk for falls     Therapy Time   Individual Concurrent Group Co-treatment   Time In 1002         Time Out 1025         Minutes [address] [place], [place]

## 2021-10-24 LAB
ABO/RH: NORMAL
ABSOLUTE EOS #: 0.11 K/UL (ref 0–0.44)
ABSOLUTE IMMATURE GRANULOCYTE: 0.13 K/UL (ref 0–0.3)
ABSOLUTE LYMPH #: 1.34 K/UL (ref 1.1–3.7)
ABSOLUTE MONO #: 0.6 K/UL (ref 0.1–1.2)
ANION GAP SERPL CALCULATED.3IONS-SCNC: 10 MMOL/L (ref 9–17)
ANTIBODY SCREEN: NEGATIVE
ARM BAND NUMBER: NORMAL
BASOPHILS # BLD: 1 % (ref 0–2)
BASOPHILS ABSOLUTE: 0.04 K/UL (ref 0–0.2)
BLD PROD TYP BPU: NORMAL
BUN BLDV-MCNC: 22 MG/DL (ref 8–23)
BUN/CREAT BLD: 25 (ref 9–20)
CALCIUM SERPL-MCNC: 8.1 MG/DL (ref 8.6–10.4)
CHLORIDE BLD-SCNC: 103 MMOL/L (ref 98–107)
CO2: 21 MMOL/L (ref 20–31)
CREAT SERPL-MCNC: 0.88 MG/DL (ref 0.5–0.9)
CROSSMATCH RESULT: NORMAL
DIFFERENTIAL TYPE: ABNORMAL
DISPENSE STATUS BLOOD BANK: NORMAL
EOSINOPHILS RELATIVE PERCENT: 2 % (ref 1–4)
EXPIRATION DATE: NORMAL
GFR AFRICAN AMERICAN: >60 ML/MIN
GFR NON-AFRICAN AMERICAN: >60 ML/MIN
GFR SERPL CREATININE-BSD FRML MDRD: ABNORMAL ML/MIN/{1.73_M2}
GFR SERPL CREATININE-BSD FRML MDRD: ABNORMAL ML/MIN/{1.73_M2}
GLUCOSE BLD-MCNC: 102 MG/DL (ref 70–99)
HCT VFR BLD CALC: 30.6 % (ref 36.3–47.1)
HEMOGLOBIN: 9.5 G/DL (ref 11.9–15.1)
IMMATURE GRANULOCYTES: 2 %
LYMPHOCYTES # BLD: 19 % (ref 24–43)
MCH RBC QN AUTO: 31.8 PG (ref 25.2–33.5)
MCHC RBC AUTO-ENTMCNC: 31 G/DL (ref 28.4–34.8)
MCV RBC AUTO: 102.3 FL (ref 82.6–102.9)
MONOCYTES # BLD: 8 % (ref 3–12)
NRBC AUTOMATED: 0 PER 100 WBC
PDW BLD-RTO: 15.9 % (ref 11.8–14.4)
PLATELET # BLD: 231 K/UL (ref 138–453)
PLATELET ESTIMATE: ABNORMAL
PMV BLD AUTO: 10.2 FL (ref 8.1–13.5)
POTASSIUM SERPL-SCNC: 4 MMOL/L (ref 3.7–5.3)
RBC # BLD: 2.99 M/UL (ref 3.95–5.11)
RBC # BLD: ABNORMAL 10*6/UL
SEG NEUTROPHILS: 68 % (ref 36–65)
SEGMENTED NEUTROPHILS ABSOLUTE COUNT: 4.96 K/UL (ref 1.5–8.1)
SODIUM BLD-SCNC: 134 MMOL/L (ref 135–144)
TRANSFUSION STATUS: NORMAL
UNIT DIVISION: 0
UNIT NUMBER: NORMAL
WBC # BLD: 7.2 K/UL (ref 3.5–11.3)
WBC # BLD: ABNORMAL 10*3/UL

## 2021-10-24 PROCEDURE — 97535 SELF CARE MNGMENT TRAINING: CPT

## 2021-10-24 PROCEDURE — 6370000000 HC RX 637 (ALT 250 FOR IP): Performed by: ORTHOPAEDIC SURGERY

## 2021-10-24 PROCEDURE — 99232 SBSQ HOSP IP/OBS MODERATE 35: CPT | Performed by: FAMILY MEDICINE

## 2021-10-24 PROCEDURE — 2700000000 HC OXYGEN THERAPY PER DAY

## 2021-10-24 PROCEDURE — 94761 N-INVAS EAR/PLS OXIMETRY MLT: CPT

## 2021-10-24 PROCEDURE — 6360000002 HC RX W HCPCS: Performed by: ORTHOPAEDIC SURGERY

## 2021-10-24 PROCEDURE — 2580000003 HC RX 258: Performed by: INTERNAL MEDICINE

## 2021-10-24 PROCEDURE — 2580000003 HC RX 258: Performed by: ORTHOPAEDIC SURGERY

## 2021-10-24 PROCEDURE — 80048 BASIC METABOLIC PNL TOTAL CA: CPT

## 2021-10-24 PROCEDURE — 1200000000 HC SEMI PRIVATE

## 2021-10-24 PROCEDURE — 6370000000 HC RX 637 (ALT 250 FOR IP): Performed by: NURSE PRACTITIONER

## 2021-10-24 PROCEDURE — 85025 COMPLETE CBC W/AUTO DIFF WBC: CPT

## 2021-10-24 PROCEDURE — 36415 COLL VENOUS BLD VENIPUNCTURE: CPT

## 2021-10-24 PROCEDURE — 97530 THERAPEUTIC ACTIVITIES: CPT

## 2021-10-24 RX ORDER — FUROSEMIDE 40 MG/1
40 TABLET ORAL DAILY
Status: DISCONTINUED | OUTPATIENT
Start: 2021-10-24 | End: 2021-10-25 | Stop reason: HOSPADM

## 2021-10-24 RX ORDER — SODIUM CHLORIDE 9 MG/ML
INJECTION, SOLUTION INTRAVENOUS CONTINUOUS
Status: DISCONTINUED | OUTPATIENT
Start: 2021-10-24 | End: 2021-10-25 | Stop reason: HOSPADM

## 2021-10-24 RX ADMIN — SODIUM CHLORIDE, PRESERVATIVE FREE 10 ML: 5 INJECTION INTRAVENOUS at 20:47

## 2021-10-24 RX ADMIN — HYDROCODONE BITARTRATE AND ACETAMINOPHEN 2 TABLET: 5; 325 TABLET ORAL at 17:37

## 2021-10-24 RX ADMIN — AMLODIPINE BESYLATE 2.5 MG: 2.5 TABLET ORAL at 09:24

## 2021-10-24 RX ADMIN — SODIUM CHLORIDE: 9 INJECTION, SOLUTION INTRAVENOUS at 18:12

## 2021-10-24 RX ADMIN — CYANOCOBALAMIN TAB 1000 MCG 1000 MCG: 1000 TAB at 09:24

## 2021-10-24 RX ADMIN — SODIUM CHLORIDE, PRESERVATIVE FREE 10 ML: 5 INJECTION INTRAVENOUS at 10:10

## 2021-10-24 RX ADMIN — PANTOPRAZOLE SODIUM 40 MG: 40 TABLET, DELAYED RELEASE ORAL at 09:24

## 2021-10-24 RX ADMIN — ONDANSETRON 4 MG: 2 INJECTION INTRAMUSCULAR; INTRAVENOUS at 10:23

## 2021-10-24 RX ADMIN — MIRTAZAPINE 15 MG: 15 TABLET, FILM COATED ORAL at 20:38

## 2021-10-24 RX ADMIN — CARBAMAZEPINE 200 MG: 200 TABLET ORAL at 20:38

## 2021-10-24 RX ADMIN — CARBAMAZEPINE 200 MG: 200 TABLET ORAL at 09:24

## 2021-10-24 RX ADMIN — PRIMIDONE 50 MG: 50 TABLET ORAL at 20:38

## 2021-10-24 RX ADMIN — ATORVASTATIN CALCIUM 20 MG: 20 TABLET, FILM COATED ORAL at 20:46

## 2021-10-24 RX ADMIN — ENOXAPARIN SODIUM 40 MG: 40 INJECTION SUBCUTANEOUS at 09:24

## 2021-10-24 RX ADMIN — MORPHINE SULFATE 4 MG: 4 INJECTION, SOLUTION INTRAMUSCULAR; INTRAVENOUS at 20:38

## 2021-10-24 RX ADMIN — PRIMIDONE 50 MG: 50 TABLET ORAL at 09:24

## 2021-10-24 RX ADMIN — CHOLECALCIFEROL TAB 10 MCG (400 UNIT) 400 UNITS: 10 TAB at 09:24

## 2021-10-24 RX ADMIN — FERROUS SULFATE TAB 325 MG (65 MG ELEMENTAL FE) 325 MG: 325 (65 FE) TAB at 09:31

## 2021-10-24 RX ADMIN — METFORMIN HYDROCHLORIDE 500 MG: 500 TABLET ORAL at 09:31

## 2021-10-24 RX ADMIN — MORPHINE SULFATE 4 MG: 4 INJECTION, SOLUTION INTRAMUSCULAR; INTRAVENOUS at 10:23

## 2021-10-24 RX ADMIN — FERROUS SULFATE TAB 325 MG (65 MG ELEMENTAL FE) 325 MG: 325 (65 FE) TAB at 16:04

## 2021-10-24 RX ADMIN — ATENOLOL 50 MG: 50 TABLET ORAL at 09:24

## 2021-10-24 RX ADMIN — METFORMIN HYDROCHLORIDE 500 MG: 500 TABLET ORAL at 16:04

## 2021-10-24 ASSESSMENT — PAIN SCALES - GENERAL
PAINLEVEL_OUTOF10: 8
PAINLEVEL_OUTOF10: 10
PAINLEVEL_OUTOF10: 10
PAINLEVEL_OUTOF10: 0
PAINLEVEL_OUTOF10: 7
PAINLEVEL_OUTOF10: 5
PAINLEVEL_OUTOF10: 0

## 2021-10-24 ASSESSMENT — PAIN DESCRIPTION - PAIN TYPE: TYPE: ACUTE PAIN;SURGICAL PAIN

## 2021-10-24 ASSESSMENT — PAIN DESCRIPTION - LOCATION: LOCATION: HIP

## 2021-10-24 ASSESSMENT — PAIN DESCRIPTION - ORIENTATION: ORIENTATION: RIGHT;LEFT

## 2021-10-24 ASSESSMENT — PAIN SCALES - WONG BAKER: WONGBAKER_NUMERICALRESPONSE: 0

## 2021-10-24 NOTE — PROGRESS NOTES
Keerthi Machado M.D. Internal Medicine Progress Note    Patient: Donna Romero  Date of Admission: 10/11/2021 10:56 PM  Hospital Day # 12  Date of Evaluation: 10/24/2021      SUBJECTIVE:    Patient seen for f/u of bilateral hip fractures. She is resting in bed with eyes closed but arouses easily. Pain is controlled. Tolerating diet without n/v/d/      ROS:   Constitutional: negative  for fevers, and negative for chills. Respiratory: negative for shortness of breath, negative for cough, and negative for wheezing  Cardiovascular: negative for chest pain, and negative for palpitations  Gastrointestinal: negative for abdominal pain, negative for nausea,negative for vomiting, negative for diarrhea, and negative for constipation     All other systems were reviewed with the patient and are negative unless otherwise stated in HPI    -----------------------------------------------------------------  OBJECTIVE:  Vitals:   Temp: 98.2 °F (36.8 °C)  BP: (!) 119/37  Resp: 16  Pulse: 74  SpO2: 95 % on room air    Weight  Wt Readings from Last 3 Encounters:   10/24/21 152 lb (68.9 kg)   03/02/21 130 lb (59 kg)   10/30/19 148 lb 2.4 oz (67.2 kg)     Body mass index is 29.69 kg/m². 24HR INTAKE/OUTPUT:      Intake/Output Summary (Last 24 hours) at 10/24/2021 1618  Last data filed at 10/24/2021 1017  Gross per 24 hour   Intake 300 ml   Output 425 ml   Net -125 ml       Exam:  GEN:    Awake, alert and oriented x3. EYES:  EOMI, pupils equal   NECK: Supple. No lymphadenopathy. No carotid bruit  CVS:    regular rate and rhythm,  2/6 systolic murmur  PULM:  CTA, no wheezes, rales or rhonchi, no acute respiratory distress  ABD:    Bowels sounds normal.  Abdomen is soft. No distention. no tenderness to palpation. EXT:   trace edema bilaterally . No calf tenderness. NEURO: Moves all extremities. Motor and sensory are grossly intact  SKIN:  hip incisions dressed / clean.   Scattered ecchymosis  -----------------------------------------------------------------  DATA:  Complete Blood Count:   Recent Labs     10/22/21  0515 10/23/21  0652 10/24/21  0826   WBC 7.5 8.2 7.2   RBC 3.02* 3.02* 2.99*   HGB 9.5* 9.7* 9.5*   HCT 29.4* 30.0* 30.6*   MCV 97.4 99.3 102.3   MCH 31.5 32.1 31.8   MCHC 32.3 32.3 31.0   RDW 16.7* 16.0* 15.9*    214 231   MPV 10.5 9.9 10.2        Last 3 Blood Glucose:   Recent Labs     10/22/21  0515 10/23/21  0652 10/24/21  0826   GLUCOSE 109* 133* 102*        Comprehensive Metabolic Profile:   Recent Labs     10/22/21  0515 10/23/21  0652 10/24/21  0826    137 134*   K 3.8 3.7 4.0    105 103   CO2 19* 18* 21   BUN 18 18 22   CREATININE 0.75 0.77 0.88   GLUCOSE 109* 133* 102*   CALCIUM 8.1* 7.9* 8.1*        Radiology/Imaging:  FLUORO FOR SURGICAL PROCEDURES   Final Result      XR CHEST PORTABLE   Final Result   New mild diffuse prominence of interstitial markings likely related to   vascular congestion and/or diffuse infection      Interval increase in now mild streaky bibasilar atelectasis         XR HIP LEFT (2-3 VIEWS)   Final Result   No definite left hip fracture identified on this radiograph. RECOMMENDATION:   If there is strong clinical concern for left hip fracture, consider follow-up   MRI or CT. XR ANKLE RIGHT (MIN 3 VIEWS)   Final Result   Right knee:      1. Small joint effusion. Tricompartmental osteoarthrosis. CPPD. 2. No acute fracture or dislocation. 3. Mild soft tissue edema about the right knee. Right tib fib:      1. Moderate to severe edema and soft tissue swelling of the foot. 2. Mild edema in the right leg. 3. No acute osseous abnormality. 4. Tricompartmental osteoarthrosis and CPPD of the right knee. Right ankle:      1. Moderate to severe edema and soft tissue swelling of the right foot. 2. Mild edema of the right leg and right ankle. 3. No acute osseous abnormality.   Degenerative changes as detailed above.         XR KNEE RIGHT (1-2 VIEWS)   Final Result   Right knee:      1. Small joint effusion. Tricompartmental osteoarthrosis. CPPD. 2. No acute fracture or dislocation. 3. Mild soft tissue edema about the right knee. Right tib fib:      1. Moderate to severe edema and soft tissue swelling of the foot. 2. Mild edema in the right leg. 3. No acute osseous abnormality. 4. Tricompartmental osteoarthrosis and CPPD of the right knee. Right ankle:      1. Moderate to severe edema and soft tissue swelling of the right foot. 2. Mild edema of the right leg and right ankle. 3. No acute osseous abnormality. Degenerative changes as detailed above. XR TIBIA FIBULA RIGHT (2 VIEWS)   Final Result   Right knee:      1. Small joint effusion. Tricompartmental osteoarthrosis. CPPD. 2. No acute fracture or dislocation. 3. Mild soft tissue edema about the right knee. Right tib fib:      1. Moderate to severe edema and soft tissue swelling of the foot. 2. Mild edema in the right leg. 3. No acute osseous abnormality. 4. Tricompartmental osteoarthrosis and CPPD of the right knee. Right ankle:      1. Moderate to severe edema and soft tissue swelling of the right foot. 2. Mild edema of the right leg and right ankle. 3. No acute osseous abnormality. Degenerative changes as detailed above. CT CERVICAL SPINE WO CONTRAST   Final Result   No acute fracture or malalignment of the cervical spine. Multilevel degenerative change with left-sided bony foraminal narrowing at   C3-4 and C4-5. FLUORO FOR SURGICAL PROCEDURES   Final Result      MRI HIP RIGHT WO CONTRAST   Final Result   1. Acute intratrochanteric fracture of the right femur with fracture line   extending to the level of the lesser trochanter. No displacement at the   lesser trochanter identified. Only minimal displacement of the right greater   trochanter.   Associated surrounding muscular strain as well as partial   tearing of the right gluteus minimus and medius insertion on the greater   trochanter and moderate amount of fluid within the right trochanteric bursa. 2. Note is also made of a nondisplaced fracture of the intratrochanteric left   femur with fracture lines extending to the greater and lesser trochanters. CT FOOT RIGHT WO CONTRAST   Final Result   1. Diffuse osteopenia and degenerative changes as detailed above. 2. No acute osseous abnormality. No aggressive osseous destruction. 3. Severe cellulitis and induration of the subcutaneous fat and skin about   the ankle and foot. No discrete organized fluid collection. 4. Sequela of remote trauma along the inferior aspect of the lateral and   medial malleolus. 5. Irregularity of the 2nd metatarsal head could be related to sequela of   Freiberg's infraction. 6. Mild plantar calcaneal spur. CT FEMUR RIGHT WO CONTRAST   Final Result   1. Acute minimally displaced right greater trochanteric fracture with   adjacent soft tissue edema and fluid in the right greater trochanteric bursa. 2. Diffuse osteopenia. 3. Appearance of the wall of the urinary bladder which can be seen with   cystitis. Correlate with urinalysis. 4. Mild right hip osteoarthrosis. 5. Mild-to-moderate tricompartmental osteoarthrosis of the right knee. CPPD. 6. Small suprapatellar joint effusion. XR CHEST PORTABLE   Final Result   No acute process. Elevated right hemidiaphragm unchanged. Calcified nodules   left upper lung. XR HIP 2-3 VW W PELVIS RIGHT   Final Result   No acute abnormality identified. XR FEMUR RIGHT (MIN 2 VIEWS)   Final Result   Artifact versus nondisplaced fracture of the greater trochanter. XR ANKLE RIGHT (MIN 3 VIEWS)   Final Result   Findings suggestive of osteomyelitis focally involving the posterior tip of   the base of the 5th metatarsal and the lateral aspect of the lateral   cuneiform posteriorly. Consider follow-up CT or MRI. Diffuse edema of the visualized lower extremity likely representing dependent   edema. CT Head WO Contrast   Final Result   No acute intracranial abnormality.                  ASSESSMENT / PLAN:  Bilateral hip fractures  · POD #11 s/p cephalomedullary nailing right intertrochanteric hip fracture  · POD #5 s/p cephalomedullary nailing left intertrochanteric hip fracture  · Appreciate ortho assistance  · Pain control: norco  · Acute blood loss anemia  · Stable s/p PRBC transfusion  · Monitor H/H  · Acute CHF  · Continue Lasix  · Appreciate cardiology  · Diabetes mellitus  · Continue Metformin  · Hypertension  · Continue Amlodipine, Atenolol  · Hospital Prophylaxis:   · DVT: Lovenox   · Stress Ulcer: H2 Blocker   · High risk medications: none   · Disposition:    · Discharge plan is Jenn Anderson MD , M.D.  10/24/2021  4:18 PM

## 2021-10-24 NOTE — PROGRESS NOTES
Patient called out to be rolled off of her buttocks more. Writer assisted repositioning patient to her left side with pillow support. Patient states this is comfortable for her. She asked that blankets and sheet be removed as she was overly warm and unable to move due to the weight of the blankets. Writer also removed patient's socks at this time. Patient is resting quietly in bed. Denies further needs at this time. Will continue to monitor and assess.

## 2021-10-24 NOTE — PROGRESS NOTES
Dressing (Aquacel) changed to bilateral hips. Patient tolerated fairly well. Spoke to MD regarding KATIE today and OK to proceed per Dr Jacobsen.

## 2021-10-24 NOTE — PROGRESS NOTES
Spoke with Dr Reva Barfield via telephone-He would like both dressings changed on each hip. Orders in to do this.

## 2021-10-24 NOTE — PROGRESS NOTES
Writer repositioned patient in bed as she was complaining of pressure on her buttocks where the bed bends. Writer positioned a pillow under patient's buttocks to relieve the pressure. Pillow remains under patient's knees. Patient states this is much better. Vitals assessment completed at this time. Patient requesting pain medication. Morphine given at this time as norco had been given a few hours earlier and is not due. Patient is resting comfortably at this time. Will continue to monitor and assess. Call light in reach.

## 2021-10-24 NOTE — PROGRESS NOTES
Ms. Westley Templeton urine out put was at 150 ml/hr on day shift-Informed Dr Matthew Davison and he ordered 75 ml/hr normal saline.  Orders in

## 2021-10-24 NOTE — PROGRESS NOTES
Physical Therapy  Facility/Department: [facility] AT THE [facility] MED SURG  Daily Treatment Note  NAME: Manny Mendoza  : 1931  MRN: [id number]    Date of Service: 10/24/2021    Discharge Recommendations:  Continue to assess pending progress, Subacute/Skilled Nursing Facility, ECF with PT, Patient would benefit from continued therapy after discharge        Assessment   Treatment Diagnosis: generalized weakness, difficulty walking  Prognosis: Good  Activity Tolerance  Activity Tolerance: Patient Tolerated treatment well;Patient limited by pain; Patient limited by fatigue     Patient Diagnosis(es): The primary encounter diagnosis was Cellulitis, unspecified cellulitis site. A diagnosis of Acute cystitis without hematuria was also pertinent to this visit. has a past medical history of Anemia, Arthritis of knee, Benign essential tremor, CAD S/P percutaneous coronary angioplasty, Diabetes mellitus type II, controlled ([facility] [address] Gastrointestinal hemorrhage, History of pancreatitis, Humerus fracture, Hyperlipidemia, Hypertension, Major depression, S/P CABG (coronary artery bypass graft), Ventral hernia, and Vitamin B12 deficiency. has a past surgical history that includes Cholecystectomy; Coronary artery bypass graft (); Hysterectomy, vaginal (); Salpingo-oophorectomy (); Colonoscopy (N/A, 2018); hip surgery (Right); Hip fracture surgery (Right, 10/13/2021); and Hip fracture surgery (Left, 10/19/2021). Restrictions  Restrictions/Precautions  Restrictions/Precautions: Weight Bearing, General Precautions, Fall Risk  Lower Extremity Weight Bearing Restrictions  Right Lower Extremity Weight Bearing: Weight Bearing As Tolerated  Left Lower Extremity Weight Bearing: Weight Bearing As Tolerated  Subjective   General  Chart Reviewed: Yes  Response To Previous Treatment: Patient with no complaints from previous session.   Family / Caregiver Present: No  Referring Practitioner: Dwayne Moseley DO  Subjective  Subjective: Pt reports pain in B hips however unable to rate on pain scale. Orientation  Orientation  Overall Orientation Status: Within Functional Limits  Cognition      Objective   Bed mobility  Scooting: Moderate assistance;Maximal assistance;2 Person assistance  Transfers  Comment: Not attempted per nursing. Ambulation  Ambulation?: No       Goals  Short term goals  Time Frame for Short term goals: 20 days  Short term goal 1: Pt will perform bed mobility with CGA to improve functional independence. Short term goal 2: Patient to complete sit/stand and stand pivot transfers with modAx2 and no LOB to improve mobility and decrease fall risk. Short term goal 3: Patient to amb 20ft with FWW and CGA/minAx1, WBAT B LE's with no LOB to improve mobility. Short term goal 4: Patient to tolerate 20-30 min of ther ex/act to improve functional strength. Plan    Plan  Times per week: 7 days per week  Times per day: Twice a day  Current Treatment Recommendations: Strengthening, IADL Training, Neuromuscular Re-education, Home Exercise Program, ROM, Manual Therapy - Soft Tissue Mobilization, Safety Education & Training, Balance Training, Endurance Training, Patient/Caregiver Education & Training, Functional Mobility Training, ADL/Self-care Training, Transfer Training, Gait Training  Safety Devices  Type of devices:  All fall risk precautions in place, Left in chair, Call light within reach, Chair alarm in place, Nurse notified, Patient at risk for falls     Therapy Time   Individual Concurrent Group Co-treatment   Time In 0815         Time Out 0825         Minutes [address], [place]

## 2021-10-24 NOTE — PROGRESS NOTES
Occupational Therapy  Facility/Department: [facility] AT THE [facility] MED SURG  Daily Treatment Note  NAME: Naty Berry  : 1931  MRN: [id number]    Date of Service: 10/24/2021    Discharge Recommendations:  Continue to assess pending progress, ECF with OT       Assessment      Patient Education: Educated on repositioning and bed mobility strategies  Barriers to Learning: pain/discomfort  Safety Devices  Safety Devices in place: Yes  Type of devices: Call light within reach; Bed alarm in place; All fall risk precautions in place         Patient Diagnosis(es): The primary encounter diagnosis was Cellulitis, unspecified cellulitis site. A diagnosis of Acute cystitis without hematuria was also pertinent to this visit. has a past medical history of Anemia, Arthritis of knee, Benign essential tremor, CAD S/P percutaneous coronary angioplasty, Diabetes mellitus type II, controlled ([facility] [address] Gastrointestinal hemorrhage, History of pancreatitis, Humerus fracture, Hyperlipidemia, Hypertension, Major depression, S/P CABG (coronary artery bypass graft), Ventral hernia, and Vitamin B12 deficiency. has a past surgical history that includes Cholecystectomy; Coronary artery bypass graft (); Hysterectomy, vaginal (); Salpingo-oophorectomy (); Colonoscopy (N/A, 2018); hip surgery (Right); Hip fracture surgery (Right, 10/13/2021); and Hip fracture surgery (Left, 10/19/2021).     Restrictions  Restrictions/Precautions  Restrictions/Precautions: Weight Bearing, General Precautions, Fall Risk  Lower Extremity Weight Bearing Restrictions  Right Lower Extremity Weight Bearing: Weight Bearing As Tolerated  Left Lower Extremity Weight Bearing: Weight Bearing As Tolerated  Subjective   General  Chart Reviewed: Yes  Patient assessed for rehabilitation services?: Yes  Response to previous treatment: Patient with no complaints from previous session  Family / Caregiver Present: No  Referring Practitioner: Vinayak Ward  Diagnosis: Right Total Hip  Subjective  Subjective: Pt reports severe pain stating, \"I just can't get comfortable. \"  General Comment  Comments: Pt agreeable to repositioning for comfort this date. Orientation     Objective    ADL  Feeding: Beverage management;Minimal assistance;Bringing food to mouth assist           Bed mobility  Rolling to Left: Maximum assistance  Rolling to Right: Maximum assistance  Comment: Pt required max (A) x 2 for repositioning in bed for comfort. Plan   Plan  Times per week: 7x  Times per day: Daily  Current Treatment Recommendations: Strengthening, Safety Education & Training, Self-Care / ADL, Functional Mobility Training, Balance Training, Neuromuscular Re-education, Endurance Training  G-Code     OutComes Score                                                  AM-PAC Score             Goals  Short term goals  Time Frame for Short term goals: 21  Short term goal 1: Patient to progress to bed mobility with CGA 1-2 to prepare for OOB ADLs. Short term goal 2: Patient to progress to functional transfers (sit-pivot,sit-stand) with min A to improve independence. Short term goal 3: Patient to complete 10 minutes ther-ex/ther-act in order to improve strength/endurance for ADLs  Short term goal 4: Patient to complete UB bathing/dressing with min A. Short term goal 5: Patient to complete LB dressing/bathing with min A.        Therapy Time   Individual Concurrent Group Co-treatment   Time In 0815         Time Out 0830         Minutes 15                 NILA [place]/TONIA

## 2021-10-24 NOTE — PROGRESS NOTES
Patient repositioned in bed with pillow support. Oxygen is at 1L while sleeping. Patient denies needs at this time. Vitals assessment completed at this time. Will continue to monitor and assess.

## 2021-10-25 VITALS
TEMPERATURE: 98.4 F | WEIGHT: 149.9 LBS | SYSTOLIC BLOOD PRESSURE: 117 MMHG | DIASTOLIC BLOOD PRESSURE: 47 MMHG | RESPIRATION RATE: 18 BRPM | BODY MASS INDEX: 29.43 KG/M2 | OXYGEN SATURATION: 99 % | HEIGHT: 60 IN | HEART RATE: 66 BPM

## 2021-10-25 LAB
ABSOLUTE EOS #: 0.06 K/UL (ref 0–0.44)
ABSOLUTE IMMATURE GRANULOCYTE: 0.11 K/UL (ref 0–0.3)
ABSOLUTE LYMPH #: 1.05 K/UL (ref 1.1–3.7)
ABSOLUTE MONO #: 0.53 K/UL (ref 0.1–1.2)
ANION GAP SERPL CALCULATED.3IONS-SCNC: 11 MMOL/L (ref 9–17)
BASOPHILS # BLD: 1 % (ref 0–2)
BASOPHILS ABSOLUTE: 0.03 K/UL (ref 0–0.2)
BUN BLDV-MCNC: 25 MG/DL (ref 8–23)
BUN/CREAT BLD: 25 (ref 9–20)
CALCIUM SERPL-MCNC: 7.9 MG/DL (ref 8.6–10.4)
CHLORIDE BLD-SCNC: 105 MMOL/L (ref 98–107)
CO2: 21 MMOL/L (ref 20–31)
CREAT SERPL-MCNC: 0.99 MG/DL (ref 0.5–0.9)
DIFFERENTIAL TYPE: ABNORMAL
EOSINOPHILS RELATIVE PERCENT: 1 % (ref 1–4)
GFR AFRICAN AMERICAN: >60 ML/MIN
GFR NON-AFRICAN AMERICAN: 53 ML/MIN
GFR SERPL CREATININE-BSD FRML MDRD: ABNORMAL ML/MIN/{1.73_M2}
GFR SERPL CREATININE-BSD FRML MDRD: ABNORMAL ML/MIN/{1.73_M2}
GLUCOSE BLD-MCNC: 112 MG/DL (ref 70–99)
HCT VFR BLD CALC: 27.5 % (ref 36.3–47.1)
HEMOGLOBIN: 8.5 G/DL (ref 11.9–15.1)
IMMATURE GRANULOCYTES: 2 %
LYMPHOCYTES # BLD: 17 % (ref 24–43)
MCH RBC QN AUTO: 31.5 PG (ref 25.2–33.5)
MCHC RBC AUTO-ENTMCNC: 30.9 G/DL (ref 28.4–34.8)
MCV RBC AUTO: 101.9 FL (ref 82.6–102.9)
MONOCYTES # BLD: 9 % (ref 3–12)
NRBC AUTOMATED: 0 PER 100 WBC
PDW BLD-RTO: 15.8 % (ref 11.8–14.4)
PLATELET # BLD: 253 K/UL (ref 138–453)
PLATELET ESTIMATE: ABNORMAL
PMV BLD AUTO: 10.6 FL (ref 8.1–13.5)
POTASSIUM SERPL-SCNC: 4 MMOL/L (ref 3.7–5.3)
RBC # BLD: 2.7 M/UL (ref 3.95–5.11)
RBC # BLD: ABNORMAL 10*6/UL
SARS-COV-2, RAPID: NOT DETECTED
SEG NEUTROPHILS: 70 % (ref 36–65)
SEGMENTED NEUTROPHILS ABSOLUTE COUNT: 4.4 K/UL (ref 1.5–8.1)
SODIUM BLD-SCNC: 137 MMOL/L (ref 135–144)
SPECIMEN DESCRIPTION: NORMAL
WBC # BLD: 6.2 K/UL (ref 3.5–11.3)
WBC # BLD: ABNORMAL 10*3/UL

## 2021-10-25 PROCEDURE — 2580000003 HC RX 258: Performed by: INTERNAL MEDICINE

## 2021-10-25 PROCEDURE — 87635 SARS-COV-2 COVID-19 AMP PRB: CPT

## 2021-10-25 PROCEDURE — 80048 BASIC METABOLIC PNL TOTAL CA: CPT

## 2021-10-25 PROCEDURE — 36415 COLL VENOUS BLD VENIPUNCTURE: CPT

## 2021-10-25 PROCEDURE — 6370000000 HC RX 637 (ALT 250 FOR IP): Performed by: ORTHOPAEDIC SURGERY

## 2021-10-25 PROCEDURE — 97110 THERAPEUTIC EXERCISES: CPT

## 2021-10-25 PROCEDURE — 6370000000 HC RX 637 (ALT 250 FOR IP): Performed by: NURSE PRACTITIONER

## 2021-10-25 PROCEDURE — 6370000000 HC RX 637 (ALT 250 FOR IP): Performed by: FAMILY MEDICINE

## 2021-10-25 PROCEDURE — 94761 N-INVAS EAR/PLS OXIMETRY MLT: CPT

## 2021-10-25 PROCEDURE — 85025 COMPLETE CBC W/AUTO DIFF WBC: CPT

## 2021-10-25 PROCEDURE — 97530 THERAPEUTIC ACTIVITIES: CPT

## 2021-10-25 PROCEDURE — 6360000002 HC RX W HCPCS: Performed by: ORTHOPAEDIC SURGERY

## 2021-10-25 PROCEDURE — 2700000000 HC OXYGEN THERAPY PER DAY

## 2021-10-25 RX ORDER — CHOLECALCIFEROL (VITAMIN D3) 125 MCG
5 CAPSULE ORAL NIGHTLY PRN
Qty: 90 TABLET | Refills: 3 | DISCHARGE
Start: 2021-10-25

## 2021-10-25 RX ORDER — AMLODIPINE BESYLATE 2.5 MG/1
2.5 TABLET ORAL DAILY
Qty: 30 TABLET | Refills: 3 | DISCHARGE
Start: 2021-10-26

## 2021-10-25 RX ORDER — POTASSIUM CHLORIDE 20 MEQ/1
20 TABLET, EXTENDED RELEASE ORAL DAILY
Qty: 30 TABLET | Refills: 5 | DISCHARGE
Start: 2021-10-25

## 2021-10-25 RX ORDER — SODIUM CHLORIDE 9 MG/ML
50 INJECTION, SOLUTION INTRAVENOUS CONTINUOUS
Status: ON HOLD | DISCHARGE
Start: 2021-10-25 | End: 2022-08-25 | Stop reason: HOSPADM

## 2021-10-25 RX ORDER — FUROSEMIDE 40 MG/1
40 TABLET ORAL DAILY
Qty: 60 TABLET | Refills: 3 | DISCHARGE
Start: 2021-10-26 | End: 2021-12-16 | Stop reason: SDUPTHER

## 2021-10-25 RX ORDER — FERROUS SULFATE 325(65) MG
325 TABLET ORAL 2 TIMES DAILY WITH MEALS
Qty: 30 TABLET | Refills: 3 | DISCHARGE
Start: 2021-10-25

## 2021-10-25 RX ORDER — HYDROCODONE BITARTRATE AND ACETAMINOPHEN 5; 325 MG/1; MG/1
2 TABLET ORAL EVERY 4 HOURS PRN
Qty: 36 TABLET | Refills: 0 | Status: SHIPPED | OUTPATIENT
Start: 2021-10-25 | End: 2021-10-28

## 2021-10-25 RX ADMIN — CYANOCOBALAMIN TAB 1000 MCG 1000 MCG: 1000 TAB at 08:46

## 2021-10-25 RX ADMIN — CARBAMAZEPINE 200 MG: 200 TABLET ORAL at 08:46

## 2021-10-25 RX ADMIN — PREDNISONE 5 MG: 5 TABLET ORAL at 08:46

## 2021-10-25 RX ADMIN — HYDROCODONE BITARTRATE AND ACETAMINOPHEN 2 TABLET: 5; 325 TABLET ORAL at 04:12

## 2021-10-25 RX ADMIN — FERROUS SULFATE TAB 325 MG (65 MG ELEMENTAL FE) 325 MG: 325 (65 FE) TAB at 08:46

## 2021-10-25 RX ADMIN — PANTOPRAZOLE SODIUM 40 MG: 40 TABLET, DELAYED RELEASE ORAL at 08:46

## 2021-10-25 RX ADMIN — METFORMIN HYDROCHLORIDE 500 MG: 500 TABLET ORAL at 08:46

## 2021-10-25 RX ADMIN — ATENOLOL 50 MG: 50 TABLET ORAL at 08:46

## 2021-10-25 RX ADMIN — SODIUM CHLORIDE: 9 INJECTION, SOLUTION INTRAVENOUS at 04:17

## 2021-10-25 RX ADMIN — PRIMIDONE 50 MG: 50 TABLET ORAL at 08:46

## 2021-10-25 RX ADMIN — ENOXAPARIN SODIUM 40 MG: 40 INJECTION SUBCUTANEOUS at 08:46

## 2021-10-25 RX ADMIN — CHOLECALCIFEROL TAB 10 MCG (400 UNIT) 400 UNITS: 10 TAB at 08:46

## 2021-10-25 RX ADMIN — AMLODIPINE BESYLATE 2.5 MG: 2.5 TABLET ORAL at 08:46

## 2021-10-25 RX ADMIN — FUROSEMIDE 40 MG: 40 TABLET ORAL at 08:46

## 2021-10-25 RX ADMIN — HYDROCODONE BITARTRATE AND ACETAMINOPHEN 2 TABLET: 5; 325 TABLET ORAL at 13:56

## 2021-10-25 ASSESSMENT — PAIN DESCRIPTION - ORIENTATION: ORIENTATION: RIGHT;LEFT

## 2021-10-25 ASSESSMENT — PAIN SCALES - GENERAL
PAINLEVEL_OUTOF10: 10
PAINLEVEL_OUTOF10: 8
PAINLEVEL_OUTOF10: 8

## 2021-10-25 ASSESSMENT — PAIN DESCRIPTION - LOCATION: LOCATION: HIP

## 2021-10-25 ASSESSMENT — PAIN DESCRIPTION - DESCRIPTORS: DESCRIPTORS: ACHING

## 2021-10-25 ASSESSMENT — PAIN DESCRIPTION - PAIN TYPE: TYPE: ACUTE PAIN

## 2021-10-25 NOTE — PROGRESS NOTES
Progress Note  Vinnie Odonnell MD    OBJECTIVE:    Patient seen for f/u of Closed right hip fracture, initial encounter ([facility] [address] She has some  hip  Pain , weakness. Has minimal edema  Has lack of motivation  Hb 8.5  Bun 25  Cr 0.99  Has poor intake and decreased out put    ROS:   Constitutional: negative  for fevers, and negative for chills. Respiratory: negative for shortness of breath, negative for cough, and negative for wheezing,on oxygen  Cardiovascular: negative for chest pain, and negative for palpitations, has edema of legs  Gastrointestinal: negative for abdominal pain, negative for nausea,negative for vomiting, negative for diarrhea, and negative for constipation  Has bilat hip pain   All other systems were reviewed with the patient and are negative unless otherwise stated in HPI    OBJECTIVE:  Vitals:   Temp: 97.7 °F (36.5 °C)  BP: (!) 121/39  Resp: 20  Pulse: 68  SpO2: 91 %    24HR INTAKE/OUTPUT:      Intake/Output Summary (Last 24 hours) at 10/25/2021 0811  Last data filed at 10/25/2021 0418  Gross per 24 hour   Intake 950.24 ml   Output 125 ml   Net 825.24 ml     -----------------------------------------------------------------  Exam:  GEN:    Awake, alert and oriented x3. EYES:  EOMI, pupils equal   NECK: Supple. No lymphadenopathy. No carotid bruit  CVS:    regular rate and rhythm, 2/6 systolic murmur,has bilat leg  edema  PULM:  Has basal rales, no acute respiratory distress  ABD:    Bowels sounds normal.  Abdomen is soft. No distention. no tenderness to palpation. EXT:  Has minimal bilat leg edema . No calf tenderness. NEURO: Moves all extremities. Motor and sensory are grossly intact  SKIN:  No rashes.   Has multiple bruises  -----------------------------------------------------------------  Diagnostic Data:    · All available data reviewed  Lab Results   Component Value Date    WBC 6.2 10/25/2021    HGB 8.5 (L) 10/25/2021    .9 10/25/2021     10/25/2021      Lab Results Component Value Date    GLUCOSE 112 (H) 10/25/2021    BUN 25 (H) 10/25/2021    CREATININE 0.99 (H) 10/25/2021     10/25/2021    K 4.0 10/25/2021    CALCIUM 7.9 (L) 10/25/2021     10/25/2021    CO2 21 10/25/2021       PROBLEM LIST:  Principal Problem:    Closed right hip fracture, initial encounter ([facility]  Active Problems:    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  *      ASSESSMENT / PLAN:  Closed right hip fracture, initial encounter ([facility])  · Pain control  · Pt and ot  · Acute blood loss anemia-hb 8.5 after transfusion, monitor  · Lt hip Fr- pain control  · Poor intake-encourage to increase intake  · ashd- cardiology follow up,   · Type 2 dm- blood sugars well controlled  · Nutrition status: at risk for malnutrition  · DVT prophylaxis: SCD's ,lovenox  · High risk medications: none   · Disposition:  Discharge plan is ECF   · Poor prognosis due to lack of motivation and multiple risk factors    Angel Go MD , M.D.  10/25/2021  8:11 AM

## 2021-10-25 NOTE — PROGRESS NOTES
.      Patient: Hayden Cervantes  : 1931  Date of Admission: 10/11/2021  Primary Care Physician: Eriberto Tamayo  Today's Date: 10/24/2021    REASON FOR CONSULTATION: Leg Pain (right leg pain after fall three days ago)      HPI: Ms. Vani Gama is a 80 y.o. female who was admitted to the hospital with right hip fracture. Cardiology consulted for preoperative cardiovascular evaluation. Ms. Vani Gama  reported a cardiac history of CABG in  as well as several stents placed in . She also has a history of hypertension and hyperlipidemia for which she typically takes medications and is well controlled. ECG 10/10/2021 showed sinus rhythm with right bundle branch block and ST-T wave abnormalities in anterolateral leads. Grossly unchanged from prior. Echo done 10/11/2021 showed normal ejection fraction and wall motion. Aortic valve sclerosis without significant stenosis. Mild tricuspid regurgitation and mild diastolic dysfunction.     Exercise Tolerance: She reports having a a very limited exercise tolerance prior to her most recent hospital admission. Ms. Vani Gama says that she Ambulate over short distances with a walker     Ms. Vani Gama reports feeling ok tonight. She denies any chest pain or shortness of breath. She does say that her hip still hurts but is not bad. She reports her breathing is ok. Denies any diarrhea or constipation.        Past Medical History:   Diagnosis Date    Anemia     Arthritis of knee 2018    bilateral knees; \"bone on bone\"; declines surg    Benign essential tremor 04/15/2016    CAD S/P percutaneous coronary angioplasty 1998    done at [facility] Diabetes mellitus type II, controlled ([address] 1979    Gastrointestinal hemorrhage     Duodenal Ulcer by EGD    History of pancreatitis     after GI bleed    H[facility]us fracture 2015    [facility]    Hyperlipidemia     Hypertension     Major depression     S/P CABG (coronary artery bypass graft) 1996    Ventral hernia 5803    infraumbilical ; incisional    Vitamin B12 deficiency 07/2018    Vitamin B12 270       CURRENT ALLERGIES: Penicillins and Penicillin g REVIEW OF SYSTEMS: Limited review of systems but positive and negatives are mentioned in HPI. Patient also found to have urinary tract infection currently on intravenous antibiotics. Past Surgical History:   Procedure Laterality Date    CHOLECYSTECTOMY      COLONOSCOPY N/A 11/5/2018    COLONOSCOPY DIAGNOSTIC OR SCREENING performed by Peace Musa MD at [place] [id number]    ? number grafts    HIP FRACTURE SURGERY Right 10/13/2021    HIP OPEN REDUCTION INTERNAL FIXATION-NAILING performed by Tobi Cisse DO at [facility] Left 10/19/2021    HIP OPEN REDUCTION INTERNAL FIXATION - IM NAIL performed by Tobi Cisse DO at [facility] Right     [facility]    Social History:  Social History     Tobacco Use    Smoking status: Never Smoker    Smokeless tobacco: Never Used   Vaping Use    Vaping Use: Never used   Substance Use Topics    Alcohol use: No    Drug use: No        CURRENT MEDICATIONS:  Outpatient Medications Marked as Taking for the 10/11/21 encounter [facility] Encounter)   Medication Sig Dispense Refill    traMADol (ULTRAM) 50 MG tablet Take 25 mg by mouth 2 times daily.       acetaminophen (TYLENOL) 325 MG tablet Take 650 mg by mouth every 4 hours as needed for Pain or Fever      VITAMIN D PO Take 400 mg by mouth      furosemide (LASIX) 20 MG tablet Take 1 tablet by mouth every morning (before breakfast) On Monday, Wednesday, and Friday only 36 tablet 3    atenolol (TENORMIN) 50 MG tablet Take 1 tablet by mouth daily 90 tablet 3    atorvastatin (LIPITOR) 20 MG tablet Take 1 tablet by mouth nightly 90 tablet 3    carBAMazepine (TEGRETOL) 200 MG tablet Take 1 tablet by mouth 2 times daily 180 tablet 3    citalopram (CELEXA) 20 MG tablet Take 1 tablet by mouth daily 90 tablet 3    ferrous sulfate (IRON 325) 325 (65 Fe) MG tablet Take 1 tablet by mouth daily (with breakfast) 90 tablet 3    metFORMIN (GLUCOPHAGE) 500 MG tablet Take 1 tablet by mouth 2 times daily (with meals) 180 tablet 3    mirtazapine (REMERON) 15 MG tablet Take 1 tablet by mouth nightly 90 tablet 3    pantoprazole (PROTONIX) 40 MG tablet Take 1 tablet by mouth daily 90 tablet 3    vitamin B-12 (CYANOCOBALAMIN) 1000 MCG tablet Take 1 tablet by mouth daily 90 tablet 3    Ergocalciferol (VITAMIN D2) 50 MCG (2000 UT) TABS Take 50,000 Units by mouth once a week 12 tablet 3    aspirin EC 81 MG EC tablet Take 1 tablet by mouth daily 90 tablet 3    citalopram (CELEXA) 10 MG tablet Take 1 tablet by mouth daily 90 tablet 3    melatonin 5 MG TABS tablet Take 1 tablet by mouth daily (Patient taking differently: Take 5 mg by mouth nightly ) 90 tablet 3    Pediatric Multiple Vitamins (MULTIVITAMIN CHILDRENS) CHEW Take 1 tablet by mouth daily 90 tablet 3    mycophenolate (CELLCEPT) 500 MG tablet Take 2 tabs qam and 1 tab every evening 270 tablet 3    primidone (MYSOLINE) 50 MG tablet Take 1 tablet by mouth 2 times daily 180 tablet 3    Ergocalciferol (VITAMIN D2) 10 MCG (400 UNIT) TABS Take 1 tablet by mouth daily 90 tablet 3    predniSONE (DELTASONE) 5 MG tablet Take 1 tablet by mouth every other day 45 tablet 3       FAMILY HISTORY: family history includes Heart Disease in her father; Stroke in her sister. PHYSICAL EXAM:   BP (!) 116/40   Pulse 73   Temp 98.8 °F (37.1 °C) (Oral)   Resp 20   Ht 5' (1.524 m)   Wt 152 lb (68.9 kg)   SpO2 97%   BMI 29.69 kg/m²  Body mass index is 29.69 kg/m². [ INSTRUCTIONS:  \"[x]\" Indicates a positive item  \"[]\" Indicates a negative item   Vital Signs: (As obtained by patient/caregiver or practitioner observation)  No flowsheet data found.      Constitutional: [x] Appears well-developed and well-nourished [x] No apparent distress      [] Abnormal-   Mental status  [x] Alert and awake  [x] Oriented to person/place/time [x]Able to follow commands      Eyes:  EOM    [x]  Normal  [] Abnormal-  Sclera  [x]  Normal  [] Abnormal -         Discharge [x]  None visible  [] Abnormal -    HENT:   [x] Normocephalic, atraumatic.   [] Abnormal   [] Mouth/Throat: Mucous membranes are moist.     External Ears [x] Normal  [] Abnormal-     Neck: [x] No visualized mass     Pulmonary/Chest: [x] Respiratory effort normal.  [x] No visualized signs of difficulty breathing or respiratory distress        [] Abnormal-     Neurological:        [x] No Facial Asymmetry (Cranial nerve 7 motor function) (limited exam to video visit)          [] No gaze palsy        [] Abnormal-         Skin:        [x] No significant exanthematous lesions or discoloration noted on facial skin         [] Abnormal-            Psychiatric:       [x] Normal Affect [] No Hallucinations        [] Abnormal-     Other pertinent observable physical exam findings: None         MOST RECENT LABS ON RECORD:   Lab Results   Component Value Date    WBC 7.2 10/24/2021    HGB 9.5 (L) 10/24/2021    HCT 30.6 (L) 10/24/2021     10/24/2021    CHOL 119 08/05/2021    TRIG 79 08/05/2021    HDL 57 08/05/2021    LDLDIRECT 41 02/23/2018    ALT 25 10/13/2021    AST 42 (H) 10/13/2021     (L) 10/24/2021    K 4.0 10/24/2021     10/24/2021    CREATININE 0.88 10/24/2021    BUN 22 10/24/2021    CO2 21 10/24/2021    TSH 1.41 01/07/2021    INR 1.0 10/12/2021    LABA1C 6.2 (H) 05/07/2021       ASSESSMENT:  Patient Active Problem List    Diagnosis Date Noted    Hypomagnesemia 10/16/2021    Acute blood loss as cause of postoperative anemia 10/14/2021    Closed left hip fracture, initial encounter ([facility] [address] 10/13/2021    [facility], [facility]12[facility]    ASHD (arteriosclerotic heart disease) 10/12/2021    Acute cystitis without hematuria 10/12/2021    Abnormal ECG     S/P angioplasty with stent 04/16/2019    Acute lower gastrointestinal bleeding 11/03/2018    Anemia     Major depression     Vitamin B12 deficiency 07/01/2018    Uncontrolled hypertension 02/22/2018    Type 2 diabetes mellitus ([facility] [address] 02/22/2018    Dyslipidemia 02/22/2018    Arthritis of knee 01/01/2018    Benign essential tremor 04/15/2016    S/P CABG (coronary artery bypass graft) 01/01/1996       PLAN:    Acute on chronic diastolic heart failure: New York Heart Association Class: III (Asymptomatic only at rest) I[ 17 lbs and 10 liters since admission per charting  Beta Blocker: Continue Atenolol (Tenormin) 50 mg daily. ACE Inibitor/ARB: Not indicated at this time. Diuretics: START furosemide (Lasix) 40 mg every morning. Heart failure counseling: I told them to start wearing lower extremity compression stockings and I advised them to try and keep their legs up whenever possible and to limit salt in their diet. Additional Testing List: None      Atherosclerotic Heart Disease: Prior CABG, prior Stents. Currently asymptomatic. Monitor for now.  Antiplatelet Agent: Restart aspirin 81 mg daily.  Beta Blocker: Continue Atenolol (Tenormin) 50 mg daily.  Anti-anginal medications: Continue low-dose amlodipine 2.5 mg daily.  Cholesterol Reduction Therapy: Continue Atorvastatin (Lipitor) 20 mg daily. · Essential Hypertension: Controlled:    Beta Blocker: Continue Atenolol (Tenormin) 50 mg daily.  ACE Inibitor/ARB: Not indicated at this time.  Calcium Channel Blocker: Start low-dose amlodipine 2.5 mg daily.  Diuretics: Continue Lasix to avoid volume overload from blood and fluid resuscitation.  Closely monitor blood pressure as per unit protocol. · Hyperlipidemia: Mixed  · Cholesterol Reduction Therapy: Continue Atorvastatin (Lipitor) 20 mg daily. Once again, thank you for allowing me to participate in this patients care.  Please do not hesitate to contact me could I be of further assistance. Sincerely,  Anand Jin MD, MS, F.AMAMTAC. [facility]) [facility], [address], [address], [address] [place] [address]  Phone: [phone], Fax: [phone]      Based on the patients current medical conditions, I believe the risk of significant morbidity and mortality is: Intermediate to high. Addi Ocampo is a 80 y.o. female being evaluated by a Virtual Visit (video visit) encounter to address concerns as mentioned above. A caregiver was present when appropriate. Due to this being a TeleHealth encounter (During [id number]-71 public health emergency), evaluation of the following organ systems was limited: Vitals/Constitutional/EENT/Resp/CV/GI//MS/Neuro/Skin/Heme-Lymph-Imm. Pursuant to the emergency declaration under the [address] [place] [address] and the [employer] and [employer] Act, this Virtual Visit was conducted with patient's (and/or legal guardian's) consent, to reduce the patient's risk of exposure to COVID-19 and provide necessary medical care. The patient (and/or legal guardian) has also been advised to contact this office for worsening conditions or problems, and seek emergency medical treatment and/or call 911 if deemed necessary. Services were provided through a video synchronous discussion virtually to substitute for in-person visit. Ms. Nirmala Moseley was located in the patients hospital room in [facility]  Thao Jones MD performed the visit from my home in [facility]    --Thao Jones MD on 10/24/2021 at 11:55 PM    An electronic signature was used to authenticate this note.

## 2021-10-25 NOTE — PROGRESS NOTES
Physical Therapy  Facility/Department: [facility] THE [facility] MED SURG  Daily Treatment Note  NAME: Philip Halsted  : 1931  MRN: [id number]    Date of Service: 10/25/2021    Discharge Recommendations:  Continue to assess pending progress, Subacute/Skilled Nursing Facility, ECF with PT, Patient would benefit from continued therapy after discharge        Assessment   Treatment Diagnosis: generalized weakness, difficulty walking  Prognosis: Good  Decision Making: High Complexity  PT Education: PT Role;Goals;Plan of Care;General Safety;Transfer Training  Patient Education: Educated pt on above with fair to poor understanding. REQUIRES PT FOLLOW UP: Yes  Activity Tolerance  Activity Tolerance: Patient Tolerated treatment well;Patient limited by pain; Other  Activity Tolerance: Pt limited by fear of falling     Patient Diagnosis(es): The primary encounter diagnosis was Cellulitis, unspecified cellulitis site. Diagnoses of Acute cystitis without hematuria, Closed left hip fracture, initial encounter ([facility] and Closed right hip fracture, initial encounter [facility]) were also pertinent to this visit. has a past medical history of Anemia, Arthritis of knee, Benign essential tremor, CAD S/P percutaneous coronary angioplasty, Diabetes mellitus type II, controlled ([facility] [address] Gastrointestinal hemorrhage, History of pancreatitis, Humerus fracture, Hyperlipidemia, Hypertension, Major depression, S/P CABG (coronary artery bypass graft), Ventral hernia, and Vitamin B12 deficiency. has a past surgical history that includes Cholecystectomy; Coronary artery bypass graft (); Hysterectomy, vaginal (); Salpingo-oophorectomy (); Colonoscopy (N/A, 2018); hip surgery (Right); Hip fracture surgery (Right, 10/13/2021); and Hip fracture surgery (Left, 10/19/2021).     Restrictions  Restrictions/Precautions  Restrictions/Precautions: Weight Bearing, General Precautions, Fall Risk  Lower Extremity Weight Bearing Restrictions  Right Lower Extremity Weight Bearing: Weight Bearing As Tolerated  Left Lower Extremity Weight Bearing: Weight Bearing As Tolerated  Subjective   General  Chart Reviewed: Yes  Response To Previous Treatment: Patient with no complaints from previous session. Family / Caregiver Present: No  Referring Practitioner: Patel Malone DO  Subjective  Subjective: Pt reports pain in B hips 9/10  General Comment  Comments: Nursing Kati Bry) aware of pain. Orientation  Orientation  Overall Orientation Status: Within Functional Limits  Cognition      Objective   Bed mobility  Supine to Sit: Moderate assistance;2 Person assistance  Sit to Supine: Maximum assistance;2 Person assistance  Scooting: Maximal assistance;2 Person assistance  Transfers  Sit to Stand: Maximum Assistance;2 Person Assistance  Stand to sit: Maximum Assistance;2 Person Assistance  Bed to Chair: Maximum assistance;2 Person Assistance  Stand Pivot Transfers: Maximum Assistance;2 Person Assistance  Comment: Pt not extending knees to bear wt., cuing for safety  Ambulation  Ambulation?: No     Balance  Posture: Fair  Sitting - Static: Fair  Sitting - Dynamic: Fair  Standing - Static: Poor  Standing - Dynamic: Poor;-  Exercises  Straight Leg Raise: x10  Heelslides: x10  Hip Flexion: x10  Hip Abduction: x10  Knee Passive Range of Motion: x10  Ankle Pumps: x10  Comments: Above completed in supine  with AAROM. Goals  Short term goals  Time Frame for Short term goals: 20 days  Short term goal 1: Pt will perform bed mobility with CGA to improve functional independence. Short term goal 2: Patient to complete sit/stand and stand pivot transfers with modAx2 and no LOB to improve mobility and decrease fall risk. Short term goal 3: Patient to amb 20ft with FWW and CGA/minAx1, WBAT B LE's with no LOB to improve mobility. Short term goal 4: Patient to tolerate 20-30 min of ther ex/act to improve functional strength.     Plan    Plan  Times per week: 7 days per week  Times per day: Twice a day  Current Treatment Recommendations: Strengthening, IADL Training, Neuromuscular Re-education, Home Exercise Program, ROM, Manual Therapy - Soft Tissue Mobilization, Safety Education & Training, Balance Training, Endurance Training, Patient/Caregiver Education & Training, Functional Mobility Training, ADL/Self-care Training, Transfer Training, Gait Training  Safety Devices  Type of devices:  All fall risk precautions in place, Chair alarm in place, Nurse notified, Patient at risk for falls, Gait belt, Bed alarm in place, Left in bed     Therapy Time   Individual Concurrent Group Co-treatment   Time In 1320         Time Out 1404         Minutes 40                 Zach Means, PTA

## 2021-10-25 NOTE — PROGRESS NOTES
Patient to be discharged to [facility] today. [facility] to provide transport. Mandy Owen notified via telephone and agreeable with discharge plans. Facility FAXED discharge paperwork. LSW to remain involved and assist with further discharge needs if/as they present.     [address], Winifred Bowser  10/25/2021

## 2021-10-25 NOTE — PROGRESS NOTES
Occupational Therapy  Facility/Department: [facility] AT THE [facility] MED SURG  Daily Treatment Note  NAME: Maine Rivera  : 1931  MRN: [id number]    Date of Service: 10/25/2021    Discharge Recommendations:  Continue to assess pending progress, ECF with OT       Assessment      OT Education: Transfer Training;OT Role;Plan of Care  Barriers to Learning: none  Activity Tolerance  Activity Tolerance: Patient limited by fatigue  Safety Devices  Safety Devices in place: Yes  Type of devices: All fall risk precautions in place; Left in chair;Nurse notified;Call light within reach         Patient Diagnosis(es): The primary encounter diagnosis was Cellulitis, unspecified cellulitis site. Diagnoses of Acute cystitis without hematuria, Closed left hip fracture, initial encounter ([facility] and Closed right hip fracture, initial encounter [facility]) were also pertinent to this visit. has a past medical history of Anemia, Arthritis of knee, Benign essential tremor, CAD S/P percutaneous coronary angioplasty, [facility] type II, [facility] Gastrointestinal hemorrhage, History of pancreatitis, Humerus fracture, Hyperlipidemia, Hypertension, Major depression, S/P CABG (coronary artery bypass graft), Ventral hernia, and Vitamin B12 deficiency. has a past surgical history that includes Cholecystectomy; Coronary artery bypass graft (); Hysterectomy, vaginal (); Salpingo-oophorectomy (); Colonoscopy (N/A, 2018); hip surgery (Right); Hip fracture surgery (Right, 10/13/2021); and Hip fracture surgery (Left, 10/19/2021).     Restrictions  Restrictions/Precautions  Restrictions/Precautions: Weight Bearing, General Precautions, Fall Risk  Lower Extremity Weight Bearing Restrictions  Right Lower Extremity Weight Bearing: Weight Bearing As Tolerated  Left Lower Extremity Weight Bearing: Weight Bearing As Tolerated  Subjective   General  Chart Reviewed: Yes  Patient assessed for rehabilitation services?: Yes  Response to previous treatment: Patient with no complaints from previous session  Family / Caregiver Present: No  Referring Practitioner: Becka Galvin  Diagnosis: Right Total Hip  Subjective  Subjective: Pt reports pain 7/10. Pt with increased fear of falling this date. General Comment  Comments: Pt supine in bed upon arrival. Pt agreeable to Ot tx. Orientation     Objective                Bed mobility  Rolling to Right: Maximum assistance;2 Person assistance  Supine to Sit: Maximum assistance;2 Person assistance  Scooting: Maximal assistance;2 Person assistance  Transfers  Sit to stand: 2 Person assistance;Maximum assistance  Stand to sit: 2 Person assistance; Moderate assistance  Transfer Comments: Completed stand-pivot transfer with Max A X2 this date from bed to bedside chair. Pt with increased fear of falling limiting standing tolerance. Plan   Plan  Times per week: 7x  Times per day: Daily  Current Treatment Recommendations: Strengthening, Safety Education & Training, Self-Care / ADL, Functional Mobility Training, Balance Training, Neuromuscular Re-education, Endurance Training  G-Code     OutComes Score                                                  AM-PAC Score        AM-[facility] Inpatient Daily Activity Raw Score: 10 (10/20/21 1158)  AM-PAC Inpatient ADL T-Scale Score : 27.31 (10/20/21 1158)  ADL Inpatient CMS 0-100% Score: 74.7 (10/20/21 1158)  ADL Inpatient CMS G-Code Modifier : CL (10/20/21 1158)    Goals  Short term goals  Time Frame for Short term goals: 21  Short term goal 1: Patient to progress to bed mobility with CGA 1-2 to prepare for OOB ADLs. Short term goal 2: Patient to progress to functional transfers (sit-pivot,sit-stand) with min A to improve independence.   Short term goal 3: Patient to complete 10 minutes ther-ex/ther-act in order to improve strength/endurance for ADLs  Short term goal 4: Patient to complete UB bathing/dressing with min A.  Short term goal 5: Patient to complete LB dressing/bathing with min A.        Therapy Time   Individual Concurrent Group Co-treatment   Time In [address]         Time Out 1140         Minutes [address]

## 2021-10-25 NOTE — PROGRESS NOTES
Physical Therapy  Facility/Department: [facility] AT THE [facility] MED SURG  Daily Treatment Note  NAME: Ofe Moyer  : 1931  MRN: [id number]    Date of Service: 10/25/2021    Discharge Recommendations:  Continue to assess pending progress, Subacute/Skilled Nursing Facility, ECF with PT, Patient would benefit from continued therapy after discharge        Assessment   Treatment Diagnosis: generalized weakness, difficulty walking  Prognosis: Good  Decision Making: High Complexity  PT Education: PT Role;Goals;Plan of Care;General Safety;Transfer Training  Patient Education: Educated pt on above with fair to poor understanding. REQUIRES PT FOLLOW UP: Yes  Activity Tolerance  Activity Tolerance: Patient Tolerated treatment well;Patient limited by pain; Other  Activity Tolerance: Pt limited by fear of falling     Patient Diagnosis(es): The primary encounter diagnosis was Cellulitis, unspecified cellulitis site. Diagnoses of Acute cystitis without hematuria, Closed left hip fracture, initial encounter ([facility] and Closed right hip fracture, initial encounter [facility]) were also pertinent to this visit. has a past medical history of Anemia, Arthritis of knee, Benign essential tremor, CAD S/P percutaneous coronary angioplasty, Diabetes mellitus type II, controlled ([facility] Gastrointestinal hemorrhage, History of pancreatitis, Humerus fracture, Hyperlipidemia, Hypertension, Major depression, S/P CABG (coronary artery bypass graft), Ventral hernia, and Vitamin B12 deficiency. has a past surgical history that includes Cholecystectomy; Coronary artery bypass graft (); Hysterectomy, vaginal (); Salpingo-oophorectomy (); Colonoscopy (N/A, 2018); hip surgery (Right); Hip fracture surgery (Right, 10/13/2021); and Hip fracture surgery (Left, 10/19/2021).     Restrictions  Restrictions/Precautions  Restrictions/Precautions: Weight Bearing, General Precautions, Fall Risk  Lower Extremity Weight Bearing Restrictions  Right Lower Extremity Weight Bearing: Weight Bearing As Tolerated  Left Lower Extremity Weight Bearing: Weight Bearing As Tolerated  Subjective   General  Chart Reviewed: Yes  Response To Previous Treatment: Patient with no complaints from previous session. Family / Caregiver Present: No  Referring Practitioner: Sukhjinder Maurice DO  Subjective  Subjective: Pt reports pain in B hips however unable to rate on pain scale. Orientation  Orientation  Overall Orientation Status: Within Functional Limits  Cognition      Objective   Bed mobility  Supine to Sit: Moderate assistance;2 Person assistance  Scooting: Moderate assistance  Transfers  Sit to Stand: Maximum Assistance;2 Person Assistance  Stand to sit: Maximum Assistance;2 Person Assistance  Stand Pivot Transfers: Maximum Assistance;2 Person Assistance  Comment: Pt very fearful of transfer  Ambulation  Ambulation?: No     Balance  Posture: Fair  Sitting - Static: Fair  Sitting - Dynamic: Fair  Standing - Static: Poor  Standing - Dynamic: Poor;-      Goals  Short term goals  Time Frame for Short term goals: 20 days  Short term goal 1: Pt will perform bed mobility with CGA to improve functional independence. Short term goal 2: Patient to complete sit/stand and stand pivot transfers with modAx2 and no LOB to improve mobility and decrease fall risk. Short term goal 3: Patient to amb 20ft with FWW and CGA/minAx1, WBAT B LE's with no LOB to improve mobility. Short term goal 4: Patient to tolerate 20-30 min of ther ex/act to improve functional strength.     Plan    Plan  Times per week: 7 days per week  Times per day: Twice a day  Current Treatment Recommendations: Strengthening, IADL Training, Neuromuscular Re-education, Home Exercise Program, ROM, Manual Therapy - Soft Tissue Mobilization, Safety Education & Training, Balance Training, Endurance Training, Patient/Caregiver Education & Training, Functional Mobility Training, ADL/Self-care Training, Transfer Training, Gait

## 2021-10-25 NOTE — PROGRESS NOTES
Pts iv was beeping I went in room pt stated she was trying to take things off of herself so she can get up and go home, I reoriented pt untangled cords and wires, offered water to patient as well as EPC and sky hose, pt refused and said both of those hurt her so much, pillow was added to under her legs

## 2021-10-25 NOTE — DISCHARGE SUMMARY
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 [facility] Course: Yamil Hyatt is a 80 y.o. female admitted with closed right fracture of right and left hip. She presented to the emergency room with complaints of right leg pain. She stated she fell approximately 3 days prior to evaluation. Patient does have history of hypertension, type 2 diabetes as well as hyperlipidemia. She stated that while using her walker she tripped and fell hit her head and her right leg. She denied LOC. Patient originally resided at [address] Initial radiology studies were negative for fracture however due to progressing pain and inability to walk further investigation was completed. During her evaluation MRI confirmed that patient had both left and right hip fractures. Orthopedic surgeon was consulted. Patient's first surgery was on  patient underwent IM nailing of the right hip. Patient tolerated procedure well. Postoperatively patient required blood transfusions due to low hemoglobin. Patient did develop some hypoxia cardiology was consulted as well. On  patient underwent an IM nailing of the left intertrochanteric hip fracture. She tolerated procedure well.   Patient did require a second transfusion on postop. Patient did have some elevation in troponins surrounding the surgeries. Patient was started on Lasix 40 mg daily. Patient was continued on low-dose amlodipine, Lipitor and Tenormin. Patient currently working with physical therapy however is difficult for her. Patient will be [facility] to continue her skilled therapy. Patient will continue with Ramey catheter at this time due to difficulty with transfers. Patient currently on 1 L of oxygen and will continue on discharge and will wean as able. Due to decreased urine output and decreased oral intake patient will be discharged with normal saline at 50 mL's per hour continued. Consultants:  Dr. Ghada Luke, cardiology; Dr. Sean Carrasquillo, orthopedic surgeon    Procedures: IM nailing of both right and left intertrochanteric hip. Complications: Acute blood loss anemia requiring blood transfusions; elevated troponin    Discharge Condition: fair    Exam:  GEN:  alert and oriented to person, place and time, well-developed and well-nourished, in no acute distress  EYES: No gross abnormalities. , PERRL and EOMI  NECK: normal, supple, no lymphadenopathy,  no carotid bruits  PULM: rales present-bilateral bases  COR: regular rate & rhythm, no gallops, S1 normal, S2 normal and systolic murmur  ABD:  soft, non-tender, non-distended, normal bowel sounds, no masses or organomegaly  EXT:   edema: 2+ affecting bilateral foot, bilateral ankle, bilateral calf  NEURO: follows commands, CHOE, no deficits  SKIN:  Dressings clean dry and intact bilateral hips    Significant Diagnostic Studies:   Lab Results   Component Value Date    WBC 6.2 10/25/2021    HGB 8.5 (L) 10/25/2021     10/25/2021       Lab Results   Component Value Date    BUN 25 (H) 10/25/2021    CREATININE 0.99 (H) 10/25/2021     10/25/2021    K 4.0 10/25/2021    CALCIUM 7.9 (L) 10/25/2021     10/25/2021    CO2 21 10/25/2021    LABGLOM 53 (L) 10/25/2021 Lab Results   Component Value Date    WBCUA 20 TO 50 10/12/2021    RBCUA None 10/12/2021    EPITHUA 0 TO 2 10/12/2021    LEUKOCYTESUR MODERATE (A) 10/12/2021    SPECGRAV 1.010 10/12/2021    GLUCOSEU NEGATIVE 10/12/2021    KETUA NEGATIVE 10/12/2021    PROTEINU NEGATIVE 10/12/2021    HGBUR NEGATIVE 10/12/2021    CASTUA NOT REPORTED 10/12/2021    CRYSTUA NOT REPORTED 10/12/2021    BACTERIA 2+ (A) 10/12/2021    YEAST NOT REPORTED 10/12/2021       Echocardiogram complete 2D with doppler with color    Result Date: 10/12/2021  [facility] Transthoracic Echocardiography Report (TTE)  Patient Name Prem Domínguez       Date of Study               10/12/2021               [facility]   Date of      11/21/1931  Gender                      Female  Birth   Age          80 year(s)  Race                           Room Number  0336        Height:                     60 inch, 152.4 cm   Corporate ID [id number]    Weight:                     135 pounds, 61.2 kg  #   Patient Acct [de-identified]   BSA:          1.58 m^2      BMI:      26.37  #                                                              kg/m^2   MR #         [id number]      Sonographer                 Work,So   Accession #  [id number]  Interpreting Physician      Marissa Diaz   Fellow                   Referring Nurse             Dalia Betancourt, BERTO                           Practitioner   Interpreting             Referring Physician  Fellow  Type of Study   TTE procedure:2D Echocardiogram, Color Doppler, Doppler, M-Mode. Procedure Date Date: 10/12/2021 Start: 01:50 PM Study Location: [facility] Indications:Preop cardiac evaluation. History / Tech. Comments: Dx: pre-op Hx: HTN, hyperlipidemia, DM, angioplsty, CABG Unable to fully position patient due to hip fracture.  Patient Status: Inpatient Height: 60 inches Weight: 135 pounds BSA: 1.58 m^2 BMI: 26.37 kg/m^2 BP: 195/63 mmHg CONCLUSIONS Summary Global left ventricular systolic function appears peroneus brevis tendon. No avulsion fracture fragments are identified. There appears to be soft tissue edema posterior to this. A small cortical defect and erosive changes evident at the lateral aspect of lateral cuneiform posteriorly evident on the lateral view. No fracture or dislocation is identified. Soft tissues otherwise are noted for marked diffuse dependent edema. Findings suggestive of osteomyelitis focally involving the posterior tip of the base of the 5th metatarsal and the lateral aspect of the lateral cuneiform posteriorly. Consider follow-up CT or MRI. Diffuse edema of the visualized lower extremity likely representing dependent edema. CT Head WO Contrast    Result Date: 10/12/2021  EXAMINATION: CT OF THE HEAD WITHOUT CONTRAST  10/12/2021 12:11 am TECHNIQUE: CT of the head was performed without the administration of intravenous contrast. Dose modulation, iterative reconstruction, and/or weight based adjustment of the mA/kV was utilized to reduce the radiation dose to as low as reasonably achievable. COMPARISON: None. HISTORY: ORDERING SYSTEM PROVIDED HISTORY: Head injury after fall TECHNOLOGIST PROVIDED HISTORY: Head injury after fall Decision Support Exception - unselect if not a suspected or confirmed emergency medical condition->Emergency Medical Condition (MA) FINDINGS: BRAIN/VENTRICLES: There is no acute intracranial hemorrhage, mass effect or midline shift. No abnormal extra-axial fluid collection. The gray-white differentiation is maintained without evidence of an acute infarct. There is no evidence of hydrocephalus. Chronic and age related changes including age-appropriate cerebral volume loss and scattered nonspecific white matter hypodensities which are likely the sequela of chronic small vessel ischemic disease. ORBITS: The visualized portion of the orbits demonstrate no acute abnormality.  SINUSES: The visualized paranasal sinuses and mastoid air cells demonstrate no acute abnormality. SOFT TISSUES/SKULL:  No acute abnormality of the visualized skull or soft tissues. No acute intracranial abnormality. XR CHEST PORTABLE    Result Date: 10/12/2021  EXAMINATION: ONE XRAY VIEW OF THE CHEST 10/12/2021 3:25 am COMPARISON: 03/02/2021 HISTORY: ORDERING SYSTEM PROVIDED HISTORY: Fall TECHNOLOGIST PROVIDED HISTORY: Fall FINDINGS: Normal cardiomediastinal silhouette. Elevated right hemidiaphragm unchanged. Calcified nodules left upper lung unchanged. No focal consolidation. No pleural effusion. No pneumothorax. Median sternotomy. No acute process. Elevated right hemidiaphragm unchanged. Calcified nodules left upper lung. CT FEMUR RIGHT WO CONTRAST    Result Date: 10/12/2021  EXAMINATION: CT OF THE RIGHT FEMUR WITH CONTRAST 10/12/2021 5:15 am TECHNIQUE: CT of the right femur was performed with the administration of intravenous contrast.  Multiplanar reformatted images are provided for review. Dose modulation, iterative reconstruction, and/or weight based adjustment of the mA/kV was utilized to reduce the radiation dose to as low as reasonably achievable. COMPARISON: Right femur plain radiographs from 10/12/2021 HISTORY ORDERING SYSTEM PROVIDED HISTORY: Possible greater trochanter fracture TECHNOLOGIST PROVIDED HISTORY: Possible greater trochanter fracture Decision Support Exception - unselect if not a suspected or confirmed emergency medical condition->Emergency Medical Condition (MA) 80year-old female with possible greater trochanter fracture FINDINGS: Bones: Acute minimally displaced fracture involving the right greater trochanter on image 50, series 602 and image 105, series 601 with adjacent soft tissue edema and fluid in the right greater trochanteric bursa. Diffuse osteopenia. Soft Tissue: Atherosclerotic calcification of the vasculature. Pelvic phleboliths. Irregularity of the wall of the urinary bladder with associated wall thickening of the urinary bladder.  Mild fatty degeneration of the visualized musculature. Joint: Mild osteophyte spurring and narrowing of the right hip joint space. Right femoral head is properly located within the right acetabulum without clear evidence for acute fracture, dislocation or femoral head flattening. Mild degenerative changes of the pubic symphysis with associated chondrocalcinosis. Small suprapatellar joint effusion. Mild-to-moderate tricompartmental osteophyte spurring and joint space narrowing with associated chondrocalcinosis. 1. Acute minimally displaced right greater trochanteric fracture with adjacent soft tissue edema and fluid in the right greater trochanteric bursa. 2. Diffuse osteopenia. 3. Appearance of the wall of the urinary bladder which can be seen with cystitis. Correlate with urinalysis. 4. Mild right hip osteoarthrosis. 5. Mild-to-moderate tricompartmental osteoarthrosis of the right knee. CPPD. 6. Small suprapatellar joint effusion. CT FOOT RIGHT WO CONTRAST    Result Date: 10/12/2021  EXAMINATION: CT OF THE RIGHT FOOT WITHOUT CONTRAST 10/12/2021 5:15 am TECHNIQUE: CT of the right foot was performed without the administration of intravenous contrast.  Multiplanar reformatted images are provided for review. Dose modulation, iterative reconstruction, and/or weight based adjustment of the mA/kV was utilized to reduce the radiation dose to as low as reasonably achievable. COMPARISON: Right ankle plain radiographs from 10/12/2021 HISTORY ORDERING SYSTEM PROVIDED HISTORY: Possible osteomyelitis of right foot TECHNOLOGIST PROVIDED HISTORY: Possible osteomyelitis of right foot Decision Support Exception - unselect if not a suspected or confirmed emergency medical condition->Emergency Medical Condition (MA) 80year-old female with possible osteomyelitis of the right foot. FINDINGS: Bones: Diffuse osteopenia. Mild plantar calcaneal spur. Mild distal Achilles enthesopathy.  No osteochondral lesion or defect at the talar dome.  No aggressive osseous destruction. No acute fracture or dislocation. Soft Tissue: Preservation of the sinus tarsi fat. Severe edema and soft tissue swelling about the ankle and foot. No discrete organized fluid collection. Atherosclerotic calcification of the vasculature. Irregularity of the inferior aspect of the lateral and medial malleolus likely related to sequela of remote trauma. Visualized Achilles, peroneal, flexor, and extensor tendons are seen in their expected locations. There is circumferential induration of the right foot and right ankle skin. Joint: No tibiotalar joint effusion. Ankle mortise appears intact. Mild degenerative changes of the midfoot and TMT joints. Moderate degenerative changes of the 1st MTP/MTS joints. Irregularity of the 2nd metatarsal head likely related to sequela of Freiberg's infraction. 1. Diffuse osteopenia and degenerative changes as detailed above. 2. No acute osseous abnormality. No aggressive osseous destruction. 3. Severe cellulitis and induration of the subcutaneous fat and skin about the ankle and foot. No discrete organized fluid collection. 4. Sequela of remote trauma along the inferior aspect of the lateral and medial malleolus. 5. Irregularity of the 2nd metatarsal head could be related to sequela of Freiberg's infraction. 6. Mild plantar calcaneal spur.      MRI HIP RIGHT WO CONTRAST    Result Date: 10/12/2021  EXAMINATION: MRI OF THE RIGHT HIP WITHOUT CONTRAST, 10/12/2021 4:25 pm TECHNIQUE: Multiplanar multisequence MRI of the right hip was performed without the administration of intravenous contrast. COMPARISON: CT right femur same day HISTORY: ORDERING SYSTEM PROVIDED HISTORY: right greater trochanter fracture TECHNOLOGIST PROVIDED HISTORY: right greater trochanter fracture - evaluate for fracture extension into intertrochanteric/lesser trochanter region right greater trochanter fracture FINDINGS: BONE MARROW: Acute fracture of the intratrochanteric right femur with fracture lines extending to the level of the lesser trochanter. No displacement identified at the lesser trochanter. Only mild displacement the greater trochanter. Nondisplaced intratrochanteric fracture of the left hip also present with fracture line extension into both the lesser and greater trochanters (images 15 through 18 series 3). No additional fractures are identified. HIP JOINT: Anatomic alignment of the bilateral hips with mild osteoarthritic changes bilaterally. No significant joint effusion. LABRUM: To the extent that the right acetabular labrum is visualized on the current exam, underlying labral degeneration is present. No paralabral cyst identified. BURSAE: Small amount of fluid present within the right trochanteric bursa, likely posttraumatic. No iliopsoas bursitis identified. SCIATIC NERVE: Right sciatic nerve is normal in course and caliber. MUSCLES / TENDONS: Intramuscular edema involving the right gluteus minimus and medius muscles, right adductor musculature, and imaged proximal anterior compartment musculature of the right thigh. Partial tearing of the right gluteus minimus and medius insertions on the greater trochanter. Tendinosis of the right hamstring origin. INTRAPELVIC CONTENTS / SOFT TISSUES: Limited images of the intrapelvic contents demonstrate a Ramey catheter within the bladder. Small amount of gas within the bladder lumen likely related to Ramey catheter placement. Partially imaged fat filled umbilical hernia. 1. Acute intratrochanteric fracture of the right femur with fracture line extending to the level of the lesser trochanter. No displacement at the lesser trochanter identified. Only minimal displacement of the right greater trochanter. Associated surrounding muscular strain as well as partial tearing of the right gluteus minimus and medius insertion on the greater trochanter and moderate amount of fluid within the right trochanteric bursa.  2. daily             aspirin EC 81 MG EC tablet  Take 1 tablet by mouth daily             atenolol (TENORMIN) 50 MG tablet  Take 1 tablet by mouth daily             atorvastatin (LIPITOR) 20 MG tablet  Take 1 tablet by mouth nightly             carBAMazepine (TEGRETOL) 200 MG tablet  Take 1 tablet by mouth 2 times daily             citalopram (CELEXA) 10 MG tablet  Take 1 tablet by mouth daily             citalopram (CELEXA) 20 MG tablet  Take 1 tablet by mouth daily             Ergocalciferol (VITAMIN D2) 10 MCG (400 UNIT) TABS  Take 1 tablet by mouth daily             Ergocalciferol (VITAMIN D2) 50 MCG (2000 UT) TABS  Take 50,000 Units by mouth once a week             ferrous sulfate (IRON 325) 325 (65 Fe) MG tablet  Take 1 tablet by mouth 2 times daily (with meals)             furosemide (LASIX) 40 MG tablet  Take 1 tablet by mouth daily             HYDROcodone-acetaminophen (NORCO) 5-325 MG per tablet  Take 2 tablets by mouth every 4 hours as needed for Pain for up to 3 days. melatonin 5 MG TABS tablet  Take 1 tablet by mouth nightly as needed (insominia)             metFORMIN (GLUCOPHAGE) 500 MG tablet  Take 1 tablet by mouth 2 times daily (with meals)             mirtazapine (REMERON) 15 MG tablet  Take 1 tablet by mouth nightly             mycophenolate (CELLCEPT) 500 MG tablet  Take 2 tabs qam and 1 tab every evening             nitroGLYCERIN (NITROSTAT) 0.4 MG SL tablet  Place 1 tablet under the tongue every 5 minutes as needed for Chest pain up to max of 3 total doses. If no relief after 1 dose, call 911.              pantoprazole (PROTONIX) 40 MG tablet  Take 1 tablet by mouth daily             Pediatric Multiple Vitamins (MULTIVITAMIN CHILDRENS) CHEW  Take 1 tablet by mouth daily             potassium chloride (KLOR-CON M) 20 MEQ extended release tablet  Take 1 tablet by mouth daily             predniSONE (DELTASONE) 5 MG tablet  Take 1 tablet by mouth every other day             primidone (MYSOLINE) 50 MG tablet  Take 1 tablet by mouth 2 times daily             sodium chloride 0.9 % infusion  Infuse 50 mL/hr intravenously continuous             traMADol (ULTRAM) 50 MG tablet  Take 25 mg by mouth 2 times daily. vitamin B-12 (CYANOCOBALAMIN) 1000 MCG tablet  Take 1 tablet by mouth daily                 Patient Instructions:    Activity: activity as tolerated  Diet: cardiac diet  Wound Care: none needed  Other: CBC and BMP in 1 week    Disposition:   DC to [facility]    Follow up:  Patient will be followed by Roni Courtney MD in 1-2 weeks; Dr. Queenie Villa, cardiology; Dr. Natalia Paz, orthopedic surgeon    Brock Leyva on Discharge (if applicable)  ACE/ARB in CHF: Yes  Statin in MI: NA  ASA in MI: NA  Statin in CVA: NA  Antiplatelet in CVA: NA    Total time spent on discharge services: 45 minutes    Including the following activities:  Evaluation and Management of patient  Discussion with patient and/or surrogate about current care plan  Coordination with Case Management and/or   Coordination of care with Consultants (if applicable)   Coordination of care with Receiving Facility Physician (if applicable)  Completion of DME forms (if applicable)  Preparation of Discharge Summary  Preparation of Medication Reconciliation  Preparation of Discharge Prescriptions    Signed:  SIMBA Balderas - CNP, SIMBA, NP-C  10/25/2021, 12:40 PM

## 2021-10-26 PROBLEM — L12.0 BULLOUS PEMPHIGOID: Chronic | Status: ACTIVE | Noted: 2021-10-26

## 2021-10-27 ENCOUNTER — OUTSIDE SERVICES (OUTPATIENT)
Dept: FAMILY MEDICINE CLINIC | Age: 86
End: 2021-10-27
Payer: MEDICARE

## 2021-10-27 DIAGNOSIS — E11.9 TYPE 2 DIABETES MELLITUS WITHOUT COMPLICATION, WITHOUT LONG-TERM CURRENT USE OF INSULIN (HCC): ICD-10-CM

## 2021-10-27 DIAGNOSIS — Z98.890 STATUS POST OPEN REDUCTION AND INTERNAL FIXATION (ORIF) OF FRACTURE: Primary | ICD-10-CM

## 2021-10-27 DIAGNOSIS — D61.818 PANCYTOPENIA (HCC): ICD-10-CM

## 2021-10-27 DIAGNOSIS — G25.0 BENIGN ESSENTIAL TREMOR: ICD-10-CM

## 2021-10-27 DIAGNOSIS — D64.9 CHRONIC ANEMIA: ICD-10-CM

## 2021-10-27 DIAGNOSIS — Z86.79 HX OF ACQUIRED CONGESTIVE HEART FAILURE: ICD-10-CM

## 2021-10-27 DIAGNOSIS — I25.10 ASHD (ARTERIOSCLEROTIC HEART DISEASE): ICD-10-CM

## 2021-10-27 DIAGNOSIS — Z95.1 S/P CABG (CORONARY ARTERY BYPASS GRAFT): ICD-10-CM

## 2021-10-27 DIAGNOSIS — E78.2 MIXED HYPERLIPIDEMIA: ICD-10-CM

## 2021-10-27 DIAGNOSIS — I10 ESSENTIAL HYPERTENSION: ICD-10-CM

## 2021-10-27 DIAGNOSIS — L12.0 BULLOUS PEMPHIGOID: ICD-10-CM

## 2021-10-27 DIAGNOSIS — Z87.81 STATUS POST OPEN REDUCTION AND INTERNAL FIXATION (ORIF) OF FRACTURE: Primary | ICD-10-CM

## 2021-10-27 PROCEDURE — 99306 1ST NF CARE HIGH MDM 50: CPT | Performed by: FAMILY MEDICINE

## 2021-10-27 NOTE — PROGRESS NOTES
[address] [address]  [place] [id number]  Dept: [phone]    [place] DILLARD RMA, am transcribing for and in the presence of Dr. Hernandez Hui. 10/27/2021 5:31 pm Ju[place] May is a 80 y.o. female being seen for her initial H&P visit. Location of visit: [facility]    HPI:  Admission History:  Poornima Stephenson was admitted to [facility] on 10/26/2021 following fall at  and sustained fx to R hip mild dispaced L hip nondisplaced. bothe tx with internal fixation by Dr. Dylon Araujo. During the stay had tachycardia, CHF and renal impairment. She comes to [facility] for therapy. Came with 0.9 NS 15/hr. Faxes:  o 10/26/21 dx given for mycophenolate mofetil  dx bullous pemphigoid   New today:  No dyspnea. Bowels fine valladares cath in place. ROS:  General Constitutional: Denies fever. Denies lightheadedness. Respiratory: Denies cough. Denies shortness of breath. Denies wheezing. Cardiovascular: Denies chest pain at rest.  Gastrointestinal: Denies abdominal pain. Denies blood in the stool. Denies constipation. Denies diarrhea. Denies nausea. Denies vomiting. Genitourinary: Denies blood in the urine. Denies painful urination. Skin: Denies rash. Neurologic: Denies falls. Denies dizziness. [place] [facility]: Denies sleep disturbance. Denies anxiety. Denies depressed mood.     Past Medical History:   Diagnosis Date    Anemia 7/201    Arthritis of knee 2018    bilateral knees; \"bone on bone\"; declines surg    Benign essential tremor 04/15/2016    CAD S/P percutaneous coronary angioplasty 1998    done at [facility] Diabetes mellitus type II, controlled ([address] 1979    Gastrointestinal hemorrhage 2012    Duodenal Ulcer by EGD    History of pancreatitis 2013    after GI bleed    Humerus fracture 2015    right humerus ; [place]    Hyperlipidemia     Hypertension     Major depression     S/P CABG (coronary artery bypass graft) 1996    Ventral hernia 2018 infraumbilical ; incisional    Vitamin B12 deficiency 07/2018    Vitamin B12 270       Past Surgical History:   Procedure Laterality Date    CHOLECYSTECTOMY      COLONOSCOPY N/A 11/5/2018    COLONOSCOPY DIAGNOSTIC OR SCREENING performed by Wil Roberts MD at [facility] 1284    ? number grafts    HIP FRACTURE SURGERY Right 10/13/2021    HIP OPEN REDUCTION INTERNAL FIXATION-NAILING performed by Taiwo Mullen DO at [facility] Left 10/19/2021    HIP OPEN REDUCTION INTERNAL FIXATION - IM NAIL performed by Taiwo Mullen DO at [facility] Right     HYSTERECTOMY, VAGINAL  1958    SALPINGO-OOPHORECTOMY  1960       PHYSICAL EXAM:    General Appearance: in no acute distress, well nourished. Eyes: pupils equal, round reactive to light and accommodation. Oral Cavity: mucosa moist.  Neck/Thyroid:  no cervical lymphadenopathy, no thyromegaly  Skin: warm and dry no bullous lesions. Blood soaking of dressing of bilateral hips, nothing outside bandage area. Heart: regular rate and rhythm. No murmurs. S1, S2 normal, no gallops. Rate: 70's  Lungs: clear to auscultation bilaterally. Abdomen:soft, nontender, nondistended, no masses or organomegaly. Extremities: no cyanosis 2+ edema at feet ankles/bilaterally. Peripheral Pulses: 2+ throughout, symetric. Neurologic: verbally responsive, moves extremities. Psych: normal affect, speech fluent. ASSESSMENT:   Diagnosis Orders   1. Status post open reduction and internal fixation (ORIF) of fracture      bilateral hip   2. ASHD (arteriosclerotic heart disease)     3. Hx of acquired congestive heart failure      Diastolic   4. Bullous pemphigoid      stable on cellcept   5. Chronic anemia     6. Pancytopenia ([address]     7. Mixed hyperlipidemia     8. Type 2 diabetes mellitus without complication, without long-term current use of insulin ([address]     9. Benign essential tremor     10.  S/P CABG (coronary artery bypass graft)     11. Essential hypertension           PLAN:  I will stop IV fluids and check lab  She is getting PT  Plan to return to [place] [id number]., Dr. Laci Motley, personally performed the services described in this documentation as scribed/transcribed by KWASI Montalvo in my presence, and is both accurate and complete.

## 2021-10-28 ENCOUNTER — HOSPITAL ENCOUNTER (OUTPATIENT)
Age: 86
Setting detail: SPECIMEN
Discharge: HOME | End: 2021-10-28
Payer: MEDICARE

## 2021-10-28 LAB
ABSOLUTE EOS #: 0.07 K/UL (ref 0–0.44)
ABSOLUTE IMMATURE GRANULOCYTE: 0.12 K/UL (ref 0–0.3)
ABSOLUTE LYMPH #: 1.27 K/UL (ref 1.1–3.7)
ABSOLUTE MONO #: 0.43 K/UL (ref 0.1–1.2)
ANION GAP SERPL CALCULATED.3IONS-SCNC: 13 MMOL/L (ref 9–17)
BASOPHILS # BLD: 1 % (ref 0–2)
BASOPHILS ABSOLUTE: 0.04 K/UL (ref 0–0.2)
BNP INTERPRETATION: ABNORMAL
BUN BLDV-MCNC: 16 MG/DL (ref 8–23)
BUN/CREAT BLD: 24 (ref 9–20)
CALCIUM SERPL-MCNC: 8.1 MG/DL (ref 8.6–10.4)
CHLORIDE BLD-SCNC: 104 MMOL/L (ref 98–107)
CO2: 23 MMOL/L (ref 20–31)
CREAT SERPL-MCNC: 0.68 MG/DL (ref 0.5–0.9)
DIFFERENTIAL TYPE: ABNORMAL
EOSINOPHILS RELATIVE PERCENT: 1 % (ref 1–4)
GFR AFRICAN AMERICAN: >60 ML/MIN
GFR NON-AFRICAN AMERICAN: >60 ML/MIN
GFR SERPL CREATININE-BSD FRML MDRD: ABNORMAL ML/MIN/{1.73_M2}
GFR SERPL CREATININE-BSD FRML MDRD: ABNORMAL ML/MIN/{1.73_M2}
GLUCOSE BLD-MCNC: 96 MG/DL (ref 70–99)
HCT VFR BLD CALC: 31.7 % (ref 36.3–47.1)
HEMOGLOBIN: 9.8 G/DL (ref 11.9–15.1)
IMMATURE GRANULOCYTES: 2 %
LYMPHOCYTES # BLD: 19 % (ref 24–43)
MCH RBC QN AUTO: 31.4 PG (ref 25.2–33.5)
MCHC RBC AUTO-ENTMCNC: 30.9 G/DL (ref 28.4–34.8)
MCV RBC AUTO: 101.6 FL (ref 82.6–102.9)
MONOCYTES # BLD: 6 % (ref 3–12)
NRBC AUTOMATED: 0 PER 100 WBC
PDW BLD-RTO: 15.6 % (ref 11.8–14.4)
PLATELET # BLD: 310 K/UL (ref 138–453)
PLATELET ESTIMATE: ABNORMAL
PMV BLD AUTO: 10.3 FL (ref 8.1–13.5)
POTASSIUM SERPL-SCNC: 3.9 MMOL/L (ref 3.7–5.3)
PRO-BNP: ABNORMAL PG/ML
RBC # BLD: 3.12 M/UL (ref 3.95–5.11)
RBC # BLD: ABNORMAL 10*6/UL
SEG NEUTROPHILS: 71 % (ref 36–65)
SEGMENTED NEUTROPHILS ABSOLUTE COUNT: 4.82 K/UL (ref 1.5–8.1)
SODIUM BLD-SCNC: 140 MMOL/L (ref 135–144)
WBC # BLD: 6.8 K/UL (ref 3.5–11.3)
WBC # BLD: ABNORMAL 10*3/UL

## 2021-10-28 PROCEDURE — 85025 COMPLETE CBC W/AUTO DIFF WBC: CPT

## 2021-10-28 PROCEDURE — P9603 ONE-WAY ALLOW PRORATED MILES: HCPCS

## 2021-10-28 PROCEDURE — 36415 COLL VENOUS BLD VENIPUNCTURE: CPT

## 2021-10-28 PROCEDURE — 83880 ASSAY OF NATRIURETIC PEPTIDE: CPT

## 2021-10-28 PROCEDURE — 80048 BASIC METABOLIC PNL TOTAL CA: CPT

## 2021-11-01 ENCOUNTER — TELEPHONE (OUTPATIENT)
Dept: FAMILY MEDICINE CLINIC | Age: 86
End: 2021-11-01

## 2021-11-01 NOTE — TELEPHONE ENCOUNTER
Jenaro Mendez called and stated that the patient has made a complaint of the staff being verbally rude to her. They are currently investigating the issue. They wanted you to be aware.

## 2021-11-03 ENCOUNTER — OUTSIDE SERVICES (OUTPATIENT)
Dept: FAMILY MEDICINE CLINIC | Age: 86
End: 2021-11-03
Payer: MEDICARE

## 2021-11-03 DIAGNOSIS — Z86.79 HX OF ACQUIRED CONGESTIVE HEART FAILURE: ICD-10-CM

## 2021-11-03 DIAGNOSIS — F33.41 RECURRENT MAJOR DEPRESSIVE DISORDER, IN PARTIAL REMISSION (HCC): ICD-10-CM

## 2021-11-03 DIAGNOSIS — Z95.820 S/P ANGIOPLASTY WITH STENT: ICD-10-CM

## 2021-11-03 DIAGNOSIS — M79.672 LEFT FOOT PAIN: ICD-10-CM

## 2021-11-03 DIAGNOSIS — D64.9 CHRONIC ANEMIA: ICD-10-CM

## 2021-11-03 DIAGNOSIS — I10 ESSENTIAL HYPERTENSION: ICD-10-CM

## 2021-11-03 DIAGNOSIS — E61.1 IRON DEFICIENCY: ICD-10-CM

## 2021-11-03 DIAGNOSIS — Z95.1 S/P CABG (CORONARY ARTERY BYPASS GRAFT): ICD-10-CM

## 2021-11-03 DIAGNOSIS — E78.2 MIXED HYPERLIPIDEMIA: ICD-10-CM

## 2021-11-03 DIAGNOSIS — I25.10 ASHD (ARTERIOSCLEROTIC HEART DISEASE): ICD-10-CM

## 2021-11-03 DIAGNOSIS — Z98.890 STATUS POST OPEN REDUCTION AND INTERNAL FIXATION (ORIF) OF FRACTURE: ICD-10-CM

## 2021-11-03 DIAGNOSIS — E11.9 TYPE 2 DIABETES MELLITUS WITHOUT COMPLICATION, WITHOUT LONG-TERM CURRENT USE OF INSULIN (HCC): ICD-10-CM

## 2021-11-03 DIAGNOSIS — D61.818 PANCYTOPENIA (HCC): ICD-10-CM

## 2021-11-03 DIAGNOSIS — G25.2 INTENTION TREMOR: ICD-10-CM

## 2021-11-03 DIAGNOSIS — L12.0 BULLOUS PEMPHIGOID: ICD-10-CM

## 2021-11-03 DIAGNOSIS — Z87.81 STATUS POST OPEN REDUCTION AND INTERNAL FIXATION (ORIF) OF FRACTURE: ICD-10-CM

## 2021-11-03 DIAGNOSIS — F32.A DEPRESSION, UNSPECIFIED DEPRESSION TYPE: Primary | ICD-10-CM

## 2021-11-03 PROCEDURE — 99309 SBSQ NF CARE MODERATE MDM 30: CPT | Performed by: FAMILY MEDICINE

## 2021-11-03 NOTE — PROGRESS NOTES
[address] [address]  [phone]  Dept: [phone]    I, KWASI Lala, am transcribing for and in the presence of Dr. Renata Flanagan. 11/03/2021 11:09 am 1650 Rebecca WALKER is a 80 y.o. female being seen for her weekly follow up. Location of visit: [facility]    HPI:  Admission History:  Lorelei Omer was admitted to [facility] on 10/26/2021 following fall at  and sustained fx to R hip mild dispaced L hip nondisplaced. bothe tx with internal fixation by Dr. Saba Ling. During the stay had tachycardia, CHF and renal impairment. She comes to [facility] for therapy. Came with 0.9 NS 15/hr. Last visit:  I will stop IV fluids and check lab  She is getting PT  Plan to return to [address] [place] [address] since last seen:   10/8/21 [place] c/o R femur pain, pain radiates down the leg. Discoloration at bottom and back of ankle. Xray R femur and hip.  10/8/21 [facility] OT eval and care plan revd and signed   10/11/2021 R hip xray reviewed showing no fx or dislocation. Orders given for tramadol 25 mg bid x2 weeks.  10/12/21 refill tramadol sent to [facility]   10/12/21 noted sent to ER for cellulitis and UTI    10/15/21 fall noted    10/15/21 [facility] to [facility]   10/18/21 [facility] physician order signed  o 10/27/21 Pt has had continuous IC saline solution 50ml/1h, dc IV fluid, check CBC, BMP and BNP  o 18/66/49 Physician cert and recert signed  o 67/11/58 [facility] no injuries noted. o 10/29/21 [place] refilled at [facility]  o 11/2/21 [facility] recommendation revd. New today:  Lorelei Omer is making slow progress. She has difficulty with weight bearing yet and presently her worst pain is in L foot, mostly due to trauma to foot. States its painful even with covers on bed touching it. No hx of gout. No dyspnea. She is according to staff showing signs/symptoms of depression. Pharmacist notates about celexa dosing noted. ROS:  General Constitutional: Denies fever.  Denies lightheadedness. Respiratory: Denies cough. Denies shortness of breath. Denies wheezing. Cardiovascular: Denies chest pain at rest.  Gastrointestinal: Denies abdominal pain. Denies blood in the stool. Denies constipation. Denies diarrhea. Denies nausea. Denies vomiting. Genitourinary: Denies blood in the urine. Denies painful urination. Skin:  Denies rash. Neurologic: Denies falls. Denies dizziness. Psychiatric: Denies sleep disturbance. Denies anxiety. Denies depressed mood. Past Medical History:   Diagnosis Date    Anemia 7/201    Arthritis of knee 2018    bilateral knees; \"bone on bone\"; declines surg    Benign essential tremor 04/15/2016    CAD S/P percutaneous coronary angioplasty 1998    done at [facility] Diabetes mellitus type II, controlled ([facility] 1979    Gastrointestinal hemorrhage 2012    Duodenal Ulcer by EGD    History of pancreatitis 2013    after GI bleed    Humerus fracture 2015    right humerus ; [place]    Hyperlipidemia     Hypertension     Major depression     S/P CABG (coronary artery bypass graft) 1996    Ventral hernia 3631    infraumbilical ; incisional    Vitamin B12 deficiency 07/2018    Vitamin B12 270       Past Surgical History:   Procedure Laterality Date    CHOLECYSTECTOMY      COLONOSCOPY N/A 11/5/2018    COLONOSCOPY DIAGNOSTIC OR SCREENING performed by Gavin Vizcaino MD at [place] [address]    ? number grafts    HIP FRACTURE SURGERY Right 10/13/2021    HIP OPEN REDUCTION INTERNAL FIXATION-NAILING performed by Joe Price DO at [facility] Left 10/19/2021    HIP OPEN REDUCTION INTERNAL FIXATION - IM NAIL performed by Joe Price DO at [facility] Right    [address] [facility]  1958    SALPINGO-OOPHORECTOMY  1960       PHYSICAL EXAM:    General Appearance: in no acute distress, well nourished.   Eyes: pupils equal, round reactive to light and O2 95% RA accommodation. Oral Cavity: mucosa moist.  Neck/Thyroid:  no cervical lymphadenopathy, no thyromegaly  Skin: warm and dry  Heart: regular rate and rhythm. No murmurs. S1, S2 normal, no gallops. Lungs: clear to auscultation bilaterally. Abdomen:soft, nontender, nondistended, no masses or organomegaly. Valladares cath in place. Extremities: no cyanosis 2+ edema distal legs. L foot 3+ edema, tender to touch, no open lesions or vesicles. No ischmic changes or erythema. Peripheral Pulses: 2+ throughout, symetric. Neurologic: verbally responsive, moves extremities. Intention tremor. Psych: normal affect, speech fluent. ASSESSMENT:   Diagnosis Orders   1. Depression, unspecified depression type     2. Status post open reduction and internal fixation (ORIF) of fracture      bilateral hips   3. ASHD (arteriosclerotic heart disease)     4. Hx of acquired congestive heart failure     5. Bullous pemphigoid     6. Chronic anemia     7. Mixed hyperlipidemia     8. Pancytopenia ([address]     9. Type 2 diabetes mellitus without complication, without long-term current use of insulin ([address]     10. S/P CABG (coronary artery bypass graft)     11. Essential hypertension     12. S/P angioplasty with stent     13. Recurrent major depressive disorder, in partial remission ([address]     14. Iron deficiency     15. Left foot pain      unknown etiology cannot rule out gout, ischemia or stress Fx.    16. Intention tremor         PLAN:  I will trial colchicine bid and get an xray of the foot. If there is not improvement with the colchicine, I will have her trial a boot. I will stop citalopram and start lexapro 10 mg daily and increase mirtazapine to 30 mg et hs and d/c valladares cath and O2. I, Dr. Oriana Herbert, personally performed the services described in this documentation as scribed/transcribed by KWASI Retana in my presence, and is both accurate and complete.

## 2021-11-04 ENCOUNTER — HOSPITAL ENCOUNTER (OUTPATIENT)
Age: 86
Setting detail: SPECIMEN
Discharge: HOME | End: 2021-11-04
Payer: MEDICARE

## 2021-11-04 LAB
-: NORMAL
AMORPHOUS: NORMAL
BACTERIA: NORMAL
BILIRUBIN URINE: NEGATIVE
CASTS UA: NORMAL /LPF
COLOR: YELLOW
COMMENT UA: ABNORMAL
CRYSTALS, UA: NORMAL /HPF
EPITHELIAL CELLS UA: NORMAL /HPF (ref 0–25)
GLUCOSE URINE: NEGATIVE
KETONES, URINE: ABNORMAL
LEUKOCYTE ESTERASE, URINE: NEGATIVE
MUCUS: NORMAL
NITRITE, URINE: NEGATIVE
OTHER OBSERVATIONS UA: NORMAL
PH UA: 5.5 (ref 5–9)
PROTEIN UA: NEGATIVE
RBC UA: NORMAL /HPF (ref 0–2)
RENAL EPITHELIAL, UA: NORMAL /HPF
SPECIFIC GRAVITY UA: 1.01 (ref 1.01–1.02)
TRICHOMONAS: NORMAL
TURBIDITY: CLEAR
URINE HGB: NEGATIVE
UROBILINOGEN, URINE: NORMAL
WBC UA: NORMAL /HPF (ref 0–5)
YEAST: NORMAL

## 2021-11-04 PROCEDURE — 81001 URINALYSIS AUTO W/SCOPE: CPT

## 2021-11-08 ENCOUNTER — HOSPITAL ENCOUNTER (OUTPATIENT)
Age: 86
Setting detail: SPECIMEN
Discharge: HOME | End: 2021-11-08
Payer: MEDICARE

## 2021-11-10 ENCOUNTER — OUTSIDE SERVICES (OUTPATIENT)
Dept: FAMILY MEDICINE CLINIC | Age: 86
End: 2021-11-10
Payer: MEDICARE

## 2021-11-10 DIAGNOSIS — E61.1 IRON DEFICIENCY: ICD-10-CM

## 2021-11-10 DIAGNOSIS — F33.41 RECURRENT MAJOR DEPRESSIVE DISORDER, IN PARTIAL REMISSION (HCC): ICD-10-CM

## 2021-11-10 DIAGNOSIS — I10 ESSENTIAL HYPERTENSION: ICD-10-CM

## 2021-11-10 DIAGNOSIS — D61.818 PANCYTOPENIA (HCC): ICD-10-CM

## 2021-11-10 DIAGNOSIS — Z87.81 STATUS POST OPEN REDUCTION AND INTERNAL FIXATION (ORIF) OF FRACTURE: Primary | ICD-10-CM

## 2021-11-10 DIAGNOSIS — L12.0 BULLOUS PEMPHIGOID: ICD-10-CM

## 2021-11-10 DIAGNOSIS — I25.10 ASHD (ARTERIOSCLEROTIC HEART DISEASE): ICD-10-CM

## 2021-11-10 DIAGNOSIS — Z95.820 S/P ANGIOPLASTY WITH STENT: ICD-10-CM

## 2021-11-10 DIAGNOSIS — Z86.79 HX OF ACQUIRED CONGESTIVE HEART FAILURE: ICD-10-CM

## 2021-11-10 DIAGNOSIS — D64.9 CHRONIC ANEMIA: ICD-10-CM

## 2021-11-10 DIAGNOSIS — Z95.1 S/P CABG (CORONARY ARTERY BYPASS GRAFT): ICD-10-CM

## 2021-11-10 DIAGNOSIS — E78.2 MIXED HYPERLIPIDEMIA: ICD-10-CM

## 2021-11-10 DIAGNOSIS — E11.9 TYPE 2 DIABETES MELLITUS WITHOUT COMPLICATION, WITHOUT LONG-TERM CURRENT USE OF INSULIN (HCC): ICD-10-CM

## 2021-11-10 DIAGNOSIS — Z98.890 STATUS POST OPEN REDUCTION AND INTERNAL FIXATION (ORIF) OF FRACTURE: Primary | ICD-10-CM

## 2021-11-10 PROCEDURE — 99309 SBSQ NF CARE MODERATE MDM 30: CPT | Performed by: FAMILY MEDICINE

## 2021-11-10 NOTE — PROGRESS NOTES
[address] [address]  [phone]  Dept: [phone]    SARAH DILLARD RMA, am transcribing for and in the presence of Dr. Dione Blackwell. 11/10/21/10:25am/SNP    Kevan Aleman is a 80 y.o. female being seen for her skilled follow up. Location of visit: [address] [place] [address].    HPI:  Admission History:  Holly Vergara was admitted to [facility] on 10/26/2021 following fall at [facility] and sustained fx to R hip mild dispaced L hip nondisplaced. bothe tx with internal fixation by Dr. Vishal Dhillon. During the stay had tachycardia, CHF and renal impairment. She comes to [facility] for therapy. Came with 0.9 NS 15/hr. Last visit:  I will trial colchicine bid and get an xray of the foot. If there is not improvement with the colchicine, I will have her trial a boot.      I will stop citalopram and start lexapro 10 mg daily and increase mirtazapine to 30 mg et hs and d/c valladares cath and O2. Faxes since last seen:   11/3/21 noted about mood disturbance    11/4/21 drug interaction noted    11/8/21 Hydrocodone/apap refilled at [facility] 11/8/21 UA revd, no changes made.  11/8/21 Foot x-ray order signed   11/8/21 Foot x-ray revd, no acute osseous abnormality noted. [place] 11/8/21 [facility] discharge physician order signed. New today:  She is seen today with her grandson. Nurses report prior to arrival she was quite disturbed about financial problems, not mentioned to me. Therapy reports she is doing well with parallel bars and walking, dyspnea subsided, no longer requiring O2. Pain in L hip radiating but not past knee, l foot pain subsided after [place] last week. ROS:  General Constitutional: Denies fever. Denies lightheadedness. Respiratory: Denies cough. Denies shortness of breath. Denies wheezing. Cardiovascular: Denies chest pain at rest.  Gastrointestinal: Denies abdominal pain. Denies blood in the stool. Denies constipation. Denies diarrhea. Denies nausea.  Denies vomiting. Genitourinary: Denies blood in the urine. Denies painful urination. Skin:  Denies rash. Neurologic: Denies falls. Denies dizziness. Psychiatric: Denies sleep disturbance. Denies anxiety. Denies depressed mood. Past Medical History:   Diagnosis Date    Anemia 7/201    Arthritis of knee 2018    bilateral knees; \"bone on bone\"; declines surg    Benign essential tremor 04/15/2016    CAD S/P percutaneous coronary angioplasty 1998    done at [facility] Diabetes mellitus type II, controlled ([facility] [address] [facility] 1979    Gastrointestinal hemorrhage 2012    Duodenal Ulcer by EGD    History of pancreatitis 2013    after GI bleed    Humerus fracture 2015    right humerus ; [place]    Hyperlipidemia     Hypertension     Major depression     S/P CABG (coronary artery bypass graft) 1996    Ventral hernia 6149    infraumbilical ; incisional    Vitamin B12 deficiency 07/2018    Vitamin B12 270     Past Surgical History:   Procedure Laterality Date    CHOLECYSTECTOMY      COLONOSCOPY N/A 11/5/2018    COLONOSCOPY DIAGNOSTIC OR SCREENING performed by Julio Olivera MD at [facility] 1284    ? number grafts    HIP FRACTURE SURGERY Right 10/13/2021    HIP OPEN REDUCTION INTERNAL FIXATION-NAILING performed by Fili Bustos DO at [facility] Left 10/19/2021    HIP OPEN REDUCTION INTERNAL FIXATION - IM NAIL performed by Fili Bustos DO at [address] [facility] Right    [address], [facility]  1958    SALPINGO-OOPHORECTOMY  1960     PHYSICAL EXAM:    General Appearance: in no acute distress, well nourished. Eyes: pupils equal, round reactive to light and accommodation. Oral Cavity: mucosa moist.  Neck/Thyroid:  no cervical lymphadenopathy, no thyromegaly  Skin: warm and dry, L hip wound healed staples are still in place, R hip has small area of drainage superior portion of incision. Minimal erythema noted  Heart: regular rate and rhythm.  No murmurs. S1, S2 normal, no gallops. rate 70  Lungs: clear to auscultation bilaterally. limited breath sounds at bases  Abdomen:soft, nontender, nondistended, no masses or organomegaly. Extremities: no cyanosis, L ankle trace edema, foot 1+ edema but not tender. Peripheral Pulses: 2+ throughout, symetric. Neurologic: verbally responsive, moves extremities. Psych: normal affect, speech fluent. Alert, talkative     ASSESSMENT:   Diagnosis Orders   1. Status post open reduction and internal fixation (ORIF) of fracture     2. ASHD (arteriosclerotic heart disease)     3. Hx of acquired congestive heart failure     4. Bullous pemphigoid     5. Chronic anemia     6. Mixed hyperlipidemia     7. Pancytopenia ([address]     8. S/P CABG (coronary artery bypass graft)     9. Essential hypertension     10. S/P angioplasty with stent     11. Type 2 diabetes mellitus without complication, without long-term current use of insulin ([address]     12. Recurrent major depressive disorder, in partial remission ([address]     13. Iron deficiency       PLAN:  Appetite is better since antidepressant was changed, pain control is better except after therapy. I advise to give 2 Norco at qhs and 1 prior to therapy  Discussion with grandson, upset with [address], will not be taking her back, options discussed including therapy insufficient to be independent go to another rehab center or go home. I, Dr. Dylan Dinh, personally performed the services described in this documentation as scribed/transcribed by SARAH Posada in my presence, and is both accurate and complete.

## 2021-11-11 ENCOUNTER — HOSPITAL ENCOUNTER (OUTPATIENT)
Age: 86
Setting detail: SPECIMEN
Discharge: HOME | End: 2021-11-11
Payer: MEDICARE

## 2021-11-11 LAB
ABSOLUTE EOS #: 0.19 K/UL (ref 0–0.44)
ABSOLUTE IMMATURE GRANULOCYTE: <0.03 K/UL (ref 0–0.3)
ABSOLUTE LYMPH #: 1.82 K/UL (ref 1.1–3.7)
ABSOLUTE MONO #: 0.41 K/UL (ref 0.1–1.2)
ALBUMIN SERPL-MCNC: 3.1 G/DL (ref 3.5–5.2)
ALBUMIN/GLOBULIN RATIO: 1.6 (ref 1–2.5)
ALP BLD-CCNC: 165 U/L (ref 35–104)
ALT SERPL-CCNC: 13 U/L (ref 5–33)
ANION GAP SERPL CALCULATED.3IONS-SCNC: 26 MMOL/L (ref 9–17)
AST SERPL-CCNC: 23 U/L
BASOPHILS # BLD: 1 % (ref 0–2)
BASOPHILS ABSOLUTE: 0.03 K/UL (ref 0–0.2)
BILIRUB SERPL-MCNC: 0.18 MG/DL (ref 0.3–1.2)
BUN BLDV-MCNC: 20 MG/DL (ref 8–23)
BUN/CREAT BLD: 25 (ref 9–20)
C-REACTIVE PROTEIN: 11.5 MG/L (ref 0–5)
CALCIUM SERPL-MCNC: 8.2 MG/DL (ref 8.6–10.4)
CHLORIDE BLD-SCNC: 103 MMOL/L (ref 98–107)
CO2: 14 MMOL/L (ref 20–31)
CREAT SERPL-MCNC: 0.8 MG/DL (ref 0.5–0.9)
DIFFERENTIAL TYPE: ABNORMAL
EOSINOPHILS RELATIVE PERCENT: 3 % (ref 1–4)
ESTIMATED AVERAGE GLUCOSE: 111 MG/DL
GFR AFRICAN AMERICAN: >60 ML/MIN
GFR NON-AFRICAN AMERICAN: >60 ML/MIN
GFR SERPL CREATININE-BSD FRML MDRD: ABNORMAL ML/MIN/{1.73_M2}
GFR SERPL CREATININE-BSD FRML MDRD: ABNORMAL ML/MIN/{1.73_M2}
GLUCOSE BLD-MCNC: 86 MG/DL (ref 70–99)
HBA1C MFR BLD: 5.5 % (ref 4–6)
HCT VFR BLD CALC: 33.6 % (ref 36.3–47.1)
HEMOGLOBIN: 10.2 G/DL (ref 11.9–15.1)
IMMATURE GRANULOCYTES: 0 %
LYMPHOCYTES # BLD: 33 % (ref 24–43)
MCH RBC QN AUTO: 31.7 PG (ref 25.2–33.5)
MCHC RBC AUTO-ENTMCNC: 30.4 G/DL (ref 28.4–34.8)
MCV RBC AUTO: 104.3 FL (ref 82.6–102.9)
MONOCYTES # BLD: 7 % (ref 3–12)
NRBC AUTOMATED: 0 PER 100 WBC
PDW BLD-RTO: 16.1 % (ref 11.8–14.4)
PLATELET # BLD: 203 K/UL (ref 138–453)
PLATELET ESTIMATE: ABNORMAL
PMV BLD AUTO: 11.6 FL (ref 8.1–13.5)
POTASSIUM SERPL-SCNC: 3.7 MMOL/L (ref 3.7–5.3)
RBC # BLD: 3.22 M/UL (ref 3.95–5.11)
RBC # BLD: ABNORMAL 10*6/UL
SEDIMENTATION RATE, ERYTHROCYTE: 28 MM (ref 0–20)
SEG NEUTROPHILS: 56 % (ref 36–65)
SEGMENTED NEUTROPHILS ABSOLUTE COUNT: 3.08 K/UL (ref 1.5–8.1)
SODIUM BLD-SCNC: 143 MMOL/L (ref 135–144)
TOTAL PROTEIN: 5.1 G/DL (ref 6.4–8.3)
URIC ACID: 8.3 MG/DL (ref 2.4–5.7)
WBC # BLD: 5.6 K/UL (ref 3.5–11.3)
WBC # BLD: ABNORMAL 10*3/UL

## 2021-11-11 PROCEDURE — 83036 HEMOGLOBIN GLYCOSYLATED A1C: CPT

## 2021-11-11 PROCEDURE — 84550 ASSAY OF BLOOD/URIC ACID: CPT

## 2021-11-11 PROCEDURE — 85652 RBC SED RATE AUTOMATED: CPT

## 2021-11-11 PROCEDURE — 83880 ASSAY OF NATRIURETIC PEPTIDE: CPT

## 2021-11-11 PROCEDURE — 85025 COMPLETE CBC W/AUTO DIFF WBC: CPT

## 2021-11-11 PROCEDURE — 36415 COLL VENOUS BLD VENIPUNCTURE: CPT

## 2021-11-11 PROCEDURE — 80053 COMPREHEN METABOLIC PANEL: CPT

## 2021-11-11 PROCEDURE — 86140 C-REACTIVE PROTEIN: CPT

## 2021-11-12 LAB
BNP INTERPRETATION: ABNORMAL
PRO-BNP: 2087 PG/ML

## 2021-11-15 ENCOUNTER — HOSPITAL ENCOUNTER (OUTPATIENT)
Age: 86
Setting detail: SPECIMEN
Discharge: HOME | End: 2021-11-15
Payer: MEDICARE

## 2021-11-15 LAB
ANION GAP SERPL CALCULATED.3IONS-SCNC: 22 MMOL/L (ref 9–17)
BUN BLDV-MCNC: 18 MG/DL (ref 8–23)
BUN/CREAT BLD: 22 (ref 9–20)
CALCIUM SERPL-MCNC: 8.1 MG/DL (ref 8.6–10.4)
CHLORIDE BLD-SCNC: 105 MMOL/L (ref 98–107)
CO2: 17 MMOL/L (ref 20–31)
CREAT SERPL-MCNC: 0.82 MG/DL (ref 0.5–0.9)
GFR AFRICAN AMERICAN: >60 ML/MIN
GFR NON-AFRICAN AMERICAN: >60 ML/MIN
GFR SERPL CREATININE-BSD FRML MDRD: ABNORMAL ML/MIN/{1.73_M2}
GFR SERPL CREATININE-BSD FRML MDRD: ABNORMAL ML/MIN/{1.73_M2}
GLUCOSE BLD-MCNC: 108 MG/DL (ref 70–99)
POTASSIUM SERPL-SCNC: 3.7 MMOL/L (ref 3.7–5.3)
SODIUM BLD-SCNC: 144 MMOL/L (ref 135–144)

## 2021-11-15 PROCEDURE — 36415 COLL VENOUS BLD VENIPUNCTURE: CPT

## 2021-11-15 PROCEDURE — 80048 BASIC METABOLIC PNL TOTAL CA: CPT

## 2021-11-15 PROCEDURE — P9603 ONE-WAY ALLOW PRORATED MILES: HCPCS

## 2021-11-17 ENCOUNTER — OUTSIDE SERVICES (OUTPATIENT)
Dept: FAMILY MEDICINE CLINIC | Age: 86
End: 2021-11-17
Payer: MEDICARE

## 2021-11-17 DIAGNOSIS — Z86.79 HX OF ACQUIRED CONGESTIVE HEART FAILURE: ICD-10-CM

## 2021-11-17 DIAGNOSIS — E78.2 MIXED HYPERLIPIDEMIA: ICD-10-CM

## 2021-11-17 DIAGNOSIS — E61.1 IRON DEFICIENCY: ICD-10-CM

## 2021-11-17 DIAGNOSIS — I10 ESSENTIAL HYPERTENSION: ICD-10-CM

## 2021-11-17 DIAGNOSIS — F33.41 RECURRENT MAJOR DEPRESSIVE DISORDER, IN PARTIAL REMISSION (HCC): ICD-10-CM

## 2021-11-17 DIAGNOSIS — D64.9 CHRONIC ANEMIA: ICD-10-CM

## 2021-11-17 DIAGNOSIS — D61.818 PANCYTOPENIA (HCC): ICD-10-CM

## 2021-11-17 DIAGNOSIS — Z87.81 STATUS POST OPEN REDUCTION AND INTERNAL FIXATION (ORIF) OF FRACTURE: ICD-10-CM

## 2021-11-17 DIAGNOSIS — Z95.1 S/P CABG (CORONARY ARTERY BYPASS GRAFT): ICD-10-CM

## 2021-11-17 DIAGNOSIS — Z95.820 S/P ANGIOPLASTY WITH STENT: ICD-10-CM

## 2021-11-17 DIAGNOSIS — E11.9 TYPE 2 DIABETES MELLITUS WITHOUT COMPLICATION, WITHOUT LONG-TERM CURRENT USE OF INSULIN (HCC): ICD-10-CM

## 2021-11-17 DIAGNOSIS — L12.0 BULLOUS PEMPHIGOID: ICD-10-CM

## 2021-11-17 DIAGNOSIS — I25.10 ASHD (ARTERIOSCLEROTIC HEART DISEASE): Primary | ICD-10-CM

## 2021-11-17 DIAGNOSIS — Z98.890 STATUS POST OPEN REDUCTION AND INTERNAL FIXATION (ORIF) OF FRACTURE: ICD-10-CM

## 2021-11-17 PROCEDURE — 99309 SBSQ NF CARE MODERATE MDM 30: CPT | Performed by: FAMILY MEDICINE

## 2021-11-17 NOTE — PROGRESS NOTES
[address] [address]  [phone]  Dept: [phone]    VISH DILLARD CMA, am transcribing for and in the presence of Dr. Zaida Castro. 11/17/21/9:50/CRF    Juni Flor is a 80 y.o. female being seen for her initial H&P visit. Location of visit: [facility]    HPI:  Admission History:  Chelsea López was admitted to [facility] on 10/26/2021 following fall at [facility] and sustained fx to R hip mild dispaced L hip nondisplaced. bothe tx with internal fixation by Dr. Dominick Strickland. During the stay had tachycardia, CHF and renal impairment. She comes to [facility] for therapy. Came with 0.9 NS 15/hr.     Last visit:  I will trial colchicine bid and get an xray of the foot. If there is not improvement with the colchicine, I will have her trial a boot.      I will stop citalopram and start lexapro 10 mg daily and increase mirtazapine to 30 mg et hs and d/c valladares cath and O2.     Appetite is better since antidepressant was changed, pain control is better except after therapy. I advise to give 2 Norco at qhs and 1 prior to therapy  Discussion with treva, upset with [address], will not be taking her back, options discussed including therapy insufficient to be independent go to another rehab center or go home. New today:  Doing well continuing to improve, ambulating state saw Josse Matthewx  who took out staples, pain in hip but not severe. her appetite has improved , bowel function is fine , she is not dyspnea at rest.      ROS:  General Constitutional: Denies fever. Denies lightheadedness. Respiratory: Denies cough. Denies shortness of breath. Denies wheezing. Cardiovascular: Denies chest pain at rest.  Gastrointestinal: Denies abdominal pain. Denies blood in the stool. Denies constipation. Denies diarrhea. Denies nausea. Denies vomiting. Genitourinary: Denies blood in the urine. Denies painful urination. Skin: Denies rash. Neurologic: Denies falls.  Denies dizziness. Jayy Vivas Psychiatric: Denies sleep disturbance. Denies anxiety. Denies depressed mood. Past Medical History:   Diagnosis Date    Anemia 7/201    Arthritis of knee 2018    bilateral knees; \"bone on bone\"; declines surg    Benign essential tremor 04/15/2016    CAD S/P percutaneous coronary angioplasty 1998    [facility] Diabetes mellitus type II, controlled ([address] 1979    Gastrointestinal hemorrhage 2012    Duodenal Ulcer by EGD    History of pancreatitis 2013    after GI bleed    Humerus fracture 2015    right humerus ; [place]    Hyperlipidemia     Hypertension     Major depression     S/P CABG (coronary artery bypass graft) 1996    Ventral hernia 9475    infraumbilical ; incisional    Vitamin B12 deficiency 07/2018    Vitamin B12 270       Past Surgical History:   Procedure Laterality Date    CHOLECYSTECTOMY      COLONOSCOPY N/A 11/5/2018    COLONOSCOPY DIAGNOSTIC OR SCREENING performed by Rosalva Briceno MD at [place] [address]    ? number grafts    HIP FRACTURE SURGERY Right 10/13/2021    HIP OPEN REDUCTION INTERNAL FIXATION-NAILING performed by Meli Carson DO at [facility] Left 10/19/2021    HIP OPEN REDUCTION INTERNAL FIXATION - IM NAIL performed by Meli Carson DO at [address] [place] [address] Right    [address] [place] [address]  1958    SALPINGO-OOPHORECTOMY  1960       PHYSICAL EXAM:    General Appearance: in no acute distress, well nourished. alert pleasant, looks best she has since arrival    Eyes: pupils equal, round reactive to light and accommodation. Oral Cavity: mucosa moist.  Neck/Thyroid:  no cervical lymphadenopathy, no thyromegaly  Skin: warm and dry  Heart: regular rate and rhythm. No murmurs. S1, S2 normal, no gallops. irregular 80  Lungs: clear to auscultation bilaterally. clear  Abdomen:soft, nontender, nondistended, no masses or organomegaly. Extremities: no cyanosis or edema.  Wrinkled skin tace - 1+ edema , best it's been yet   Peripheral Pulses: 2+ throughout, symetric. Neurologic: verbally responsive, moves extremities. Psych: normal affect, speech fluent. ASSESSMENT:   Diagnosis Orders   1. ASHD (arteriosclerotic heart disease)     2. Status post open reduction and internal fixation (ORIF) of fracture     3. Hx of acquired congestive heart failure     4. Bullous pemphigoid     5. Chronic anemia     6. Mixed hyperlipidemia     7. Pancytopenia ([address]     8. S/P CABG (coronary artery bypass graft)     9. Essential hypertension     10. S/P angioplasty with stent     11. Type 2 diabetes mellitus without complication, without long-term current use of insulin ([address]     12. Recurrent major depressive disorder, in partial remission ([address]     13. Iron deficiency         PLAN:  Not determined, grandson considering another facility for her   I, Dr. Roni Courtney, personally performed the services described in this documentation as scribed/transcribed by VISH Rowe in my presence, and is both accurate and complete.

## 2021-12-01 ENCOUNTER — OUTSIDE SERVICES (OUTPATIENT)
Dept: FAMILY MEDICINE CLINIC | Age: 86
End: 2021-12-01
Payer: MEDICARE

## 2021-12-01 DIAGNOSIS — E61.1 IRON DEFICIENCY: ICD-10-CM

## 2021-12-01 DIAGNOSIS — D61.818 PANCYTOPENIA (HCC): ICD-10-CM

## 2021-12-01 DIAGNOSIS — Z86.79 HX OF ACQUIRED CONGESTIVE HEART FAILURE: Primary | ICD-10-CM

## 2021-12-01 DIAGNOSIS — L12.0 BULLOUS PEMPHIGOID: ICD-10-CM

## 2021-12-01 DIAGNOSIS — Z95.1 S/P CABG (CORONARY ARTERY BYPASS GRAFT): ICD-10-CM

## 2021-12-01 DIAGNOSIS — G25.0 ESSENTIAL TREMOR: ICD-10-CM

## 2021-12-01 DIAGNOSIS — Z87.81 STATUS POST OPEN REDUCTION AND INTERNAL FIXATION (ORIF) OF FRACTURE: ICD-10-CM

## 2021-12-01 DIAGNOSIS — I25.10 ASHD (ARTERIOSCLEROTIC HEART DISEASE): ICD-10-CM

## 2021-12-01 DIAGNOSIS — Z95.820 S/P ANGIOPLASTY WITH STENT: ICD-10-CM

## 2021-12-01 DIAGNOSIS — D64.9 CHRONIC ANEMIA: ICD-10-CM

## 2021-12-01 DIAGNOSIS — E11.9 TYPE 2 DIABETES MELLITUS WITHOUT COMPLICATION, WITHOUT LONG-TERM CURRENT USE OF INSULIN (HCC): ICD-10-CM

## 2021-12-01 DIAGNOSIS — I10 ESSENTIAL HYPERTENSION: ICD-10-CM

## 2021-12-01 DIAGNOSIS — F33.41 RECURRENT MAJOR DEPRESSIVE DISORDER, IN PARTIAL REMISSION (HCC): ICD-10-CM

## 2021-12-01 DIAGNOSIS — Z98.890 STATUS POST OPEN REDUCTION AND INTERNAL FIXATION (ORIF) OF FRACTURE: ICD-10-CM

## 2021-12-01 DIAGNOSIS — E78.2 MIXED HYPERLIPIDEMIA: ICD-10-CM

## 2021-12-01 NOTE — PROGRESS NOTES
[address] [address]  [phone]  Dept: [phone]    VISH DILLARD CMA, am transcribing for and in the presence of Dr. Ziyad Zuñiga. 12/1/21/8:40/CRF    Cruzito Washington is a 80 y.o. female being seen for her monthly follow up. Location of visit: [facility]    HPI:  Admission History:  Thu Garcia was admitted to [facility] on 10/26/2021 following fall at [facility] and sustained fx to R hip mild dispaced L hip nondisplaced. bothe tx with internal fixation by Dr. Ofe Romero. During the stay had tachycardia, CHF and renal impairment. She comes to [facility] for therapy. Came with 0.9 NS 15/hr.      Last visit:  Doing well continuing to improve, ambulating state saw Madalyn Penelope  who took out staples, pain in hip but not severe. her appetite has improved , bowel function is fine , she is not dyspnea at rest.     Faxes since last seen:   11/15/21 Labs revd, no changes made.  11/15/21 Order given for Calmoseptine d/t patients bottom being red w/ sores.  11/18/21 Labs revd, bicarb level is very low. Recheck [facility]   11/18/21 Home health, PT/OT, wheelchair order signed. Zuly Flores [facility]. Zuly Flores [facility] and [facility] order signed.  11/30/21 lab order signed  New today:  Patient has been readmitted from hospital from bilateral hip fracture ORIF, systolic heart fail, depression, anxiety. She completed rehab at [facility], since being back she is more carefull with ambulation from discomfort moving side to side not terrible, swelling is worse due to the high sodium in the diet at the facility. She states she can not afford to stay at [facility] looking into other options in the [facility] area     ROS:  General Constitutional: Denies fever. Denies lightheadedness. Respiratory: Denies cough. Denies shortness of breath. Denies wheezing. Cardiovascular: Denies chest pain at rest.  Gastrointestinal: Denies abdominal pain. Denies blood in the stool.  Denies constipation. Denies diarrhea. Denies nausea. Denies vomiting. Genitourinary: Denies blood in the urine. Denies painful urination. Skin:  Denies rash. Neurologic: Denies falls. Denies dizziness. Psychiatric: Denies sleep disturbance. Denies anxiety. Denies depressed mood. Past Medical History:   Diagnosis Date    Anemia 7/201    Arthritis of knee 2018    bilateral knees; \"bone on bone\"; declines surg    Benign essential tremor 04/15/2016    CAD S/P percutaneous coronary angioplasty 1998    done at [facility] Diabetes mellitus type II, controlled ([facility] [address] 1979    Gastrointestinal hemorrhage 2012    Duodenal Ulcer by EGD    History of pancreatitis 2013    after GI bleed    Humerus fracture 2015    right humerus ; [place]    Hyperlipidemia     Hypertension     Major depression     S/P CABG (coronary artery bypass graft) 1996    Ventral hernia 5945    infraumbilical ; incisional    Vitamin B12 deficiency 07/2018    Vitamin B12 270       Past Surgical History:   Procedure Laterality Date    CHOLECYSTECTOMY      COLONOSCOPY N/A 11/5/2018    COLONOSCOPY DIAGNOSTIC OR SCREENING performed by Edison Astorga MD at [facility] 1284    ? number grafts    HIP FRACTURE SURGERY Right 10/13/2021    HIP OPEN REDUCTION INTERNAL FIXATION-NAILING performed by Sujata Cardenas DO at [facility] Left 10/19/2021    HIP OPEN REDUCTION INTERNAL FIXATION - IM NAIL performed by Sujata Cardenas DO at [address] [facility] Right    [address] [facility]  1958    SALPINGO-OOPHORECTOMY  1960       PHYSICAL EXAM:    General Appearance: in no acute distress, well nourished. alert pleasant talkative   Eyes: pupils equal, round reactive to light and accommodation. Oral Cavity: mucosa moist.  Neck/Thyroid:  no cervical lymphadenopathy, no thyromegaly  Skin: warm and dry  Heart: regular rate and rhythm. No murmurs. S1, S2 normal, no gallops. clear reg 90 Lungs: clear to auscultation bilaterally. Abdomen:soft, nontender, nondistended, no masses or organomegaly. Extremities: no cyanosis or edema. 3+ at foot 2+ at ankle no bolas lesion on skin  Peripheral Pulses: 2+ throughout, symetric. Neurologic: verbally responsive, moves extremities. Psych: normal affect, speech fluent. ASSESSMENT:   Diagnosis Orders   1. Hx of acquired congestive heart failure     2. Status post open reduction and internal fixation (ORIF) of fracture     3. ASHD (arteriosclerotic heart disease)     4. Bullous pemphigoid     5. Chronic anemia     6. Mixed hyperlipidemia     7. Pancytopenia ([address]     8. S/P CABG (coronary artery bypass graft)     9. Essential hypertension     10. S/P angioplasty with stent     11. Type 2 diabetes mellitus without complication, without long-term current use of insulin ([address]     12. Recurrent major depressive disorder, in partial remission ([address]     13. Iron deficiency     14. Essential tremor         PLAN:  CBC CMP, BNP, prealbumin   I, Dr. Salas Gonzales, personally performed the services described in this documentation as scribed/transcribed by VISH Hankins in my presence, and is both accurate and complete.

## 2021-12-02 ENCOUNTER — HOSPITAL ENCOUNTER (OUTPATIENT)
Age: 86
Setting detail: SPECIMEN
Discharge: HOME | End: 2021-12-02

## 2021-12-03 ENCOUNTER — HOSPITAL ENCOUNTER (OUTPATIENT)
Age: 86
Setting detail: SPECIMEN
Discharge: HOME | End: 2021-12-03
Payer: MEDICARE

## 2021-12-03 LAB
ABSOLUTE EOS #: 0.08 K/UL (ref 0–0.44)
ABSOLUTE IMMATURE GRANULOCYTE: <0.03 K/UL (ref 0–0.3)
ABSOLUTE LYMPH #: 2.04 K/UL (ref 1.1–3.7)
ABSOLUTE MONO #: 0.46 K/UL (ref 0.1–1.2)
ALBUMIN SERPL-MCNC: 3.1 G/DL (ref 3.5–5.2)
ALBUMIN/GLOBULIN RATIO: 1.9 (ref 1–2.5)
ALP BLD-CCNC: 91 U/L (ref 35–104)
ALT SERPL-CCNC: 8 U/L (ref 5–33)
ANION GAP SERPL CALCULATED.3IONS-SCNC: 13 MMOL/L (ref 9–17)
AST SERPL-CCNC: 15 U/L
BASOPHILS # BLD: 1 % (ref 0–2)
BASOPHILS ABSOLUTE: 0.03 K/UL (ref 0–0.2)
BILIRUB SERPL-MCNC: <0.1 MG/DL (ref 0.3–1.2)
BNP INTERPRETATION: ABNORMAL
BUN BLDV-MCNC: 24 MG/DL (ref 8–23)
BUN/CREAT BLD: 26 (ref 9–20)
CALCIUM SERPL-MCNC: 8.3 MG/DL (ref 8.6–10.4)
CHLORIDE BLD-SCNC: 101 MMOL/L (ref 98–107)
CO2: 24 MMOL/L (ref 20–31)
CREAT SERPL-MCNC: 0.92 MG/DL (ref 0.5–0.9)
DIFFERENTIAL TYPE: ABNORMAL
EOSINOPHILS RELATIVE PERCENT: 2 % (ref 1–4)
GFR AFRICAN AMERICAN: >60 ML/MIN
GFR NON-AFRICAN AMERICAN: 57 ML/MIN
GFR SERPL CREATININE-BSD FRML MDRD: ABNORMAL ML/MIN/{1.73_M2}
GFR SERPL CREATININE-BSD FRML MDRD: ABNORMAL ML/MIN/{1.73_M2}
GLUCOSE BLD-MCNC: 88 MG/DL (ref 70–99)
HCT VFR BLD CALC: 28.5 % (ref 36.3–47.1)
HEMOGLOBIN: 8.9 G/DL (ref 11.9–15.1)
IMMATURE GRANULOCYTES: 0 %
LYMPHOCYTES # BLD: 38 % (ref 24–43)
MCH RBC QN AUTO: 32.5 PG (ref 25.2–33.5)
MCHC RBC AUTO-ENTMCNC: 31.2 G/DL (ref 28.4–34.8)
MCV RBC AUTO: 104 FL (ref 82.6–102.9)
MONOCYTES # BLD: 9 % (ref 3–12)
NRBC AUTOMATED: 0 PER 100 WBC
PDW BLD-RTO: 15.9 % (ref 11.8–14.4)
PLATELET # BLD: 172 K/UL (ref 138–453)
PLATELET ESTIMATE: ABNORMAL
PMV BLD AUTO: 11.9 FL (ref 8.1–13.5)
POTASSIUM SERPL-SCNC: 3.8 MMOL/L (ref 3.7–5.3)
PREALBUMIN: 22.2 MG/DL (ref 20–40)
PRO-BNP: 2003 PG/ML
RBC # BLD: 2.74 M/UL (ref 3.95–5.11)
RBC # BLD: ABNORMAL 10*6/UL
SEG NEUTROPHILS: 50 % (ref 36–65)
SEGMENTED NEUTROPHILS ABSOLUTE COUNT: 2.8 K/UL (ref 1.5–8.1)
SODIUM BLD-SCNC: 138 MMOL/L (ref 135–144)
TOTAL PROTEIN: 4.7 G/DL (ref 6.4–8.3)
WBC # BLD: 5.4 K/UL (ref 3.5–11.3)
WBC # BLD: ABNORMAL 10*3/UL

## 2021-12-03 PROCEDURE — P9604 ONE-WAY ALLOW PRORATED TRIP: HCPCS

## 2021-12-03 PROCEDURE — 80053 COMPREHEN METABOLIC PANEL: CPT

## 2021-12-03 PROCEDURE — 83880 ASSAY OF NATRIURETIC PEPTIDE: CPT

## 2021-12-03 PROCEDURE — 36415 COLL VENOUS BLD VENIPUNCTURE: CPT

## 2021-12-03 PROCEDURE — 85025 COMPLETE CBC W/AUTO DIFF WBC: CPT

## 2021-12-03 PROCEDURE — 84134 ASSAY OF PREALBUMIN: CPT

## 2021-12-16 ENCOUNTER — HOSPITAL ENCOUNTER (EMERGENCY)
Age: 86
Discharge: HOME | End: 2021-12-16
Attending: EMERGENCY MEDICINE
Payer: MEDICARE

## 2021-12-16 ENCOUNTER — HOSPITAL ENCOUNTER (OUTPATIENT)
Age: 86
Setting detail: SPECIMEN
Discharge: HOME | End: 2021-12-16
Payer: MEDICARE

## 2021-12-16 VITALS
WEIGHT: 150 LBS | RESPIRATION RATE: 20 BRPM | SYSTOLIC BLOOD PRESSURE: 161 MMHG | OXYGEN SATURATION: 97 % | TEMPERATURE: 98.7 F | HEART RATE: 80 BPM | DIASTOLIC BLOOD PRESSURE: 55 MMHG | BODY MASS INDEX: 29.29 KG/M2

## 2021-12-16 DIAGNOSIS — R60.0 BILATERAL LOWER EXTREMITY EDEMA: Primary | ICD-10-CM

## 2021-12-16 LAB
ABSOLUTE EOS #: 0.06 K/UL (ref 0–0.44)
ABSOLUTE IMMATURE GRANULOCYTE: <0.03 K/UL (ref 0–0.3)
ABSOLUTE LYMPH #: 1.95 K/UL (ref 1.1–3.7)
ABSOLUTE MONO #: 0.41 K/UL (ref 0.1–1.2)
ALBUMIN SERPL-MCNC: 3.5 G/DL (ref 3.5–5.2)
ALBUMIN/GLOBULIN RATIO: 1.5 (ref 1–2.5)
ALP BLD-CCNC: 86 U/L (ref 35–104)
ALT SERPL-CCNC: 7 U/L (ref 5–33)
ANION GAP SERPL CALCULATED.3IONS-SCNC: 17 MMOL/L (ref 9–17)
AST SERPL-CCNC: 14 U/L
BASOPHILS # BLD: 0 % (ref 0–2)
BASOPHILS ABSOLUTE: <0.03 K/UL (ref 0–0.2)
BILIRUB SERPL-MCNC: 0.19 MG/DL (ref 0.3–1.2)
BNP INTERPRETATION: ABNORMAL
BUN BLDV-MCNC: 18 MG/DL (ref 8–23)
BUN/CREAT BLD: 25 (ref 9–20)
C-REACTIVE PROTEIN: 13.3 MG/L (ref 0–5)
CALCIUM SERPL-MCNC: 8.6 MG/DL (ref 8.6–10.4)
CHLORIDE BLD-SCNC: 98 MMOL/L (ref 98–107)
CO2: 25 MMOL/L (ref 20–31)
CREAT SERPL-MCNC: 0.73 MG/DL (ref 0.5–0.9)
DIFFERENTIAL TYPE: ABNORMAL
EOSINOPHILS RELATIVE PERCENT: 1 % (ref 1–4)
GFR AFRICAN AMERICAN: >60 ML/MIN
GFR NON-AFRICAN AMERICAN: >60 ML/MIN
GFR SERPL CREATININE-BSD FRML MDRD: ABNORMAL ML/MIN/{1.73_M2}
GFR SERPL CREATININE-BSD FRML MDRD: ABNORMAL ML/MIN/{1.73_M2}
GLUCOSE BLD-MCNC: 103 MG/DL (ref 70–99)
HCT VFR BLD CALC: 31.2 % (ref 36.3–47.1)
HEMOGLOBIN: 9.7 G/DL (ref 11.9–15.1)
IMMATURE GRANULOCYTES: 0 %
LYMPHOCYTES # BLD: 32 % (ref 24–43)
MCH RBC QN AUTO: 32.1 PG (ref 25.2–33.5)
MCHC RBC AUTO-ENTMCNC: 31.1 G/DL (ref 28.4–34.8)
MCV RBC AUTO: 103.3 FL (ref 82.6–102.9)
MONOCYTES # BLD: 7 % (ref 3–12)
NRBC AUTOMATED: 0 PER 100 WBC
PDW BLD-RTO: 14.7 % (ref 11.8–14.4)
PLATELET # BLD: 276 K/UL (ref 138–453)
PLATELET ESTIMATE: ABNORMAL
PMV BLD AUTO: 11.4 FL (ref 8.1–13.5)
POTASSIUM SERPL-SCNC: 3.6 MMOL/L (ref 3.7–5.3)
PRO-BNP: 1806 PG/ML
RBC # BLD: 3.02 M/UL (ref 3.95–5.11)
RBC # BLD: ABNORMAL 10*6/UL
SEDIMENTATION RATE, ERYTHROCYTE: 25 MM (ref 0–20)
SEG NEUTROPHILS: 60 % (ref 36–65)
SEGMENTED NEUTROPHILS ABSOLUTE COUNT: 3.74 K/UL (ref 1.5–8.1)
SODIUM BLD-SCNC: 140 MMOL/L (ref 135–144)
TOTAL PROTEIN: 5.9 G/DL (ref 6.4–8.3)
WBC # BLD: 6.2 K/UL (ref 3.5–11.3)
WBC # BLD: ABNORMAL 10*3/UL

## 2021-12-16 PROCEDURE — 6370000000 HC RX 637 (ALT 250 FOR IP): Performed by: EMERGENCY MEDICINE

## 2021-12-16 PROCEDURE — 80053 COMPREHEN METABOLIC PANEL: CPT

## 2021-12-16 PROCEDURE — 86140 C-REACTIVE PROTEIN: CPT

## 2021-12-16 PROCEDURE — 99283 EMERGENCY DEPT VISIT LOW MDM: CPT

## 2021-12-16 PROCEDURE — 85025 COMPLETE CBC W/AUTO DIFF WBC: CPT

## 2021-12-16 PROCEDURE — 83880 ASSAY OF NATRIURETIC PEPTIDE: CPT

## 2021-12-16 PROCEDURE — P9603 ONE-WAY ALLOW PRORATED MILES: HCPCS

## 2021-12-16 PROCEDURE — 85652 RBC SED RATE AUTOMATED: CPT

## 2021-12-16 PROCEDURE — 36415 COLL VENOUS BLD VENIPUNCTURE: CPT

## 2021-12-16 RX ORDER — FUROSEMIDE 40 MG/1
40 TABLET ORAL DAILY
Qty: 60 TABLET | Refills: 3 | Status: SHIPPED | OUTPATIENT
Start: 2021-12-16

## 2021-12-16 RX ORDER — PREDNISONE 1 MG/1
5 TABLET ORAL EVERY OTHER DAY
COMMUNITY

## 2021-12-16 RX ORDER — FUROSEMIDE 40 MG/1
40 TABLET ORAL ONCE
Status: COMPLETED | OUTPATIENT
Start: 2021-12-16 | End: 2021-12-16

## 2021-12-16 RX ADMIN — FUROSEMIDE 40 MG: 40 TABLET ORAL at 18:36

## 2021-12-16 ASSESSMENT — ENCOUNTER SYMPTOMS
SHORTNESS OF BREATH: 0
COUGH: 0
BLOOD IN STOOL: 0
VOICE CHANGE: 0
DIARRHEA: 0
BACK PAIN: 0
NAUSEA: 0
CHEST TIGHTNESS: 0
TROUBLE SWALLOWING: 0
VOMITING: 0
ABDOMINAL PAIN: 0

## 2021-12-16 ASSESSMENT — PAIN DESCRIPTION - PAIN TYPE: TYPE: ACUTE PAIN;CHRONIC PAIN

## 2021-12-16 ASSESSMENT — PAIN SCALES - GENERAL: PAINLEVEL_OUTOF10: 7

## 2021-12-16 NOTE — ED NOTES
Bed: 03  Expected date: 12/16/21  Expected time: 3:35 PM  Means of arrival: EMS  Comments:   80year old Leg and foot swelling     Iva Garza RN  12/16/21 [id number]

## 2021-12-16 NOTE — ED PROVIDER NOTES
[facility] [place] ED    EMERGENCY MEDICINE     Pt Name: Maine Rivera  MRN: [id number]  [place] 11/21/1931  Date of evaluation: 12/16/2021  Provider: Alyssa Luu DO, [address]       Chief Complaint   Patient presents with    Leg Swelling     bilateral swelling worsening in left    Foot Swelling       HISTORY OF PRESENT ILLNESS    Maine Rivera is a pleasant 80 y.o. female   Presents to the emergency department from home   Complains of bilateral lower extremity edema  This is not new for her  Denies any shortness of breath or chest pain  Denies any fever cough  I discussed the patient with Dr. Cesar Solis, her primary care physician  Patient been on 40 mg of Lasix previously, was backed down to 20 due to renal issues. Apparently has not been compliant with compression stockings, there are also apparently some dietary issues as well that are likely contributing from a sodium standpoint    Triage notes and Nursing notes were reviewed by myself. Any discrepancies are addressed above.     PAST MEDICAL HISTORY     Past Medical History:   Diagnosis Date    Anemia 7/201    Arthritis of knee 2018    bilateral knees; \"bone on bone\"; declines surg    Benign essential tremor 04/15/2016    CAD S/P percutaneous coronary angioplasty 1998    [facility] at [facility] Diabetes mellitus type II, controlled ([facility] [address] 1979    Gastrointestinal hemorrhage 2012    Duodenal Ulcer by EGD    History of pancreatitis 2013    after GI bleed    Humerus fracture 2015    right humerus ; [place]    Hyperlipidemia     Hypertension     Major depression     S/P CABG (coronary artery bypass graft) 1996    Ventral hernia 6000    infraumbilical ; incisional    Vitamin B12 deficiency 07/2018    Vitamin B12 270       SURGICAL HISTORY       Past Surgical History:   Procedure Laterality Date    CHOLECYSTECTOMY      COLONOSCOPY N/A 11/5/2018    COLONOSCOPY DIAGNOSTIC OR SCREENING performed by Ne Gomez MD at [facility] ENDOSCOPY    CORONARY ARTERY BYPASS GRAFT  1996    ? number grafts    HIP FRACTURE SURGERY Right 10/13/2021    HIP OPEN REDUCTION INTERNAL FIXATION-NAILING performed by Camila Reyna DO at [facility] Left 10/19/2021    HIP OPEN REDUCTION INTERNAL FIXATION - IM NAIL performed by Camila Reyna DO at [address] Right     HYSTERECTOMY, [address]       Current Discharge Medication List      CONTINUE these medications which have NOT CHANGED    Details   predniSONE (DELTASONE) 5 MG tablet Take 5 mg by mouth every other day      ferrous sulfate (IRON 325) 325 (65 Fe) MG tablet Take 1 tablet by mouth 2 times daily (with meals)  Qty: 30 tablet, Refills: 3      melatonin 5 MG TABS tablet Take 1 tablet by mouth nightly as needed (insominia)  Qty: 90 tablet, Refills: 3      atenolol (TENORMIN) 50 MG tablet Take 1 tablet by mouth daily  Qty: 90 tablet, Refills: 3    Associated Diagnoses: Essential hypertension      atorvastatin (LIPITOR) 20 MG tablet Take 1 tablet by mouth nightly  Qty: 90 tablet, Refills: 3    Associated Diagnoses: S/P angioplasty with stent; S/P CABG (coronary artery bypass graft)      carBAMazepine (TEGRETOL) 200 MG tablet Take 1 tablet by mouth 2 times daily  Qty: 180 tablet, Refills: 3    Associated Diagnoses: Benign essential tremor      !! citalopram (CELEXA) 20 MG tablet Take 1 tablet by mouth daily  Qty: 90 tablet, Refills: 3    Associated Diagnoses: Recurrent major depressive disorder, in partial remission (HCC)      metFORMIN (GLUCOPHAGE) 500 MG tablet Take 1 tablet by mouth 2 times daily (with meals)  Qty: 180 tablet, Refills: 3    Associated Diagnoses: Type 2 diabetes mellitus without complication, without long-term current use of insulin (HCC)      mirtazapine (REMERON) 15 MG tablet Take 1 tablet by mouth nightly  Qty: 90 tablet, Refills: 3    Associated Diagnoses: Recurrent major depressive disorder, in partial remission (HCC)      pantoprazole (PROTONIX) 40 MG tablet Take 1 tablet by mouth daily  Qty: 90 tablet, Refills: 3    Associated Diagnoses: Hx of upper gastrointestinal hemorrhage      vitamin B-12 (CYANOCOBALAMIN) 1000 MCG tablet Take 1 tablet by mouth daily  Qty: 90 tablet, Refills: 3    Associated Diagnoses: Vitamin B12 deficiency      aspirin EC 81 MG EC tablet Take 1 tablet by mouth daily  Qty: 90 tablet, Refills: 3      !! citalopram (CELEXA) 10 MG tablet Take 1 tablet by mouth daily  Qty: 90 tablet, Refills: 3      Pediatric Multiple Vitamins (MULTIVITAMIN CHILDRENS) CHEW Take 1 tablet by mouth daily  Qty: 90 tablet, Refills: 3      mycophenolate (CELLCEPT) 500 MG tablet Take 2 tabs qam and 1 tab every evening  Qty: 270 tablet, Refills: 3      primidone (MYSOLINE) 50 MG tablet Take 1 tablet by mouth 2 times daily  Qty: 180 tablet, Refills: 3      amLODIPine (NORVASC) 2.5 MG tablet Take 1 tablet by mouth daily  Qty: 30 tablet, Refills: 3      potassium chloride (KLOR-CON M) 20 MEQ extended release tablet Take 1 tablet by mouth daily  Qty: 30 tablet, Refills: 5      sodium chloride 0.9 % infusion Infuse 50 mL/hr intravenously continuous      traMADol (ULTRAM) 50 MG tablet Take 25 mg by mouth 2 times daily. acetaminophen (TYLENOL) 325 MG tablet Take 650 mg by mouth every 4 hours as needed for Pain or Fever      !! Ergocalciferol (VITAMIN D2) 50 MCG (2000 UT) TABS Take 50,000 Units by mouth once a week  Qty: 12 tablet, Refills: 3      !! Ergocalciferol (VITAMIN D2) 10 MCG (400 UNIT) TABS Take 1 tablet by mouth daily  Qty: 90 tablet, Refills: 3      nitroGLYCERIN (NITROSTAT) 0.4 MG SL tablet Place 1 tablet under the tongue every 5 minutes as needed for Chest pain up to max of 3 total doses. If no relief after 1 dose, call 911. Qty: 25 tablet, Refills: 3    Associated Diagnoses: S/P angioplasty with stent       !! - Potential duplicate medications found.  Please discuss with provider. ALLERGIES     Penicillins and Penicillin g    FAMILY HISTORY       Family History   Problem Relation Age of Onset    Heart Disease Father     Stroke Sister         SOCIAL HISTORY       Social History     Socioeconomic History    Marital status:      Spouse name: Not on file    Number of children: Not on file    Years of education: Not on file    Highest education level: Not on file   Occupational History    Not on file   Tobacco Use    Smoking status: Never Smoker    Smokeless tobacco: Never Used   Vaping Use    Vaping Use: Never used   Substance and Sexual Activity    Alcohol use: No    Drug use: No    Sexual activity: Not on file   Other Topics Concern    Not on file   Social History Narrative    Not on file     Social Determinants of Health     Financial Resource Strain:     Difficulty of Paying Living Expenses: Not on file   Food Insecurity:     Worried About Running Out of Food in the Last Year: Not on file    Carlos of Food in the Last Year: Not on file   Transportation Needs:     Lack of Transportation (Medical): Not on file    Lack of Transportation (Non-Medical):  Not on file   Physical Activity:     Days of Exercise per Week: Not on file    Minutes of Exercise per Session: Not on file   Stress:     Feeling of Stress : Not on file   Social Connections:     Frequency of Communication with Friends and Family: Not on file    Frequency of Social Gatherings with Friends and Family: Not on file    Attends [employer] Services: Not on file    Active Member of Clubs or Organizations: Not on file    Attends Club or Organization Meetings: Not on file    Marital Status: Not on file   Intimate Partner Violence:     Fear of Current or Ex-Partner: Not on file    Emotionally Abused: Not on file    Physically Abused: Not on file    Sexually Abused: Not on file   Housing Stability:     Unable to Pay for Housing in the Last Year: Not on file    [facility] of [facility] in the Last Year: Not on file    Unstable Housing in the Last Year: Not on file       REVIEW OF SYSTEMS     Review of Systems   Constitutional: Negative for chills, diaphoresis and fever. HENT: Negative for trouble swallowing and voice change. Eyes: Negative for visual disturbance. Respiratory: Negative for cough, chest tightness and shortness of breath. Cardiovascular: Positive for leg swelling. Negative for chest pain. Gastrointestinal: Negative for abdominal pain, blood in stool, diarrhea, nausea and vomiting. Genitourinary: Negative for dysuria, frequency and hematuria. Musculoskeletal: Negative for back pain and neck pain. Skin: Negative for rash and wound. Neurological: Negative for speech difficulty, weakness, numbness and headaches. Psychiatric/Behavioral: Negative for confusion. Except as noted above the remainder of the review of systems was reviewed and is. PHYSICAL EXAM    (up to 7 for level 4, 8 or more for level 5)     ED Triage Vitals [12/16/21 2237]   BP Temp Temp Source Pulse Resp SpO2 Height Weight   (!) 161/55 98.7 °F (37.1 °C) Oral 80 20 97 % -- --       Physical Exam  Vitals and nursing note reviewed. Constitutional:       General: She is not in acute distress. Appearance: She is well-developed. She is not ill-appearing, toxic-appearing or diaphoretic. HENT:      Head: Normocephalic and atraumatic. Eyes:      General: No scleral icterus. Conjunctiva/sclera: Conjunctivae normal.      Right eye: Right conjunctiva is not injected. Left eye: Left conjunctiva is not injected. Pupils: Pupils are equal, round, and reactive to light. Neck:      Thyroid: No thyromegaly. Trachea: No tracheal deviation. Cardiovascular:      Rate and Rhythm: Normal rate and regular rhythm. Heart sounds: Normal heart sounds. No murmur heard. No friction rub. No gallop. Pulmonary:      Effort: Pulmonary effort is normal. No respiratory distress. Breath sounds: Normal breath sounds. No stridor. No wheezing or rales. Abdominal:      General: Bowel sounds are normal. There is no distension. Palpations: Abdomen is soft. There is no mass. Tenderness: There is no abdominal tenderness. There is no guarding or rebound. Comments: Negative Alamo's sign  Nontender McBurney's Point  Negative Rovsig's sign  No bruising or echymosis of abdomen   Musculoskeletal:         General: No tenderness. Cervical back: Normal range of motion and neck supple. Right lower leg: Edema present. Left lower leg: Edema present. Comments: Negative Roma's Sign bilaterally  4+ pitting edema up to proximal tibias bilat  No cellulitis or warmth   Lymphadenopathy:      Cervical: No cervical adenopathy. Skin:     General: Skin is warm and dry. Coloration: Skin is not pale. Findings: No erythema or rash. Neurological:      Mental Status: She is alert and oriented to person, place, and time. Cranial Nerves: No cranial nerve deficit. Motor: No abnormal muscle tone. Coordination: Coordination normal.      Comments: No nystagmus   Psychiatric:         Behavior: Behavior normal.         Thought Content: Thought content normal.         DIAGNOSTIC RESULTS     EKG:(none if blank)  All E[facility]'s [facility] who either signs or Co-signs this chart in the absence of a cardiologist.        RADIOLOGY: (none if blank)   Interpretation per the Radiologistbelow, if available at the time of this note:    No orders to display       LABS:  Labs Reviewed   [facility] W/ REFLEX TO MG FOR LOW K       All other labs were within normal range or not returned as of this dictation. Please note, any cultures that may have been sent were not resulted at the time of this patient visit.     EMERGENCY DEPARTMENT COURSE andMedical Decision Making:     MDM/  ED Course as of 12/16/21 1741   Thu Dec 16, 2021   1615 D/w Dr Paolo Wesley, pt's PCP [AB]      ED Course User Index  [AB] Austin Agee DO     Reviewed labs done this am  Normal renal funxn  D/w dr Paolo Wesley  Compression stockings  Increase Lasix to 40mg daily      Strict returnprecautions and follow up instructions were discussed with the patient with which the patient agrees      The patient was seen at a time when COVID-19 was severely surging in the region, placing a significant strain on available resources and personnel. ED Medications administered this visit:    Medications   furosemide (LASIX) tablet 40 mg (has no administration in time range)         Procedures: (None if blank)       CLINICAL       1.  Bilateral lower extremity edema          DISPOSITION/PLAN   DISPOSITION Decision To Discharge 12/16/2021 05:38:59 PM      PATIENT REFERRED TO:  Myesha Osorio MD  [address] [place] [address]  [facility]     In 2 days        DISCHARGE MEDICATIONS:  Current Discharge Medication List        @[facility]([id number]:LAST:1)      (Please note that portions of this note were completed with a voice recognition program.  Efforts were made to edit the dictations but occasionallywords are mis-transcribed.)      Joie Cormier DO,FACEP (electronically signed)  Attending Physician, Emergency [address], DO  12/16/21 174

## 2022-01-27 ENCOUNTER — HOSPITAL ENCOUNTER (OUTPATIENT)
Age: 87
Setting detail: SPECIMEN
Discharge: HOME | End: 2022-01-27

## 2022-01-31 RX ORDER — TRIAMCINOLONE ACETONIDE 40 MG/ML
INJECTION, SUSPENSION INTRA-ARTICULAR; INTRAMUSCULAR
Qty: 2 ML | Refills: 0 | Status: ON HOLD | OUTPATIENT
Start: 2022-01-31 | End: 2022-08-25 | Stop reason: HOSPADM

## 2022-02-23 RX ORDER — TRIAMCINOLONE ACETONIDE 40 MG/ML
INJECTION, SUSPENSION INTRA-ARTICULAR; INTRAMUSCULAR
Qty: 2 ML | Refills: 0 | OUTPATIENT
Start: 2022-02-23

## 2022-04-19 NOTE — PROGRESS NOTES
Department of Orthopedic Surgery  Attending Progress Note      SUBJECTIVE      POD #7 CMN Right intertrochanteric hip fx  POD #1 CMN Left intertrochanteric hip fx    Patient seen and examined. Pain seems controlled at this time. She is currently eating lunch. OBJECTIVE    Physical    VITALS:  BP (!) 139/49   Pulse 71   Temp 97.9 °F (36.6 °C) (Oral)   Resp 20   Ht 5' (1.524 m)   Wt 148 lb (67.1 kg)   SpO2 97%   BMI 28.90 kg/m²     General: Alert and oriented. NAD    RLE: Dressing has some dry bloody show, stable. Mild swelling of the thigh. Edema in lower leg. Sensation and motor function intact. Toes are perfused. LLE: Dressing clean, dry, and intact. Mild swelling of the thigh. Edema in lower leg. Sensation and motor function intact. Toes are perfused.       Data    CBC with Differential:    Lab Results   Component Value Date    WBC 8.4 10/20/2021    RBC 2.52 10/20/2021    HGB 8.0 10/20/2021    HCT 25.4 10/20/2021     10/20/2021    .8 10/20/2021    MCH 31.7 10/20/2021    MCHC 31.5 10/20/2021    RDW 15.0 10/20/2021    SEGSPCT 45.4 11/06/2018    LYMPHOPCT 13 10/20/2021    LYMPHOPCT 42.6 11/06/2018    MONOPCT 7 10/20/2021    MONOPCT 6.8 11/06/2018    BASOPCT 0 10/20/2021    BASOPCT 0.9 11/06/2018    MONOSABS 0.62 10/20/2021    MONOSABS 0.3 11/06/2018    LYMPHSABS 1.09 10/20/2021    LYMPHSABS 2.0 11/06/2018    EOSABS 0.05 10/20/2021    EOSABS 0.2 11/06/2018    BASOSABS 0.03 10/20/2021    DIFFTYPE NOT REPORTED 10/20/2021     BMP:    Lab Results   Component Value Date     10/20/2021    K 3.8 10/20/2021    K 4.2 02/23/2018     10/20/2021    CO2 17 10/20/2021    BUN 23 10/20/2021    LABALBU 3.4 10/13/2021    CREATININE 0.88 10/20/2021    CALCIUM 8.3 10/20/2021    GFRAA >60 10/20/2021    LABGLOM >60 10/20/2021    GLUCOSE 144 10/20/2021     Current Inpatient Medications    Current Facility-Administered Medications: enoxaparin (LOVENOX) injection 40 mg, 40 mg, SubCUTAneous, PRS    No changes in history or exam  All questions answered  Does not smoke  Mammogram negative  Letter of support reviewed  Wants to keep his own nipples    A/P: 25 transmale p/f BL double-incision mastectomies with free nipple-areolar skin grafting    -OR today for BILATERAL double-incision mastectomies WITH free nipple-areolar skin grafting  -Discussed at-length both patient goals as well as risks of chest masculinization surgery, which include but are not limited to infection, bleeding, hematoma, seroma, pain, poor scarring, wound healing issues including skin graft loss, numbness, nipple projection loss, nipple areolar pigmentary changes, asymmetry, need for revisional surgery, DVT/PE, death. Despite these risks, patient consents to and would like to proceed with BILATERAL double-incision mastectomies with free nipple-areolar skin grafting.   -Discharge from PACU once criteria met  -Clinic in 1 week for drain care and bolster removal    Leonid Fraga MD, PhD     Daily  vancomycin (VANCOCIN) 750 mg in dextrose 5 % 250 mL IVPB, 750 mg, IntraVENous, Q24H  amLODIPine (NORVASC) tablet 2.5 mg, 2.5 mg, Oral, Daily  [Held by provider] aspirin EC tablet 81 mg, 81 mg, Oral, Daily  potassium chloride (KLOR-CON M) extended release tablet 40 mEq, 40 mEq, Oral, PRN **OR** potassium bicarb-citric acid (EFFER-K) effervescent tablet 40 mEq, 40 mEq, Oral, PRN **OR** potassium chloride 10 mEq/100 mL IVPB (Peripheral Line), 10 mEq, IntraVENous, PRN  0.9 % sodium chloride infusion, , IntraVENous, PRN  atenolol (TENORMIN) tablet 50 mg, 50 mg, Oral, Daily  atorvastatin (LIPITOR) tablet 20 mg, 20 mg, Oral, Nightly  carBAMazepine (TEGRETOL) tablet 200 mg, 200 mg, Oral, BID  vitamin D3 (CHOLECALCIFEROL) tablet 400 Units, 400 Units, Oral, Daily  ferrous sulfate (IRON 325) tablet 325 mg, 325 mg, Oral, Daily with breakfast  furosemide (LASIX) tablet 20 mg, 20 mg, Oral, Q MWF  metFORMIN (GLUCOPHAGE) tablet 500 mg, 500 mg, Oral, BID WC  mirtazapine (REMERON) tablet 15 mg, 15 mg, Oral, Nightly  pantoprazole (PROTONIX) tablet 40 mg, 40 mg, Oral, Daily  predniSONE (DELTASONE) tablet 5 mg, 5 mg, Oral, Every Other Day  primidone (MYSOLINE) tablet 50 mg, 50 mg, Oral, BID  vitamin B-12 (CYANOCOBALAMIN) tablet 1,000 mcg, 1,000 mcg, Oral, Daily  0.9 % sodium chloride infusion, , IntraVENous, Continuous  sodium chloride flush 0.9 % injection 5-40 mL, 5-40 mL, IntraVENous, 2 times per day  sodium chloride flush 0.9 % injection 5-40 mL, 5-40 mL, IntraVENous, PRN  0.9 % sodium chloride infusion, 25 mL, IntraVENous, PRN  polyethylene glycol (GLYCOLAX) packet 17 g, 17 g, Oral, Daily PRN  ondansetron (ZOFRAN) injection 4 mg, 4 mg, IntraVENous, Q6H PRN  HYDROcodone-acetaminophen (NORCO) 5-325 MG per tablet 1 tablet, 1 tablet, Oral, Q4H PRN **OR** HYDROcodone-acetaminophen (NORCO) 5-325 MG per tablet 2 tablet, 2 tablet, Oral, Q4H PRN  morphine (PF) injection 2 mg, 2 mg, IntraVENous, Q2H PRN **OR** morphine injection 4 mg, 4 mg, IntraVENous, Q2H PRN  [COMPLETED] levoFLOXacin (LEVAQUIN) 500 MG/100ML infusion 500 mg, 500 mg, IntraVENous, Once **AND** levoFLOXacin (LEVAQUIN) 250 MG/50ML infusion 250 mg, 250 mg, IntraVENous, Q24H  vitamin D (ERGOCALCIFEROL) capsule 50,000 Units, 50,000 Units, Oral, Weekly  vancomycin (VANCOCIN) intermittent dosing (placeholder), , Other, RX Placeholder    ASSESSMENT AND PLAN      POD #7 CMN Right intertrochanteric hip fx  POD #1 CMN Left intertrochanteric hip fx    WBAT bilateral legs   ROM as tolerated  PT/OT  Pain control  DVT Prophylaxis with teds if tolerated, scd's mobilization, Lovenox  Hgb 8.0 - continue to monitor  Will have nursing change Right hip dressing today  Maintain left hip dressing

## 2022-05-09 DIAGNOSIS — F33.41 RECURRENT MAJOR DEPRESSIVE DISORDER, IN PARTIAL REMISSION (HCC): ICD-10-CM

## 2022-05-09 DIAGNOSIS — Z87.19 HX OF UPPER GASTROINTESTINAL HEMORRHAGE: ICD-10-CM

## 2022-05-09 DIAGNOSIS — E11.9 TYPE 2 DIABETES MELLITUS WITHOUT COMPLICATION, WITHOUT LONG-TERM CURRENT USE OF INSULIN (HCC): ICD-10-CM

## 2022-05-09 DIAGNOSIS — I10 ESSENTIAL HYPERTENSION: ICD-10-CM

## 2022-05-09 RX ORDER — PANTOPRAZOLE SODIUM 40 MG/1
TABLET, DELAYED RELEASE ORAL
Qty: 30 TABLET | Refills: 0 | OUTPATIENT
Start: 2022-05-09

## 2022-05-09 RX ORDER — MYCOPHENOLATE MOFETIL 500 MG/1
TABLET ORAL
Qty: 90 TABLET | Refills: 0 | OUTPATIENT
Start: 2022-05-09

## 2022-05-09 RX ORDER — MIRTAZAPINE 15 MG/1
TABLET, FILM COATED ORAL
Qty: 30 TABLET | Refills: 0 | OUTPATIENT
Start: 2022-05-09

## 2022-05-09 RX ORDER — ATENOLOL 50 MG/1
TABLET ORAL
Qty: 30 TABLET | Refills: 0 | OUTPATIENT
Start: 2022-05-09

## 2022-07-23 ENCOUNTER — HOSPITAL ENCOUNTER (EMERGENCY)
Age: 87
Discharge: HOME | End: 2022-07-24
Attending: STUDENT IN AN ORGANIZED HEALTH CARE EDUCATION/TRAINING PROGRAM
Payer: MEDICARE

## 2022-07-23 DIAGNOSIS — L29.9 PRURITIC DISORDER: Primary | ICD-10-CM

## 2022-07-23 DIAGNOSIS — B86 SCABIES: ICD-10-CM

## 2022-07-23 PROCEDURE — 99283 EMERGENCY DEPT VISIT LOW MDM: CPT

## 2022-07-24 VITALS
DIASTOLIC BLOOD PRESSURE: 59 MMHG | SYSTOLIC BLOOD PRESSURE: 176 MMHG | OXYGEN SATURATION: 98 % | TEMPERATURE: 98.1 F | RESPIRATION RATE: 19 BRPM | HEART RATE: 58 BPM

## 2022-07-24 PROCEDURE — 6370000000 HC RX 637 (ALT 250 FOR IP): Performed by: STUDENT IN AN ORGANIZED HEALTH CARE EDUCATION/TRAINING PROGRAM

## 2022-07-24 RX ORDER — DIPHENHYDRAMINE HCL 25 MG
25 TABLET ORAL EVERY 6 HOURS PRN
Status: DISCONTINUED | OUTPATIENT
Start: 2022-07-24 | End: 2022-07-24 | Stop reason: HOSPADM

## 2022-07-24 RX ORDER — HYDROXYZINE HYDROCHLORIDE 25 MG/1
25 TABLET, FILM COATED ORAL EVERY 6 HOURS PRN
Qty: 20 TABLET | Refills: 0 | Status: ON HOLD | OUTPATIENT
Start: 2022-07-24 | End: 2022-08-04

## 2022-07-24 RX ORDER — PERMETHRIN 50 MG/G
CREAM TOPICAL
Qty: 60 G | Refills: 0 | Status: ON HOLD | OUTPATIENT
Start: 2022-07-24 | End: 2022-08-25 | Stop reason: HOSPADM

## 2022-07-24 RX ORDER — DIAPER,BRIEF,INFANT-TODD,DISP
EACH MISCELLANEOUS
Qty: 120 G | Refills: 0 | Status: SHIPPED | OUTPATIENT
Start: 2022-07-24 | End: 2022-07-31

## 2022-07-24 RX ORDER — HYDROXYZINE HYDROCHLORIDE 25 MG/1
25 TABLET, FILM COATED ORAL ONCE
Status: COMPLETED | OUTPATIENT
Start: 2022-07-24 | End: 2022-07-24

## 2022-07-24 RX ADMIN — DIPHENHYDRAMINE HCL 25 MG: 25 TABLET ORAL at 00:31

## 2022-07-24 RX ADMIN — HYDROXYZINE HYDROCHLORIDE 25 MG: 25 TABLET, FILM COATED ORAL at 03:42

## 2022-07-24 NOTE — ED NOTES
Pt reports to ED with c/o itching that has been on going for the past week. Pt reports that she has itching on her back, on her right lower abd, under her left armpit and then her bilateral ankles. Pt reports that she also develops these small white blisters that are very itchy. Pt is reporting that she also has been having bilateral lower leg swelling that has increased over the past few years and recently gotten worse in the past 6 months.       Pushpa Esteves RN  07/24/22 2070

## 2022-07-30 ENCOUNTER — HOSPITAL ENCOUNTER (EMERGENCY)
Age: 87
Discharge: HOME | DRG: 125 | End: 2022-07-30
Attending: EMERGENCY MEDICINE
Payer: MEDICARE

## 2022-07-30 ENCOUNTER — APPOINTMENT (OUTPATIENT)
Dept: CT IMAGING | Age: 87
DRG: 125 | End: 2022-07-30
Payer: MEDICARE

## 2022-07-30 VITALS
HEART RATE: 64 BPM | WEIGHT: 125 LBS | OXYGEN SATURATION: 97 % | BODY MASS INDEX: 23 KG/M2 | TEMPERATURE: 98 F | SYSTOLIC BLOOD PRESSURE: 181 MMHG | RESPIRATION RATE: 25 BRPM | DIASTOLIC BLOOD PRESSURE: 64 MMHG | HEIGHT: 62 IN

## 2022-07-30 DIAGNOSIS — B02.9 HERPES ZOSTER WITHOUT COMPLICATION: Primary | ICD-10-CM

## 2022-07-30 PROCEDURE — 99284 EMERGENCY DEPT VISIT MOD MDM: CPT

## 2022-07-30 PROCEDURE — 70450 CT HEAD/BRAIN W/O DYE: CPT

## 2022-07-30 PROCEDURE — 6370000000 HC RX 637 (ALT 250 FOR IP): Performed by: EMERGENCY MEDICINE

## 2022-07-30 RX ORDER — ACYCLOVIR 800 MG/1
800 TABLET ORAL
Qty: 35 TABLET | Refills: 0 | Status: ON HOLD | OUTPATIENT
Start: 2022-07-30 | End: 2022-08-04 | Stop reason: SDUPTHER

## 2022-07-30 RX ORDER — ACYCLOVIR 800 MG/1
800 TABLET ORAL
Qty: 35 TABLET | Refills: 0 | Status: SHIPPED | OUTPATIENT
Start: 2022-07-30 | End: 2022-07-30

## 2022-07-30 RX ORDER — ACYCLOVIR 200 MG/1
800 CAPSULE ORAL ONCE
Status: COMPLETED | OUTPATIENT
Start: 2022-07-30 | End: 2022-07-30

## 2022-07-30 RX ADMIN — ACYCLOVIR 800 MG: 200 CAPSULE ORAL at 09:48

## 2022-07-30 ASSESSMENT — PAIN DESCRIPTION - FREQUENCY: FREQUENCY: INTERMITTENT

## 2022-07-30 ASSESSMENT — PAIN SCALES - GENERAL: PAINLEVEL_OUTOF10: 6

## 2022-07-30 ASSESSMENT — PAIN DESCRIPTION - LOCATION: LOCATION: HEAD

## 2022-07-30 ASSESSMENT — PAIN DESCRIPTION - ORIENTATION: ORIENTATION: LEFT

## 2022-07-30 NOTE — ED NOTES
Pt returned from [address];      [facility], [address] [facility]  07/30/22 3061

## 2022-07-30 NOTE — ED PROVIDER NOTES
[address] [place] [address] ED  EMERGENCY DEPARTMENT ENCOUNTER      Pt Name: Tatiana Chau  MRN: [id number]  [place] 11/21/1931  Date of evaluation: 7/30/2022  Provider: Alton Holm MD    CHIEF COMPLAINT       Chief Complaint   Patient presents with    Headache         HISTORY OF PRESENT ILLNESS  (Location/Symptom, Timing/Onset, Context/Setting, Quality, Duration, Modifying Factors, Severity.)   Tatiana Chau is a 80 y.o. female who presents to the emergency department for headache. It seems to have started yesterday and its intermittent. She states it lasts for a second or so at a time and its only on the left side. No trauma. She does not complain of ear pain or dental issues. No neck pain no weakness or numbness. No vomiting or diarrhea. She has not had a cough. She is a poor historian. Nursing Notes were reviewed.     ALLERGIES     Penicillins and Penicillin g    CURRENT MEDICATIONS       Previous Medications    ACETAMINOPHEN (TYLENOL) 325 MG TABLET    Take 650 mg by mouth every 4 hours as needed for Pain or Fever    AMLODIPINE (NORVASC) 2.5 MG TABLET    Take 1 tablet by mouth daily    ASPIRIN EC 81 MG EC TABLET    Take 1 tablet by mouth daily    ATENOLOL (TENORMIN) 50 MG TABLET    Take 1 tablet by mouth daily    ATORVASTATIN (LIPITOR) 20 MG TABLET    Take 1 tablet by mouth nightly    CARBAMAZEPINE (TEGRETOL) 200 MG TABLET    Take 1 tablet by mouth 2 times daily    CITALOPRAM (CELEXA) 10 MG TABLET    Take 1 tablet by mouth daily    CITALOPRAM (CELEXA) 20 MG TABLET    Take 1 tablet by mouth daily    ELASTIC BANDAGES & SUPPORTS (JOBST KNEE HIGH COMPRESSION SM) MISC    1 each by Does not apply route daily as needed (swelling)    ERGOCALCIFEROL (VITAMIN D2) 10 MCG (400 UNIT) TABS    Take 1 tablet by mouth daily    ERGOCALCIFEROL (VITAMIN D2) 50 MCG (2000 UT) TABS    Take 50,000 Units by mouth once a week    FERROUS SULFATE (IRON 325) 325 (65 FE) MG TABLET    Take 1 tablet by mouth 2 times daily (with meals) FUROSEMIDE (LASIX) 40 MG TABLET    Take 1 tablet by mouth daily    HYDROCORTISONE 1 % CREAM    Apply topically 2 -3 times daily for 5 - 7 days. HYDROXYZINE HCL (ATARAX) 25 MG TABLET    Take 1 tablet by mouth every 6 hours as needed for Itching    MELATONIN 5 MG TABS TABLET    Take 1 tablet by mouth nightly as needed (insominia)    METFORMIN (GLUCOPHAGE) 500 MG TABLET    Take 1 tablet by mouth 2 times daily (with meals)    MIRTAZAPINE (REMERON) 15 MG TABLET    Take 1 tablet by mouth nightly    MYCOPHENOLATE (CELLCEPT) 500 MG TABLET    Take 2 tabs qam and 1 tab every evening    NITROGLYCERIN (NITROSTAT) 0.4 MG SL TABLET    Place 1 tablet under the tongue every 5 minutes as needed for Chest pain up to max of 3 total doses. If no relief after 1 dose, call 911. PANTOPRAZOLE (PROTONIX) 40 MG TABLET    Take 1 tablet by mouth daily    PEDIATRIC MULTIPLE VITAMINS (MULTIVITAMIN CHILDRENS) CHEW    Take 1 tablet by mouth daily    PERMETHRIN (ELIMITE) 5 % CREAM    Apply topically to entire body or affected areas once, leave on for 8 hours, then rinse. May repeat in 1-2 weeks if symptoms persist.    POTASSIUM CHLORIDE (KLOR-CON M) 20 MEQ EXTENDED RELEASE TABLET    Take 1 tablet by mouth daily    PREDNISONE (DELTASONE) 5 MG TABLET    Take 5 mg by mouth every other day    PRIMIDONE (MYSOLINE) 50 MG TABLET    Take 1 tablet by mouth 2 times daily    SODIUM CHLORIDE 0.9 % INFUSION    Infuse 50 mL/hr intravenously continuous    TRAMADOL (ULTRAM) 50 MG TABLET    Take 25 mg by mouth 2 times daily.     TRIAMCINOLONE ACETONIDE (KENALOG-40) 40 MG/ML INJECTION    INJECT 1ML (40MG) I.M. EVERY 2 WEEKS ON MONDAY AS DIRECTED    VITAMIN B-12 (CYANOCOBALAMIN) 1000 MCG TABLET    Take 1 tablet by mouth daily       PAST MEDICAL HISTORY         Diagnosis Date    Anemia 7/201    Arthritis of knee 2018    bilateral knees; \"bone on bone\"; declines surg    Benign essential tremor 04/15/2016    CAD S/P percutaneous coronary angioplasty 1998    done at [facility]    Diabetes mellitus type II, controlled ([facility] [address] 1979    Gastrointestinal hemorrhage     Duodenal Ulcer by EGD    History of pancreatitis     after GI bleed    Humerus fracture 2015    right humerus ; [place]    Hyperlipidemia     Hypertension     Major depression     S/P CABG (coronary artery bypass graft)     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 number grafts    HIP FRACTURE SURGERY Right 10/13/2021    HIP OPEN REDUCTION INTERNAL FIXATION-NAILING performed by Kathy Burciaga DO at [address] [facility] Left 10/19/2021    HIP OPEN REDUCTION INTERNAL FIXATION - IM NAIL performed by Kathy Burciaga DO at [facility] Right     HYSTERECTOMY, VAGINAL      SALPINGO-OOPHORECTOMY           FAMILY HISTORY           Problem Relation Age of Onset    Heart Disease Father     Stroke Sister      Family Status   Relation Name Status    Mother      Father      Sister          SOCIAL HISTORY      reports that she has never smoked. She has never used smokeless tobacco. She reports that she does not drink alcohol and does not use drugs. REVIEW OF SYSTEMS    (2-9 systems for level 4, 10 or more for level 5)     Review of Systems   Unable to perform ROS: Age      Except as noted above the remainder of the review of systems was reviewed and negative.      PHYSICAL EXAM    (up to 7 for level 4, 8 or more for level 5)     Vitals:    22 0704 22 0715 22 0730 22 0815   BP: (!) 200/60 (!) 197/62 (!) 193/59 (!) 181/64   Pulse: 64 61 60 64   Resp: 18 19 17 25   Temp: 98 °F (36.7 °C)      TempSrc: Oral      SpO2: 98% 97% 96% 97% available at the time of this note:    CT HEAD WO CONTRAST    Result Date: 7/30/2022  EXAMINATION: CT OF THE HEAD WITHOUT CONTRAST  7/30/2022 7:37 am TECHNIQUE: CT of the head was performed without the administration of intravenous contrast. Automated exposure control, iterative reconstruction, and/or weight based adjustment of the mA/kV was utilized to reduce the radiation dose to as low as reasonably achievable. COMPARISON: 10/12/2021 HISTORY: ORDERING SYSTEM PROVIDED HISTORY: Left-sided headache that lasted for a few seconds at a time TECHNOLOGIST PROVIDED HISTORY: Left-sided headache that lasted for a few seconds at a time Decision Support Exception - unselect if not a suspected or confirmed emergency medical condition->Emergency Medical Condition (MA) Reason for Exam: Left-sided headache that lasted for a few seconds at a time FINDINGS: BRAIN/VENTRICLES: There is no acute intracranial hemorrhage, mass effect or midline shift. No abnormal extra-axial fluid collection. The gray-white differentiation is maintained without evidence of an acute infarct. There is no evidence of hydrocephalus. There is a mild-to-moderate degree of generalized volume loss. In there is a mild degree of low-attenuation in the periventricular and subcortical white matter, consistent with small vessel disease. ORBITS: The visualized portion of the orbits demonstrate no acute abnormality. SINUSES: Mild inflammatory disease of the bilateral maxillary sinuses. Mastoid air cells are clear. SOFT TISSUES/SKULL:  No acute abnormality of the visualized skull or soft tissues. No acute intracranial abnormality. Stable pattern of atrophy and mild microangiopathic disease. Mild inflammatory disease of the maxillary sinuses, acute or chronic. LABS:  Labs Reviewed - No data to display    All other labs were within normal range or not returned as of this dictation.     EMERGENCY DEPARTMENT COURSE and DIFFERENTIAL DIAGNOSIS/MDM:   Vitals: Vitals:    07/30/22 0704 07/30/22 0715 07/30/22 0730 07/30/22 0815   BP: (!) 200/60 (!) 197/62 (!) 193/59 (!) 181/64   Pulse: 64 61 60 64   Resp: 18 19 17 25   Temp: 98 °F (36.7 °C)      TempSrc: Oral      SpO2: 98% 97% 96% 97%   Weight: 125 lb (56.7 kg)      Height: 5' 2\" (1.575 m)          Orders Placed This Encounter   Medications    acyclovir (ZOVIRAX) 800 MG tablet     Sig: Take 1 tablet by mouth 5 times daily for 7 days     Dispense:  35 tablet     Refill:  0       Medical Decision Making: My clinical impression is that she has shingles. Treatment diagnosis and follow-up were discussed with the patient      CONSULTS:  None    PROCEDURES:  None    FINAL IMPRESSION      1. Herpes zoster without complication          DISPOSITION/PLAN   DISPOSITION Decision To Discharge 07/30/2022 08:27:18 AM      PATIENT REFERRED TO:   [facility] ED  [address] [facility]  [phone]    If symptoms worsen    DISCHARGE MEDICATIONS:     New Prescriptions    ACYCLOVIR (ZOVIRAX) 800 MG TABLET    Take 1 tablet by mouth 5 times daily for 7 days       The care is provided during an unprecedented national emergency due to the novel coronavirus, COVID-19.     (Please note that portions of this note were completed with a voice recognition program.  Efforts were made to edit the dictations but occasionally words are mis-transcribed.)    Elsie Infante MD  Attending Emergency Physician            Elsie Infante MD  07/30/22 5395

## 2022-08-02 ENCOUNTER — APPOINTMENT (OUTPATIENT)
Dept: CT IMAGING | Age: 87
DRG: 125 | End: 2022-08-02
Payer: MEDICARE

## 2022-08-02 ENCOUNTER — HOSPITAL ENCOUNTER (INPATIENT)
Age: 87
LOS: 5 days | Discharge: NURSING, REHAB OR LONG-TERM CARE | DRG: 125 | End: 2022-08-07
Attending: EMERGENCY MEDICINE | Admitting: INTERNAL MEDICINE
Payer: MEDICARE

## 2022-08-02 DIAGNOSIS — B02.31 HERPES ZOSTER CONJUNCTIVITIS: ICD-10-CM

## 2022-08-02 DIAGNOSIS — N30.01 ACUTE CYSTITIS WITH HEMATURIA: ICD-10-CM

## 2022-08-02 DIAGNOSIS — L03.211 FACIAL CELLULITIS: ICD-10-CM

## 2022-08-02 DIAGNOSIS — B02.29 HERPES ZOSTER VIRUS INFECTION OF FACE AND EAR NERVES: Primary | ICD-10-CM

## 2022-08-02 PROBLEM — E11.9 TYPE 2 DIABETES MELLITUS WITHOUT COMPLICATION (HCC): Status: ACTIVE | Noted: 2017-10-18

## 2022-08-02 PROBLEM — K21.9 GASTROESOPHAGEAL REFLUX DISEASE: Status: ACTIVE | Noted: 2022-08-02

## 2022-08-02 PROBLEM — B02.9 HERPES ZOSTER: Status: ACTIVE | Noted: 2022-08-02

## 2022-08-02 PROBLEM — L03.213 PERIORBITAL CELLULITIS OF LEFT EYE: Status: ACTIVE | Noted: 2022-08-02

## 2022-08-02 PROBLEM — Z66 DNR (DO NOT RESUSCITATE): Status: ACTIVE | Noted: 2022-08-02

## 2022-08-02 LAB
-: ABNORMAL
ABSOLUTE EOS #: 0.08 K/UL (ref 0–0.44)
ABSOLUTE IMMATURE GRANULOCYTE: 0.01 K/UL (ref 0–0.3)
ABSOLUTE LYMPH #: 1.13 K/UL (ref 1.1–3.7)
ABSOLUTE MONO #: 0.44 K/UL (ref 0.1–1.2)
ALBUMIN SERPL-MCNC: 4.4 G/DL (ref 3.5–5.2)
ALP BLD-CCNC: 114 U/L (ref 35–104)
ALT SERPL-CCNC: 12 U/L (ref 5–33)
ANION GAP SERPL CALCULATED.3IONS-SCNC: 14 MMOL/L (ref 9–17)
AST SERPL-CCNC: 20 U/L
BACTERIA: ABNORMAL
BASOPHILS # BLD: 1 % (ref 0–2)
BASOPHILS ABSOLUTE: <0.03 K/UL (ref 0–0.2)
BILIRUB SERPL-MCNC: 0.37 MG/DL (ref 0.3–1.2)
BILIRUBIN URINE: NEGATIVE
BUN BLDV-MCNC: 18 MG/DL (ref 8–23)
BUN/CREAT BLD: 16 (ref 9–20)
C-REACTIVE PROTEIN: 9 MG/L (ref 0–5)
CALCIUM SERPL-MCNC: 9.7 MG/DL (ref 8.6–10.4)
CHLORIDE BLD-SCNC: 98 MMOL/L (ref 98–107)
CO2: 26 MMOL/L (ref 20–31)
COLOR: YELLOW
CREAT SERPL-MCNC: 1.15 MG/DL (ref 0.5–0.9)
EOSINOPHILS RELATIVE PERCENT: 2 % (ref 1–4)
EPITHELIAL CELLS UA: ABNORMAL /HPF (ref 0–5)
GFR AFRICAN AMERICAN: 54 ML/MIN
GFR NON-AFRICAN AMERICAN: 44 ML/MIN
GFR SERPL CREATININE-BSD FRML MDRD: ABNORMAL ML/MIN/{1.73_M2}
GLUCOSE BLD-MCNC: 136 MG/DL (ref 70–99)
GLUCOSE BLD-MCNC: 142 MG/DL (ref 65–105)
GLUCOSE URINE: NEGATIVE
HCT VFR BLD CALC: 40 % (ref 36.3–47.1)
HEMOGLOBIN: 12.7 G/DL (ref 11.9–15.1)
IMMATURE GRANULOCYTES: 0 %
KETONES, URINE: ABNORMAL
LACTIC ACID, SEPSIS: 1.1 MMOL/L (ref 0.5–1.9)
LEUKOCYTE ESTERASE, URINE: ABNORMAL
LYMPHOCYTES # BLD: 28 % (ref 24–43)
MCH RBC QN AUTO: 31.8 PG (ref 25.2–33.5)
MCHC RBC AUTO-ENTMCNC: 31.8 G/DL (ref 28.4–34.8)
MCV RBC AUTO: 100 FL (ref 82.6–102.9)
MONOCYTES # BLD: 11 % (ref 3–12)
NITRITE, URINE: POSITIVE
NRBC AUTOMATED: 0 PER 100 WBC
PDW BLD-RTO: 12 % (ref 11.8–14.4)
PH UA: 6 (ref 5–8)
PLATELET # BLD: 153 K/UL (ref 138–453)
PMV BLD AUTO: 10.9 FL (ref 8.1–13.5)
POTASSIUM SERPL-SCNC: 4.1 MMOL/L (ref 3.7–5.3)
PROTEIN UA: ABNORMAL
RBC # BLD: 4 M/UL (ref 3.95–5.11)
RBC UA: ABNORMAL /HPF (ref 0–2)
SEDIMENTATION RATE, ERYTHROCYTE: 43 MM/HR (ref 0–30)
SEG NEUTROPHILS: 58 % (ref 36–65)
SEGMENTED NEUTROPHILS ABSOLUTE COUNT: 2.37 K/UL (ref 1.5–8.1)
SODIUM BLD-SCNC: 138 MMOL/L (ref 135–144)
SPECIFIC GRAVITY UA: 1.02 (ref 1–1.03)
TOTAL PROTEIN: 7.4 G/DL (ref 6.4–8.3)
TURBIDITY: ABNORMAL
URINE HGB: ABNORMAL
UROBILINOGEN, URINE: NORMAL
WBC # BLD: 4.1 K/UL (ref 3.5–11.3)
WBC UA: ABNORMAL /HPF (ref 0–5)

## 2022-08-02 PROCEDURE — 87086 URINE CULTURE/COLONY COUNT: CPT

## 2022-08-02 PROCEDURE — 6360000002 HC RX W HCPCS: Performed by: NURSE PRACTITIONER

## 2022-08-02 PROCEDURE — 70487 CT MAXILLOFACIAL W/DYE: CPT

## 2022-08-02 PROCEDURE — 81001 URINALYSIS AUTO W/SCOPE: CPT

## 2022-08-02 PROCEDURE — 96365 THER/PROPH/DIAG IV INF INIT: CPT

## 2022-08-02 PROCEDURE — 99285 EMERGENCY DEPT VISIT HI MDM: CPT

## 2022-08-02 PROCEDURE — 99223 1ST HOSP IP/OBS HIGH 75: CPT | Performed by: NURSE PRACTITIONER

## 2022-08-02 PROCEDURE — 87186 SC STD MICRODIL/AGAR DIL: CPT

## 2022-08-02 PROCEDURE — 70450 CT HEAD/BRAIN W/O DYE: CPT

## 2022-08-02 PROCEDURE — 96375 TX/PRO/DX INJ NEW DRUG ADDON: CPT

## 2022-08-02 PROCEDURE — 83036 HEMOGLOBIN GLYCOSYLATED A1C: CPT

## 2022-08-02 PROCEDURE — 2060000000 HC ICU INTERMEDIATE R&B

## 2022-08-02 PROCEDURE — 6370000000 HC RX 637 (ALT 250 FOR IP): Performed by: NURSE PRACTITIONER

## 2022-08-02 PROCEDURE — 82947 ASSAY GLUCOSE BLOOD QUANT: CPT

## 2022-08-02 PROCEDURE — 80053 COMPREHEN METABOLIC PANEL: CPT

## 2022-08-02 PROCEDURE — 87088 URINE BACTERIA CULTURE: CPT

## 2022-08-02 PROCEDURE — 2580000003 HC RX 258: Performed by: EMERGENCY MEDICINE

## 2022-08-02 PROCEDURE — 87040 BLOOD CULTURE FOR BACTERIA: CPT

## 2022-08-02 PROCEDURE — 96366 THER/PROPH/DIAG IV INF ADDON: CPT

## 2022-08-02 PROCEDURE — 85652 RBC SED RATE AUTOMATED: CPT

## 2022-08-02 PROCEDURE — 86140 C-REACTIVE PROTEIN: CPT

## 2022-08-02 PROCEDURE — 6360000004 HC RX CONTRAST MEDICATION: Performed by: EMERGENCY MEDICINE

## 2022-08-02 PROCEDURE — 83605 ASSAY OF LACTIC ACID: CPT

## 2022-08-02 PROCEDURE — 6360000002 HC RX W HCPCS: Performed by: EMERGENCY MEDICINE

## 2022-08-02 PROCEDURE — 85025 COMPLETE CBC W/AUTO DIFF WBC: CPT

## 2022-08-02 PROCEDURE — 6370000000 HC RX 637 (ALT 250 FOR IP): Performed by: EMERGENCY MEDICINE

## 2022-08-02 PROCEDURE — 96376 TX/PRO/DX INJ SAME DRUG ADON: CPT

## 2022-08-02 PROCEDURE — 96368 THER/DIAG CONCURRENT INF: CPT

## 2022-08-02 PROCEDURE — 2580000003 HC RX 258: Performed by: NURSE PRACTITIONER

## 2022-08-02 RX ORDER — DEXTROSE MONOHYDRATE 100 MG/ML
INJECTION, SOLUTION INTRAVENOUS CONTINUOUS PRN
Status: DISCONTINUED | OUTPATIENT
Start: 2022-08-02 | End: 2022-08-07 | Stop reason: HOSPADM

## 2022-08-02 RX ORDER — 0.9 % SODIUM CHLORIDE 0.9 %
80 INTRAVENOUS SOLUTION INTRAVENOUS ONCE
Status: COMPLETED | OUTPATIENT
Start: 2022-08-02 | End: 2022-08-02

## 2022-08-02 RX ORDER — FLUOROMETHOLONE 0.1 %
1 SUSPENSION, DROPS(FINAL DOSAGE FORM)(ML) OPHTHALMIC (EYE) 4 TIMES DAILY
Status: DISCONTINUED | OUTPATIENT
Start: 2022-08-02 | End: 2022-08-07 | Stop reason: HOSPADM

## 2022-08-02 RX ORDER — PANTOPRAZOLE SODIUM 40 MG/1
40 TABLET, DELAYED RELEASE ORAL DAILY
Status: DISCONTINUED | OUTPATIENT
Start: 2022-08-02 | End: 2022-08-07 | Stop reason: HOSPADM

## 2022-08-02 RX ORDER — ACETAMINOPHEN 325 MG/1
650 TABLET ORAL EVERY 6 HOURS PRN
Status: DISCONTINUED | OUTPATIENT
Start: 2022-08-02 | End: 2022-08-07 | Stop reason: HOSPADM

## 2022-08-02 RX ORDER — SODIUM CHLORIDE 0.9 % (FLUSH) 0.9 %
10 SYRINGE (ML) INJECTION PRN
Status: DISCONTINUED | OUTPATIENT
Start: 2022-08-02 | End: 2022-08-07 | Stop reason: HOSPADM

## 2022-08-02 RX ORDER — CITALOPRAM 10 MG/1
10 TABLET ORAL DAILY
Status: DISCONTINUED | OUTPATIENT
Start: 2022-08-02 | End: 2022-08-02

## 2022-08-02 RX ORDER — HYDROCODONE BITARTRATE AND ACETAMINOPHEN 5; 325 MG/1; MG/1
2 TABLET ORAL EVERY 4 HOURS PRN
Status: DISCONTINUED | OUTPATIENT
Start: 2022-08-02 | End: 2022-08-07 | Stop reason: HOSPADM

## 2022-08-02 RX ORDER — LANOLIN ALCOHOL/MO/W.PET/CERES
1000 CREAM (GRAM) TOPICAL DAILY
Status: DISCONTINUED | OUTPATIENT
Start: 2022-08-02 | End: 2022-08-07 | Stop reason: HOSPADM

## 2022-08-02 RX ORDER — ATORVASTATIN CALCIUM 20 MG/1
20 TABLET, FILM COATED ORAL NIGHTLY
Status: DISCONTINUED | OUTPATIENT
Start: 2022-08-02 | End: 2022-08-07 | Stop reason: HOSPADM

## 2022-08-02 RX ORDER — POTASSIUM CHLORIDE 20 MEQ/1
20 TABLET, EXTENDED RELEASE ORAL DAILY
Status: DISCONTINUED | OUTPATIENT
Start: 2022-08-03 | End: 2022-08-07 | Stop reason: HOSPADM

## 2022-08-02 RX ORDER — SODIUM CHLORIDE 0.9 % (FLUSH) 0.9 %
5-40 SYRINGE (ML) INJECTION EVERY 12 HOURS SCHEDULED
Status: DISCONTINUED | OUTPATIENT
Start: 2022-08-02 | End: 2022-08-07 | Stop reason: HOSPADM

## 2022-08-02 RX ORDER — PRIMIDONE 50 MG/1
50 TABLET ORAL 2 TIMES DAILY
Status: DISCONTINUED | OUTPATIENT
Start: 2022-08-03 | End: 2022-08-07 | Stop reason: HOSPADM

## 2022-08-02 RX ORDER — POTASSIUM CHLORIDE 7.45 MG/ML
10 INJECTION INTRAVENOUS PRN
Status: DISCONTINUED | OUTPATIENT
Start: 2022-08-02 | End: 2022-08-02

## 2022-08-02 RX ORDER — ONDANSETRON 2 MG/ML
4 INJECTION INTRAMUSCULAR; INTRAVENOUS EVERY 6 HOURS PRN
Status: DISCONTINUED | OUTPATIENT
Start: 2022-08-02 | End: 2022-08-04

## 2022-08-02 RX ORDER — GABAPENTIN 100 MG/1
100 CAPSULE ORAL 3 TIMES DAILY
Status: DISCONTINUED | OUTPATIENT
Start: 2022-08-02 | End: 2022-08-07 | Stop reason: HOSPADM

## 2022-08-02 RX ORDER — INSULIN LISPRO 100 [IU]/ML
0-4 INJECTION, SOLUTION INTRAVENOUS; SUBCUTANEOUS NIGHTLY
Status: DISCONTINUED | OUTPATIENT
Start: 2022-08-02 | End: 2022-08-07 | Stop reason: HOSPADM

## 2022-08-02 RX ORDER — CITALOPRAM 20 MG/1
20 TABLET ORAL DAILY
Status: DISCONTINUED | OUTPATIENT
Start: 2022-08-02 | End: 2022-08-02

## 2022-08-02 RX ORDER — FUROSEMIDE 40 MG/1
40 TABLET ORAL DAILY
Status: DISCONTINUED | OUTPATIENT
Start: 2022-08-03 | End: 2022-08-07 | Stop reason: HOSPADM

## 2022-08-02 RX ORDER — ACETAMINOPHEN 650 MG/1
650 SUPPOSITORY RECTAL EVERY 6 HOURS PRN
Status: DISCONTINUED | OUTPATIENT
Start: 2022-08-02 | End: 2022-08-07 | Stop reason: HOSPADM

## 2022-08-02 RX ORDER — LANOLIN ALCOHOL/MO/W.PET/CERES
325 CREAM (GRAM) TOPICAL 2 TIMES DAILY WITH MEALS
Status: DISCONTINUED | OUTPATIENT
Start: 2022-08-02 | End: 2022-08-07 | Stop reason: HOSPADM

## 2022-08-02 RX ORDER — ENOXAPARIN SODIUM 100 MG/ML
30 INJECTION SUBCUTANEOUS DAILY
Status: DISCONTINUED | OUTPATIENT
Start: 2022-08-03 | End: 2022-08-07 | Stop reason: HOSPADM

## 2022-08-02 RX ORDER — PHENAZOPYRIDINE HYDROCHLORIDE 200 MG/1
200 TABLET, FILM COATED ORAL ONCE
Status: COMPLETED | OUTPATIENT
Start: 2022-08-02 | End: 2022-08-02

## 2022-08-02 RX ORDER — ACYCLOVIR 800 MG/1
800 TABLET ORAL
Status: DISCONTINUED | OUTPATIENT
Start: 2022-08-02 | End: 2022-08-03 | Stop reason: DRUGHIGH

## 2022-08-02 RX ORDER — MYCOPHENOLATE MOFETIL 250 MG/1
500 CAPSULE ORAL NIGHTLY
Status: DISCONTINUED | OUTPATIENT
Start: 2022-08-02 | End: 2022-08-07 | Stop reason: HOSPADM

## 2022-08-02 RX ORDER — MYCOPHENOLATE MOFETIL 250 MG/1
1000 CAPSULE ORAL DAILY
Status: DISCONTINUED | OUTPATIENT
Start: 2022-08-03 | End: 2022-08-07 | Stop reason: HOSPADM

## 2022-08-02 RX ORDER — SODIUM CHLORIDE 9 MG/ML
INJECTION, SOLUTION INTRAVENOUS PRN
Status: DISCONTINUED | OUTPATIENT
Start: 2022-08-02 | End: 2022-08-07 | Stop reason: HOSPADM

## 2022-08-02 RX ORDER — ERGOCALCIFEROL 1.25 MG/1
50000 CAPSULE ORAL WEEKLY
Status: DISCONTINUED | OUTPATIENT
Start: 2022-08-05 | End: 2022-08-07 | Stop reason: HOSPADM

## 2022-08-02 RX ORDER — FENTANYL CITRATE 50 UG/ML
50 INJECTION, SOLUTION INTRAMUSCULAR; INTRAVENOUS ONCE
Status: COMPLETED | OUTPATIENT
Start: 2022-08-02 | End: 2022-08-02

## 2022-08-02 RX ORDER — HYDROCODONE BITARTRATE AND ACETAMINOPHEN 5; 325 MG/1; MG/1
1 TABLET ORAL EVERY 4 HOURS PRN
Status: DISCONTINUED | OUTPATIENT
Start: 2022-08-02 | End: 2022-08-07 | Stop reason: HOSPADM

## 2022-08-02 RX ORDER — HYDROXYZINE HYDROCHLORIDE 25 MG/1
25 TABLET, FILM COATED ORAL EVERY 6 HOURS PRN
Status: DISCONTINUED | OUTPATIENT
Start: 2022-08-02 | End: 2022-08-07 | Stop reason: HOSPADM

## 2022-08-02 RX ORDER — TETRACAINE HYDROCHLORIDE 5 MG/ML
SOLUTION OPHTHALMIC
Status: DISPENSED
Start: 2022-08-02 | End: 2022-08-02

## 2022-08-02 RX ORDER — TETRACAINE HYDROCHLORIDE 5 MG/ML
1 SOLUTION OPHTHALMIC ONCE
Status: COMPLETED | OUTPATIENT
Start: 2022-08-02 | End: 2022-08-02

## 2022-08-02 RX ORDER — ONDANSETRON 4 MG/1
4 TABLET, ORALLY DISINTEGRATING ORAL EVERY 8 HOURS PRN
Status: DISCONTINUED | OUTPATIENT
Start: 2022-08-02 | End: 2022-08-04

## 2022-08-02 RX ORDER — M-VIT,TX,IRON,MINS/CALC/FOLIC 27MG-0.4MG
1 TABLET ORAL DAILY
Status: DISCONTINUED | OUTPATIENT
Start: 2022-08-02 | End: 2022-08-07 | Stop reason: HOSPADM

## 2022-08-02 RX ORDER — SODIUM CHLORIDE 0.9 % (FLUSH) 0.9 %
10 SYRINGE (ML) INJECTION ONCE
Status: COMPLETED | OUTPATIENT
Start: 2022-08-02 | End: 2022-08-02

## 2022-08-02 RX ORDER — VITAMIN B COMPLEX
500 TABLET ORAL DAILY
Status: DISCONTINUED | OUTPATIENT
Start: 2022-08-02 | End: 2022-08-07 | Stop reason: HOSPADM

## 2022-08-02 RX ORDER — ACETAMINOPHEN 325 MG/1
650 TABLET ORAL EVERY 4 HOURS PRN
Status: DISCONTINUED | OUTPATIENT
Start: 2022-08-02 | End: 2022-08-02 | Stop reason: SDUPTHER

## 2022-08-02 RX ORDER — ATENOLOL 50 MG/1
50 TABLET ORAL DAILY
Status: DISCONTINUED | OUTPATIENT
Start: 2022-08-02 | End: 2022-08-07 | Stop reason: HOSPADM

## 2022-08-02 RX ORDER — MORPHINE SULFATE 2 MG/ML
2 INJECTION, SOLUTION INTRAMUSCULAR; INTRAVENOUS
Status: DISCONTINUED | OUTPATIENT
Start: 2022-08-02 | End: 2022-08-07 | Stop reason: HOSPADM

## 2022-08-02 RX ORDER — POTASSIUM CHLORIDE 20 MEQ/1
40 TABLET, EXTENDED RELEASE ORAL PRN
Status: DISCONTINUED | OUTPATIENT
Start: 2022-08-02 | End: 2022-08-02

## 2022-08-02 RX ORDER — TRAMADOL HYDROCHLORIDE 50 MG/1
25 TABLET ORAL 2 TIMES DAILY
Status: DISCONTINUED | OUTPATIENT
Start: 2022-08-02 | End: 2022-08-07 | Stop reason: HOSPADM

## 2022-08-02 RX ORDER — POLYETHYLENE GLYCOL 3350 17 G/17G
17 POWDER, FOR SOLUTION ORAL DAILY PRN
Status: DISCONTINUED | OUTPATIENT
Start: 2022-08-02 | End: 2022-08-07 | Stop reason: HOSPADM

## 2022-08-02 RX ORDER — ASPIRIN 81 MG/1
81 TABLET ORAL DAILY
Status: DISCONTINUED | OUTPATIENT
Start: 2022-08-02 | End: 2022-08-07 | Stop reason: HOSPADM

## 2022-08-02 RX ORDER — SODIUM CHLORIDE 9 MG/ML
INJECTION, SOLUTION INTRAVENOUS CONTINUOUS
Status: DISCONTINUED | OUTPATIENT
Start: 2022-08-02 | End: 2022-08-07 | Stop reason: HOSPADM

## 2022-08-02 RX ORDER — MIRTAZAPINE 15 MG/1
15 TABLET, FILM COATED ORAL NIGHTLY
Status: DISCONTINUED | OUTPATIENT
Start: 2022-08-02 | End: 2022-08-07 | Stop reason: HOSPADM

## 2022-08-02 RX ORDER — PREDNISONE 1 MG/1
5 TABLET ORAL EVERY OTHER DAY
Status: DISCONTINUED | OUTPATIENT
Start: 2022-08-03 | End: 2022-08-07 | Stop reason: HOSPADM

## 2022-08-02 RX ORDER — MAGNESIUM SULFATE 1 G/100ML
1000 INJECTION INTRAVENOUS PRN
Status: DISCONTINUED | OUTPATIENT
Start: 2022-08-02 | End: 2022-08-02

## 2022-08-02 RX ORDER — AMLODIPINE BESYLATE 2.5 MG/1
2.5 TABLET ORAL DAILY
Status: DISCONTINUED | OUTPATIENT
Start: 2022-08-02 | End: 2022-08-07 | Stop reason: HOSPADM

## 2022-08-02 RX ORDER — INSULIN LISPRO 100 [IU]/ML
0-4 INJECTION, SOLUTION INTRAVENOUS; SUBCUTANEOUS
Status: DISCONTINUED | OUTPATIENT
Start: 2022-08-02 | End: 2022-08-07 | Stop reason: HOSPADM

## 2022-08-02 RX ORDER — LANOLIN ALCOHOL/MO/W.PET/CERES
6 CREAM (GRAM) TOPICAL NIGHTLY PRN
Status: DISCONTINUED | OUTPATIENT
Start: 2022-08-02 | End: 2022-08-07 | Stop reason: HOSPADM

## 2022-08-02 RX ORDER — CARBAMAZEPINE 200 MG/1
200 TABLET ORAL 2 TIMES DAILY
Status: DISCONTINUED | OUTPATIENT
Start: 2022-08-02 | End: 2022-08-07 | Stop reason: HOSPADM

## 2022-08-02 RX ORDER — MORPHINE SULFATE 4 MG/ML
4 INJECTION, SOLUTION INTRAMUSCULAR; INTRAVENOUS
Status: DISCONTINUED | OUTPATIENT
Start: 2022-08-02 | End: 2022-08-07 | Stop reason: HOSPADM

## 2022-08-02 RX ORDER — PHENAZOPYRIDINE HYDROCHLORIDE 200 MG/1
200 TABLET, FILM COATED ORAL
Status: DISCONTINUED | OUTPATIENT
Start: 2022-08-02 | End: 2022-08-07

## 2022-08-02 RX ORDER — FENTANYL CITRATE 50 UG/ML
INJECTION, SOLUTION INTRAMUSCULAR; INTRAVENOUS
Status: DISPENSED
Start: 2022-08-02 | End: 2022-08-02

## 2022-08-02 RX ADMIN — FLUOROMETHOLONE 1 DROP: 1 SOLUTION/ DROPS OPHTHALMIC at 18:03

## 2022-08-02 RX ADMIN — TRAMADOL HYDROCHLORIDE 25 MG: 50 TABLET, COATED ORAL at 20:30

## 2022-08-02 RX ADMIN — CYANOCOBALAMIN TAB 1000 MCG 1000 MCG: 1000 TAB at 18:03

## 2022-08-02 RX ADMIN — IOPAMIDOL 75 ML: 755 INJECTION, SOLUTION INTRAVENOUS at 12:22

## 2022-08-02 RX ADMIN — PANTOPRAZOLE SODIUM 40 MG: 40 TABLET, DELAYED RELEASE ORAL at 18:02

## 2022-08-02 RX ADMIN — CARBAMAZEPINE 200 MG: 200 TABLET ORAL at 20:33

## 2022-08-02 RX ADMIN — FLUORESCEIN SODIUM 1 EACH: 0.6 STRIP OPHTHALMIC at 10:46

## 2022-08-02 RX ADMIN — SODIUM CHLORIDE, PRESERVATIVE FREE 10 ML: 5 INJECTION INTRAVENOUS at 12:22

## 2022-08-02 RX ADMIN — SODIUM CHLORIDE 80 ML: 0.9 INJECTION, SOLUTION INTRAVENOUS at 12:21

## 2022-08-02 RX ADMIN — FERROUS SULFATE TAB EC 325 MG (65 MG FE EQUIVALENT) 325 MG: 325 (65 FE) TABLET DELAYED RESPONSE at 17:52

## 2022-08-02 RX ADMIN — METFORMIN HYDROCHLORIDE 500 MG: 500 TABLET ORAL at 17:52

## 2022-08-02 RX ADMIN — ACYCLOVIR 800 MG: 800 TABLET ORAL at 18:01

## 2022-08-02 RX ADMIN — FENTANYL CITRATE 50 MCG: 50 INJECTION, SOLUTION INTRAMUSCULAR; INTRAVENOUS at 15:21

## 2022-08-02 RX ADMIN — MULTIPLE VITAMINS W/ MINERALS TAB 1 TABLET: TAB at 18:03

## 2022-08-02 RX ADMIN — GABAPENTIN 100 MG: 100 CAPSULE ORAL at 18:02

## 2022-08-02 RX ADMIN — AMLODIPINE BESYLATE 2.5 MG: 2.5 TABLET ORAL at 18:03

## 2022-08-02 RX ADMIN — PHENAZOPYRIDINE HYDROCHLORIDE 200 MG: 200 TABLET ORAL at 13:05

## 2022-08-02 RX ADMIN — ATENOLOL 50 MG: 50 TABLET ORAL at 18:02

## 2022-08-02 RX ADMIN — PHENAZOPYRIDINE HYDROCHLORIDE 200 MG: 200 TABLET ORAL at 18:01

## 2022-08-02 RX ADMIN — Medication 500 UNITS: at 18:03

## 2022-08-02 RX ADMIN — SODIUM CHLORIDE: 9 INJECTION, SOLUTION INTRAVENOUS at 17:53

## 2022-08-02 RX ADMIN — MYCOPHENOLATE MOFETIL 500 MG: 250 CAPSULE ORAL at 20:33

## 2022-08-02 RX ADMIN — MIRTAZAPINE 15 MG: 15 TABLET, FILM COATED ORAL at 20:31

## 2022-08-02 RX ADMIN — FENTANYL CITRATE 50 MCG: 50 INJECTION, SOLUTION INTRAMUSCULAR; INTRAVENOUS at 10:46

## 2022-08-02 RX ADMIN — ASPIRIN 81 MG: 81 TABLET, COATED ORAL at 18:02

## 2022-08-02 RX ADMIN — ACYCLOVIR SODIUM 450 MG: 50 INJECTION, SOLUTION INTRAVENOUS at 11:53

## 2022-08-02 RX ADMIN — CEFTRIAXONE SODIUM 1000 MG: 1 INJECTION, POWDER, FOR SOLUTION INTRAMUSCULAR; INTRAVENOUS at 12:51

## 2022-08-02 RX ADMIN — MORPHINE SULFATE 2 MG: 2 INJECTION, SOLUTION INTRAMUSCULAR; INTRAVENOUS at 17:52

## 2022-08-02 RX ADMIN — ATORVASTATIN CALCIUM 20 MG: 20 TABLET, FILM COATED ORAL at 20:30

## 2022-08-02 RX ADMIN — GABAPENTIN 100 MG: 100 CAPSULE ORAL at 20:30

## 2022-08-02 RX ADMIN — FENTANYL CITRATE 50 MCG: 50 INJECTION, SOLUTION INTRAMUSCULAR; INTRAVENOUS at 11:49

## 2022-08-02 RX ADMIN — ONDANSETRON 4 MG: 2 INJECTION INTRAMUSCULAR; INTRAVENOUS at 19:54

## 2022-08-02 RX ADMIN — TETRACAINE HYDROCHLORIDE 1 DROP: 5 SOLUTION OPHTHALMIC at 10:46

## 2022-08-02 RX ADMIN — SODIUM CHLORIDE, PRESERVATIVE FREE 10 ML: 5 INJECTION INTRAVENOUS at 19:55

## 2022-08-02 RX ADMIN — ACYCLOVIR 800 MG: 800 TABLET ORAL at 22:22

## 2022-08-02 ASSESSMENT — ENCOUNTER SYMPTOMS
EYE DISCHARGE: 1
COLOR CHANGE: 0
EYE REDNESS: 1
EYE REDNESS: 0
CONSTIPATION: 0
EYE PAIN: 1
ABDOMINAL PAIN: 0
SHORTNESS OF BREATH: 0
VOMITING: 0
DIARRHEA: 0
SHORTNESS OF BREATH: 0
FACIAL SWELLING: 1
NAUSEA: 0
EYE DISCHARGE: 0
COUGH: 0
CHEST TIGHTNESS: 0
ABDOMINAL PAIN: 0

## 2022-08-02 ASSESSMENT — PAIN SCALES - GENERAL
PAINLEVEL_OUTOF10: 10
PAINLEVEL_OUTOF10: 7
PAINLEVEL_OUTOF10: 7

## 2022-08-02 ASSESSMENT — PAIN DESCRIPTION - ORIENTATION
ORIENTATION: LEFT
ORIENTATION: LEFT

## 2022-08-02 ASSESSMENT — PAIN DESCRIPTION - LOCATION
LOCATION: FACE
LOCATION: FACE;HEAD

## 2022-08-02 ASSESSMENT — PAIN DESCRIPTION - FREQUENCY: FREQUENCY: INTERMITTENT

## 2022-08-02 NOTE — ED PROVIDER NOTES
Drug use: No    Sexual activity: Not on file   Other Topics Concern    Not on file   Social History Narrative    Not on file     Social Determinants of Health     Financial Resource Strain: Not on file   Food Insecurity: Not on file   Transportation Needs: Not on file   Physical Activity: Not on file   Stress: Not on file   Social Connections: Not on file   Intimate Partner Violence: Not on file   Housing Stability: Not on file       Family History   Problem Relation Age of Onset    Heart Disease Father     Stroke Sister        Allergies:    Penicillins and Penicillin g    Home Medications:  Prior to Admission medications    Medication Sig Start Date End Date Taking? Authorizing Provider   acyclovir (ZOVIRAX) 800 MG tablet Take 1 tablet by mouth 5 times daily for 7 days 7/30/22 8/6/22  Chas Howard MD   permethrin (ELIMITE) 5 % cream Apply topically to entire body or affected areas once, leave on for 8 hours, then rinse.   May repeat in 1-2 weeks if symptoms persist. 7/24/22   Flores Lesia, DO   hydrOXYzine HCl (ATARAX) 25 MG tablet Take 1 tablet by mouth every 6 hours as needed for Itching 7/24/22 8/3/22  Flores Lesia, DO   triamcinolone acetonide (KENALOG-40) 40 MG/ML injection INJECT 1ML (40MG) I.M. EVERY 2 WEEKS ON MONDAY AS DIRECTED 1/31/22   Maru Alfaro MD   predniSONE (DELTASONE) 5 MG tablet Take 5 mg by mouth every other day    Historical Provider, MD   furosemide (LASIX) 40 MG tablet Take 1 tablet by mouth daily 12/16/21   Dena Dietz, DO   Elastic Bandages & Supports (JOBST KNEE HIGH COMPRESSION SM) MISC 1 each by Does not apply route daily as needed (swelling) 12/16/21   Dena Dietz, DO   amLODIPine (NORVASC) 2.5 MG tablet Take 1 tablet by mouth daily 10/26/21   Neoma Begun, APRN - CNP   ferrous sulfate (IRON 325) 325 (65 Fe) MG tablet Take 1 tablet by mouth 2 times daily (with meals) 10/25/21   Neoma Begun, APRN - CNP   melatonin 5 MG TABS tablet Take 1 tablet by mouth nightly as needed (insominia)  Patient taking differently: Take 5 mg by mouth nightly  10/25/21   SIMBA Davis CNP   potassium chloride (KLOR-CON M) 20 MEQ extended release tablet Take 1 tablet by mouth daily 10/25/21   SIMBA Davis CNP   sodium chloride 0.9 % infusion Infuse 50 mL/hr intravenously continuous 10/25/21   SIMBA Davis CNP   traMADol (ULTRAM) 50 MG tablet Take 25 mg by mouth 2 times daily.     Historical Provider, MD   acetaminophen (TYLENOL) 325 MG tablet Take 650 mg by mouth every 4 hours as needed for Pain or Fever    Historical Provider, MD   atenolol (TENORMIN) 50 MG tablet Take 1 tablet by mouth daily 8/2/21   Olivia Hobbs MD   atorvastatin (LIPITOR) 20 MG tablet Take 1 tablet by mouth nightly 8/2/21   Olivia Hobbs MD   carBAMazepine (TEGRETOL) 200 MG tablet Take 1 tablet by mouth 2 times daily 8/2/21   Olivia Hobbs MD   citalopram (CELEXA) 20 MG tablet Take 1 tablet by mouth daily  Patient taking differently: Take 20 mg by mouth daily 30 mg total 8/2/21   Olivia Hobbs MD   metFORMIN (GLUCOPHAGE) 500 MG tablet Take 1 tablet by mouth 2 times daily (with meals) 8/2/21   Olivia Hobbs MD   mirtazapine (REMERON) 15 MG tablet Take 1 tablet by mouth nightly 8/2/21   Olivia Hobbs MD   pantoprazole (PROTONIX) 40 MG tablet Take 1 tablet by mouth daily 8/2/21   Olivia Hobbs MD   vitamin B-12 (CYANOCOBALAMIN) 1000 MCG tablet Take 1 tablet by mouth daily 8/2/21   Olivia Hobbs MD   Ergocalciferol (VITAMIN D2) 50 MCG (2000 UT) TABS Take 50,000 Units by mouth once a week 8/2/21   Olivia Hobbs MD   aspirin EC 81 MG EC tablet Take 1 tablet by mouth daily 8/2/21   Olivia Hobbs MD   citalopram (CELEXA) 10 MG tablet Take 1 tablet by mouth daily  Patient taking differently: Take 10 mg by mouth daily Total 30 8/2/21   Olivia Hobbs MD   Pediatric Multiple Vitamins (MULTIVITAMIN CHILDRENS) CHEW Take 1 tablet by mouth daily 8/2/21   Olivia Hobbs MD mycophenolate (CELLCEPT) 500 MG tablet Take 2 tabs qam and 1 tab every evening 8/2/21   Gabo Lam MD   primidone (MYSOLINE) 50 MG tablet Take 1 tablet by mouth 2 times daily 8/2/21   Gabo Lam MD   Ergocalciferol (VITAMIN D2) 10 MCG (400 UNIT) TABS Take 1 tablet by mouth daily 8/2/21   Gabo Lam MD   nitroGLYCERIN (NITROSTAT) 0.4 MG SL tablet Place 1 tablet under the tongue every 5 minutes as needed for Chest pain up to max of 3 total doses. If no relief after 1 dose, call 911. 4/16/19   Jimi Gongora MD       REVIEW OF SYSTEMS    (2-9 systems for level 4, 10 or more for level 5)    Review of Systems   Constitutional:  Negative for fever. HENT:  Positive for facial swelling. Eyes:  Positive for discharge and redness. Negative for visual disturbance. Gastrointestinal:  Negative for nausea and vomiting. Musculoskeletal:  Positive for myalgias. Skin:  Positive for rash. Neurological:  Positive for headaches. Negative for dizziness and light-headedness. PHYSICAL EXAM   (up to 7 for level 4, 8 or more for level 5)    VITALS:   Vitals:    08/02/22 1023 08/02/22 1037   BP: (!) 205/99 (!) 184/98   Pulse: 89 92   Resp: 16    Temp: 98.6 °F (37 °C)    TempSrc: Oral    SpO2: 94%    Weight: 120 lb (54.4 kg)    Height: 5' (1.524 m)        Physical Exam  Vitals and nursing note reviewed. Constitutional:       General: She is in acute distress. Appearance: She is well-developed. She is not diaphoretic. HENT:      Head: Normocephalic and atraumatic. Jaw: There is normal jaw occlusion. Comments: L sided V1 distribution of shingles rash with facial edema, erythema, warmth to the touch. Tip of nose involved. No external or internal ear involvement      Right Ear: Tympanic membrane, ear canal and external ear normal.      Left Ear: Tympanic membrane, ear canal and external ear normal.      Mouth/Throat:      Lips: Pink.       Mouth: Mucous membranes are moist.      Pharynx: Oropharynx is clear. Eyes:      General:         Left eye: Discharge present. Extraocular Movements: Extraocular movements intact. Conjunctiva/sclera:      Left eye: Left conjunctiva is injected. Pupils: Pupils are equal, round, and reactive to light. Left eye: Fluorescein uptake present. No corneal abrasion. Sid exam negative. Comments: Periorbital and orbital edema with erythema. No pain with EROM. Dendritic lesions present with staining and woods lamp exam   Cardiovascular:      Rate and Rhythm: Normal rate and regular rhythm. Heart sounds: Normal heart sounds. Pulmonary:      Effort: Pulmonary effort is normal. No respiratory distress. Breath sounds: Normal breath sounds. No wheezing or rales. Musculoskeletal:         General: Normal range of motion. Cervical back: Normal range of motion. Skin:     General: Skin is warm and dry. Findings: Erythema and rash present. Neurological:      General: No focal deficit present. Mental Status: She is alert. Psychiatric:         Behavior: Behavior normal.       DIFFERENTIAL  DIAGNOSIS   PLAN (LABS / IMAGING / EKG):  Orders Placed This Encounter   Procedures    Culture, Urine    Culture, Blood 1    Culture, Blood 1    CT HEAD WO CONTRAST    CT FACIAL BONES W CONTRAST    CBC with Auto Differential    Comprehensive Metabolic Panel w/ Reflex to MG    Sedimentation Rate    C-Reactive Protein    Urinalysis with Microscopic    Lactate, Sepsis    Inpatient consult to Ophthalmology    Inpatient consult to Hospitalist    Insert peripheral IV       MEDICATIONS ORDERED:  Orders Placed This Encounter   Medications    fentaNYL (SUBLIMAZE) injection 50 mcg    fluorescein ophthalmic strip 1 each    tetracaine (TETRAVISC) 0.5 % ophthalmic solution 1 drop    acyclovir (ZOVIRAX) 450 mg in dextrose 5 % 250 mL IVPB     Order Specific Question:   Antimicrobial Indications     Answer:    Other     Order Specific Question:   Other Abx Indication     Answer:   severe shingles + occular involvement    fentaNYL (SUBLIMAZE) injection 50 mcg    0.9 % sodium chloride bolus    iopamidol (ISOVUE-370) 76 % injection 75 mL    sodium chloride flush 0.9 % injection 10 mL    cefTRIAXone (ROCEPHIN) 1,000 mg in dextrose 5 % 50 mL IVPB mini-bag     Order Specific Question:   Antimicrobial Indications     Answer:   Urinary Tract Infection    phenazopyridine (PYRIDIUM) tablet 200 mg     DIAGNOSTIC RESULTS / EMERGENCYDEPARTMENT COURSE / MDM   LABS:  Labs Reviewed   COMPREHENSIVE METABOLIC PANEL W/ REFLEX TO MG FOR LOW K - Abnormal; Notable for the following components:       Result Value    Glucose 136 (*)     Creatinine 1.15 (*)     Alkaline Phosphatase 114 (*)     GFR Non- 44 (*)     GFR  54 (*)     All other components within normal limits   SEDIMENTATION RATE - Abnormal; Notable for the following components:    Sed Rate 43 (*)     All other components within normal limits   C-REACTIVE PROTEIN - Abnormal; Notable for the following components:    CRP 9.0 (*)     All other components within normal limits   URINALYSIS WITH MICROSCOPIC - Abnormal; Notable for the following components:    Turbidity UA Cloudy (*)     Ketones, Urine TRACE (*)     Urine Hgb 1+ (*)     Protein, UA 2+ (*)     Nitrite, Urine POSITIVE (*)     Leukocyte Esterase, Urine LARGE (*)     Bacteria, UA MODERATE (*)     All other components within normal limits   CULTURE, URINE   CULTURE, BLOOD 1   CULTURE, BLOOD 1   CBC WITH AUTO DIFFERENTIAL   LACTATE, SEPSIS   LACTATE, SEPSIS       RADIOLOGY:  CT HEAD WO CONTRAST    Result Date: 8/2/2022  EXAMINATION: CT OF THE FACE WITH CONTRAST; CT OF THE HEAD WITHOUT CONTRAST 8/2/2022 TECHNIQUE: CT of the face was performed with the administration of intravenous contrast. Multiplanar reformatted images are provided for review.  Automated exposure control, iterative reconstruction, and/or weight based adjustment of the mA/kV was utilized to reduce the radiation dose to as low as reasonably achievable.; CT of the head was performed without the administration of intravenous contrast. Automated exposure control, iterative reconstruction, and/or weight based adjustment of the mA/kV was utilized to reduce the radiation dose to as low as reasonably achievable. COMPARISON: Head CT 07/30/2022 HISTORY: ORDERING SYSTEM PROVIDED HISTORY: shingles outbreak, facial redness, edema, concern for orbital cellulitis FINDINGS: BRAIN/VENTRICLES:  No evidence of an acute infarct or other acute parenchymal process. No evidence of acute intracranial hemorrhage. There is no evidence of an intracranial mass or extraaxial fluid collection. No significant mass effect or midline shift. There is mild to moderate generalized volume loss. Ventricular enlargement concordant with the degree of parenchymal volume loss. Scattered patchy foci of low attenuation are present within supratentorial white matter which is a nonspecific finding but likely represents mild chronic microvascular ischemia. ORBITS: The visualized portion of the orbits demonstrate no acute abnormality. SINUSES:  The visualized paranasal sinuses and mastoid air cells demonstrate no acute abnormality. SOFT TISSUES/SKULL: No acute abnormality of the visualized skull or soft tissues. PHARYNX/LARYNX: The palatine tonsils are normal in appearance. The tongue is normal in appearance. The valleculae, epiglottis, aryepiglottic folds and pyriform sinuses appear unremarkable. No mass or abscess is seen. SALIVARY GLANDS: The parotid and submandibular glands appear unremarkable. LYMPH NODES: No cervical lymphadenopathy is seen. SOFT TISSUES: Mild left periorbital soft tissue swelling. No soft tissue abscess. OTHER: No abnormal intracranial enhancement within the visualized portions of the brain. BONES:  No acute osseous abnormality is seen. 1. Mild left periorbital soft tissue swelling.  2. No evidence of orbital/postseptal involvement. 3. No acute intracranial findings. CT FACIAL BONES W CONTRAST    Result Date: 8/2/2022  EXAMINATION: CT OF THE FACE WITH CONTRAST; CT OF THE HEAD WITHOUT CONTRAST 8/2/2022 TECHNIQUE: CT of the face was performed with the administration of intravenous contrast. Multiplanar reformatted images are provided for review. Automated exposure control, iterative reconstruction, and/or weight based adjustment of the mA/kV was utilized to reduce the radiation dose to as low as reasonably achievable.; CT of the head was performed without the administration of intravenous contrast. Automated exposure control, iterative reconstruction, and/or weight based adjustment of the mA/kV was utilized to reduce the radiation dose to as low as reasonably achievable. COMPARISON: Head CT 07/30/2022 HISTORY: ORDERING SYSTEM PROVIDED HISTORY: shingles outbreak, facial redness, edema, concern for orbital cellulitis FINDINGS: BRAIN/VENTRICLES:  No evidence of an acute infarct or other acute parenchymal process. No evidence of acute intracranial hemorrhage. There is no evidence of an intracranial mass or extraaxial fluid collection. No significant mass effect or midline shift. There is mild to moderate generalized volume loss. Ventricular enlargement concordant with the degree of parenchymal volume loss. Scattered patchy foci of low attenuation are present within supratentorial white matter which is a nonspecific finding but likely represents mild chronic microvascular ischemia. ORBITS: The visualized portion of the orbits demonstrate no acute abnormality. SINUSES:  The visualized paranasal sinuses and mastoid air cells demonstrate no acute abnormality. SOFT TISSUES/SKULL: No acute abnormality of the visualized skull or soft tissues. PHARYNX/LARYNX: The palatine tonsils are normal in appearance. The tongue is normal in appearance.   The valleculae, epiglottis, aryepiglottic folds and pyriform sinuses appear unremarkable. No mass or abscess is seen. SALIVARY GLANDS: The parotid and submandibular glands appear unremarkable. LYMPH NODES: No cervical lymphadenopathy is seen. SOFT TISSUES: Mild left periorbital soft tissue swelling. No soft tissue abscess. OTHER: No abnormal intracranial enhancement within the visualized portions of the brain. BONES:  No acute osseous abnormality is seen. 1. Mild left periorbital soft tissue swelling. 2. No evidence of orbital/postseptal involvement. 3. No acute intracranial findings. EMERGENCY DEPARTMENT COURSE:  ED Course as of 08/02/22 1328   Tue Aug 02, 2022   1121 Eye exam completed  [AO]   1123 CBC with Auto Differential:    WBC 4.1   RBC 4.00   Hemoglobin Quant 12.7   Hematocrit 40.0   .0   MCH 31.8   MCHC 31.8   RDW 12.0   Platelet Count 525   MPV 10.9   NRBC Automated 0.0   Seg Neutrophils 58   Lymphocytes 28   Monocytes 11   Eosinophils % 2   Basophils 1   Immature Granulocytes 0   Segs Absolute 2.37   Absolute Lymph # 1.13   Absolute Mono # 0.44   Absolute Eos # 0.08   Basophils Absolute <0.03   Absolute Immature Granulocyte 0.01 [AO]   1133 Comprehensive Metabolic Panel w/ Reflex to MG(!):    GLUCOSE, FASTING,(!)   BUN,BUNPL 18   Creatinine 1.15(!)   Bun/Cre Ratio 16   CALCIUM, SERUM, 364734 9.7   Sodium 138   Potassium 4.1   Chloride 98   CO2 26   Anion Gap 14   Alk Phos 114(!)   ALT 12   AST 20   Bilirubin 0.37   Total Protein 7.4   Albumin 4.4   GFR Non- 44(!)   GFR  54(!)   GFR Comment      [AO]   1134 C-Reactive Protein(!):    CRP 9.0(!) [AO]   1141 Speaking to optho. Acyclovir op drops 4 times a day.   [AO]   1205 Urinalysis with Microscopic(!):    Color, UA Yellow   Turbidity UA Cloudy(!)   Glucose, UA NEGATIVE   Bilirubin, Urine NEGATIVE   Ketones, Urine TRACE(!)   Specific [place], UA 1.020   Urine Hgb 1+(!)   pH, UA 6.0   Protein, UA 2+(!)   Urobilinogen, Urine Normal   Nitrite, Urine POSITIVE(!)   Leukocyte Esterase, Urine LARGE(!)   -        WBC, UA TOO NUMEROUS TO COUNT   RBC, UA 10 TO 20   Epithelial Cells, UA 0 TO 2   Bacteria, UA MODERATE(!) [AO]   1205 Sed Rate(!): 37 [AO]   200 CT HEAD WO CONTRAST [AO]   200 CT FACIAL BONES W CONTRAST [AO]   0 [facility] accepted  [AO]      ED Course User Index  [AO] Nina Mata 1721, DO       MDM     Amount and/or Complexity of Data Reviewed  Clinical lab tests: ordered and reviewed  Tests in the radiology section of CPT®: ordered and reviewed  Review and summarize past medical records: yes  Discuss the patient with other providers: yes  Independent visualization of images, tracings, or specimens: yes    Patient Progress  Patient progress: stable      PROCEDURES:  Procedures     CONSULTS:  IP CONSULT TO OPHTHALMOLOGY  IP CONSULT TO HOSPITALIST    CRITICAL CARE:  NONE    FINAL IMPRESSION     1. 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    2. Acute cystitis with hematuria    3. Herpes zoster conjunctivitis    4. Facial cellulitis          DISPOSITION / PLAN   DISPOSITION Decision To Admit 08/02/2022 12:41:08 PM    Sonia Barnes DO  Emergency Medicine Physician    (Please note that portions of this note were completed with a voice recognition program.  Efforts were made to edit the dictations but occasionally words are mis-transcribed.)        Nina Casanova DO  08/02/22 0581

## 2022-08-02 NOTE — CARE COORDINATION
Case Management Initial Discharge Renetta Daley 23,         Readmission Risk              Risk of Unplanned Readmission:  [id number]             Met with:patient to discuss discharge plans. Information verified: address, contacts, phone number, , insurance Yes  PCP: No primary care provider on file. Date of last visit: n/a    Insurance Provider: [employer] Grise [employer]     Discharge Planning  Current Residence:  Home  Living Arrangements:  lives alone   Home has 1 stories/0 stairs to climb  Support Systems:     Current Services PTA:    Supplier: n/a  Patient able to perform ADL's:Assisted  DME used to aid ambulation prior to admission: walker /during admission wheelchair     Potential Assistance Needed:  SNF vs [facility]     Pharmacy: [facility]   Potential Assistance Purchasing Medications:     Does patient want to participate in local refill/ meds to beds program?       Patient agreeable to home care: Yes  Freedom of choice provided:  yes      Type of Home Care Services:     Patient expects to be discharged to:       Prior SNF/Rehab Placement and Facility: [address]? Agreeable to SNF/Rehab: Yes  [place] of choice provided: yes   Evaluation: yes    Expected Discharge date: Follow Up Appointment: Best Day/ Time:      Transportation provider: needs   Transportation arrangements needed for discharge: Yes     Discharge Plan:    met with patient at bedside. Patient was very tearful stating that she is in pain due to shingles and bladder infection. She states that she would like to get some pain meds. Patient reports that she lives in senior apartments but she does not have someone to care for her. She reports that she gets assistance with meals and cleaning. Patient utilizes a walker at home and wheelchair in the community. Patient agreeable to SNF and [facility] following. Would like referrals sent to [facility] AT [facility], [address], [address] LSW faxed referrals.          Electronically signed by CALISTA Taylor on 8/2/22 at 4:01 PM EDT

## 2022-08-02 NOTE — ED NOTES
Pure wick placed. patient tolerated well. Call light in reach.       Jesenia Alonso RN  08/02/22 8720

## 2022-08-02 NOTE — ED TRIAGE NOTES
Patient states she is having left side face pain. Patient dx with shingles. Patient states she is having a left side headache. Left side ear pain.    Patient lives at [employer]

## 2022-08-02 NOTE — ED NOTES
Took patient to bathroom by wheelchair. Able to ambulate to toilet with assistance. Call light in reach.       Dilan Carias, RN  08/02/22 5411

## 2022-08-02 NOTE — PROGRESS NOTES
The potassium/magnesium sliding scale has been automatically discontinued per [facility] approved policy because the patient has decreased renal function (CrCl<30 ml/min). The patient's current K/Mag levels are currently:    Recent Labs     08/02/22  1103   K 4.1       Estimated Creatinine Clearance: 23 mL/min (A) (based on SCr of 1.15 mg/dL (H)). For patients with decreased renal function (below 30ml/min) needing potassium/magnesium supplementation, please order individual bolus doses with appropriate monitoring. Please contact the inpatient pharmacy at [phone] with any concerns. Thank you.     Connie Ji, [facility]  8/2/2022  4:58 PM

## 2022-08-02 NOTE — ED NOTES
Contacted [employer] or [employer] or grandsons phone number. Earle Wray #[phone].      Krystian Manzano RN  08/02/22 1096

## 2022-08-02 NOTE — ED PROVIDER NOTES
[facility]  Emergency Department Encounter     Pt Name: Kevan Aleman  MRN: [id number]  [place] 11/21/1931  Date of evaluation: 8/2/22  PCP:  No primary care provider on file. CHIEF COMPLAINT       Chief Complaint   Patient presents with    Other     Itching      Blister       HISTORY OFPRESENT ILLNESS  (Location/Symptom, Timing/Onset, Context/Setting, Quality, Duration, Modifying Factors,Severity.)      Kevan Aleman is a 80 y.o. female who presents with itching between her fingers. This itching is worse in the evening. Denies any pain. She moved into a new apartment. PAST MEDICAL / SURGICAL / SOCIAL / FAMILY HISTORY      has a past medical history of Anemia, Arthritis of knee, Benign essential tremor, CAD S/P percutaneous coronary angioplasty, Diabetes mellitus type II, controlled ([facility] [address] Gastrointestinal hemorrhage, History of pancreatitis, Humerus fracture, Hyperlipidemia, Hypertension, Major depression, S/P CABG (coronary artery bypass graft), Ventral hernia, and Vitamin B12 deficiency. has a past surgical history that includes Cholecystectomy; Coronary artery bypass graft (1996); Hysterectomy, vaginal (1958); Salpingo-oophorectomy (1960); Colonoscopy (N/A, 11/5/2018); hip surgery (Right); Hip fracture surgery (Right, 10/13/2021); and Hip fracture surgery (Left, 10/19/2021). Social History     Socioeconomic History    Marital status:       Spouse name: Not on file    Number of children: Not on file    Years of education: Not on file    Highest education level: Not on file   Occupational History    Not on file   Tobacco Use    Smoking status: Never    Smokeless tobacco: Never   Vaping Use    Vaping Use: Never used   Substance and Sexual Activity    Alcohol use: No    Drug use: No    Sexual activity: Not on file   Other Topics Concern    Not on file   Social History Narrative    Not on file     Social Determinants of Health     Financial Resource Strain: Not on file   Food Insecurity: Not on file   Transportation Needs: Not on file   Physical Activity: Not on file   Stress: Not on file   Social Connections: Not on file   Intimate Partner Violence: Not on file   Housing Stability: Not on file       Family History   Problem Relation Age of Onset    Heart Disease Father     Stroke Sister        Allergies:  Penicillins and Penicillin g    Home Medications:  Prior to Admission medications    Medication Sig Start Date End Date Taking? Authorizing Provider   permethrin (ELIMITE) 5 % cream Apply topically to entire body or affected areas once, leave on for 8 hours, then rinse.   May repeat in 1-2 weeks if symptoms persist. 7/24/22  Yes Crystal Palma DO   hydrOXYzine HCl (ATARAX) 25 MG tablet Take 1 tablet by mouth every 6 hours as needed for Itching 7/24/22 8/3/22 Yes Crystal Palma DO   acyclovir (ZOVIRAX) 800 MG tablet Take 1 tablet by mouth 5 times daily for 7 days 7/30/22 8/6/22  Sid Echevarria MD   triamcinolone acetonide (KENALOG-40) 40 MG/ML injection INJECT 1ML (40MG) I.M. EVERY 2 WEEKS ON MONDAY AS DIRECTED 1/31/22   Robert Delcid MD   predniSONE (DELTASONE) 5 MG tablet Take 5 mg by mouth every other day    Historical Provider, MD   furosemide (LASIX) 40 MG tablet Take 1 tablet by mouth daily 12/16/21   Adryan Chaudhry, DO   Elastic Bandages & Supports (JOBST KNEE HIGH COMPRESSION SM) MISC 1 each by Does not apply route daily as needed (swelling) 12/16/21   Adryan Chaudhry, DO   amLODIPine (NORVASC) 2.5 MG tablet Take 1 tablet by mouth daily 10/26/21   Kailyn Arts, APRN - CNP   ferrous sulfate (IRON 325) 325 (65 Fe) MG tablet Take 1 tablet by mouth 2 times daily (with meals) 10/25/21   Kailyn Arts, APRN - CNP   melatonin 5 MG TABS tablet Take 1 tablet by mouth nightly as needed (insominia)  Patient taking differently: Take 5 mg by mouth nightly  10/25/21   Kailyn Arts, APRN - CNP   potassium chloride (KLOR-CON M) 20 MEQ extended release tablet Take 1 tablet by mouth daily 10/25/21   SIMBA Smith CNP   sodium chloride 0.9 % infusion Infuse 50 mL/hr intravenously continuous 10/25/21   SIMBA Smith CNP   traMADol (ULTRAM) 50 MG tablet Take 25 mg by mouth 2 times daily.     Historical Provider, MD   acetaminophen (TYLENOL) 325 MG tablet Take 650 mg by mouth every 4 hours as needed for Pain or Fever    Historical Provider, MD   atenolol (TENORMIN) 50 MG tablet Take 1 tablet by mouth daily 8/2/21   Pedro Gill MD   atorvastatin (LIPITOR) 20 MG tablet Take 1 tablet by mouth nightly 8/2/21   Pedro Gill MD   carBAMazepine (TEGRETOL) 200 MG tablet Take 1 tablet by mouth 2 times daily 8/2/21   Pedro Gill MD   citalopram (CELEXA) 20 MG tablet Take 1 tablet by mouth daily  Patient taking differently: Take 20 mg by mouth daily 30 mg total 8/2/21   Pedro Gill MD   metFORMIN (GLUCOPHAGE) 500 MG tablet Take 1 tablet by mouth 2 times daily (with meals) 8/2/21   Pedro Gill MD   mirtazapine (REMERON) 15 MG tablet Take 1 tablet by mouth nightly 8/2/21   Pedro Gill MD   pantoprazole (PROTONIX) 40 MG tablet Take 1 tablet by mouth daily 8/2/21   Pedro Gill MD   vitamin B-12 (CYANOCOBALAMIN) 1000 MCG tablet Take 1 tablet by mouth daily 8/2/21   Pedro Gill MD   Ergocalciferol (VITAMIN D2) 50 MCG (2000 UT) TABS Take 50,000 Units by mouth once a week 8/2/21   Pedro Gill MD   aspirin EC 81 MG EC tablet Take 1 tablet by mouth daily 8/2/21   Pedro Gill MD   citalopram (CELEXA) 10 MG tablet Take 1 tablet by mouth daily  Patient taking differently: Take 10 mg by mouth daily Total 30 8/2/21   Pedro Gill MD   Pediatric Multiple Vitamins (MULTIVITAMIN CHILDRENS) CHEW Take 1 tablet by mouth daily 8/2/21   Pedro Gill MD   mycophenolate (CELLCEPT) 500 MG tablet Take 2 tabs qam and 1 tab every evening 8/2/21   Pedro Gill MD   primidone (MYSOLINE) 50 MG tablet Take 1 tablet by mouth 2 times daily 8/2/21   Pedro Gill MD Ergocalciferol (VITAMIN D2) 10 MCG (400 UNIT) TABS Take 1 tablet by mouth daily 8/2/21   Jesisca Paulino MD   nitroGLYCERIN (NITROSTAT) 0.4 MG SL tablet Place 1 tablet under the tongue every 5 minutes as needed for Chest pain up to max of 3 total doses. If no relief after 1 dose, call 911. 4/16/19   Nella Roach MD       REVIEW OF SYSTEMS    (2-9 systems for level 4, 10 or more for level 5)      Review of Systems   Constitutional:  Negative for chills and fever. Eyes:  Negative for discharge and redness. Respiratory:  Negative for shortness of breath. Cardiovascular:  Negative for chest pain. Gastrointestinal:  Negative for abdominal pain. Genitourinary:  Negative for flank pain. Musculoskeletal:  Negative for myalgias. Skin:  Positive for rash. Negative for color change. Allergic/Immunologic: Negative for environmental allergies. Neurological:  Negative for headaches. Psychiatric/Behavioral:  Negative for agitation and confusion. PHYSICAL EXAM   (up to 7 for level 4, 8 or more for level 5)     INITIAL VITALS:    oral temperature is 98.1 °F (36.7 °C). Her blood pressure is 176/59 (abnormal) and her pulse is 58. Her respiration is 19 and oxygen saturation is 98%. Physical Exam  Vitals and nursing note reviewed. Constitutional:       Appearance: She is well-developed. HENT:      Head: Normocephalic and atraumatic. Nose: Nose normal.      Mouth/Throat:      Mouth: Mucous membranes are moist.   Eyes:      General: No scleral icterus. Conjunctiva/sclera: Conjunctivae normal.      Pupils: Pupils are equal, round, and reactive to light. Cardiovascular:      Rate and Rhythm: Normal rate and regular rhythm. Heart sounds: Normal heart sounds. No murmur heard. No friction rub. No gallop. Pulmonary:      Effort: Pulmonary effort is normal. No respiratory distress. Breath sounds: Normal breath sounds. No wheezing or rales.    Musculoskeletal:         General: Normal range of motion. Skin:     General: Skin is warm and dry. Findings: No erythema or rash. Comments: Excoriated rash to b/l hands and web spaces in between fingers. Neurological:      Mental Status: She is alert and oriented to person, place, and time. Psychiatric:         Behavior: Behavior normal.       DIFFERENTIAL  DIAGNOSIS     PLAN (LABS / IMAGING / EKG):  No orders of the defined types were placed in this encounter. MEDICATIONS ORDERED:  Orders Placed This Encounter   Medications    DISCONTD: diphenhydrAMINE (BENADRYL) tablet 25 mg    hydrocortisone 1 % cream     Sig: Apply topically 2 -3 times daily for 5 - 7 days. Dispense:  120 g     Refill:  0    permethrin (ELIMITE) 5 % cream     Sig: Apply topically to entire body or affected areas once, leave on for 8 hours, then rinse. May repeat in 1-2 weeks if symptoms persist.     Dispense:  60 g     Refill:  0    hydrOXYzine HCl (ATARAX) 25 MG tablet     Sig: Take 1 tablet by mouth every 6 hours as needed for Itching     Dispense:  20 tablet     Refill:  0    hydrOXYzine HCl (ATARAX) tablet 25 mg       DDX: Scabies vs atopic    Initial MDM/Plan: 80 y.o. female who presents with itching. Will treat for scabies given story and location. DIAGNOSTIC RESULTS / EMERGENCY DEPARTMENT COURSE / MDM     LABS:  Labs Reviewed - No data to display      RADIOLOGY:  No results found. EMERGENCY DEPARTMENT COURSE:     Based on the low acuity of concerning symptoms and improvement of symptoms, patient will be discharged with follow up and prescription information listed in the Disposition section. Patient states they will follow-up with primary care physician and/or return to the emergency department should they experience a change or worsening of symptoms. Patient will be discharged with resources: summary of visit, instructions, follow-up information, prescriptions if necessary. Patient/ family instructed to read discharge paperwork. All of their questions and concerns were addressed. Suspicion for any acute life-threatening processes is low. Patient voices understanding of plan. PROCEDURES:  None    CONSULTS:  None    CRITICAL CARE:  0    FINAL IMPRESSION      1. Pruritic disorder    2. Scabies          DISPOSITION / PLAN     DISPOSITION Decision To Discharge 07/24/2022 03:27:06 AM        PATIENTREFERRED TO:  No follow-up provider specified. DISCHARGE MEDICATIONS:  Discharge Medication List as of 7/24/2022  4:10 AM        START taking these medications    Details   hydrocortisone 1 % cream Apply topically 2 -3 times daily for 5 - 7 days. , Disp-120 g, R-0, Print      permethrin (ELIMITE) 5 % cream Apply topically to entire body or affected areas once, leave on for 8 hours, then rinse.   May repeat in 1-2 weeks if symptoms persist., Disp-60 g, R-0, Print      hydrOXYzine HCl (ATARAX) 25 MG tablet Take 1 tablet by mouth every 6 hours as needed for Itching, Disp-20 tablet, R-0Print             Viktoriya Argueta DO  EmergencyMedicine Attending    (Please note that portions of this note were completed with a voice recognition program.  Efforts were made to edit the dictations but occasionally words are mis-transcribed.)       Viktoriya Argueta DO  08/02/22 [id number]

## 2022-08-02 NOTE — H&P
[facility]  Office: [address], DO, Bright Yadira, DO, Lisa Harris, DO, Tremayne Tessuzma Marquez, DO, Cordella Holstein, MD, Sagar Clemente MD, Trent Torres MD, Jayant Gruber MD,  Laurie Leija MD, Ariane Contreras MD, Jonh Arias, DO, Ananya Scherer MD,  Nash Mann MD, Emerald Holley MD, Jerrell Javier DO, Ag Hargrove MD, Jose Alfredo Mcmahon MD, Pavithra Bocanegra MD, Krista Hargrove DO, Best Nuno MD, Carlyn Mccann MD, Marine Hernandez, CNP,  Michelle Marcos, CNP, Nevin Aviles, CNP, Manny Butcher, CNP, Mejia Barron PADylanC, Yeison Hoyt, DNP, Enriqueta Jiang, CNP, Brian Lin, CNP, Alex Cade, CNP, Oleksandr Sesay, CNP, Angelina Jurado, CNP, Tyshawn Tadeo, CNS, Ky Gutierrez, DNP, Jorgito Marquez, CNP, Ale Arellano, CNP, Pennie Montemayor, CNP           [facility]    HISTORY AND PHYSICAL EXAMINATION            Date:   8/2/2022  Patient name:  Dorys Carty  Date of admission:  8/2/2022 10:20 AM  MRN:   [id number]  Account:  [de-identified]  YOB: 1931  PCP:    No primary care provider on file. Room:   [place] [address]  Code Status:    DNR CC-A    Chief Complaint:     Chief Complaint   Patient presents with    Facial Pain    Headache    Otalgia     History Obtained From:     patient, electronic medical record    History of Present Illness:     Patient presents the emergency room today with complaints of facial pain, headache and otalgia. Of note, patient was recently diagnosed with herpes zoster on 7/30/2022 and was treated with acyclovir 800 mg p.o. 5 times daily for 7 days. Patient states that she has taken the medication as prescribed, but that her symptoms seem to have worsened since that date. Patient states that the pain is around and behind her left ear which has caused her to have a headache. Patient also complains of \"bladder pain\".   Patient states that she has been urinating a lot at home but has also attributed it to her taking that new medication often throughout the day. Patient has a significant past medical history of an essential tremor, coronary artery disease, diabetes, hyperlipidemia, and hypertension. Patient denies any fevers, chills, chest pain, shortness of breath, nausea, vomiting and diarrhea. Patient states that she has not eaten or drink in the last few days. She has been at her assisted living facility but states that she has not received much assistance. Patient states that her vision to her left eye has not changed since being diagnosed with shingles. 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 Throughout the emergency room evaluation it was noted that her CRP was 9.0. Sed rate 43. She has a large leukocytes in her urine. Lactic 1.1. A CT head and facial bones was obtained which shows:   1. Mild left periorbital soft tissue swelling. 2. No evidence of orbital/postseptal involvement. 3. No acute intracranial findings.        Past Medical History:     Past Medical History:   Diagnosis Date    Anemia 7/201    Arthritis of knee 2018    bilateral knees; \"bone on bone\"; declines surg    Benign essential tremor 04/15/2016    CAD S/P percutaneous coronary angioplasty 1998    done at [facility]    Diabetes mellitus type II, controlled ([facility] [address] 1979    Gastrointestinal hemorrhage 2012    Duodenal Ulcer by EGD    History of pancreatitis 2013    after GI bleed    Humerus fracture 2015    right humerus ; [place]    Hyperlipidemia     Hypertension     Major depression     S/P CABG (coronary artery bypass graft) 1996    Ventral hernia 8681    infraumbilical ; incisional    Vitamin B12 deficiency 07/2018    Vitamin B12 270        Past Surgical History:     Past Surgical History:   Procedure Laterality Date    CHOLECYSTECTOMY      COLONOSCOPY N/A 11/5/2018    COLONOSCOPY DIAGNOSTIC OR SCREENING performed by Mary Riojas MD at [facility]    CORONARY ARTERY BYPASS GRAFT  1996    ? number grafts    HIP FRACTURE SURGERY Right 10/13/2021    HIP OPEN REDUCTION INTERNAL FIXATION-NAILING performed by Sukhjinder Maurice DO at [facility] Dimitri Left 10/19/2021    HIP OPEN REDUCTION INTERNAL FIXATION - IM NAIL performed by Sukhjinder Maurice DO at [address] Right     HYSTERECTOMY, [facility]    SALPINGO-OOPHORECTOMY  1960        Medications Prior to Admission:     Prior to Admission medications    Medication Sig Start Date End Date Taking? Authorizing Provider   acyclovir (ZOVIRAX) 800 MG tablet Take 1 tablet by mouth 5 times daily for 7 days 7/30/22 8/6/22  Rodrick Delong MD   permethrin (ELIMITE) 5 % cream Apply topically to entire body or affected areas once, leave on for 8 hours, then rinse.   May repeat in 1-2 weeks if symptoms persist. 7/24/22   Ly Frazier DO   hydrOXYzine HCl (ATARAX) 25 MG tablet Take 1 tablet by mouth every 6 hours as needed for Itching 7/24/22 8/3/22  Ly Frazier DO   triamcinolone acetonide (KENALOG-40) 40 MG/ML injection INJECT 1ML (40MG) I.M. EVERY 2 WEEKS ON MONDAY AS DIRECTED 1/31/22   Iris Gonzalez MD   predniSONE (DELTASONE) 5 MG tablet Take 5 mg by mouth every other day    Historical Provider, MD   furosemide (LASIX) 40 MG tablet Take 1 tablet by mouth daily 12/16/21   Carly Lipscomb DO   Elastic Bandages & Supports (JOBST KNEE HIGH COMPRESSION SM) MISC 1 each by Does not apply route daily as needed (swelling) 12/16/21   Carly Lipscomb DO   amLODIPine (NORVASC) 2.5 MG tablet Take 1 tablet by mouth daily 10/26/21   SIMBA Escobedo CNP   ferrous sulfate (IRON 325) 325 (65 Fe) MG tablet Take 1 tablet by mouth 2 times daily (with meals) 10/25/21   SIMBA Escobedo CNP   melatonin 5 MG TABS tablet Take 1 tablet by mouth nightly as needed (insominia)  Patient taking differently: Take 5 mg by mouth nightly  10/25/21   SIMBA Escobedo CNP potassium chloride (KLOR-CON M) 20 MEQ extended release tablet Take 1 tablet by mouth daily 10/25/21   SIMBA Balderas CNP   sodium chloride 0.9 % infusion Infuse 50 mL/hr intravenously continuous 10/25/21   SIMBA Balderas CNP   traMADol (ULTRAM) 50 MG tablet Take 25 mg by mouth 2 times daily.     Historical Provider, MD   acetaminophen (TYLENOL) 325 MG tablet Take 650 mg by mouth every 4 hours as needed for Pain or Fever    Historical Provider, MD   atenolol (TENORMIN) 50 MG tablet Take 1 tablet by mouth daily 8/2/21   Roni Courtney MD   atorvastatin (LIPITOR) 20 MG tablet Take 1 tablet by mouth nightly 8/2/21   Roni Courtney MD   carBAMazepine (TEGRETOL) 200 MG tablet Take 1 tablet by mouth 2 times daily 8/2/21   Roni Courtney MD   citalopram (CELEXA) 20 MG tablet Take 1 tablet by mouth daily  Patient taking differently: Take 20 mg by mouth daily 30 mg total 8/2/21   Roni Courtney MD   metFORMIN (GLUCOPHAGE) 500 MG tablet Take 1 tablet by mouth 2 times daily (with meals) 8/2/21   Roni Courtney MD   mirtazapine (REMERON) 15 MG tablet Take 1 tablet by mouth nightly 8/2/21   Roni Courtney MD   pantoprazole (PROTONIX) 40 MG tablet Take 1 tablet by mouth daily 8/2/21   Roni Courtney MD   vitamin B-12 (CYANOCOBALAMIN) 1000 MCG tablet Take 1 tablet by mouth daily 8/2/21   Roni Courtney MD   Ergocalciferol (VITAMIN D2) 50 MCG (2000 UT) TABS Take 50,000 Units by mouth once a week 8/2/21   Roni Courtney MD   aspirin EC 81 MG EC tablet Take 1 tablet by mouth daily 8/2/21   Roni Courtney MD   citalopram (CELEXA) 10 MG tablet Take 1 tablet by mouth daily  Patient taking differently: Take 10 mg by mouth daily Total 30 8/2/21   Roni Courtney MD   Pediatric Multiple Vitamins (MULTIVITAMIN CHILDRENS) CHEW Take 1 tablet by mouth daily 8/2/21   Roni Courtney MD   mycophenolate (CELLCEPT) 500 MG tablet Take 2 tabs qam and 1 tab every evening 8/2/21   Roni Courtney MD   primidone (MYSOLINE) 50 MG tablet Take 1 tablet by mouth 2 times daily 21   Peewee Huynh MD   Ergocalciferol (VITAMIN D2) 10 MCG (400 UNIT) TABS Take 1 tablet by mouth daily 21   Peewee Huynh MD   nitroGLYCERIN (NITROSTAT) 0.4 MG SL tablet Place 1 tablet under the tongue every 5 minutes as needed for Chest pain up to max of 3 total doses. If no relief after 1 dose, call 911. 19   Litzy Kaur MD        Allergies:     Penicillins and Penicillin g    Social History:     Tobacco:    reports that she has never smoked. She has never used smokeless tobacco.  Alcohol:      reports no history of alcohol use. Drug Use:  reports no history of drug use. Family History:     Family History   Problem Relation Age of Onset    Heart Disease Father     Stroke Sister        Review of Systems:     Positive and Negative as described in HPI. Review of Systems   Constitutional:  Positive for appetite change. Negative for activity change, chills, fatigue and fever. HENT:  Negative for congestion. Eyes:  Positive for pain and redness. Negative for visual disturbance. Respiratory:  Negative for cough, chest tightness and shortness of breath. Cardiovascular: Negative. Gastrointestinal:  Negative for abdominal pain, constipation, diarrhea, nausea and vomiting. Endocrine: Negative for cold intolerance and polyuria. Genitourinary:  Positive for frequency and pelvic pain. Negative for difficulty urinating. Musculoskeletal:  Positive for arthralgias and gait problem. Skin:  Negative for wound. Neurological:  Positive for weakness. Negative for dizziness and numbness. Psychiatric/Behavioral:  Negative for confusion.       Physical Exam:   BP (!) 184/98   Pulse 92   Temp 98.6 °F (37 °C) (Oral)   Resp 16   Ht 5' (1.524 m)   Wt 120 lb (54.4 kg)   SpO2 94%   BMI 23.44 kg/m²   Temp (24hrs), Av.6 °F (37 °C), Min:98.6 °F (37 °C), Max:98.6 °F (37 °C)    No results for input(s): POCGLU in the last 72 hours.    Intake/Output Summary (Last 24 hours) at 8/2/2022 9824  Last data filed at 8/2/2022 1421  Gross per 24 hour   Intake 380 ml   Output --   Net 380 ml       Physical Exam  Vitals and nursing note reviewed. Constitutional:       General: She is not in acute distress. Appearance: Normal appearance. She is ill-appearing. HENT:      Head: Normocephalic. Left periorbital erythema present. Eyes:      General: Visual field deficit present. Extraocular Movements:      Left eye: Normal extraocular motion. Pupils: Pupils are equal, round, and reactive to light. Cardiovascular:      Rate and Rhythm: Regular rhythm. Tachycardia present. Pulses: Normal pulses. Heart sounds: Normal heart sounds. No murmur heard. No friction rub. No gallop. Pulmonary:      Effort: Pulmonary effort is normal. No respiratory distress. Breath sounds: Normal breath sounds. No wheezing or rales. Abdominal:      General: Bowel sounds are normal. There is no distension. Palpations: Abdomen is soft. Tenderness: There is abdominal tenderness in the suprapubic area. Musculoskeletal:         General: No swelling or tenderness. Normal range of motion. Cervical back: Normal range of motion. Skin:     General: Skin is warm and dry. Capillary Refill: Capillary refill takes less than 2 seconds. Coloration: Skin is pale. Neurological:      General: No focal deficit present. Mental Status: She is alert and oriented to person, place, and time. Psychiatric:         Mood and Affect: Mood normal.         Behavior: Behavior normal.         Thought Content:  Thought content normal.         Judgment: Judgment normal.       Investigations:      Laboratory Testing:  Recent Results (from the past 24 hour(s))   CBC with Auto Differential    Collection Time: 08/02/22 11:03 AM   Result Value Ref Range    WBC 4.1 3.5 - 11.3 k/uL    RBC 4.00 3.95 - 5.11 m/uL    Hemoglobin 12.7 11.9 - 15.1 g/dL    Hematocrit 40.0 36.3 - 47.1 %    .0 82.6 - 102.9 fL    MCH 31.8 25.2 - 33.5 pg    MCHC 31.8 28.4 - 34.8 g/dL    RDW 12.0 11.8 - 14.4 %    Platelets 450 267 - 094 k/uL    MPV 10.9 8.1 - 13.5 fL    NRBC Automated 0.0 0.0 per 100 WBC    Seg Neutrophils 58 36 - 65 %    Lymphocytes 28 24 - 43 %    Monocytes 11 3 - 12 %    Eosinophils % 2 1 - 4 %    Basophils 1 0 - 2 %    Immature Granulocytes 0 0 %    Segs Absolute 2.37 1.50 - 8.10 k/uL    Absolute Lymph # 1.13 1.10 - 3.70 k/uL    Absolute Mono # 0.44 0.10 - 1.20 k/uL    Absolute Eos # 0.08 0.00 - 0.44 k/uL    Basophils Absolute <0.03 0.00 - 0.20 k/uL    Absolute Immature Granulocyte 0.01 0.00 - 0.30 k/uL   Comprehensive Metabolic Panel w/ Reflex to MG    Collection Time: 08/02/22 11:03 AM   Result Value Ref Range    Glucose 136 (H) 70 - 99 mg/dL    BUN 18 8 - 23 mg/dL    Creatinine 1.15 (H) 0.50 - 0.90 mg/dL    Bun/Cre Ratio 16 9 - 20    Calcium 9.7 8.6 - 10.4 mg/dL    Sodium 138 135 - 144 mmol/L    Potassium 4.1 3.7 - 5.3 mmol/L    Chloride 98 98 - 107 mmol/L    CO2 26 20 - 31 mmol/L    Anion Gap 14 9 - 17 mmol/L    Alkaline Phosphatase 114 (H) 35 - 104 U/L    ALT 12 5 - 33 U/L    AST 20 <32 U/L    Total Bilirubin 0.37 0.3 - 1.2 mg/dL    Total Protein 7.4 6.4 - 8.3 g/dL    Albumin 4.4 3.5 - 5.2 g/dL    GFR Non- 44 (L) >60 mL/min    GFR  54 (L) >60 mL/min    GFR Comment         Sedimentation Rate    Collection Time: 08/02/22 11:03 AM   Result Value Ref Range    Sed Rate 43 (H) 0 - 30 mm/Hr   C-Reactive Protein    Collection Time: 08/02/22 11:03 AM   Result Value Ref Range    CRP 9.0 (H) 0.0 - 5.0 mg/L   Urinalysis with Microscopic    Collection Time: 08/02/22 11:45 AM   Result Value Ref Range    Color, UA Yellow Yellow    Turbidity UA Cloudy (A) Clear    Glucose, Ur NEGATIVE NEGATIVE    Bilirubin Urine NEGATIVE NEGATIVE    Ketones, Urine TRACE (A) NEGATIVE    Specific Gravity, UA 1.020 1.005 - 1.030    Urine Hgb 1+ (A) NEGATIVE    pH, UA 6.0 5.0 - 8.0    Protein, UA 2+ (A) NEGATIVE    Urobilinogen, Urine Normal Normal    Nitrite, Urine POSITIVE (A) NEGATIVE    Leukocyte Esterase, Urine LARGE (A) NEGATIVE    -          WBC, UA TOO NUMEROUS TO COUNT 0 - 5 /HPF    RBC, UA 10 TO 20 0 - 2 /HPF    Epithelial Cells UA 0 TO 2 0 - 5 /HPF    Bacteria, UA MODERATE (A) None   Culture, Blood 1    Collection Time: 08/02/22 12:20 PM    Specimen: Blood   Result Value Ref Range    Specimen Description . BLOOD     Special Requests LEFT AC,12ML     Culture NO GROWTH <24 HRS    Lactate, Sepsis    Collection Time: 08/02/22 12:32 PM   Result Value Ref Range    Lactic Acid, Sepsis 1.1 0.5 - 1.9 mmol/L   Culture, Blood 1    Collection Time: 08/02/22 12:32 PM    Specimen: Blood   Result Value Ref Range    Specimen Description . BLOOD     Special Requests RIGHT AC,12ML     Culture NO GROWTH <24 HRS        Imaging/Diagnostics:  CT HEAD WO CONTRAST    Result Date: 8/2/2022  1. Mild left periorbital soft tissue swelling. 2. No evidence of orbital/postseptal involvement. 3. No acute intracranial findings. CT HEAD WO CONTRAST    Result Date: 7/30/2022  No acute intracranial abnormality. Stable pattern of atrophy and mild microangiopathic disease. Mild inflammatory disease of the maxillary sinuses, acute or chronic. CT FACIAL BONES W CONTRAST    Result Date: 8/2/2022  1. Mild left periorbital soft tissue swelling. 2. No evidence of orbital/postseptal involvement. 3. No acute intracranial findings.        Assessment :      Hospital Problems             Last Modified POA    * (Principal) Periorbital cellulitis of left eye 8/2/2022 Yes    Chronic combined systolic and diastolic heart failure ([address] 8/2/2022 Yes    Gastroesophageal reflux disease 8/2/2022 Yes    Type 2 diabetes mellitus without complication ([address] 2/8/1361 Yes    Herpes zoster 8/2/2022 Yes    DNR (do not resuscitate) 8/2/2022 Yes    Benign essential tremor 8/2/2022 Yes    Overview Signed 10/6/2017 11:27 AM by Phil Leonard CMA     Last Assessment & Plan:   Controlled on current regimen and continue to monitor adjust as needed          Dyslipidemia 8/2/2022 Yes    Major depression 8/2/2022 Yes    ASHD (arteriosclerotic heart disease) 8/2/2022 Yes    Acute cystitis without hematuria 8/2/2022 Yes       Plan:     Patient status inpatient in the  Progressive Unit/Step down    Preorbital cellulitis of left eye  IV antibiotics  Consult ID  Acute cystitis without hematuria  IV antibiotics  Gentle IV hydration  Add Pyridium 3 times daily  Herpes zoster  Continue acyclovir  Will add Neurontin  Will add fluorometholone ophthalmic drops  As needed pain medication  Chronic CHF  Continue home medications  GERD  Continue home medication  Hypertension  Continue home medications with holding parameters  Diabetes  Continue home medication  Monitor blood sugar levels before meals and at bedtime with sliding scale coverage  Dyslipidemia  Continue home medication  Depression  Continue home medication  Atherosclerotic heart disease  Continue home medications  Essential tremor  Continue to monitor  Continue home medication  DNR CCA  Social work consult for placement needs  Adult diet  PT/OT  Monitor a.m. labs    Consultations:   IP CONSULT TO OPHTHALMOLOGY  IP CONSULT TO HOSPITALIST  IP CONSULT TO INFECTIOUS DISEASES  IP CONSULT TO SOCIAL WORK    Patient is admitted as inpatient status because of co-morbidities listed above, severity of signs and symptoms as outlined, requirement for current medical therapies and most importantly because of direct risk to patient if care not provided in a hospital setting. Expected length of stay > 48 hours. On this date 8/2/2022 I have spent 31 minutes reviewing previous notes, test results and face to face with the patient discussing the diagnosis and importance of compliance with the treatment plan as well as documenting on the day of the visit.  At least 50% of the time documented was spent with the patient to provide counseling and/or coordination of care. SIMBA Becker - CNP  8/2/2022  2:54 PM    Copy sent to Dr. Sebastian Seymour primary care provider on file.

## 2022-08-03 PROBLEM — B02.29 HERPES ZOSTER VIRUS INFECTION OF FACE AND EAR NERVES: Status: ACTIVE | Noted: 2022-08-02

## 2022-08-03 LAB
ABSOLUTE EOS #: 0.03 K/UL (ref 0–0.44)
ABSOLUTE IMMATURE GRANULOCYTE: 0.03 K/UL (ref 0–0.3)
ABSOLUTE LYMPH #: 1.55 K/UL (ref 1.1–3.7)
ABSOLUTE MONO #: 0.69 K/UL (ref 0.1–1.2)
ANION GAP SERPL CALCULATED.3IONS-SCNC: 12 MMOL/L (ref 9–17)
BASOPHILS # BLD: 0 % (ref 0–2)
BASOPHILS ABSOLUTE: 0.03 K/UL (ref 0–0.2)
BUN BLDV-MCNC: 23 MG/DL (ref 8–23)
BUN/CREAT BLD: 14 (ref 9–20)
CALCIUM SERPL-MCNC: 8.6 MG/DL (ref 8.6–10.4)
CHLORIDE BLD-SCNC: 102 MMOL/L (ref 98–107)
CO2: 25 MMOL/L (ref 20–31)
CREAT SERPL-MCNC: 1.68 MG/DL (ref 0.5–0.9)
EOSINOPHILS RELATIVE PERCENT: 0 % (ref 1–4)
ESTIMATED AVERAGE GLUCOSE: 128 MG/DL
GFR AFRICAN AMERICAN: 35 ML/MIN
GFR NON-AFRICAN AMERICAN: 29 ML/MIN
GFR SERPL CREATININE-BSD FRML MDRD: ABNORMAL ML/MIN/{1.73_M2}
GLUCOSE BLD-MCNC: 115 MG/DL (ref 65–105)
GLUCOSE BLD-MCNC: 119 MG/DL (ref 65–105)
GLUCOSE BLD-MCNC: 134 MG/DL (ref 65–105)
GLUCOSE BLD-MCNC: 134 MG/DL (ref 70–99)
GLUCOSE BLD-MCNC: 183 MG/DL (ref 65–105)
HBA1C MFR BLD: 6.1 % (ref 4–6)
HCT VFR BLD CALC: 34.6 % (ref 36.3–47.1)
HEMOGLOBIN: 10.7 G/DL (ref 11.9–15.1)
IMMATURE GRANULOCYTES: 0 %
LYMPHOCYTES # BLD: 16 % (ref 24–43)
MCH RBC QN AUTO: 31.6 PG (ref 25.2–33.5)
MCHC RBC AUTO-ENTMCNC: 30.9 G/DL (ref 28.4–34.8)
MCV RBC AUTO: 102.1 FL (ref 82.6–102.9)
MONOCYTES # BLD: 7 % (ref 3–12)
NRBC AUTOMATED: 0 PER 100 WBC
PDW BLD-RTO: 12.2 % (ref 11.8–14.4)
PLATELET # BLD: 154 K/UL (ref 138–453)
PMV BLD AUTO: 10.6 FL (ref 8.1–13.5)
POTASSIUM SERPL-SCNC: 4.7 MMOL/L (ref 3.7–5.3)
RBC # BLD: 3.39 M/UL (ref 3.95–5.11)
SEG NEUTROPHILS: 76 % (ref 36–65)
SEGMENTED NEUTROPHILS ABSOLUTE COUNT: 7.16 K/UL (ref 1.5–8.1)
SODIUM BLD-SCNC: 139 MMOL/L (ref 135–144)
WBC # BLD: 9.5 K/UL (ref 3.5–11.3)

## 2022-08-03 PROCEDURE — 6370000000 HC RX 637 (ALT 250 FOR IP): Performed by: INTERNAL MEDICINE

## 2022-08-03 PROCEDURE — 97166 OT EVAL MOD COMPLEX 45 MIN: CPT

## 2022-08-03 PROCEDURE — 97530 THERAPEUTIC ACTIVITIES: CPT

## 2022-08-03 PROCEDURE — 82947 ASSAY GLUCOSE BLOOD QUANT: CPT

## 2022-08-03 PROCEDURE — 6360000002 HC RX W HCPCS: Performed by: NURSE PRACTITIONER

## 2022-08-03 PROCEDURE — 99232 SBSQ HOSP IP/OBS MODERATE 35: CPT | Performed by: INTERNAL MEDICINE

## 2022-08-03 PROCEDURE — 2060000000 HC ICU INTERMEDIATE R&B

## 2022-08-03 PROCEDURE — 6370000000 HC RX 637 (ALT 250 FOR IP): Performed by: NURSE PRACTITIONER

## 2022-08-03 PROCEDURE — 36415 COLL VENOUS BLD VENIPUNCTURE: CPT

## 2022-08-03 PROCEDURE — 97535 SELF CARE MNGMENT TRAINING: CPT

## 2022-08-03 PROCEDURE — 97163 PT EVAL HIGH COMPLEX 45 MIN: CPT

## 2022-08-03 PROCEDURE — 85025 COMPLETE CBC W/AUTO DIFF WBC: CPT

## 2022-08-03 PROCEDURE — 2580000003 HC RX 258: Performed by: NURSE PRACTITIONER

## 2022-08-03 PROCEDURE — 80048 BASIC METABOLIC PNL TOTAL CA: CPT

## 2022-08-03 PROCEDURE — 99222 1ST HOSP IP/OBS MODERATE 55: CPT | Performed by: NURSE PRACTITIONER

## 2022-08-03 RX ORDER — ACYCLOVIR 800 MG/1
800 TABLET ORAL 3 TIMES DAILY
Status: DISCONTINUED | OUTPATIENT
Start: 2022-08-03 | End: 2022-08-07 | Stop reason: HOSPADM

## 2022-08-03 RX ADMIN — ACYCLOVIR 800 MG: 800 TABLET ORAL at 21:34

## 2022-08-03 RX ADMIN — FERROUS SULFATE TAB EC 325 MG (65 MG FE EQUIVALENT) 325 MG: 325 (65 FE) TABLET DELAYED RESPONSE at 10:41

## 2022-08-03 RX ADMIN — SODIUM CHLORIDE: 9 INJECTION, SOLUTION INTRAVENOUS at 14:43

## 2022-08-03 RX ADMIN — GABAPENTIN 100 MG: 100 CAPSULE ORAL at 10:40

## 2022-08-03 RX ADMIN — ENOXAPARIN SODIUM 30 MG: 100 INJECTION SUBCUTANEOUS at 10:37

## 2022-08-03 RX ADMIN — TRAMADOL HYDROCHLORIDE 25 MG: 50 TABLET, COATED ORAL at 21:34

## 2022-08-03 RX ADMIN — MULTIPLE VITAMINS W/ MINERALS TAB 1 TABLET: TAB at 10:40

## 2022-08-03 RX ADMIN — CARBAMAZEPINE 200 MG: 200 TABLET ORAL at 21:33

## 2022-08-03 RX ADMIN — PRIMIDONE 50 MG: 50 TABLET ORAL at 10:41

## 2022-08-03 RX ADMIN — ATORVASTATIN CALCIUM 20 MG: 20 TABLET, FILM COATED ORAL at 21:33

## 2022-08-03 RX ADMIN — ACYCLOVIR 800 MG: 800 TABLET ORAL at 10:38

## 2022-08-03 RX ADMIN — MYCOPHENOLATE MOFETIL 1000 MG: 250 CAPSULE ORAL at 10:37

## 2022-08-03 RX ADMIN — CITALOPRAM HYDROBROMIDE 30 MG: 20 TABLET ORAL at 10:38

## 2022-08-03 RX ADMIN — ACYCLOVIR 800 MG: 800 TABLET ORAL at 14:39

## 2022-08-03 RX ADMIN — CARBAMAZEPINE 200 MG: 200 TABLET ORAL at 10:41

## 2022-08-03 RX ADMIN — MYCOPHENOLATE MOFETIL 500 MG: 250 CAPSULE ORAL at 21:34

## 2022-08-03 RX ADMIN — MIRTAZAPINE 15 MG: 15 TABLET, FILM COATED ORAL at 21:33

## 2022-08-03 RX ADMIN — PHENAZOPYRIDINE HYDROCHLORIDE 200 MG: 200 TABLET ORAL at 10:41

## 2022-08-03 RX ADMIN — PRIMIDONE 50 MG: 50 TABLET ORAL at 21:33

## 2022-08-03 RX ADMIN — TRAMADOL HYDROCHLORIDE 25 MG: 50 TABLET, COATED ORAL at 10:39

## 2022-08-03 RX ADMIN — FLUOROMETHOLONE 1 DROP: 1 SOLUTION/ DROPS OPHTHALMIC at 21:39

## 2022-08-03 RX ADMIN — PANTOPRAZOLE SODIUM 40 MG: 40 TABLET, DELAYED RELEASE ORAL at 10:39

## 2022-08-03 RX ADMIN — CEFTRIAXONE SODIUM 1000 MG: 1 INJECTION, POWDER, FOR SOLUTION INTRAMUSCULAR; INTRAVENOUS at 14:47

## 2022-08-03 RX ADMIN — PREDNISONE 5 MG: 5 TABLET ORAL at 10:41

## 2022-08-03 RX ADMIN — ACYCLOVIR 800 MG: 800 TABLET ORAL at 06:03

## 2022-08-03 RX ADMIN — CYANOCOBALAMIN TAB 1000 MCG 1000 MCG: 1000 TAB at 10:40

## 2022-08-03 RX ADMIN — ASPIRIN 81 MG: 81 TABLET, COATED ORAL at 10:41

## 2022-08-03 RX ADMIN — GABAPENTIN 100 MG: 100 CAPSULE ORAL at 21:34

## 2022-08-03 RX ADMIN — Medication 500 UNITS: at 10:39

## 2022-08-03 RX ADMIN — POTASSIUM CHLORIDE 20 MEQ: 1500 TABLET, EXTENDED RELEASE ORAL at 10:38

## 2022-08-03 ASSESSMENT — PAIN SCALES - GENERAL
PAINLEVEL_OUTOF10: 0
PAINLEVEL_OUTOF10: 0
PAINLEVEL_OUTOF10: 10

## 2022-08-03 ASSESSMENT — PAIN DESCRIPTION - LOCATION: LOCATION: FACE

## 2022-08-03 ASSESSMENT — PAIN DESCRIPTION - ORIENTATION: ORIENTATION: LEFT

## 2022-08-03 NOTE — CONSULTS
[facility]  Initial Consult Note  Date: 8/3/2022    Hospital day :1     Impression:   Left facial/periorbital herpes zoster infection  E coli urinary tract infection  Headache  Diabetes mellitus, type II    Recommendations   She can continue on acyclovir for the herpes zoster  The patient can continue on IV ceftriaxone for the E. coli isolated in the urine  The patient needs to be seen and evaluated by ophthalmology, they have been consulted. She does tell me she has blurred vision in the left eye    Chief complaint/reason for consultation:   UTI     History of Present Illness:   Ofe Moyer is a 80y.o.-year-old female who was initially admitted on 8/2/2022. The patient has a known medical history of diabetes mellitus type 2, essential tremors, coronary artery disease, hypertension, major depression, glaucoma    The patient initially presented to the emergency department 7/30/2022 with complaints of an intermittent headache that started 1 day prior. She was noted to have left-sided facial rash isolated around the forehead/eye, she was diagnosed with herpes zoster . She was given oral acyclovir 800 mg 5 times per day x7 days. She has been compliant with this. She has not eaten or drank much over the last few days, mainly due to a poor appetite. She has not had any associated fevers or chills. She continues to have left-sided headache, redness, swelling pain and eye drainage with eye redness so she decided to come to the emergency department because she folic it was getting worse. She does tell me she has been having blurry vision with pain and sensitivity to light. This has gotten worse over the last few days. She also tells me that she has been having some dysuria with pain and pressure with urination. Urine culture has come back positive for E. coli. Patient continues on acyclovir and has been initiated on ceftriaxone.     I have personally reviewed the past medical history, past surgical history, medications, social history, and family history, and I have updated the database accordingly.   Past Medical History:     Past Medical History:   Diagnosis Date    Anemia 7/201    Arthritis of knee 2018    bilateral knees; \"bone on bone\"; declines surg    Benign essential tremor 04/15/2016    CAD S/P percutaneous coronary angioplasty 1998    [facility] at [facility]    Diabetes mellitus type II, controlled ([address] 1979    Gastrointestinal hemorrhage 2012    Duodenal Ulcer by EGD    History of pancreatitis 2013    after GI bleed    Humerus fracture 2015    right humerus ; [place]    Hyperlipidemia     Hypertension     Major depression     S/P CABG (coronary artery bypass graft) 1996    Ventral hernia 2782    infraumbilical ; incisional    Vitamin B12 deficiency 07/2018    Vitamin B12 270     Past Surgical  History:     Past Surgical History:   Procedure Laterality Date    CHOLECYSTECTOMY      COLONOSCOPY N/A 11/5/2018    COLONOSCOPY DIAGNOSTIC OR SCREENING performed by Kerwin Cruz MD at [facility]    ? number grafts    HIP FRACTURE SURGERY Right 10/13/2021    HIP OPEN REDUCTION INTERNAL FIXATION-NAILING performed by Dwayne Moseley DO at [address] [facility] Left 10/19/2021    HIP OPEN REDUCTION INTERNAL FIXATION - IM NAIL performed by Dwayne Moseley DO at [address] Right     HYSTERECTOMY, VAGINAL  1958    SALPINGO-OOPHORECTOMY  1960     Medications:      atenolol  50 mg Oral Daily    atorvastatin  20 mg Oral Nightly    carBAMazepine  200 mg Oral BID    metFORMIN  500 mg Oral BID WC    mirtazapine  15 mg Oral Nightly    pantoprazole  40 mg Oral Daily    vitamin B-12  1,000 mcg Oral Daily    [START ON 8/5/2022] vitamin D  50,000 Units Oral Weekly    aspirin EC  81 mg Oral Daily    therapeutic multivitamin-minerals  1 tablet Oral Daily    primidone  50 mg Oral BID    Vitamin D  500 Units Oral Daily    traMADol  25 mg Oral BID amLODIPine  2.5 mg Oral Daily    ferrous sulfate  325 mg Oral BID WC    potassium chloride  20 mEq Oral Daily    predniSONE  5 mg Oral Every Other Day    furosemide  40 mg Oral Daily    acyclovir  800 mg Oral 5x Daily    mycophenolate  1,000 mg Oral Daily    mycophenolate  500 mg Oral Nightly    sodium chloride flush  5-40 mL IntraVENous 2 times per day    enoxaparin  30 mg SubCUTAneous Daily    cefTRIAXone (ROCEPHIN) IV  1,000 mg IntraVENous Q24H    insulin lispro  0-4 Units SubCUTAneous TID WC    insulin lispro  0-4 Units SubCUTAneous Nightly    gabapentin  100 mg Oral TID    fluorometholone  1 drop Left Eye 4x Daily    phenazopyridine  200 mg Oral TID WC    citalopram  30 mg Oral Daily     Social History:     Social History     Socioeconomic History    Marital status:      Spouse name: Not on file    Number of children: Not on file    Years of education: Not on file    Highest education level: Not on file   Occupational History    Not on file   Tobacco Use    Smoking status: Never    Smokeless tobacco: Never   Vaping Use    Vaping Use: Never used   Substance and Sexual Activity    Alcohol use: No    Drug use: No    Sexual activity: Not on file   Other Topics Concern    Not on file   Social History Narrative    Not on file     Social Determinants of Health     Financial Resource Strain: Not on file   Food Insecurity: Not on file   Transportation Needs: Not on file   Physical Activity: Not on file   Stress: Not on file   Social Connections: Not on file   Intimate Partner Violence: Not on file   Housing Stability: Not on file     Family History:     Family History   Problem Relation Age of Onset    Heart Disease Father     Stroke Sister       Allergies:   Penicillins and Penicillin g     Review of Systems:   General: No fevers or chills. Eyes:  Blurry vision, pain in left eye and photophobia  ENT: No sore throat or runny nose. Cardiovascular: No chest pain or palpitations.   Lung: No shortness of breath or cough. Abdomen: No nausea, vomiting, diarrhea, or abdominal pain. Genitourinary:  Dysuria with painful urination and pressure  Musculoskeletal: No muscle aches or pains. Hematologic: No bleeding or bruising. Neurologic: No headache, weakness, numbness, or tingling. Physical Examination :   BP (!) 96/37   Pulse 74   Temp 99.9 °F (37.7 °C) (Oral)   Resp 22   Ht 5' (1.524 m)   Wt 120 lb (54.4 kg)   SpO2 91%   BMI 23.44 kg/m²     Temperature Range: Temp: 99.9 °F (37.7 °C) Temp  Av °F (37.2 °C)  Min: 97.9 °F (36.6 °C)  Max: 99.9 °F (37.7 °C)  General Appearance: Awake, alert, and in no apparent distress  Head: Normocephalic, without obvious abnormality, atraumatic,   left side facial erythema with dried lesions on forehead, swelling around the left eye with clear discharge from the eye  Eyes: Periorbital and orbital edema of the left eye, conjunctiva is injected, clear discharge  ENT: Oropharynx clear, without erythema, exudate, or thrush. Neck: Supple, without lymphadenopathy. Pulmonary/Chest: Clear to auscultation, without wheezes, rales, or rhonchi  Cardiovascular: Regular rate and rhythm without murmurs, rubs, or gallops. Abdomen: Soft, nontender, nondistended. Extremities: No cyanosis, clubbing, edema, or effusions. Neurologic: No gross sensory or motor deficits. Skin: Warm and dry with no rash.         Medical Decision Making:   I have independently reviewed/ordered the following labs:  CBC with Differential:   Recent Labs     22  1103 22  0501   WBC 4.1 9.5   HGB 12.7 10.7*   HCT 40.0 34.6*    154   LYMPHOPCT 28 16*   MONOPCT 11 7     BMP:   Recent Labs     22  1103 22  0501    139   K 4.1 4.7   CL 98 102   CO2 26 25   BUN 18 23   CREATININE 1.15* 1.68*     Hepatic Function Panel:   Recent Labs     22  1103   PROT 7.4   LABALBU 4.4   BILITOT 0.37   ALKPHOS 114*   ALT 12   AST 20       No results found for: PROCAL    Lab Results   Component Value Date/Time    CRP 9.0 08/02/2022 11:03 AM    CRP 13.3 12/16/2021 06:45 AM    CRP 11.5 11/11/2021 07:19 AM     Lab Results   Component Value Date/Time    FERRITIN 49 06/07/2021 07:10 AM    FERRITIN 30 04/12/2021 06:20 AM    FERRITIN 18.4 07/02/2018 10:47 AM     No results found for: FIBRINOGEN  Lab Results   Component Value Date/Time    DDIMER 309 02/22/2018 06:15 PM     No results found for: LDH    Lab Results   Component Value Date    SEDRATE 43 (H) 08/02/2022       Lab Results   Component Value Date/Time    COVID19 Not Detected 10/25/2021 10:13 AM    COVID19 Not Detected 03/02/2021 05:03 PM     No results found for requested labs within last 30 days. Imaging Studies:   CT HEAD WO CONTRAST  Result Date: 8/2/2022  1. Mild left periorbital soft tissue swelling. 2. No evidence of orbital/postseptal involvement. 3. No acute intracranial findings. CT HEAD WO CONTRAST  Result Date: 7/30/2022  No acute intracranial abnormality. Stable pattern of atrophy and mild microangiopathic disease. Mild inflammatory disease of the maxillary sinuses, acute or chronic. CT FACIAL BONES W CONTRAST  Result Date: 8/2/2022  1. Mild left periorbital soft tissue swelling. 2. No evidence of orbital/postseptal involvement. 3. No acute intracranial findings. Cultures:     Component 8/2/22 1145    Specimen Description . CLEAN CATCH URINE P    Culture ESCHERICHIA COLI >[id number] CFU/ML Abnormal  [address]              Specimen Collected: 08/02/22 11:45 EDT Last Resulted: 08/03/22 07:44 EDT           Culture, Blood 1 [[id number]] Collected: 08/02/22 1220   Order Status: Completed Specimen: Blood Updated: 08/03/22 2938    Specimen Description . BLOOD    Special Requests LEFT AC,12ML    Culture NO GROWTH 12 HOURS   Culture, Blood 1 [[id number]] Collected: 08/02/22 1232   Order Status: Completed Specimen: Blood Updated: 08/03/22 0210    Specimen Description . BLOOD    Special Requests RIGHT AC,12ML    Culture NO GROWTH 12 HOURS       Thank you for allowing us to participate in the care of this patient. Please call with questions. Electronically signed by SIMBA Rosa CNP on 8/3/2022 at 7:55 AM      [facility]  Hollie Perkins, [address] messaging  OFFICE: [phone]      This note is created with the assistance of a speech recognition program.  While intending to generate a document that actually reflects the content of the visit, the document can still have some errors including those of syntax and sound a like substitutions which may escape proof reading. In such instances, actual meaning can be extrapolated by contextual diversion.

## 2022-08-03 NOTE — PROGRESS NOTES
Occupational Therapy  Facility/Department: [facility] PROGRESSIVE CARE  Occupational Therapy Initial Assessment    Name: Alexey Bess  : 1931  MRN: [id number]  Date of Service: 8/3/2022    OLLIE Limon reports patient is medically stable for therapy treatment this date. Chart reviewed prior to treatment and patient is agreeable for therapy. All lines intact and patient positioned comfortably at end of treatment. All patient needs addressed prior to ending therapy session. Discharge Recommendations:  Patient would benefit from continued therapy after discharge  Pt currently functioning below baseline. Recommend daily inpatient skilled therapy at time of discharge to maximize long term outcomes and prevent re-admission. Please refer to AM-PAC score for current level of function. OT Equipment Recommendations  Equipment Needed: Yes  Mobility Devices: ADL Assistive Devices  ADL Assistive Devices: Long-handled Sponge;Long-handled Shoe Horn;Reacher;Sock-Aid Hard;Emergency Alert System       Patient Diagnosis(es): The primary encounter diagnosis was 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. Diagnoses of Acute cystitis with hematuria, Herpes zoster conjunctivitis, and Facial cellulitis were also pertinent to this visit. Past Medical History:  has a past medical history of Anemia, Arthritis of knee, Benign essential tremor, CAD S/P percutaneous coronary angioplasty, Diabetes mellitus type II, controlled ([facility] [address] Gastrointestinal hemorrhage, History of pancreatitis, Humerus fracture, Hyperlipidemia, Hypertension, Major depression, S/P CABG (coronary artery bypass graft), Ventral hernia, and Vitamin B12 deficiency. Past Surgical History:  has a past surgical history that includes Cholecystectomy; Coronary artery bypass graft (); Hysterectomy, vaginal (); Salpingo-oophorectomy (); Colonoscopy (N/A, 2018); hip surgery (Right);  Hip fracture surgery (Right, 10/13/2021); and Hip fracture surgery (Left, 10/19/2021). PER H&P: Benita Proctor is a 80 y.o. female who presents with L sided headache, facial redness, facial swelling, facial pain, eye redness, eye drainage. Patient was recently seen in the ED and diagnosed with shingles and sent home on Acyclovir which she states that she has been compliant with however she feels like she is getting worse. No changes in her vision. Assessment   Performance deficits / Impairments: Decreased functional mobility ; Decreased ADL status; Decreased safe awareness;Decreased cognition;Decreased strength;Decreased endurance;Decreased high-level IADLs;Decreased fine motor control;Decreased balance;Decreased posture;Decreased coordination  Assessment: Pt would benefit from continued skilled OT services to increase endurance, I and safety during functional tasks to return home at [facility] as able. Pt is currently requiring 2 staff assist to safely complete functional mobility and ADL transfers, at this time pt would not be safe to return to prior living situation. Prognosis: Fair  Decision Making: Medium Complexity  Activity Tolerance  Activity Tolerance: Patient limited by fatigue  Activity Tolerance Comments: fair, pt limited by fatigue/decreased endurance and activity tolerance.  Pt cooperative throughout session        Plan   Plan  Times per Week: 4-5x/wk 1x/day as stephania  Current Treatment Recommendations: Strengthening, Balance training, Functional mobility training, Endurance training, Safety education & training, Equipment evaluation, education, & procurement, Self-Care / ADL, Home management training, Cognitive/Perceptual training, Patient/Caregiver education & training     Restrictions  Restrictions/Precautions  Restrictions/Precautions: Contact Precautions, Isolation, Fall Risk, General Precautions  Required Braces or Orthoses?: No  Position Activity Restriction  Other position/activity restrictions: elevate heels, up with assist, AIRBORNE contact iso for shingles    Subjective   General  Chart Reviewed: Yes  Patient assessed for rehabilitation services?: Yes  Family / Caregiver Present: No  Subjective  Subjective: \"I haven't really slept much\"  General Comment  Comments: Pt resting in bed, agreeable to OT eval  Denies pain      Social/Functional History  Social/Functional History  Lives With: Alone  Type of Home: Senior housing apartment  Home Layout: One level  Home Access: Level entry  Bathroom Shower/Tub: Walk-in shower (has to step over a lip)  Bathroom Toilet: Standard  Bathroom Equipment: Built-in shower seat, Grab bars in shower, Grab bars around toilet  Home Equipment: Tasha Spikes, rolling  Has the patient had two or more falls in the past year or any fall with injury in the past year?: No  ADL Assistance: [address]: Needs assistance (meals and house cleaning provided by facility)  Ambulation Assistance: Independent (uses RW primarily, w/c for community distances)  Transfer Assistance: Independent  Active : No  Patient's  Info: transport provided by facility  Occupation: Retired  Type of Occupation:   Leisure & Hobbies: lately has not had any hobbies       Objective   Observation/Palpation  Posture: Fair  Observation: Left facial/periorbital herpes zoster infection (red discolorations)  Edema: B LE's  Safety Devices  Type of Devices: Bed alarm in place;Call light within reach; Left in bed;Patient at risk for falls;Gait belt;Nurse notified; All fall risk precautions in place; Heels elevated for pressure relief      Balance  Sitting:  (CGA)  Standing:  (Mod x2 staff assist)  Functional Mobility   Overall Level of Assistance: Assist X2;Maximum assistance; Moderate assistance (Pt only able to take 3-4 small lateral steps towards HOB. Pt unsteady at this time requiring MAX x2 staff assist not safe to ambulate away)  Interventions: Verbal cues; Tactile cues (Pt given Max verbal cues for pacing self, upright posture, seqeuncing, initiation, pursed lip breathing, RW safety, hand placement on RW all to increase safety.)     AROM: Within functional limits  PROM: Within functional limits  Strength: Generally decreased, functional (B UE ~ 4-/5)  Coordination: Generally decreased, functional (slowed/delayed B opposition)  Tone: Normal  Sensation: Intact      ADL  Feeding: Setup  Grooming: Setup;Minimal assistance (Pt completed oral care while seated on EOB with Min A for opening supplies, set up and throughness.)  UE Bathing: Setup; Moderate assistance  LE Bathing: Setup;Maximum assistance  UE Dressing: Setup; Moderate assistance (to tie/adjust hosp gown seated on EOB)  LE Dressing: Maximum assistance;Setup (Pt attempted to don socks leaned forward unable to reach socks d/t pain, weakness/fatigue .)  Toileting: Dependent/Total (Pt has purewick)  Additional Comments: Pt currently limited by weakness, fatigue and decreased activity tolerance. Activity Tolerance  Activity Tolerance: Patient limited by endurance; Patient limited by fatigue      Bed mobility  Supine to Sit: Minimal assistance; Moderate assistance;2 Person assistance  Sit to Supine: Moderate assistance;2 Person assistance  Scooting: Moderate assistance  Bed Mobility Comments: Pt given Mod verbal cues for initiation, use of bedrails, pacing self, pursed lip breathing, awareness/assist with lines, all to increase safety/ease. Pt requiring increased time/effort throughout. Transfers  Sit to stand: Moderate assistance;2 Person assistance (with RW)  Stand to sit: Moderate assistance;2 Person assistance  Transfer Comments: Pt given Max verbal cues for RW safety, upright posture, controlled stand to sit, reaching back prior to sitting, line mgmt, initiation, sequencing, hand placement on RW all to increase safety/reduce risk of falls.       Vision  Vision: Impaired  Vision Exceptions: Wears glasses at all times  Hearing  Hearing: Within functional to Min A with use of AD/grab bars/BSC as needed. Short Term Goal 4: UB ADLs to Set up and LB ADLs to Min A with use of AD/AE as needed. Short Term Goal 5: bed mobility to SBA with use of bedrails as needed. Long Term Goals  Long Term Goal 1: Pt to be I with fall prevention education, EC/WS tech, recommendations for discharge/AE with use of handouts as needed. Patient Goals   Patient goals : To feel better! Therapy Time   Individual Concurrent Group Co-treatment   Time In 0852         Time Out 0932         Minutes 40          Tx time: 30 min    Co-treatment with PT warranted secondary to decreased safety and independence requiring 2 skilled therapy professionals to address individual discipline's goals.           Katie Murphy, OT

## 2022-08-03 NOTE — PROGRESS NOTES
[facility]  Office: [address], DO, Jony Gandhi, DO, Ben Medellin, DO, Jenni Peterson Blood, DO, Margi Blake MD, Jaimee Reese MD, Kelly Feldman MD, Swetha Chavez MD,  Guilherme Evangelista MD, Chi Lau MD, Peggy Rich, DO, Gisela Sheridan MD,  Salinas Peterson MD, Michelle Vazquez MD, Shelia Velazquez, DO, Geraldine Madrigal MD, Dhiraj Miranda MD, Han Pack MD, Иван Hardwickllor, DO, Darin Lou MD, Jarred Denson MD, Marvin Valladares, CNP,  Marion Rubio, CNP, Kathy Baez, CNP, Sharon Boland, CNP, Jose Eduardo Julio PADylanC, Dontae Clayton, DNP, Kinza King, CNP, Josse Daily, CNP, Brandyn Hodgson, CNP, Krishan Mir, CNP, Jesenia Cancer, CNP, Pk Jain, CNS, Jase Darnell, [facility], Jurgen Granados, CNP, Matt Maradiaga, CNP, Gladis Penny, CNP           [facility]    Progress Note    8/3/2022    4:21 PM    Name:   Philip Halsted  MRN:     [id number]     Acct:      [de-identified]   Room:   [facility] Day:  1  Admit Date:  8/2/2022 10:20 AM    PCP:   No primary care provider on file. Code Status:  DNR-CCA    Subjective:     C/C:   Chief Complaint   Patient presents with    Facial Pain    Headache    Otalgia     Interval History Status: not changed. Patient with continued facial erythema and pain, eye pain with blurry vision and photophobia. Denies any fevers or chills, chest pain, shortness of breath, nausea or vomiting. Brief History: This is a 14-year-old female that was diagnosed with facial shingles on July 30 and treated as an outpatient with acyclovir. She returns at this time with worsening erythema of the left side of her face and concern for periorbital cellulitis/facial cellulitis. She is admitted for IV antibiotic therapy and infectious disease and ophthalmology evaluations.     Review of Systems:     Constitutional:  negative for chills, fevers, sweats  Respiratory:  negative for cough, dyspnea on exertion, shortness of breath, wheezing  Cardiovascular:  negative for chest pain, chest pressure/discomfort, lower extremity edema, palpitations  Gastrointestinal:  negative for abdominal pain, constipation, diarrhea, nausea, vomiting  Neurological:  negative for dizziness, headache    Medications: Allergies:     Allergies   Allergen Reactions    Penicillins Swelling     Rash and swelling      Penicillin G        Current Meds:   Scheduled Meds:    acyclovir  800 mg Oral TID    atenolol  50 mg Oral Daily    atorvastatin  20 mg Oral Nightly    carBAMazepine  200 mg Oral BID    metFORMIN  500 mg Oral BID WC    mirtazapine  15 mg Oral Nightly    pantoprazole  40 mg Oral Daily    vitamin B-12  1,000 mcg Oral Daily    [START ON 8/5/2022] vitamin D  50,000 Units Oral Weekly    aspirin EC  81 mg Oral Daily    therapeutic multivitamin-minerals  1 tablet Oral Daily    primidone  50 mg Oral BID    Vitamin D  500 Units Oral Daily    traMADol  25 mg Oral BID    amLODIPine  2.5 mg Oral Daily    ferrous sulfate  325 mg Oral BID WC    potassium chloride  20 mEq Oral Daily    predniSONE  5 mg Oral Every Other Day    furosemide  40 mg Oral Daily    mycophenolate  1,000 mg Oral Daily    mycophenolate  500 mg Oral Nightly    sodium chloride flush  5-40 mL IntraVENous 2 times per day    enoxaparin  30 mg SubCUTAneous Daily    cefTRIAXone (ROCEPHIN) IV  1,000 mg IntraVENous Q24H    insulin lispro  0-4 Units SubCUTAneous TID WC    insulin lispro  0-4 Units SubCUTAneous Nightly    gabapentin  100 mg Oral TID    fluorometholone  1 drop Left Eye 4x Daily    phenazopyridine  200 mg Oral TID WC    citalopram  30 mg Oral Daily     Continuous Infusions:    dextrose      sodium chloride 50 mL/hr at 08/03/22 1443    sodium chloride       PRN Meds: melatonin, hydrOXYzine HCl, glucose, dextrose bolus **OR** dextrose bolus, glucagon (rDNA), dextrose, sodium chloride flush, sodium chloride, ondansetron **OR** ondansetron, --    LABALBU 4.4  --   --   --    LABA1C 6.1*  --   --   --    AST 20  --   --   --    ALT 12  --   --   --    ALKPHOS 114*  --   --   --    BILITOT 0.37  --   --   --    POCGLU  --  142* 119* 183*     ABG:No results found for: POCPH, PHART, PH, POCPCO2, ABX9RMR, PCO2, POCPO2, PO2ART, PO2, POCHCO3, XOF9FCV, HCO3, NBEA, PBEA, BEART, BE, THGBART, THB, KQJ6HZY, QYQG5JMN, A2LASCWD, O2SAT, FIO2  Lab Results   Component Value Date/Time    SPECIAL RIGHT AC,12ML 08/02/2022 12:32 PM     Lab Results   Component Value Date/Time    CULTURE NO GROWTH 1 DAY 08/02/2022 12:32 PM       Radiology:  CT HEAD WO CONTRAST    Result Date: 8/2/2022  1. Mild left periorbital soft tissue swelling. 2. No evidence of orbital/postseptal involvement. 3. No acute intracranial findings. CT HEAD WO CONTRAST    Result Date: 7/30/2022  No acute intracranial abnormality. Stable pattern of atrophy and mild microangiopathic disease. Mild inflammatory disease of the maxillary sinuses, acute or chronic. CT FACIAL BONES W CONTRAST    Result Date: 8/2/2022  1. Mild left periorbital soft tissue swelling. 2. No evidence of orbital/postseptal involvement. 3. No acute intracranial findings.        Physical Examination:        General appearance:  alert, cooperative and no distress  Mental Status:  oriented to person, place and time and normal affect  Lungs:  clear to auscultation bilaterally, normal effort  Heart:  regular rate and rhythm, no murmur  Abdomen:  soft, nontender, nondistended, normal bowel sounds, no masses, hepatomegaly, splenomegaly  Extremities:  no edema, redness, tenderness in the calves  Skin:  no gross lesions, rashes, induration    Assessment:        Hospital Problems             Last Modified POA    * (Principal) Periorbital cellulitis of left eye 8/2/2022 Yes    Chronic combined systolic and diastolic heart failure ([address] 8/2/2022 Yes    Gastroesophageal reflux disease 8/2/2022 Yes    Type 2 diabetes mellitus without complication ([facility] [address] 8/2/2022 Yes    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 8/3/2022 Yes    DNR (do not resuscitate) 8/2/2022 Yes    Benign essential tremor 8/2/2022 Yes    Overview Signed 10/6/2017 11:27 AM by [facility]     Last Assessment & Plan:   Controlled on current regimen and continue to monitor adjust as needed          Primary hypertension 8/2/2022 Yes    Dyslipidemia 8/2/2022 Yes    Major depression 8/2/2022 Yes    ASHD (arteriosclerotic heart disease) 8/2/2022 Yes    Acute cystitis without hematuria 8/2/2022 Yes       Plan:        Continue Zovirax as ordered  Rocephin, antibiotics per ID  Insulin scale for glycemic control  Continue home cardiac medications  Monitor and control blood pressure  GI and DVT prophylaxis  PT and OT  Further recommendation and to follow pending clinical course    Marty Duff DO  8/3/2022  4:21 PM

## 2022-08-03 NOTE — PLAN OF CARE
Problem: Discharge Planning  Goal: Discharge to home or other facility with appropriate resources  Outcome: Progressing  Flowsheets (Taken 8/2/2022 2000)  Discharge to home or other facility with appropriate resources: Identify barriers to discharge with patient and caregiver     Problem: Skin/Tissue Integrity - Adult  Goal: Incisions, wounds, or drain sites healing without S/S of infection  Outcome: Progressing  Flowsheets  Taken 8/2/2022 2000 by Jody Chavez RN  Incisions, Wounds, or Drain Sites Healing Without Sign and Symptoms of Infection: ADMISSION and DAILY: Assess and document risk factors for pressure ulcer development  Taken 8/2/2022 1654 by Amena Cervantes RN  Incisions, Wounds, or Drain Sites Healing Without Sign and Symptoms of Infection: ADMISSION and DAILY: Assess and document risk factors for pressure ulcer development     Problem: Skin/Tissue Integrity  Goal: Absence of new skin breakdown  Description: 1. Monitor for areas of redness and/or skin breakdown  2. Assess vascular access sites hourly  3. Every 4-6 hours minimum:  Change oxygen saturation probe site  4. Every 4-6 hours:  If on nasal continuous positive airway pressure, respiratory therapy assess nares and determine need for appliance change or resting period.   Outcome: Progressing     Problem: Safety - Adult  Goal: Free from fall injury  Outcome: Progressing     Problem: ABCDS Injury Assessment  Goal: Absence of physical injury  Outcome: Progressing     Problem: Pain  Goal: Verbalizes/displays adequate comfort level or baseline comfort level  Outcome: Progressing

## 2022-08-03 NOTE — FLOWSHEET NOTE
Patient was sitting up in bed eating as writer entered the room (no family members present). Patient engaged in a brief conversation, and stated she was \"glad to see a child of God. \"    The patient began to express her spiritual struggle, but do to the volume of the air flow in the room, and the softness of her voice, it was hard to hear the patient's conversation. Patient became emotionally tearful and sad during the conversation. Writer provided words of hope; a prayer of inner peace and healing; and was able to leave a prayer card as additional support. The patient was grateful and thankful for the prayer and visit. Spiritual care will follow up as needed or requested. 08/03/22 1120   Encounter Summary   Service Provided For: Patient   Referral/Consult From: Nurse;Patient   Last Encounter  08/03/22   Complexity of Encounter Moderate   Begin Time 1100   End Time  1115   Total Time Calculated 15 min   Spiritual/Emotional needs   Type Spiritual Support;Emotional Distress   Assessment/Intervention/Outcome   Assessment Anxious; Loneliness;Sad;Tearful   Intervention Active listening;Nurtured Hope;Prayer (assurance of)/[place];Read/Provided Scripture;Sustaining Presence/Ministry of presence   Outcome Engaged in conversation;Expressed Gratitude   Plan and Referrals   Plan/Referrals Continue to visit, (comment)

## 2022-08-03 NOTE — CARE COORDINATION
Social work: [facility] called to start precert. Will need lola, Rx and discharge order for snf.  Marthe Denver lsw

## 2022-08-03 NOTE — PROGRESS NOTES
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 Diagnoses of Acute cystitis with hematuria, Herpes zoster conjunctivitis, and Facial cellulitis were also pertinent to this visit. Past Medical History:  has a past medical history of Anemia, Arthritis of knee, Benign essential tremor, CAD S/P percutaneous coronary angioplasty, Diabetes mellitus type II, controlled ([facility] [address] Gastrointestinal hemorrhage, History of pancreatitis, Humerus fracture, Hyperlipidemia, Hypertension, Major depression, S/P CABG (coronary artery bypass graft), Ventral hernia, and Vitamin B12 deficiency. Past Surgical History:  has a past surgical history that includes Cholecystectomy; Coronary artery bypass graft (); Hysterectomy, vaginal (); Salpingo-oophorectomy (); Colonoscopy (N/A, 2018); hip surgery (Right); Hip fracture surgery (Right, 10/13/2021); and Hip fracture surgery (Left, 10/19/2021). Assessment   Body Structures, Functions, Activity Limitations Requiring Skilled Therapeutic Intervention: Decreased endurance;Decreased strength;Decreased functional mobility ; Decreased balance;Decreased posture  Assessment: Pt lacks activity tolerance  Therapy Prognosis: Good  Decision Making: High Complexity  Requires PT Follow-Up: Yes  Activity Tolerance  Activity Tolerance: Patient limited by endurance; Patient limited by fatigue     Plan   Plan  Plan: 5-7 times per week  Current Treatment Recommendations: Strengthening, Balance training, Functional mobility training, Transfer training, Gait training, Endurance training (Posture)  Safety Devices  Type of Devices: Gait belt, Left in bed, Bed alarm in place, Call light within reach, Nurse notified  Restraints  Restraints Initially in Place: No     Restrictions  Restrictions/Precautions  Restrictions/Precautions: Contact Precautions, Isolation, Fall Risk, General Precautions  Required Braces or Orthoses?: No  Position Activity Restriction  Other position/activity restrictions: elevate heels, up with assist, AIRBORNE contact iso for shingles     Subjective   Pain: Denies pain  General  Chart Reviewed: Yes  Patient assessed for rehabilitation services?: Yes  Response To Previous Treatment: Not applicable  Family / Caregiver Present: No  Follows Commands: Within Functional Limits  General Comment  Comments: OK for PT per Sridhar Contreras RN         Social/Functional History  Social/Functional History  Lives With: Alone  Type of Home: Senior housing apartment  Home Layout: One level  Home Access: Level entry  Bathroom Shower/Tub: Walk-in shower (has to step over a lip)  Bathroom Toilet: Standard  Bathroom Equipment: Built-in shower seat, Grab bars in shower, Grab bars around toilet  Home Equipment: Audra Passer, rolling  Has the patient had two or more falls in the past year or any fall with injury in the past year?: No  ADL Assistance: Independent  Homemaking Assistance: Needs assistance (meals and house cleaning provided by facility)  Ambulation Assistance: Independent (uses RW primarily, w/c for community distances)  Transfer Assistance: Independent  Active : No  Patient's  Info: transport provided by facility  Occupation: Retired  Type of Occupation:   Leisure & Hobbies: lately has not had any hobbies  Vision/Hearing       Cognition   Orientation  Orientation Level: Oriented X4     Objective   Heart Rate: 83  Heart Rate Source: Monitor  BP: (!) 119/44  BP Location: Right upper arm  Patient Position: Supine  MAP (Calculated): 69  Resp: 24  SpO2: 96 %  O2 Device: None (Room air)        Gross Assessment  AROM: Within functional limits  Strength: Within functional limits  Coordination: Within functional limits  Tone: Normal  Sensation: Intact        Strength RLE  Strength RLE: WFL  Comment: B LE's 4/5 to 4+/5  Strength LLE  Strength LLE: WFL  Strength RUE  Comment: See OT sravani for B UE MMT        Balance  Sitting:  (CGA)  Standing:  (Mod x2 staff assist)  Gait  Overall Level of Assistance: Assist X2;Maximum assistance; Moderate assistance (Pt only able to take 3-4 small lateral steps towards HOB. Pt unsteady at this time requiring MAX x2 staff assist not safe to ambulate away)  Interventions: Verbal cues; Tactile cues (Pt given Max verbal cues for pacing self, upright posture, seqeuncing, initiation, pursed lip breathing, RW safety, hand placement on RW all to increase safety.)  Bed mobility  Rolling to Left: Minimal assistance  Rolling to Right: Minimal assistance  Supine to Sit: Minimal assistance; Moderate assistance;2 Person assistance  Sit to Supine: Moderate assistance;2 Person assistance  Scooting: Moderate assistance  Transfers  Sit to Stand: Moderate Assistance;Maximum Assistance;2 Person Assistance  Stand to sit: Moderate Assistance;Maximum Assistance;2 Person Assistance  Stand Pivot Transfers: Moderate Assistance;Maximum Assistance;2 Person Assistance  Ambulation  Surface: level tile  Device: Rolling Walker  Assistance:  Moderate assistance;Maximum assistance;2 Person assistance  Distance: 3 L side steps  Comments: BTB  More Ambulation?: No  Stairs/Curb  Stairs?: No     Balance  Posture: Fair  Sitting - Static: Good  Sitting - Dynamic: Good  Standing - Static: Poor;+  Standing - Dynamic: Poor           OutComes Score                                                  AM-PAC Score  AM-PAC Inpatient Mobility Raw Score : 12 (08/03/22 1219)  AM-PAC Inpatient T-Scale Score : 35.33 (08/03/22 1219)  Mobility Inpatient CMS 0-100% Score: 68.66 (08/03/22 1219)  Mobility Inpatient CMS G-Code Modifier : CL (08/03/22 1219)          Tinneti Score Goals  Short Term Goals  Time Frame for Short term goals: 12 treatments  Short term goal 1: Independent bed mobility/transfers  Short term goal 2: Ambulate 48' x 1 w/ RW and CGA  Short term goal 3: Good standing balance and posture  Short term goal 4: Tolerate 30 min ther act  Short term goal 5: 1/2 to 1 grade strength increase B LE's  Patient Goals   Patient goals : Feel better       Education  Patient Education  Education Given To: Patient  Education Provided: Role of Therapy;Plan of Care  Education Provided Comments:  Ankle pumps handout  Education Method: Verbal;Demonstration;Printed Information/Hand-outs  Barriers to Learning: None  Education Outcome: Verbalized understanding;Demonstrated understanding      Therapy Time   Individual Concurrent Group Co-treatment   Time In 0902 (Treatment time 23 minutes)         Time Out 0932         Minutes [address] [place] [address]

## 2022-08-04 PROBLEM — N17.9 AKI (ACUTE KIDNEY INJURY) (HCC): Status: ACTIVE | Noted: 2022-08-04

## 2022-08-04 LAB
ABSOLUTE EOS #: 0.12 K/UL (ref 0–0.44)
ABSOLUTE IMMATURE GRANULOCYTE: 0.01 K/UL (ref 0–0.3)
ABSOLUTE LYMPH #: 1.69 K/UL (ref 1.1–3.7)
ABSOLUTE MONO #: 0.35 K/UL (ref 0.1–1.2)
ANION GAP SERPL CALCULATED.3IONS-SCNC: 12 MMOL/L (ref 9–17)
BASOPHILS # BLD: 0 % (ref 0–2)
BASOPHILS ABSOLUTE: <0.03 K/UL (ref 0–0.2)
BUN BLDV-MCNC: 28 MG/DL (ref 8–23)
BUN/CREAT BLD: 17 (ref 9–20)
CALCIUM SERPL-MCNC: 8.5 MG/DL (ref 8.6–10.4)
CHLORIDE BLD-SCNC: 104 MMOL/L (ref 98–107)
CO2: 22 MMOL/L (ref 20–31)
CREAT SERPL-MCNC: 1.62 MG/DL (ref 0.5–0.9)
CULTURE: ABNORMAL
EOSINOPHILS RELATIVE PERCENT: 2 % (ref 1–4)
GFR AFRICAN AMERICAN: 36 ML/MIN
GFR NON-AFRICAN AMERICAN: 30 ML/MIN
GFR SERPL CREATININE-BSD FRML MDRD: ABNORMAL ML/MIN/{1.73_M2}
GLUCOSE BLD-MCNC: 107 MG/DL (ref 65–105)
GLUCOSE BLD-MCNC: 111 MG/DL (ref 65–105)
GLUCOSE BLD-MCNC: 133 MG/DL (ref 65–105)
GLUCOSE BLD-MCNC: 90 MG/DL (ref 70–99)
GLUCOSE BLD-MCNC: 93 MG/DL (ref 65–105)
HCT VFR BLD CALC: 36.2 % (ref 36.3–47.1)
HEMOGLOBIN: 10.9 G/DL (ref 11.9–15.1)
IMMATURE GRANULOCYTES: 0 %
LYMPHOCYTES # BLD: 24 % (ref 24–43)
MCH RBC QN AUTO: 31.3 PG (ref 25.2–33.5)
MCHC RBC AUTO-ENTMCNC: 30.1 G/DL (ref 28.4–34.8)
MCV RBC AUTO: 104 FL (ref 82.6–102.9)
MONOCYTES # BLD: 5 % (ref 3–12)
NRBC AUTOMATED: 0 PER 100 WBC
PDW BLD-RTO: 12.2 % (ref 11.8–14.4)
PLATELET # BLD: 144 K/UL (ref 138–453)
PMV BLD AUTO: 10.7 FL (ref 8.1–13.5)
POTASSIUM SERPL-SCNC: 4.1 MMOL/L (ref 3.7–5.3)
RBC # BLD: 3.48 M/UL (ref 3.95–5.11)
RBC # BLD: ABNORMAL 10*6/UL
SEG NEUTROPHILS: 69 % (ref 36–65)
SEGMENTED NEUTROPHILS ABSOLUTE COUNT: 4.91 K/UL (ref 1.5–8.1)
SODIUM BLD-SCNC: 138 MMOL/L (ref 135–144)
SPECIMEN DESCRIPTION: ABNORMAL
WBC # BLD: 7.1 K/UL (ref 3.5–11.3)

## 2022-08-04 PROCEDURE — 80048 BASIC METABOLIC PNL TOTAL CA: CPT

## 2022-08-04 PROCEDURE — 99232 SBSQ HOSP IP/OBS MODERATE 35: CPT | Performed by: NURSE PRACTITIONER

## 2022-08-04 PROCEDURE — 6370000000 HC RX 637 (ALT 250 FOR IP): Performed by: INTERNAL MEDICINE

## 2022-08-04 PROCEDURE — 97110 THERAPEUTIC EXERCISES: CPT

## 2022-08-04 PROCEDURE — 2580000003 HC RX 258: Performed by: NURSE PRACTITIONER

## 2022-08-04 PROCEDURE — 6370000000 HC RX 637 (ALT 250 FOR IP): Performed by: NURSE PRACTITIONER

## 2022-08-04 PROCEDURE — 97530 THERAPEUTIC ACTIVITIES: CPT

## 2022-08-04 PROCEDURE — 36415 COLL VENOUS BLD VENIPUNCTURE: CPT

## 2022-08-04 PROCEDURE — 82947 ASSAY GLUCOSE BLOOD QUANT: CPT

## 2022-08-04 PROCEDURE — 97116 GAIT TRAINING THERAPY: CPT

## 2022-08-04 PROCEDURE — 97535 SELF CARE MNGMENT TRAINING: CPT

## 2022-08-04 PROCEDURE — 99232 SBSQ HOSP IP/OBS MODERATE 35: CPT | Performed by: INTERNAL MEDICINE

## 2022-08-04 PROCEDURE — 6360000002 HC RX W HCPCS: Performed by: NURSE PRACTITIONER

## 2022-08-04 PROCEDURE — 85025 COMPLETE CBC W/AUTO DIFF WBC: CPT

## 2022-08-04 PROCEDURE — 2060000000 HC ICU INTERMEDIATE R&B

## 2022-08-04 RX ORDER — INSULIN LISPRO 100 [IU]/ML
0-4 INJECTION, SOLUTION INTRAVENOUS; SUBCUTANEOUS NIGHTLY
DISCHARGE
Start: 2022-08-04

## 2022-08-04 RX ORDER — ONDANSETRON 4 MG/1
4 TABLET, ORALLY DISINTEGRATING ORAL EVERY 6 HOURS PRN
Status: DISCONTINUED | OUTPATIENT
Start: 2022-08-04 | End: 2022-08-07 | Stop reason: HOSPADM

## 2022-08-04 RX ORDER — FLUOROMETHOLONE 0.1 %
1 SUSPENSION, DROPS(FINAL DOSAGE FORM)(ML) OPHTHALMIC (EYE) 4 TIMES DAILY
Refills: 3 | DISCHARGE
Start: 2022-08-04 | End: 2022-09-03

## 2022-08-04 RX ORDER — ONDANSETRON 2 MG/ML
4 INJECTION INTRAMUSCULAR; INTRAVENOUS EVERY 6 HOURS PRN
Status: DISCONTINUED | OUTPATIENT
Start: 2022-08-04 | End: 2022-08-07 | Stop reason: HOSPADM

## 2022-08-04 RX ORDER — GABAPENTIN 100 MG/1
100 CAPSULE ORAL 3 TIMES DAILY
Qty: 90 CAPSULE | Refills: 0 | Status: ON HOLD | DISCHARGE
Start: 2022-08-04 | End: 2022-08-25 | Stop reason: SDUPTHER

## 2022-08-04 RX ORDER — HYDROCODONE BITARTRATE AND ACETAMINOPHEN 5; 325 MG/1; MG/1
1 TABLET ORAL EVERY 4 HOURS PRN
Qty: 15 TABLET | Refills: 0 | Status: SHIPPED | OUTPATIENT
Start: 2022-08-04 | End: 2022-08-07

## 2022-08-04 RX ORDER — INSULIN LISPRO 100 [IU]/ML
0-4 INJECTION, SOLUTION INTRAVENOUS; SUBCUTANEOUS
DISCHARGE
Start: 2022-08-04

## 2022-08-04 RX ORDER — POLYETHYLENE GLYCOL 3350 17 G/17G
17 POWDER, FOR SOLUTION ORAL DAILY PRN
Qty: 527 G | Refills: 1 | DISCHARGE
Start: 2022-08-04 | End: 2022-09-03

## 2022-08-04 RX ORDER — ACYCLOVIR 800 MG/1
800 TABLET ORAL 3 TIMES DAILY
Refills: 0 | DISCHARGE
Start: 2022-08-04 | End: 2022-08-11

## 2022-08-04 RX ORDER — HYDROXYZINE HYDROCHLORIDE 25 MG/1
25 TABLET, FILM COATED ORAL EVERY 6 HOURS PRN
Qty: 20 TABLET | Refills: 0 | DISCHARGE
Start: 2022-08-04 | End: 2022-08-14

## 2022-08-04 RX ADMIN — GABAPENTIN 100 MG: 100 CAPSULE ORAL at 09:55

## 2022-08-04 RX ADMIN — TRAMADOL HYDROCHLORIDE 25 MG: 50 TABLET, COATED ORAL at 09:51

## 2022-08-04 RX ADMIN — ATORVASTATIN CALCIUM 20 MG: 20 TABLET, FILM COATED ORAL at 20:19

## 2022-08-04 RX ADMIN — FUROSEMIDE 40 MG: 40 TABLET ORAL at 09:59

## 2022-08-04 RX ADMIN — CITALOPRAM HYDROBROMIDE 30 MG: 20 TABLET ORAL at 09:56

## 2022-08-04 RX ADMIN — PANTOPRAZOLE SODIUM 40 MG: 40 TABLET, DELAYED RELEASE ORAL at 10:00

## 2022-08-04 RX ADMIN — FERROUS SULFATE TAB EC 325 MG (65 MG FE EQUIVALENT) 325 MG: 325 (65 FE) TABLET DELAYED RESPONSE at 09:58

## 2022-08-04 RX ADMIN — FERROUS SULFATE TAB EC 325 MG (65 MG FE EQUIVALENT) 325 MG: 325 (65 FE) TABLET DELAYED RESPONSE at 17:31

## 2022-08-04 RX ADMIN — PRIMIDONE 50 MG: 50 TABLET ORAL at 09:58

## 2022-08-04 RX ADMIN — FLUOROMETHOLONE 1 DROP: 1 SOLUTION/ DROPS OPHTHALMIC at 20:19

## 2022-08-04 RX ADMIN — CARBAMAZEPINE 200 MG: 200 TABLET ORAL at 20:19

## 2022-08-04 RX ADMIN — SODIUM CHLORIDE, PRESERVATIVE FREE 10 ML: 5 INJECTION INTRAVENOUS at 20:17

## 2022-08-04 RX ADMIN — MULTIPLE VITAMINS W/ MINERALS TAB 1 TABLET: TAB at 09:53

## 2022-08-04 RX ADMIN — FLUOROMETHOLONE 1 DROP: 1 SOLUTION/ DROPS OPHTHALMIC at 10:09

## 2022-08-04 RX ADMIN — MYCOPHENOLATE MOFETIL 1000 MG: 250 CAPSULE ORAL at 10:02

## 2022-08-04 RX ADMIN — ENOXAPARIN SODIUM 30 MG: 100 INJECTION SUBCUTANEOUS at 10:01

## 2022-08-04 RX ADMIN — PHENAZOPYRIDINE HYDROCHLORIDE 200 MG: 200 TABLET ORAL at 09:53

## 2022-08-04 RX ADMIN — ACYCLOVIR 800 MG: 800 TABLET ORAL at 10:00

## 2022-08-04 RX ADMIN — ATENOLOL 50 MG: 50 TABLET ORAL at 09:53

## 2022-08-04 RX ADMIN — PRIMIDONE 50 MG: 50 TABLET ORAL at 20:17

## 2022-08-04 RX ADMIN — AMLODIPINE BESYLATE 2.5 MG: 2.5 TABLET ORAL at 10:01

## 2022-08-04 RX ADMIN — METFORMIN HYDROCHLORIDE 500 MG: 500 TABLET ORAL at 09:54

## 2022-08-04 RX ADMIN — ASPIRIN 81 MG: 81 TABLET, COATED ORAL at 10:00

## 2022-08-04 RX ADMIN — MYCOPHENOLATE MOFETIL 500 MG: 250 CAPSULE ORAL at 20:17

## 2022-08-04 RX ADMIN — TRAMADOL HYDROCHLORIDE 25 MG: 50 TABLET, COATED ORAL at 20:17

## 2022-08-04 RX ADMIN — ACYCLOVIR 800 MG: 800 TABLET ORAL at 20:17

## 2022-08-04 RX ADMIN — CYANOCOBALAMIN TAB 1000 MCG 1000 MCG: 1000 TAB at 09:55

## 2022-08-04 RX ADMIN — GABAPENTIN 100 MG: 100 CAPSULE ORAL at 20:18

## 2022-08-04 RX ADMIN — CEFTRIAXONE SODIUM 1000 MG: 1 INJECTION, POWDER, FOR SOLUTION INTRAMUSCULAR; INTRAVENOUS at 12:27

## 2022-08-04 RX ADMIN — FLUOROMETHOLONE 1 DROP: 1 SOLUTION/ DROPS OPHTHALMIC at 17:31

## 2022-08-04 RX ADMIN — SODIUM CHLORIDE: 9 INJECTION, SOLUTION INTRAVENOUS at 17:29

## 2022-08-04 RX ADMIN — CARBAMAZEPINE 200 MG: 200 TABLET ORAL at 10:00

## 2022-08-04 RX ADMIN — METFORMIN HYDROCHLORIDE 500 MG: 500 TABLET ORAL at 17:31

## 2022-08-04 RX ADMIN — PHENAZOPYRIDINE HYDROCHLORIDE 200 MG: 200 TABLET ORAL at 12:39

## 2022-08-04 RX ADMIN — ACYCLOVIR 800 MG: 800 TABLET ORAL at 14:22

## 2022-08-04 RX ADMIN — GABAPENTIN 100 MG: 100 CAPSULE ORAL at 14:22

## 2022-08-04 RX ADMIN — FLUOROMETHOLONE 1 DROP: 1 SOLUTION/ DROPS OPHTHALMIC at 14:22

## 2022-08-04 RX ADMIN — MIRTAZAPINE 15 MG: 15 TABLET, FILM COATED ORAL at 20:19

## 2022-08-04 RX ADMIN — PHENAZOPYRIDINE HYDROCHLORIDE 200 MG: 200 TABLET ORAL at 17:31

## 2022-08-04 RX ADMIN — Medication 500 UNITS: at 09:58

## 2022-08-04 RX ADMIN — ACETAMINOPHEN 650 MG: 325 TABLET ORAL at 23:55

## 2022-08-04 RX ADMIN — POTASSIUM CHLORIDE 20 MEQ: 1500 TABLET, EXTENDED RELEASE ORAL at 10:00

## 2022-08-04 ASSESSMENT — PAIN SCALES - GENERAL: PAINLEVEL_OUTOF10: 3

## 2022-08-04 ASSESSMENT — ENCOUNTER SYMPTOMS
RESPIRATORY NEGATIVE: 1
GASTROINTESTINAL NEGATIVE: 1

## 2022-08-04 NOTE — PLAN OF CARE
Problem: Discharge Planning  Goal: Discharge to home or other facility with appropriate resources  Outcome: Progressing     Problem: Skin/Tissue Integrity - Adult  Goal: Incisions, wounds, or drain sites healing without S/S of infection  Outcome: Progressing     Problem: Chronic Conditions and Co-morbidities  Goal: Patient's chronic conditions and co-morbidity symptoms are monitored and maintained or improved  Outcome: Progressing     Problem: Musculoskeletal - Adult  Goal: Return mobility to safest level of function  Outcome: Progressing

## 2022-08-04 NOTE — PROGRESS NOTES
[facility]  Progress Note    Eriberto Almonte  MRN: [id number]  Date: 8/4/2022  LOS: 2     Reason for F/U :   Herpes zoster infection    Impression :   Left facial/periorbital herpes zoster infection  E. coli urinary tract infection  Headache  Diabetes mellitus type 2    Recommendations:   Patient continues on acyclovir for the herpes zoster  She continues on IV ceftriaxone through today for the E. coli isolated in the urine  Ophthalmology does not round at [facility], the plan is for patient to follow-up on an outpatient basis on Monday  From an infectious disease standpoint, patient can be discharged when okay with other services on oral acyclovir as previously prescribed     Infection Control Recommendations:   Contact precautions    Discharge Planning:   Estimated Length of IV antimicrobials: 8/4/2022  Patient will need Midline Catheter Insertion/ PICC line Insertion: No  Patient will need: Home IV , [place],  SNF,  LTAC: Undetermined  Patient willneed outpatient wound care: No    Medical Decision making / Summary of Stay:   Eriberto Almonte is a 80y.o.-year-old female who was initially admitted on 8/2/2022. The patient has a known medical history of diabetes mellitus type 2, essential tremors, coronary artery disease, hypertension, major depression, glaucoma     The patient initially presented to the emergency department 7/30/2022 with complaints of an intermittent headache that started 1 day prior. She was noted to have left-sided facial rash isolated around the forehead/eye, she was diagnosed with herpes zoster . She was given oral acyclovir 800 mg 5 times per day x7 days. She has been compliant with this. She has not eaten or drank much over the last few days, mainly due to a poor appetite. She has not had any associated fevers or chills.   She continues to have left-sided headache, redness, swelling pain and eye drainage with eye redness so she decided to come to the emergency department because she folic it was getting worse. She does tell me she has been having blurry vision with pain and sensitivity to light. This has gotten worse over the last few days. She also tells me that she has been having some dysuria with pain and pressure with urination. Urine culture has come back positive for E. coli. Patient continues on acyclovir and has been initiated on ceftriaxone. Current evaluation:2022    BP (!) 157/47   Pulse 66   Temp 98.2 °F (36.8 °C)   Resp 16   Ht 5' (1.524 m)   Wt 120 lb (54.4 kg)   SpO2 94%   BMI 23.44 kg/m²     Temperature Range: Temp: 98.2 °F (36.8 °C) Temp  Av °F (36.7 °C)  Min: 97.7 °F (36.5 °C)  Max: 98.4 °F (36.9 °C)    The patient was seen and evaluated at bedside  She continues to have a blurred vision in the left eye  Denies pain or needs    Review of Systems   Constitutional: Negative. HENT: Negative. Eyes:         Blurred vision in left eye   Respiratory: Negative. Cardiovascular: Negative. Gastrointestinal: Negative. Genitourinary: Negative. Musculoskeletal: Negative. Skin: Negative. Neurological: Negative. Psychiatric/Behavioral: Negative. Physical Examination :     Physical Exam  Constitutional:       Appearance: Normal appearance. She is normal weight. Eyes:      Conjunctiva/sclera:      Left eye: Left conjunctiva is injected. Pulmonary:      Effort: Pulmonary effort is normal. No respiratory distress. Musculoskeletal:         General: Normal range of motion. Skin:     General: Skin is warm and dry. Neurological:      General: No focal deficit present. Mental Status: She is alert. Mental status is at baseline. Psychiatric:         Mood and Affect: Mood normal.         Behavior: Behavior normal.         Thought Content:  Thought content normal.       Laboratory data:   I have independently reviewed the followinglabs:  CBC with Differential:   Recent Labs     22  0501 08/04/22  0516   WBC 9.5 7.1   HGB 10.7* 10.9*   HCT 34.6* 36.2*    144   LYMPHOPCT 16* 24   MONOPCT 7 5     BMP:   Recent Labs     08/03/22  0501 08/04/22  0516    138   K 4.7 4.1    104   CO2 25 22   BUN 23 28*   CREATININE 1.68* 1.62*     Hepatic Function Panel:   Recent Labs     08/02/22  1103   PROT 7.4   LABALBU 4.4   BILITOT 0.37   ALKPHOS 114*   ALT 12   AST 20         No results found for: PROCAL  Lab Results   Component Value Date/Time    CRP 9.0 08/02/2022 11:03 AM    CRP 13.3 12/16/2021 06:45 AM    CRP 11.5 11/11/2021 07:19 AM     Lab Results   Component Value Date    SEDRATE 43 (H) 08/02/2022         Lab Results   Component Value Date/Time    DDIMER 309 02/22/2018 06:15 PM     Lab Results   Component Value Date/Time    FERRITIN 49 06/07/2021 07:10 AM    FERRITIN 30 04/12/2021 06:20 AM    FERRITIN 18.4 07/02/2018 10:47 AM     No results found for: LDH  No results found for: FIBRINOGEN    No results found for requested labs within last 30 days. Lab Results   Component Value Date/Time    COVID19 Not Detected 10/25/2021 10:13 AM    COVID19 Not Detected 03/02/2021 05:03 PM       No results for input(s): VANCOTROUGH in the last 72 hours. Imaging Studies:   CT head  Impression:        1. Mild left periorbital soft tissue swelling. 2. No evidence of orbital/postseptal involvement. 3. No acute intracranial findings. Cultures:     Component 8/2/22 1145    Specimen Description . CLEAN CATCH URINE    Culture ESCHERICHIA COLI >[id number] CFU/ML Abnormal     Resulting [address]          Susceptibility      Escherichia coli (1)    Antibiotic Interpretation Microscan Method Status    ampicillin Sensitive 4 BACTERIAL SUSCEPTIBILITY PANEL JERRY Final    aztreonam Sensitive <=1 BACTERIAL SUSCEPTIBILITY PANEL JERRY Final    ceFAZolin Sensitive <=4 BACTERIAL SUSCEPTIBILITY PANEL JERRY Final     Cefazolin sensitivity results can be used to predict the effectiveness of oral cephalosporins (eg.  Cephalexin) in uncomplicated Urinary Tract Infections due to E. coli, K.    pneumoniae, and P. mirabilis        cefTRIAXone Sensitive <=1 BACTERIAL SUSCEPTIBILITY PANEL JERRY Final    ciprofloxacin Sensitive <=0.25 BACTERIAL SUSCEPTIBILITY PANEL JERRY Final    Confirmatory Extended Spectrum Beta-Lactamase Negative NEGATIVE BACTERIAL SUSCEPTIBILITY PANEL JERRY Final    gentamicin Sensitive <=1 BACTERIAL SUSCEPTIBILITY PANEL JERRY Final    nitrofurantoin Sensitive <=16 BACTERIAL SUSCEPTIBILITY PANEL JERRY Final    tobramycin Sensitive <=1 BACTERIAL SUSCEPTIBILITY PANEL JERRY Final    trimethoprim-sulfamethoxazole Sensitive <=20 BACTERIAL SUSCEPTIBILITY PANEL JERRY Final    piperacillin-tazobactam Sensitive <=4 BACTERIAL SUSCEPTIBILITY PANEL JERRY Final          Specimen Collected: 08/02/22 11:45 EDT Last Resulted: 08/04/22 00:13 EDT             Medications:      acyclovir  800 mg Oral TID    atenolol  50 mg Oral Daily    atorvastatin  20 mg Oral Nightly    carBAMazepine  200 mg Oral BID    metFORMIN  500 mg Oral BID WC    mirtazapine  15 mg Oral Nightly    pantoprazole  40 mg Oral Daily    vitamin B-12  1,000 mcg Oral Daily    [START ON 8/5/2022] vitamin D  50,000 Units Oral Weekly    aspirin EC  81 mg Oral Daily    therapeutic multivitamin-minerals  1 tablet Oral Daily    primidone  50 mg Oral BID    Vitamin D  500 Units Oral Daily    traMADol  25 mg Oral BID    amLODIPine  2.5 mg Oral Daily    ferrous sulfate  325 mg Oral BID WC    potassium chloride  20 mEq Oral Daily    predniSONE  5 mg Oral Every Other Day    furosemide  40 mg Oral Daily    mycophenolate  1,000 mg Oral Daily    mycophenolate  500 mg Oral Nightly    sodium chloride flush  5-40 mL IntraVENous 2 times per day    enoxaparin  30 mg SubCUTAneous Daily    cefTRIAXone (ROCEPHIN) IV  1,000 mg IntraVENous Q24H    insulin lispro  0-4 Units SubCUTAneous TID     insulin lispro  0-4 Units SubCUTAneous Nightly    gabapentin  100 mg Oral TID fluorometholone  1 drop Left Eye 4x Daily    phenazopyridine  200 mg Oral TID WC    citalopram  30 mg Oral Daily           [facility]  SIMBA Rivera CNP  [employer] messaging  OFFICE: [phone]      Electronically signed by SIMBA Rivera CNP on 8/4/2022 at 8:08 AM  Thank you for allowing us to participate in the care of this patient. Please call with questions. This note iscreated with the assistance of a speech recognition program.  While intending to generate a document that actually reflects the content of the visit, the document can still have some errors including those of syntax andsound a like substitutions which may escape proof reading. In such instances, actual meaning can be extrapolated by contextual diversion.

## 2022-08-04 NOTE — PROGRESS NOTES
Follow up appointment with Dr Kady Garland for ophthalmology for Monday 8/8/22 at [address] [place]  1 [facility], [address] [place] [address]  [phone]

## 2022-08-04 NOTE — PROGRESS NOTES
Physical Therapy  Facility/Department: [facility] PROGRESSIVE CARE  Daily Treatment Note  NAME: Naty Berry  : 1931  MRN: [id number]    Date of Service: 2022    Discharge Recommendations:  Patient would benefit from continued therapy after discharge   Patient Diagnosis(es): The primary encounter diagnosis was 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. Diagnoses of Acute cystitis with hematuria, Herpes zoster conjunctivitis, and Facial cellulitis were also pertinent to this visit. Assessment   Assessment: Pt was able to ambulate a short distance this date however continues with deficits in strength, endurance, balance, bed mobility, transfers, and gait requiring moderate assistance of 2 for mobility. Pt is currently functioning below baseline and would benefit from continued skilled therapy to maximize return to PLOF. AM-PAC score of 13/24 for current level of function. Activity Tolerance: Patient limited by endurance; Patient limited by fatigue   Plan    Plan  Plan: 5-7 times per week  Current Treatment Recommendations: Strengthening;Balance training;Functional mobility training;Transfer training;Gait training; Endurance training     Restrictions  Restrictions/Precautions  Restrictions/Precautions: Contact Precautions, Isolation, Fall Risk, General Precautions  Required Braces or Orthoses?: No  Position Activity Restriction  Other position/activity restrictions: elevate heels, up with assist, AIRBORNE contact iso for shingles     Subjective    Subjective  Subjective: Pt agreeable to PT session (Nurse gives approval for PT session)  Orientation  Overall Orientation Status: Within Functional Limits  Orientation Level: Oriented X4     Objective      Bed Mobility Training  Bed Mobility Training: Yes  Overall Level of Assistance: Moderate assistance  Interventions: Safety awareness training; Tactile cues; Verbal cues  Rolling: Moderate assistance  Supine to Sit: Moderate assistance  Scooting:  Moderate assistance  Balance  Sitting: Intact  Standing: With support  Transfer Training  Transfer Training: Yes  Overall Level of Assistance: Moderate assistance;Assist X2  Interventions: Tactile cues; Safety awareness training;Verbal cues  Sit to Stand: Moderate assistance;Assist X2  Stand to Sit: Moderate assistance;Assist X2  Stand Pivot Transfers: Moderate assistance;Assist X2  Bed to Chair: Moderate assistance;Assist X2  Toilet Transfer: Moderate assistance;Assist X2  Gait Training  Gait Training: Yes  Gait  Overall Level of Assistance: Moderate assistance;Assist X2  Interventions: Safety awareness training; Tactile cues; Verbal cues  Speed/Michelle: Slow;Shuffled  Step Length: Right shortened;Left shortened  Gait Abnormalities: Shuffling gait, unsteady gait, shuffled steps, posterior lean at times, fatigues quickly, decreased endurance, assist to manage walker  Distance (ft): 10 Feet  Assistive Device: Walker, rolling;Gait belt     PT Exercises  Exercise Treatment: yes  Circulation/Endurance Exercises: ankle pumps  Static Sitting Balance Exercises: Pt performed ADL sitting at EOB, required CGA  for static sitting balance  Static Standing Balance Exercises: Pt performed ADLs in standing requiring mod A for static standing balance.  Pt fatigues quickly in standing requiring frequent rest breaks      Goals  Short Term Goals  Time Frame for Short term goals: 12 treatments  Short term goal 1: Independent bed mobility/transfers  Short term goal 2: Ambulate 48' x 1 w/ RW and CGA  Short term goal 3: Good standing balance and posture  Short term goal 4: Tolerate 30 min ther act  Short term goal 5: 1/2 to 1 grade strength increase B LE's  Patient Goals   Patient goals : Feel better    Education  Patient Education  Education Given To: Patient  Education Provided: Role of Therapy;Plan of Care;Transfer Training  Education Provided Comments: Pt educated on benefits of being OOB to increase strength,endurance and prevent sedentary

## 2022-08-04 NOTE — PROGRESS NOTES
for cough, dyspnea on exertion, shortness of breath, wheezing  Cardiovascular:  negative for chest pain, chest pressure/discomfort, lower extremity edema, palpitations  Gastrointestinal:  negative for abdominal pain, constipation, diarrhea, nausea, vomiting  Neurological:  negative for dizziness, headache    Medications: Allergies:     Allergies   Allergen Reactions    Penicillins Swelling     Rash and swelling      Penicillin G        Current Meds:   Scheduled Meds:    acyclovir  800 mg Oral TID    atenolol  50 mg Oral Daily    atorvastatin  20 mg Oral Nightly    carBAMazepine  200 mg Oral BID    metFORMIN  500 mg Oral BID WC    mirtazapine  15 mg Oral Nightly    pantoprazole  40 mg Oral Daily    vitamin B-12  1,000 mcg Oral Daily    [START ON 8/5/2022] vitamin D  50,000 Units Oral Weekly    aspirin EC  81 mg Oral Daily    therapeutic multivitamin-minerals  1 tablet Oral Daily    primidone  50 mg Oral BID    Vitamin D  500 Units Oral Daily    traMADol  25 mg Oral BID    amLODIPine  2.5 mg Oral Daily    ferrous sulfate  325 mg Oral BID WC    potassium chloride  20 mEq Oral Daily    predniSONE  5 mg Oral Every Other Day    furosemide  40 mg Oral Daily    mycophenolate  1,000 mg Oral Daily    mycophenolate  500 mg Oral Nightly    sodium chloride flush  5-40 mL IntraVENous 2 times per day    enoxaparin  30 mg SubCUTAneous Daily    cefTRIAXone (ROCEPHIN) IV  1,000 mg IntraVENous Q24H    insulin lispro  0-4 Units SubCUTAneous TID WC    insulin lispro  0-4 Units SubCUTAneous Nightly    gabapentin  100 mg Oral TID    fluorometholone  1 drop Left Eye 4x Daily    phenazopyridine  200 mg Oral TID WC    citalopram  30 mg Oral Daily     Continuous Infusions:    dextrose      sodium chloride 50 mL/hr at 08/03/22 1443    sodium chloride       PRN Meds: melatonin, hydrOXYzine HCl, glucose, dextrose bolus **OR** dextrose bolus, glucagon (rDNA), dextrose, sodium chloride flush, sodium chloride, ondansetron **OR** ondansetron, LABGLOM 44* 29* 30*   GFRAA 54* 35* 36*   CALCIUM 9.7 8.6 8.5*     Recent Labs     08/02/22  1103 08/02/22  1955 08/03/22  0749 08/03/22  1214 08/03/22  1645 08/03/22 2007 08/04/22  0820   PROT 7.4  --   --   --   --   --   --    LABALBU 4.4  --   --   --   --   --   --    LABA1C 6.1*  --   --   --   --   --   --    AST 20  --   --   --   --   --   --    ALT 12  --   --   --   --   --   --    ALKPHOS 114*  --   --   --   --   --   --    BILITOT 0.37  --   --   --   --   --   --    POCGLU  --  142* 119* 183* 134* 115* 93     ABG:No results found for: POCPH, PHART, PH, POCPCO2, JVN6QGH, PCO2, POCPO2, PO2ART, PO2, POCHCO3, DJO8DTU, HCO3, NBEA, PBEA, BEART, BE, THGBART, THB, BIZ3RAC, OPSW7QDZ, J4ZOSLWB, O2SAT, FIO2  Lab Results   Component Value Date/Time    SPECIAL RIGHT AC,12ML 08/02/2022 12:32 PM     Lab Results   Component Value Date/Time    CULTURE NO GROWTH 1 DAY 08/02/2022 12:32 PM       Radiology:  CT HEAD WO CONTRAST    Result Date: 8/2/2022  1. Mild left periorbital soft tissue swelling. 2. No evidence of orbital/postseptal involvement. 3. No acute intracranial findings. CT HEAD WO CONTRAST    Result Date: 7/30/2022  No acute intracranial abnormality. Stable pattern of atrophy and mild microangiopathic disease. Mild inflammatory disease of the maxillary sinuses, acute or chronic. CT FACIAL BONES W CONTRAST    Result Date: 8/2/2022  1. Mild left periorbital soft tissue swelling. 2. No evidence of orbital/postseptal involvement. 3. No acute intracranial findings.        Physical Examination:        General appearance:  alert, cooperative and no distress  Mental Status:  oriented to person, place and time and normal affect  Lungs:  clear to auscultation bilaterally, normal effort  Heart:  regular rate and rhythm, no murmur  Abdomen:  soft, nontender, nondistended, normal bowel sounds, no masses, hepatomegaly, splenomegaly  Extremities:  no edema, redness, tenderness in the calves  Skin:  no gross lesions, rashes, induration    Assessment:        Hospital Problems             Last Modified POA    * (Principal) Periorbital cellulitis of left eye 8/2/2022 Yes    Chronic combined systolic and diastolic heart failure ([facility] [address] 8/2/2022 Yes    Gastroesophageal reflux disease 8/2/2022 Yes    Type 2 diabetes mellitus without complication ([facility] [address] 7/1/3542 Yes    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 8/3/2022 Yes    DNR (do not resuscitate) 8/2/2022 Yes    Benign essential tremor 8/2/2022 Yes    Overview Signed 10/6/2017 11:27 AM by [facility]     Last Assessment & Plan:   Controlled on current regimen and continue to monitor adjust as needed          Primary hypertension 8/2/2022 Yes    Dyslipidemia 8/2/2022 Yes    Major depression 8/2/2022 Yes    ASHD (arteriosclerotic heart disease) 8/2/2022 Yes    Acute cystitis without hematuria 8/2/2022 Yes       Plan:        Ophthalmology evaluation, unfortunately no provider on staff will arrange as an outpatient  Antibiotics per ID  Continue acyclovir  Insulin scale  Monitor and control blood pressure  GI and DVT prophylaxis  PT and OT  Continue home maintenance medications  Discharge planning to skilled facility    Marty Duff DO  8/4/2022  9:17 AM

## 2022-08-04 NOTE — PROGRESS NOTES
Occupational Therapy  Facility/Department: [facility] PROGRESSIVE CARE  Rehabilitation Occupational Therapy Daily Treatment Note    Date: 22  Patient Name: Noa Thao       Room: 8379/7421-85  MRN: [id number]  Account: [de-identified]   : 1931  ([address] Gender: female        Pt currently functioning below baseline. Recommend daily inpatient skilled therapy at time of discharge to maximize long term outcomes and prevent re-admission. Please refer to AM-PAC score for current level of function. Past Medical History:  has a past medical history of Anemia, Arthritis of knee, Benign essential tremor, CAD S/P percutaneous coronary angioplasty, Diabetes mellitus type II, controlled ([facility] [address] Gastrointestinal hemorrhage, History of pancreatitis, Humerus fracture, Hyperlipidemia, Hypertension, Major depression, S/P CABG (coronary artery bypass graft), Ventral hernia, and Vitamin B12 deficiency. Past Surgical History:   has a past surgical history that includes Cholecystectomy; Coronary artery bypass graft (); Hysterectomy, vaginal (); Salpingo-oophorectomy (); Colonoscopy (N/A, 2018); hip surgery (Right); Hip fracture surgery (Right, 10/13/2021); and Hip fracture surgery (Left, 10/19/2021). Restrictions  Restrictions/Precautions: Contact Precautions;Isolation; Fall Risk;General Precautions  Other position/activity restrictions: elevate heels, up with assist, AIRBORNE contact iso for shingles  Required Braces or Orthoses?: No    Subjective  Subjective: Pt in bed upon arrival and agreeable to therapy. Restrictions/Precautions: Contact Precautions;Isolation; Fall Risk;General Precautions             Objective     Cognition  Overall Cognitive Status: Exceptions  Arousal/Alertness: Appropriate responses to stimuli  Following Commands: Follows one step commands with increased time; Follows one step commands with repetition  Attention Span: Appears intact; Attends with cues to redirect  Memory: Decreased short term memory  Safety Judgement: Decreased awareness of need for assistance;Decreased awareness of need for safety  Problem Solving: Decreased awareness of errors;Assistance required to identify errors made;Assistance required to correct errors made  Insights: Decreased awareness of deficits  Initiation: Requires cues for some  Sequencing: Requires cues for some  Orientation  Overall Orientation Status: Within Functional Limits   Perception  Overall Perceptual Status: [facility]     ADL  Grooming/Oral Hygiene  Assistance Level: Set-up; Verbal cues; Moderate assistance  Skilled Clinical Factors: seated EOB to wash face, completed oral care seated in chair and writer washed and combed thru pt's hair  Upper Extremity Bathing  Assistance Level: Set-up; Verbal cues; Minimal assistance  Skilled Clinical Factors: seated EOb with assist to wash back  Lower Extremity Bathing  Assistance Level: Set-up; Verbal cues; Maximum assistance; Requires x 2 assistance  Skilled Clinical Factors: standing with walker  Upper Extremity Dressing  Assistance Level: Set-up; Verbal cues; Minimal assistance  Lower Extremity Dressing  Assistance Level: Set-up; Verbal cues; Maximum assistance; Requires x 2 assistance  Skilled Clinical Factors: standing with walker  Putting On/Taking Off Footwear  Assistance Level: Dependent  Toileting  Assistance Level: Maximum assistance; Requires x 2 assistance  Toilet Transfers  Technique: Stand step  Equipment: Beside commode  Additional Factors: Verbal cues;Cues for hand placement  Assistance Level: Moderate assistance; Requires x 2 assistance  Skilled Clinical Factors: Max verbal/tactile cues for hand placement, nose over toes, upright posture, shifting weight forward (pt presents with posterior lean), safety with walker. Functional Mobility  Device: Rolling walker  Activity:  (mobility to [facility])  Assistance Level: Moderate assistance; Requires x 2 assistance  Skilled Clinical Factors: Max verbal/tactile cues for hand placement, nose over toes, upright posture, shifting weight forward (pt presents with posterior lean), safety with walker. Bed Mobility  Overall Assistance Level: Moderate Assistance; Requires x 2 Assistance  Additional Factors: Verbal cues; Increased time to complete; Head of bed raised; With handrails  Supine to Sit  Assistance Level: Moderate assistance; Requires x 2 assistance  Scooting  Assistance Level: Moderate assistance; Requires x 2 assistance  Sit to Stand  Assistance Level: Moderate assistance; Requires x 2 assistance  Stand to Sit  Assistance Level: Moderate assistance; Requires x 2 assistance  Skilled Clinical Factors: Max verbal/tactile cues for hand placement, nose over toes, upright posture, shifting weight forward (pt presents with posterior lean), safety with walker. Neuromuscular Education  Neuromuscular education: Yes  NDT Treatment: Gait ;Sitting;Standing     Assessment  Assessment  Assessment: Pt presents with global weakness, dcereased activity tolerance and overall fatigue. Pt is 2 staff assist for all tasks at this time. Skilled OT indicated to increase safety and IND with all functional tasks to ensure a safe return to PLOF. Activity Tolerance: Patient limited by endurance; Patient limited by fatigue  Discharge Recommendations: Patient would benefit from continued therapy after discharge  Safety Devices  Safety Devices in place: Yes  Type of devices: All fall risk precautions in place; Left in chair;Nurse notified;Call light within reach; Chair alarm in place;Gait belt;Patient at risk for falls    Patient Education  Education  Education Given To: Patient  Education Provided: Mobility Training;Transfer Training;Cognition; Safety  Education Method: Verbal  Barriers to Learning: Cognition  Education Outcome: Verbalized understanding;Demonstrated understanding;Continued education needed    Plan  Plan  Times per Week: 4-5x/wk 1x/day as stephania  Current Treatment Recommendations: Strengthening;Balance training;Functional mobility training; Endurance training; Safety education & training;Equipment evaluation, education, & procurement;Self-Care / ADL; Home management training;Cognitive/Perceptual training;Patient/Caregiver education & training    Goals  Patient Goals   Patient goals : To feel better! Short Term Goals  Time Frame for Short term goals: By discharge, pt to demo  Short Term Goal 1: ADL transfers and functional mobility to Min A with use of AD as needed. Short Term Goal 2: increased B UE strength by 1/2 grade to assist with functional tasks/I with simple B UE HEP with use of handouts as needed. Short Term Goal 3: toileting to Min A with use of AD/grab bars/BSC as needed. Short Term Goal 4: UB ADLs to Set up and LB ADLs to Min A with use of AD/AE as needed. Short Term Goal 5: bed mobility to SBA with use of bedrails as needed. Long Term Goals  Long Term Goal 1: Pt to be I with fall prevention education, EC/WS tech, recommendations for discharge/AE with use of handouts as needed. -PAC Score        [facility] Inpatient Daily Activity Raw Score: 14 (08/04/22 1411)  AM-PAC Inpatient ADL T-Scale Score : 33.39 (08/04/22 1411)  ADL Inpatient CMS 0-100% Score: 59.67 (08/04/22 1411)  ADL Inpatient CMS G-Code Modifier : CK (08/04/22 1411)      Therapy Time   Individual Concurrent Group Co-treatment   Time In       1006   Time Out       1045   Minutes       44    Co-treatment with PT warranted secondary to decreased safety and independence requiring 2 skilled therapy professionals to address individual discipline's goals. OT addressing preparation for ADL transfer, sitting balance for increased ADL performance, sitting/activity tolerance, functional reaching, environmental safety/scanning, fall prevention, functional mobility for ADL transfers, ability to sequence and follow directions, bed mobility tech, and functional UE strength.          LINDA Ross

## 2022-08-05 LAB
ANION GAP SERPL CALCULATED.3IONS-SCNC: 13 MMOL/L (ref 9–17)
BUN BLDV-MCNC: 27 MG/DL (ref 8–23)
BUN/CREAT BLD: 20 (ref 9–20)
CALCIUM SERPL-MCNC: 8.4 MG/DL (ref 8.6–10.4)
CHLORIDE BLD-SCNC: 102 MMOL/L (ref 98–107)
CO2: 21 MMOL/L (ref 20–31)
CREAT SERPL-MCNC: 1.36 MG/DL (ref 0.5–0.9)
GFR AFRICAN AMERICAN: 44 ML/MIN
GFR NON-AFRICAN AMERICAN: 37 ML/MIN
GFR SERPL CREATININE-BSD FRML MDRD: ABNORMAL ML/MIN/{1.73_M2}
GLUCOSE BLD-MCNC: 103 MG/DL (ref 70–99)
GLUCOSE BLD-MCNC: 144 MG/DL (ref 65–105)
GLUCOSE BLD-MCNC: 145 MG/DL (ref 65–105)
GLUCOSE BLD-MCNC: 145 MG/DL (ref 65–105)
GLUCOSE BLD-MCNC: 99 MG/DL (ref 65–105)
POTASSIUM SERPL-SCNC: 3.9 MMOL/L (ref 3.7–5.3)
SODIUM BLD-SCNC: 136 MMOL/L (ref 135–144)

## 2022-08-05 PROCEDURE — 6360000002 HC RX W HCPCS: Performed by: NURSE PRACTITIONER

## 2022-08-05 PROCEDURE — 2580000003 HC RX 258: Performed by: NURSE PRACTITIONER

## 2022-08-05 PROCEDURE — 2060000000 HC ICU INTERMEDIATE R&B

## 2022-08-05 PROCEDURE — 6370000000 HC RX 637 (ALT 250 FOR IP): Performed by: INTERNAL MEDICINE

## 2022-08-05 PROCEDURE — 97110 THERAPEUTIC EXERCISES: CPT

## 2022-08-05 PROCEDURE — 99232 SBSQ HOSP IP/OBS MODERATE 35: CPT | Performed by: INTERNAL MEDICINE

## 2022-08-05 PROCEDURE — 97535 SELF CARE MNGMENT TRAINING: CPT

## 2022-08-05 PROCEDURE — 82947 ASSAY GLUCOSE BLOOD QUANT: CPT

## 2022-08-05 PROCEDURE — 6370000000 HC RX 637 (ALT 250 FOR IP): Performed by: NURSE PRACTITIONER

## 2022-08-05 PROCEDURE — 36415 COLL VENOUS BLD VENIPUNCTURE: CPT

## 2022-08-05 PROCEDURE — 97116 GAIT TRAINING THERAPY: CPT

## 2022-08-05 PROCEDURE — 80048 BASIC METABOLIC PNL TOTAL CA: CPT

## 2022-08-05 RX ORDER — CEPHALEXIN 250 MG/1
250 CAPSULE ORAL 3 TIMES DAILY
Refills: 0 | DISCHARGE
Start: 2022-08-05 | End: 2022-08-07 | Stop reason: HOSPADM

## 2022-08-05 RX ADMIN — ACYCLOVIR 800 MG: 800 TABLET ORAL at 10:20

## 2022-08-05 RX ADMIN — CITALOPRAM HYDROBROMIDE 30 MG: 20 TABLET ORAL at 10:18

## 2022-08-05 RX ADMIN — FLUOROMETHOLONE 1 DROP: 1 SOLUTION/ DROPS OPHTHALMIC at 10:21

## 2022-08-05 RX ADMIN — PRIMIDONE 50 MG: 50 TABLET ORAL at 21:19

## 2022-08-05 RX ADMIN — ENOXAPARIN SODIUM 30 MG: 100 INJECTION SUBCUTANEOUS at 10:21

## 2022-08-05 RX ADMIN — FLUOROMETHOLONE 1 DROP: 1 SOLUTION/ DROPS OPHTHALMIC at 13:26

## 2022-08-05 RX ADMIN — AMLODIPINE BESYLATE 2.5 MG: 2.5 TABLET ORAL at 10:21

## 2022-08-05 RX ADMIN — GABAPENTIN 100 MG: 100 CAPSULE ORAL at 13:26

## 2022-08-05 RX ADMIN — ERGOCALCIFEROL 50000 UNITS: 1.25 CAPSULE, LIQUID FILLED ORAL at 11:28

## 2022-08-05 RX ADMIN — ASPIRIN 81 MG: 81 TABLET, COATED ORAL at 10:19

## 2022-08-05 RX ADMIN — TRAMADOL HYDROCHLORIDE 25 MG: 50 TABLET, COATED ORAL at 21:19

## 2022-08-05 RX ADMIN — PHENAZOPYRIDINE HYDROCHLORIDE 200 MG: 200 TABLET ORAL at 13:26

## 2022-08-05 RX ADMIN — METFORMIN HYDROCHLORIDE 500 MG: 500 TABLET ORAL at 10:19

## 2022-08-05 RX ADMIN — POTASSIUM CHLORIDE 20 MEQ: 1500 TABLET, EXTENDED RELEASE ORAL at 10:20

## 2022-08-05 RX ADMIN — CARBAMAZEPINE 200 MG: 200 TABLET ORAL at 21:19

## 2022-08-05 RX ADMIN — PANTOPRAZOLE SODIUM 40 MG: 40 TABLET, DELAYED RELEASE ORAL at 10:19

## 2022-08-05 RX ADMIN — FLUOROMETHOLONE 1 DROP: 1 SOLUTION/ DROPS OPHTHALMIC at 21:18

## 2022-08-05 RX ADMIN — MIRTAZAPINE 15 MG: 15 TABLET, FILM COATED ORAL at 21:19

## 2022-08-05 RX ADMIN — MYCOPHENOLATE MOFETIL 1000 MG: 250 CAPSULE ORAL at 10:20

## 2022-08-05 RX ADMIN — ACYCLOVIR 800 MG: 800 TABLET ORAL at 21:19

## 2022-08-05 RX ADMIN — SODIUM CHLORIDE: 9 INJECTION, SOLUTION INTRAVENOUS at 17:42

## 2022-08-05 RX ADMIN — GABAPENTIN 100 MG: 100 CAPSULE ORAL at 21:19

## 2022-08-05 RX ADMIN — PHENAZOPYRIDINE HYDROCHLORIDE 200 MG: 200 TABLET ORAL at 10:21

## 2022-08-05 RX ADMIN — ACYCLOVIR 800 MG: 800 TABLET ORAL at 13:26

## 2022-08-05 RX ADMIN — MULTIPLE VITAMINS W/ MINERALS TAB 1 TABLET: TAB at 10:19

## 2022-08-05 RX ADMIN — HYDROCODONE BITARTRATE AND ACETAMINOPHEN 2 TABLET: 5; 325 TABLET ORAL at 17:44

## 2022-08-05 RX ADMIN — METFORMIN HYDROCHLORIDE 500 MG: 500 TABLET ORAL at 17:36

## 2022-08-05 RX ADMIN — FLUOROMETHOLONE 1 DROP: 1 SOLUTION/ DROPS OPHTHALMIC at 17:35

## 2022-08-05 RX ADMIN — ACETAMINOPHEN 650 MG: 325 TABLET ORAL at 10:19

## 2022-08-05 RX ADMIN — GABAPENTIN 100 MG: 100 CAPSULE ORAL at 10:21

## 2022-08-05 RX ADMIN — ATENOLOL 50 MG: 50 TABLET ORAL at 10:19

## 2022-08-05 RX ADMIN — MYCOPHENOLATE MOFETIL 500 MG: 250 CAPSULE ORAL at 21:19

## 2022-08-05 RX ADMIN — FERROUS SULFATE TAB EC 325 MG (65 MG FE EQUIVALENT) 325 MG: 325 (65 FE) TABLET DELAYED RESPONSE at 10:19

## 2022-08-05 RX ADMIN — ATORVASTATIN CALCIUM 20 MG: 20 TABLET, FILM COATED ORAL at 21:19

## 2022-08-05 RX ADMIN — CARBAMAZEPINE 200 MG: 200 TABLET ORAL at 10:20

## 2022-08-05 RX ADMIN — PHENAZOPYRIDINE HYDROCHLORIDE 200 MG: 200 TABLET ORAL at 17:36

## 2022-08-05 RX ADMIN — TRAMADOL HYDROCHLORIDE 25 MG: 50 TABLET, COATED ORAL at 10:20

## 2022-08-05 RX ADMIN — CEFTRIAXONE SODIUM 1000 MG: 1 INJECTION, POWDER, FOR SOLUTION INTRAMUSCULAR; INTRAVENOUS at 13:30

## 2022-08-05 RX ADMIN — FERROUS SULFATE TAB EC 325 MG (65 MG FE EQUIVALENT) 325 MG: 325 (65 FE) TABLET DELAYED RESPONSE at 17:36

## 2022-08-05 RX ADMIN — Medication 500 UNITS: at 10:19

## 2022-08-05 RX ADMIN — PRIMIDONE 50 MG: 50 TABLET ORAL at 10:21

## 2022-08-05 RX ADMIN — CYANOCOBALAMIN TAB 1000 MCG 1000 MCG: 1000 TAB at 10:21

## 2022-08-05 RX ADMIN — PREDNISONE 5 MG: 5 TABLET ORAL at 10:19

## 2022-08-05 ASSESSMENT — ENCOUNTER SYMPTOMS
RESPIRATORY NEGATIVE: 1
GASTROINTESTINAL NEGATIVE: 1

## 2022-08-05 ASSESSMENT — PAIN SCALES - GENERAL
PAINLEVEL_OUTOF10: 10
PAINLEVEL_OUTOF10: 7
PAINLEVEL_OUTOF10: 10
PAINLEVEL_OUTOF10: 10

## 2022-08-05 ASSESSMENT — PAIN DESCRIPTION - LOCATION
LOCATION: NECK
LOCATION: NECK

## 2022-08-05 ASSESSMENT — PAIN DESCRIPTION - ORIENTATION
ORIENTATION: LEFT
ORIENTATION: LEFT

## 2022-08-05 ASSESSMENT — PAIN DESCRIPTION - PAIN TYPE: TYPE: ACUTE PAIN

## 2022-08-05 ASSESSMENT — PAIN DESCRIPTION - DESCRIPTORS
DESCRIPTORS: STABBING
DESCRIPTORS: STABBING

## 2022-08-05 ASSESSMENT — PAIN DESCRIPTION - FREQUENCY: FREQUENCY: INTERMITTENT

## 2022-08-05 NOTE — PROGRESS NOTES
[facility]  Office: [address], DO, Livia Starkey, DO, Abbie Uribeing, DO, Yuridia Amezquita Blood, DO, Edda Londono MD, Myriam Herring MD, Ariel Art MD, Alan Barahona MD,  Araceli Lucero MD, Clara Hollingsworth MD, Yolanda Rosario, DO, Arnav De Leon MD,  Dacia King MD, Brenda Che MD, Rhea Leonard, DO, Oscar Rodriguez MD, Rema Rankin MD, Roque Eason MD, Gato Krishna, DO, Adryan Montalvo MD, Finn Abarca MD, Marcia Alvarez, CNP,  Maura Garcia, CNP, Andres Smith, CNP, Santa Brower, CNP, Roylene Goodell, PA-C, Michelle Gallardo, DNP, April Cisse, CNP, Danielle Lacey, CNP, Jordyn Gonzalez, CNP, Carol Celis, CNP, Yves Cornea, CNP, Amado Martinez, CNS, Farzana Siddiqui, [facility], Spencer Leon, CNP, Carbriseida Pro, CNP, Emmanuel Jt, CNP           [facility]    Progress Note    8/5/2022    9:14 AM    Name:   Emre Crenshaw  MRN:     [id number]     Acct:      [de-identified]   Room:   [facility] Day:  3  Admit Date:  8/2/2022 10:20 AM    PCP:   No primary care provider on file. Code Status:  DNR-CCA    Subjective:     C/C:   Chief Complaint   Patient presents with    Facial Pain    Headache    Otalgia     Interval History Status: improved. Patient is resting comfortably, denies any chest pain, shortness of breath, nausea or vomiting. Has diminished facial pain but now has neck pain. Eye pain and vision changes unchanged, no further photophobia. Denies chest pain, shortness of breath, fevers or chills or acute complaints. Brief History: This is a 44-year-old female that was diagnosed with facial shingles on July 30 and treated as an outpatient with acyclovir. She returns at this time with worsening erythema of the left side of her face and concern for periorbital cellulitis/facial cellulitis.   She is admitted for IV antibiotic therapy and infectious disease and ophthalmology evaluations. Review of Systems:     Constitutional:  negative for chills, fevers, sweats  Respiratory:  negative for cough, dyspnea on exertion, shortness of breath, wheezing  Cardiovascular:  negative for chest pain, chest pressure/discomfort, lower extremity edema, palpitations  Gastrointestinal:  negative for abdominal pain, constipation, diarrhea, nausea, vomiting  Neurological:  negative for dizziness, headache    Medications: Allergies:     Allergies   Allergen Reactions    Penicillins Swelling     Rash and swelling      Penicillin G        Current Meds:   Scheduled Meds:    acyclovir  800 mg Oral TID    atenolol  50 mg Oral Daily    atorvastatin  20 mg Oral Nightly    carBAMazepine  200 mg Oral BID    metFORMIN  500 mg Oral BID WC    mirtazapine  15 mg Oral Nightly    pantoprazole  40 mg Oral Daily    vitamin B-12  1,000 mcg Oral Daily    vitamin D  50,000 Units Oral Weekly    aspirin EC  81 mg Oral Daily    therapeutic multivitamin-minerals  1 tablet Oral Daily    primidone  50 mg Oral BID    Vitamin D  500 Units Oral Daily    traMADol  25 mg Oral BID    amLODIPine  2.5 mg Oral Daily    ferrous sulfate  325 mg Oral BID WC    potassium chloride  20 mEq Oral Daily    predniSONE  5 mg Oral Every Other Day    [Held by provider] furosemide  40 mg Oral Daily    mycophenolate  1,000 mg Oral Daily    mycophenolate  500 mg Oral Nightly    sodium chloride flush  5-40 mL IntraVENous 2 times per day    enoxaparin  30 mg SubCUTAneous Daily    cefTRIAXone (ROCEPHIN) IV  1,000 mg IntraVENous Q24H    insulin lispro  0-4 Units SubCUTAneous TID     insulin lispro  0-4 Units SubCUTAneous Nightly    gabapentin  100 mg Oral TID    fluorometholone  1 drop Left Eye 4x Daily    phenazopyridine  200 mg Oral TID WC    citalopram  30 mg Oral Daily     Continuous Infusions:    dextrose      sodium chloride 50 mL/hr at 08/04/22 1729    sodium chloride       PRN Meds: ondansetron **OR** ondansetron, melatonin, hydrOXYzine HCl, regular rate and rhythm, no murmur  Abdomen:  soft, nontender, nondistended, normal bowel sounds, no masses, hepatomegaly, splenomegaly  Extremities:  no edema, redness, tenderness in the calves  Skin:  no gross lesions, rashes, induration    Assessment:        Hospital Problems             Last Modified POA    * (Principal) Periorbital cellulitis of left eye 8/2/2022 Yes    Chronic combined systolic and diastolic heart failure ([address] 8/2/2022 Yes    Gastroesophageal reflux disease 8/2/2022 Yes    Type 2 diabetes mellitus without complication ([address] 2/9/8268 Yes    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 8/3/2022 Yes    DNR (do not resuscitate) 8/2/2022 Yes    THEODORA (acute kidney injury) ([facility] [address] 8/4/2022 Yes    Overview Signed 8/4/2022  2:51 PM by Edison Mckay DO     Baseline creatinine 0.7-0.8         Benign essential tremor 8/2/2022 Yes    Overview Signed 10/6/2017 11:27 AM by Sage Yeboah CMA     Last Assessment & Plan:   Controlled on current regimen and continue to monitor adjust as needed          Primary hypertension 8/2/2022 Yes    Dyslipidemia 8/2/2022 Yes    Major depression 8/2/2022 Yes    ASHD (arteriosclerotic heart disease) 8/2/2022 Yes    Acute cystitis without hematuria 8/2/2022 Yes       Plan:        Continue acyclovir and Rocephin as ordered, Keflex for UTI plan discharge  Outpatient ophthalmology evaluation  Insulin scale  Monitor and control blood pressure  Cardiac medications as ordered  PT and OT  Discharge planning to skilled facility    Edison Mckay DO  8/5/2022  9:14 AM

## 2022-08-05 NOTE — PLAN OF CARE
Problem: Discharge Planning  Goal: Discharge to home or other facility with appropriate resources  Outcome: Progressing  Flowsheets (Taken 8/4/2022 2000)  Discharge to home or other facility with appropriate resources: Identify barriers to discharge with patient and caregiver     Problem: Skin/Tissue Integrity - Adult  Goal: Incisions, wounds, or drain sites healing without S/S of infection  Outcome: Progressing  Flowsheets (Taken 8/4/2022 2000)  Incisions, Wounds, or Drain Sites Healing Without Sign and Symptoms of Infection: ADMISSION and DAILY: Assess and document risk factors for pressure ulcer development     Problem: Cardiovascular - Adult  Goal: Maintains optimal cardiac output and hemodynamic stability  Outcome: Progressing  Flowsheets (Taken 8/4/2022 2000)  Maintains optimal cardiac output and hemodynamic stability: Monitor blood pressure and heart rate     Problem: Musculoskeletal - Adult  Goal: Return mobility to safest level of function  Outcome: Progressing

## 2022-08-05 NOTE — PROGRESS NOTES
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 coli urinary tract infection  Diabetes mellitus type 2     Recommendations: The patient is on acyclovir for the herpes virus ophthalmicus. The patient continues on Rocephin for the E. coli urinary tract infection  Clinically there is no evidence of secondary bacterial infection  There are plans for outpatient follow-up with ophthalmology  Infectious disease wise the patient remained stable and if there are plans for discharge in the antibiotics for the E. coli can be switched to oral Keflex or Omnicef    Infection Control Recommendations:   Contact precautions    Discharge Planning:   Estimated Length of IV antimicrobials: While hospitalized  Patient will need Midline Catheter Insertion/ PICC line Insertion: No  Patient will need: Home IV , [place],  SNF,  LTAC: Undetermined  Patient willneed outpatient wound care: No    Medical Decision making / Summary of Stay:   Naty Berry is a 80y.o.-year-old female who was initially admitted on 8/2/2022. The patient has a known medical history of diabetes mellitus type 2, essential tremors, coronary artery disease, hypertension, major depression, glaucoma     The patient initially presented to the emergency department 7/30/2022 with complaints of an intermittent headache that started 1 day prior. She was noted to have left-sided facial rash isolated around the forehead/eye, she was diagnosed with herpes zoster . She was given oral acyclovir 800 mg 5 times per day x7 days. She has been compliant with this. She has not eaten or drank much over the last few days, mainly due to a poor appetite. She has not had any associated fevers or chills.   She continues to have left-sided headache, redness, swelling pain and eye drainage with eye redness so she decided to come to the emergency department because she folic it was getting worse. She does tell me she has been having blurry vision with pain and sensitivity to light. This has gotten worse over the last few days. She also tells me that she has been having some dysuria with pain and pressure with urination. Urine culture has come back positive for E. coli. Patient continues on acyclovir and has been initiated on ceftriaxone. Current evaluation:2022    BP (!) 172/61   Pulse 66   Temp 98.2 °F (36.8 °C)   Resp 18   Ht 5' (1.524 m)   Wt 110 lb 8 oz (50.1 kg)   SpO2 95%   BMI 21.58 kg/m²     Temperature Range: Temp: 98.2 °F (36.8 °C) Temp  Av.4 °F (36.9 °C)  Min: 98.1 °F (36.7 °C)  Max: 98.8 °F (37.1 °C)  The patient is seen and evaluated at bedside she is awake and alert in no acute distress. No subjective fevers or chills. No abdominal pain nausea vomiting or diarrhea. She still does report some mild decreased vision in the left side. She is reporting some left-sided neck pain. Review of Systems   Constitutional: Negative. Eyes:  Positive for visual disturbance. Respiratory: Negative. Cardiovascular: Negative. Gastrointestinal: Negative. Genitourinary: Negative. Musculoskeletal:  Positive for neck pain. Skin: Negative. Neurological: Negative. Psychiatric/Behavioral: Negative. Physical Examination :     Physical Exam  Constitutional:       Appearance: She is well-developed. HENT:      Head: Normocephalic and atraumatic. Eyes:      General:         Left eye: Discharge present. Cardiovascular:      Rate and Rhythm: Regular rhythm. Heart sounds: Normal heart sounds. Pulmonary:      Effort: Pulmonary effort is normal.      Breath sounds: Normal breath sounds. Abdominal:      General: Bowel sounds are normal.      Palpations: Abdomen is soft. Musculoskeletal:      Cervical back: Tenderness present. Skin:     General: Skin is warm and dry. Comments: There is some mild erythema in the left periorbital area and some few blistering lesions. Neurological:      Mental Status: She is alert and oriented to person, place, and time. Laboratory data:   I have independently reviewed the followinglabs:  CBC with Differential:   Recent Labs     08/03/22  0501 08/04/22  0516   WBC 9.5 7.1   HGB 10.7* 10.9*   HCT 34.6* 36.2*    144   LYMPHOPCT 16* 24   MONOPCT 7 5     BMP:   Recent Labs     08/04/22  0516 08/05/22  0542    136   K 4.1 3.9    102   CO2 22 21   BUN 28* 27*   CREATININE 1.62* 1.36*     Hepatic Function Panel:   Recent Labs     08/02/22  1103   PROT 7.4   LABALBU 4.4   BILITOT 0.37   ALKPHOS 114*   ALT 12   AST 20         No results found for: PROCAL  Lab Results   Component Value Date/Time    CRP 9.0 08/02/2022 11:03 AM    CRP 13.3 12/16/2021 06:45 AM    CRP 11.5 11/11/2021 07:19 AM     Lab Results   Component Value Date    SEDRATE 43 (H) 08/02/2022         Lab Results   Component Value Date/Time    DDIMER 309 02/22/2018 06:15 PM     Lab Results   Component Value Date/Time    FERRITIN 49 06/07/2021 07:10 AM    FERRITIN 30 04/12/2021 06:20 AM    FERRITIN 18.4 07/02/2018 10:47 AM     No results found for: LDH  No results found for: FIBRINOGEN    No results found for requested labs within last 30 days. Lab Results   Component Value Date/Time    COVID19 Not Detected 10/25/2021 10:13 AM    COVID19 Not Detected 03/02/2021 05:03 PM       No results for input(s): VANCOTROUGH in the last 72 hours. Imaging Studies:   No new imaging    Cultures:     Culture, Blood 1 [[id number]] Collected: 08/02/22 1220   Order Status: Completed Specimen: Blood Updated: 08/04/22 1412    Specimen Description . BLOOD    Special Requests LEFT AC,12ML    Culture NO GROWTH 2 DAYS   Culture, Blood 1 [[id number]] Collected: 08/02/22 1232   Order Status: Completed Specimen: Blood Updated: 08/04/22 1412 Specimen Description . BLOOD    Special Requests RIGHT AC,12ML    Culture NO GROWTH 2 DAYS   Culture, Urine [[id number]] (Abnormal)  Collected: 08/02/22 1145   Order Status: Completed Specimen: Urine, clean catch Updated: 08/04/22 0013    Specimen Description . CLEAN CATCH URINE    Culture ESCHERICHIA COLI >[id number] CFU/ML Abnormal      Escherichia coli (1)    Antibiotic Interpretation Microscan Method Status    ampicillin Sensitive 4 BACTERIAL SUSCEPTIBILITY PANEL JERRY Final    aztreonam Sensitive <=1 BACTERIAL SUSCEPTIBILITY PANEL JERRY Final    ceFAZolin Sensitive <=4 BACTERIAL SUSCEPTIBILITY PANEL JERRY Final     Cefazolin sensitivity results can be used to predict the effectiveness of oral   cephalosporins (eg.  Cephalexin) in uncomplicated Urinary Tract Infections due to E. coli, K.    pneumoniae, and P. mirabilis        cefTRIAXone Sensitive <=1 BACTERIAL SUSCEPTIBILITY PANEL JERRY Final    ciprofloxacin Sensitive <=0.25 BACTERIAL SUSCEPTIBILITY PANEL JERRY Final    Confirmatory Extended Spectrum Beta-Lactamase Negative NEGATIVE BACTERIAL SUSCEPTIBILITY PANEL JERRY Final    gentamicin Sensitive <=1 BACTERIAL SUSCEPTIBILITY PANEL JERRY Final    nitrofurantoin Sensitive <=16 BACTERIAL SUSCEPTIBILITY PANEL JERRY Final    tobramycin Sensitive <=1 BACTERIAL SUSCEPTIBILITY PANEL JERRY Final    trimethoprim-sulfamethoxazole Sensitive <=20 BACTERIAL SUSCEPTIBILITY PANEL JERRY Final    piperacillin-tazobactam Sensitive <=4 BACTERIAL SUSCEPTIBILITY PANEL JERRY Final          Specimen Collected: 08/02/22 11:45 EDT Last Resulted: 08/04/22 00:13 EDT           Medications:      acyclovir  800 mg Oral TID    atenolol  50 mg Oral Daily    atorvastatin  20 mg Oral Nightly    carBAMazepine  200 mg Oral BID    metFORMIN  500 mg Oral BID WC    mirtazapine  15 mg Oral Nightly    pantoprazole  40 mg Oral Daily    vitamin B-12  1,000 mcg Oral Daily    vitamin D  50,000 Units Oral Weekly    aspirin EC  81 mg Oral Daily    therapeutic multivitamin-minerals  1 tablet Oral Daily    primidone  50 mg Oral BID    Vitamin D  500 Units Oral Daily    traMADol  25 mg Oral BID    amLODIPine  2.5 mg Oral Daily    ferrous sulfate  325 mg Oral BID WC    potassium chloride  20 mEq Oral Daily    predniSONE  5 mg Oral Every Other Day    [Held by provider] furosemide  40 mg Oral Daily    mycophenolate  1,000 mg Oral Daily    mycophenolate  500 mg Oral Nightly    sodium chloride flush  5-40 mL IntraVENous 2 times per day    enoxaparin  30 mg SubCUTAneous Daily    cefTRIAXone (ROCEPHIN) IV  1,000 mg IntraVENous Q24H    insulin lispro  0-4 Units SubCUTAneous TID WC    insulin lispro  0-4 Units SubCUTAneous Nightly    gabapentin  100 mg Oral TID    fluorometholone  1 drop Left Eye 4x Daily    phenazopyridine  200 mg Oral TID WC    citalopram  30 mg Oral Daily           [facility]  Alvarado Ott MD  [employer] messaging  OFFICE: [phone]      Electronically signed by Alvarado Ott MD on 8/5/2022 at 9:45 AM  Thank you for allowing us to participate in the care of this patient. Please call with questions. This note iscreated with the assistance of a speech recognition program.  While intending to generate a document that actually reflects the content of the visit, the document can still have some errors including those of syntax andsound a like substitutions which may escape proof reading. In such instances, actual meaning can be extrapolated by contextual diversion.

## 2022-08-05 NOTE — PLAN OF CARE
Problem: Skin/Tissue Integrity  Goal: Absence of new skin breakdown  Description: 1. Monitor for areas of redness and/or skin breakdown  2. Assess vascular access sites hourly  3. Every 4-6 hours minimum:  Change oxygen saturation probe site  4. Every 4-6 hours:  If on nasal continuous positive airway pressure, respiratory therapy assess nares and determine need for appliance change or resting period.   Outcome: Progressing     Problem: Safety - Adult  Goal: Free from fall injury  Outcome: Progressing  Free From Fall Injury: Instruct family/caregiver on patient safety     Problem: Pain  Goal: Verbalizes/displays adequate comfort level or baseline comfort level  Outcome: Progressing  Flowsheets (Taken 8/5/2022 1542)  Verbalizes/displays adequate comfort level or baseline comfort level:   Assess pain using appropriate pain scale   Encourage patient to monitor pain and request assistance

## 2022-08-05 NOTE — DISCHARGE SUMMARY
[facility]  Office: [address], DO, Miguelrose Robbet, DO, Alyssa Walters, DO, Rafa Arnold Marquez, DO, Radha Taylor MD, Lana Henderson MD, Rohan Palumbo MD, Dakota Palomino MD,  Juana Santos MD, Evens Elkins MD, Christophe Elkins, DO, Andie Mandujano MD,  Brittani Monae MD, Maria Teresa Murrieta MD, Zakia Hanson, DO, Kamila Vasquez MD, Luis Pal MD, Rohan Evans MD, Jessica Virgen DO, Mello Max MD, Asya Thurman MD, Kip Triplett, CNP,  Ryanne Baca, CNP, Dale Garrison, CNP, Arnol Kuhn, CNP, Carissa Merida PA-C, Doroteo Jackman, [facility], Shanda Amin, CNP, Hanna Encinas, CNP, Kvng Villeda, CNP, Ramona Rossi, CNP, Derrick Berry, CNP, Diego Lee, CNS, Gustabo Gordon, [facility], Jose Kong, CNP, Faviola Pickard, CNP, Jannet Hendrix, [facility]    Discharge Summary     Patient ID: Yamil Hyatt  :  1931   MRN: [id number]     ACCOUNT:  [de-identified]   Patient's PCP: No primary care provider on file. 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  *      Admission Condition:  fair     Discharged Condition: stable    Hospital Stay: Hospital Course: Shamir Luna is a 80 y.o. female who was admitted for the management of  Periorbital cellulitis of left eye , presented to ER with Facial Pain, Headache, and Otalgia    This is a 75-year-old female with recent diagnosis of facial shingles and was discharged from the emergency room with acyclovir. She returns with worsening erythema and facial pain and is admitted for concerns for periorbital cellulitis. She was started on IV Rocephin which was continued for urinary tract infection. She was eval by infectious disease and periorbital cellulitis with essentially ruled out, felt to be continued eruption from her shingles. She was arranged to see outpatient ophthalmology as there is ophthalmologist on staff. She will follow-up with ophthalmology on Monday. She will be discharged to skilled facility for continued care      Significant therapeutic interventions: As above    Significant Diagnostic Studies:   Labs / Micro:  CBC:   Lab Results   Component Value Date/Time    WBC 7.1 08/04/2022 05:16 AM    RBC 3.48 08/04/2022 05:16 AM    HGB 10.9 08/04/2022 05:16 AM    HCT 36.2 08/04/2022 05:16 AM    .0 08/04/2022 05:16 AM    MCH 31.3 08/04/2022 05:16 AM    MCHC 30.1 08/04/2022 05:16 AM    RDW 12.2 08/04/2022 05:16 AM     08/04/2022 05:16 AM     BMP:    Lab Results   Component Value Date/Time    GLUCOSE 103 08/05/2022 05:42 AM     08/05/2022 05:42 AM    K 3.9 08/05/2022 05:42 AM    K 4.2 02/23/2018 05:00 AM     08/05/2022 05:42 AM    CO2 21 08/05/2022 05:42 AM    ANIONGAP 13 08/05/2022 05:42 AM    BUN 27 08/05/2022 05:42 AM    CREATININE 1.36 08/05/2022 05:42 AM    BUNCRER 20 08/05/2022 05:42 AM    CALCIUM 8.4 08/05/2022 05:42 AM    LABGLOM 37 08/05/2022 05:42 AM    GFRAA 44 08/05/2022 05:42 AM    GFR      08/05/2022 05:42 AM        Radiology:  CT HEAD WO CONTRAST    Result Date: 8/2/2022  1. Mild left periorbital soft tissue swelling.  2. No evidence of orbital/postseptal involvement. 3. No acute intracranial findings. CT HEAD WO CONTRAST    Result Date: 7/30/2022  No acute intracranial abnormality. Stable pattern of atrophy and mild microangiopathic disease. Mild inflammatory disease of the maxillary sinuses, acute or chronic. CT FACIAL BONES W CONTRAST    Result Date: 8/2/2022  1. Mild left periorbital soft tissue swelling. 2. No evidence of orbital/postseptal involvement. 3. No acute intracranial findings. Consultations:    Consults:     Final Specialist Recommendations/Findings:   IP CONSULT TO OPHTHALMOLOGY  IP CONSULT TO HOSPITALIST  IP CONSULT TO INFECTIOUS DISEASES  IP CONSULT TO SOCIAL WORK  IP CONSULT TO SPIRITUAL SERVICES      The patient was seen and examined on day of discharge and this discharge summary is in conjunction with any daily progress note from day of discharge. Discharge plan:     Disposition: To a non-[facility] facility    Physician Follow Up:   PCP 1 week  Maggi Barnett  [address] [place] [address]  [phone]    Go on 8/8/2022  10 AM  insurance card   picture ID  medication list, hospital follow up  Will be there for an hour and a half, will dilate eyes       Requiring Further Evaluation/Follow Up POST HOSPITALIZATION/Incidental Findings: Outpatient ophthalmology evaluation    Diet: diabetic diet    Activity: As tolerated    Instructions to Patient: Take medications as prescribed    Discharge Medications:      Medication List        START taking these medications      cephALEXin 250 MG capsule  Commonly known as: KEFLEX  Take 1 capsule by mouth in the morning and 1 capsule at noon and 1 capsule before bedtime. Do all this for 3 days. fluorometholone 0.1 % ophthalmic suspension  Commonly known as: FML  Place 1 drop into the left eye in the morning and 1 drop at noon and 1 drop in the evening and 1 drop before bedtime.      gabapentin 100 MG capsule  Commonly known as: NEURONTIN  Take 1 capsule by mouth in the morning and 1 capsule at noon and 1 capsule before bedtime. Do all this for 30 days. HYDROcodone-acetaminophen 5-325 MG per tablet  Commonly known as: NORCO  Take 1 tablet by mouth every 4 hours as needed for Pain for up to 3 days. * insulin lispro 100 UNIT/ML Soln injection vial  Commonly known as: HUMALOG  Inject 0-4 Units into the skin 3 times daily (with meals)     * insulin lispro 100 UNIT/ML Soln injection vial  Commonly known as: HUMALOG  Inject 0-4 Units into the skin nightly     polyethylene glycol 17 g packet  Commonly known as: GLYCOLAX  Take 17 g by mouth daily as needed for Constipation           * This list has 2 medication(s) that are the same as other medications prescribed for you. Read the directions carefully, and ask your doctor or other care provider to review them with you. CHANGE how you take these medications      acyclovir 800 MG tablet  Commonly known as: ZOVIRAX  Take 1 tablet by mouth in the morning and 1 tablet at noon and 1 tablet before bedtime. Do all this for 7 days.   What changed: when to take this            CONTINUE taking these medications      acetaminophen 325 MG tablet  Commonly known as: TYLENOL     amLODIPine 2.5 MG tablet  Commonly known as: NORVASC  Take 1 tablet by mouth daily     aspirin EC 81 MG EC tablet  Take 1 tablet by mouth daily     atenolol 50 MG tablet  Commonly known as: TENORMIN  Take 1 tablet by mouth daily     atorvastatin 20 MG tablet  Commonly known as: Lipitor  Take 1 tablet by mouth nightly     carBAMazepine 200 MG tablet  Commonly known as: TEGRETOL  Take 1 tablet by mouth 2 times daily     ferrous sulfate 325 (65 Fe) MG tablet  Commonly known as: IRON 325  Take 1 tablet by mouth 2 times daily (with meals)     furosemide 40 MG tablet  Commonly known as: LASIX  Take 1 tablet by mouth daily     hydrOXYzine HCl 25 MG tablet  Commonly known as: ATARAX  Take 1 tablet by mouth every 6 hours as needed for Itching     JOBST KNEE HIGH COMPRESSION SM Misc  1 each by Does not apply route daily as needed (swelling)     metFORMIN 500 MG tablet  Commonly known as: GLUCOPHAGE  Take 1 tablet by mouth 2 times daily (with meals)     mirtazapine 15 MG tablet  Commonly known as: Remeron  Take 1 tablet by mouth nightly     multivitamin childrens Chew chewable  Take 1 tablet by mouth daily     mycophenolate 500 MG tablet  Commonly known as: CellCept  Take 2 tabs qam and 1 tab every evening     nitroGLYCERIN 0.4 MG SL tablet  Commonly known as: Nitrostat  Place 1 tablet under the tongue every 5 minutes as needed for Chest pain up to max of 3 total doses. If no relief after 1 dose, call 911. pantoprazole 40 MG tablet  Commonly known as: Protonix  Take 1 tablet by mouth daily     permethrin 5 % cream  Commonly known as: Elimite  Apply topically to entire body or affected areas once, leave on for 8 hours, then rinse. May repeat in 1-2 weeks if symptoms persist.     potassium chloride 20 MEQ extended release tablet  Commonly known as: KLOR-CON M  Take 1 tablet by mouth daily     predniSONE 5 MG tablet  Commonly known as: DELTASONE     primidone 50 MG tablet  Commonly known as: Mysoline  Take 1 tablet by mouth 2 times daily     sodium chloride 0.9 % infusion  Infuse 50 mL/hr intravenously continuous     traMADol 50 MG tablet  Commonly known as: ULTRAM     triamcinolone acetonide 40 MG/ML injection  Commonly known as: KENALOG-40  INJECT 1ML (40MG) I.M. EVERY 2 WEEKS ON MONDAY AS DIRECTED     vitamin B-12 1000 MCG tablet  Commonly known as: CYANOCOBALAMIN  Take 1 tablet by mouth daily     * Vitamin D2 50 MCG (2000 UT) Tabs  Take 50,000 Units by mouth once a week     * Vitamin D2 10 MCG (400 UNIT) Tabs  Take 1 tablet by mouth daily           * This list has 2 medication(s) that are the same as other medications prescribed for you. Read the directions carefully, and ask your doctor or other care provider to review them with you.                 ASK your doctor about these medications      * citalopram 20 MG tablet  Commonly known as: CELEXA  Take 1 tablet by mouth daily     * citalopram 10 MG tablet  Commonly known as: CELEXA  Take 1 tablet by mouth daily     melatonin 5 MG Tabs tablet  Take 1 tablet by mouth nightly as needed (insominia)           * This list has 2 medication(s) that are the same as other medications prescribed for you. Read the directions carefully, and ask your doctor or other care provider to review them with you. Where to Get Your Medications        You can get these medications from any pharmacy    Bring a paper prescription for each of these medications  HYDROcodone-acetaminophen 5-325 MG per tablet       Information about where to get these medications is not yet available    Ask your nurse or doctor about these medications  acyclovir 800 MG tablet  cephALEXin 250 MG capsule  fluorometholone 0.1 % ophthalmic suspension  gabapentin 100 MG capsule  hydrOXYzine HCl 25 MG tablet  insulin lispro 100 UNIT/ML Soln injection vial  insulin lispro 100 UNIT/ML Soln injection vial  polyethylene glycol 17 g packet         No discharge procedures on file. Time Spent on discharge is   28 minutes  in patient examination, evaluation, counseling as well as medication reconciliation, prescriptions for required medications, discharge plan and follow up. Electronically signed by   Tiffany Díaz DO  8/5/2022  2:34 PM      Thank you Dr. Di Carson primary care provider on file. for the opportunity to be involved in this patient's care.

## 2022-08-05 NOTE — PROGRESS NOTES
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 Diagnoses of Acute cystitis with hematuria, Herpes zoster conjunctivitis, and Facial cellulitis were also pertinent to this visit. Assessment   Assessment: Pt with poor tolerance to PT session d/t increased neck pain this date. Pt required mod to maximum assistance of 2 for all mobility. Pt is currently functioning below baseline would benefit from continued skilled PT to increase activity tolerance and for return to PLOF. AM-PAC score of  for current level of function. Activity Tolerance: Patient limited by endurance; Patient limited by pain; Patient limited by fatigue   Plan    Plan  Plan: 5-7 times per week  Current Treatment Recommendations: Strengthening;Balance training;Functional mobility training;Transfer training;Gait training; Endurance training     Restrictions  Restrictions/Precautions  Restrictions/Precautions: Contact Precautions, Isolation, Fall Risk, General Precautions  Required Braces or Orthoses?: No  Position Activity Restriction  Other position/activity restrictions: elevate heels, up with assist, AIRBORNE contact iso for shingles     Subjective    Subjective  Subjective: Pt c/o pain in neck however agreeable to PT session (Nurse gives approval for PT session)  Orientation  Overall Orientation Status: Within Functional Limits  Orientation Level: Oriented X4   Objective   Bed Mobility Training  Bed Mobility Training: Yes  Overall Level of Assistance: Maximum assistance; Total assistance;Assist X2  Interventions: Safety awareness training; Tactile cues; Verbal cues  Rolling: Maximum assistance; Total assistance;Assist X2  Supine to Sit: Maximum assistance; Total assistance;Assist X2  Scooting: Maximum assistance;Assist X2  Balance  Sitting: High guard  Standing: With support  Transfer Training  Transfer Training: Yes  Overall Level of Assistance: Moderate assistance;Assist X2  Interventions: Tactile cues; Verbal cues; Safety awareness training  Sit to Stand: Moderate assistance;Assist X2  Stand to Sit: Moderate assistance;Assist X2  Stand Pivot Transfers: Moderate assistance;Assist X2  Bed to Chair: Moderate assistance;Assist X2  Toilet Transfer: Moderate assistance;Assist X2  Gait Training  Gait Training: Yes  Gait  Overall Level of Assistance: Moderate assistance;Assist X2  Interventions: Safety awareness training; Tactile cues; Verbal cues  Speed/Michelle: Slow;Shuffled  Step Length: Right shortened;Left shortened  Gait Abnormalities: Shuffling gait, unsteady gait, fatigues quickly, decreased endurance, flexed posture  Distance (ft): 5 Feet x 2  Assistive Device: Walker, rolling;Gait belt     PT Exercises  Exercise Treatment: yes  A/AROM Exercises: LAQ  Circulation/Endurance Exercises: ankle pumps  Other Activities  Assisted pt to the bedside commode with mod A of 2. Pt was dependent for brianda care and to stevan/doff brief  Fair static standing balance for ADLs      Goals  Short Term Goals  Time Frame for Short term goals: 12 treatments  Short term goal 1: Independent bed mobility/transfers  Short term goal 2: Ambulate 48' x 1 w/ RW and CGA  Short term goal 3: Good standing balance and posture  Short term goal 4: Tolerate 30 min ther act  Short term goal 5: 1/2 to 1 grade strength increase B LE's  Patient Goals   Patient goals : Feel better    Education  Patient Education  Education Given To: Patient  Education Provided: Role of Therapy;Plan of Care;Transfer Training  Education Provided Comments: Pt educated on safety with all functional mobility  Education Method: Verbal  Barriers to Learning: None; Other (Comment) (increased pain in cervical)  Education Outcome: Verbalized understanding;Demonstrated understanding    Therapy Time   Individual Concurrent Group Co-treatment   Time In 12         Time Out 46         Minutes 39            Co-treatment with OT warranted secondary to decreased safety and independence requiring 2 skilled therapy professionals to address individual discipline's goals. PT addressing   , pre gait trunk strengthening, weight shifting prior to transfers, transfer training, and postural control in sitting.    Ramón Clayton, PTA

## 2022-08-06 LAB
GLUCOSE BLD-MCNC: 104 MG/DL (ref 65–105)
GLUCOSE BLD-MCNC: 109 MG/DL (ref 65–105)
GLUCOSE BLD-MCNC: 115 MG/DL (ref 65–105)
GLUCOSE BLD-MCNC: 160 MG/DL (ref 65–105)

## 2022-08-06 PROCEDURE — 6360000002 HC RX W HCPCS: Performed by: NURSE PRACTITIONER

## 2022-08-06 PROCEDURE — 1200000000 HC SEMI PRIVATE

## 2022-08-06 PROCEDURE — 6370000000 HC RX 637 (ALT 250 FOR IP): Performed by: NURSE PRACTITIONER

## 2022-08-06 PROCEDURE — 99232 SBSQ HOSP IP/OBS MODERATE 35: CPT | Performed by: INTERNAL MEDICINE

## 2022-08-06 PROCEDURE — 6370000000 HC RX 637 (ALT 250 FOR IP): Performed by: INTERNAL MEDICINE

## 2022-08-06 PROCEDURE — 2580000003 HC RX 258: Performed by: NURSE PRACTITIONER

## 2022-08-06 PROCEDURE — 82947 ASSAY GLUCOSE BLOOD QUANT: CPT

## 2022-08-06 RX ORDER — HYDRALAZINE HYDROCHLORIDE 20 MG/ML
5 INJECTION INTRAMUSCULAR; INTRAVENOUS ONCE
Status: COMPLETED | OUTPATIENT
Start: 2022-08-06 | End: 2022-08-06

## 2022-08-06 RX ORDER — METOPROLOL TARTRATE 5 MG/5ML
5 INJECTION INTRAVENOUS ONCE
Status: DISCONTINUED | OUTPATIENT
Start: 2022-08-06 | End: 2022-08-06

## 2022-08-06 RX ORDER — CYCLOBENZAPRINE HCL 5 MG
5 TABLET ORAL 3 TIMES DAILY PRN
Status: DISCONTINUED | OUTPATIENT
Start: 2022-08-06 | End: 2022-08-07 | Stop reason: HOSPADM

## 2022-08-06 RX ORDER — CLONIDINE HYDROCHLORIDE 0.2 MG/1
0.2 TABLET ORAL ONCE
Status: DISCONTINUED | OUTPATIENT
Start: 2022-08-06 | End: 2022-08-06

## 2022-08-06 RX ORDER — AMLODIPINE BESYLATE 2.5 MG/1
2.5 TABLET ORAL ONCE
Status: COMPLETED | OUTPATIENT
Start: 2022-08-06 | End: 2022-08-06

## 2022-08-06 RX ORDER — CYCLOBENZAPRINE HCL 5 MG
5 TABLET ORAL 3 TIMES DAILY PRN
DISCHARGE
Start: 2022-08-06 | End: 2022-08-16

## 2022-08-06 RX ADMIN — HYDROCODONE BITARTRATE AND ACETAMINOPHEN 1 TABLET: 5; 325 TABLET ORAL at 05:30

## 2022-08-06 RX ADMIN — TRAMADOL HYDROCHLORIDE 25 MG: 50 TABLET, COATED ORAL at 07:34

## 2022-08-06 RX ADMIN — CEFTRIAXONE SODIUM 1000 MG: 1 INJECTION, POWDER, FOR SOLUTION INTRAMUSCULAR; INTRAVENOUS at 11:46

## 2022-08-06 RX ADMIN — HYDROCODONE BITARTRATE AND ACETAMINOPHEN 1 TABLET: 5; 325 TABLET ORAL at 18:04

## 2022-08-06 RX ADMIN — ENOXAPARIN SODIUM 30 MG: 100 INJECTION SUBCUTANEOUS at 09:51

## 2022-08-06 RX ADMIN — ACYCLOVIR 800 MG: 800 TABLET ORAL at 09:50

## 2022-08-06 RX ADMIN — FLUOROMETHOLONE 1 DROP: 1 SOLUTION/ DROPS OPHTHALMIC at 14:51

## 2022-08-06 RX ADMIN — Medication 500 UNITS: at 09:50

## 2022-08-06 RX ADMIN — ACYCLOVIR 800 MG: 800 TABLET ORAL at 14:48

## 2022-08-06 RX ADMIN — ATORVASTATIN CALCIUM 20 MG: 20 TABLET, FILM COATED ORAL at 19:52

## 2022-08-06 RX ADMIN — AMLODIPINE BESYLATE 2.5 MG: 2.5 TABLET ORAL at 07:34

## 2022-08-06 RX ADMIN — CITALOPRAM HYDROBROMIDE 30 MG: 20 TABLET ORAL at 09:50

## 2022-08-06 RX ADMIN — METFORMIN HYDROCHLORIDE 500 MG: 500 TABLET ORAL at 09:50

## 2022-08-06 RX ADMIN — CARBAMAZEPINE 200 MG: 200 TABLET ORAL at 19:53

## 2022-08-06 RX ADMIN — POTASSIUM CHLORIDE 20 MEQ: 1500 TABLET, EXTENDED RELEASE ORAL at 09:50

## 2022-08-06 RX ADMIN — GABAPENTIN 100 MG: 100 CAPSULE ORAL at 19:52

## 2022-08-06 RX ADMIN — MYCOPHENOLATE MOFETIL 1000 MG: 250 CAPSULE ORAL at 09:49

## 2022-08-06 RX ADMIN — FERROUS SULFATE TAB EC 325 MG (65 MG FE EQUIVALENT) 325 MG: 325 (65 FE) TABLET DELAYED RESPONSE at 09:50

## 2022-08-06 RX ADMIN — PHENAZOPYRIDINE HYDROCHLORIDE 200 MG: 200 TABLET ORAL at 11:36

## 2022-08-06 RX ADMIN — HYDRALAZINE HYDROCHLORIDE 5 MG: 20 INJECTION INTRAMUSCULAR; INTRAVENOUS at 06:40

## 2022-08-06 RX ADMIN — PRIMIDONE 50 MG: 50 TABLET ORAL at 19:52

## 2022-08-06 RX ADMIN — MULTIPLE VITAMINS W/ MINERALS TAB 1 TABLET: TAB at 09:50

## 2022-08-06 RX ADMIN — CYCLOBENZAPRINE HYDROCHLORIDE 5 MG: 5 TABLET, FILM COATED ORAL at 11:36

## 2022-08-06 RX ADMIN — MORPHINE SULFATE 2 MG: 2 INJECTION, SOLUTION INTRAMUSCULAR; INTRAVENOUS at 14:49

## 2022-08-06 RX ADMIN — PANTOPRAZOLE SODIUM 40 MG: 40 TABLET, DELAYED RELEASE ORAL at 09:50

## 2022-08-06 RX ADMIN — TRAMADOL HYDROCHLORIDE 25 MG: 50 TABLET, COATED ORAL at 19:51

## 2022-08-06 RX ADMIN — AMLODIPINE BESYLATE 2.5 MG: 2.5 TABLET ORAL at 05:30

## 2022-08-06 RX ADMIN — FLUOROMETHOLONE 1 DROP: 1 SOLUTION/ DROPS OPHTHALMIC at 09:54

## 2022-08-06 RX ADMIN — PHENAZOPYRIDINE HYDROCHLORIDE 200 MG: 200 TABLET ORAL at 09:50

## 2022-08-06 RX ADMIN — HYDROCODONE BITARTRATE AND ACETAMINOPHEN 2 TABLET: 5; 325 TABLET ORAL at 11:36

## 2022-08-06 RX ADMIN — MYCOPHENOLATE MOFETIL 500 MG: 250 CAPSULE ORAL at 19:58

## 2022-08-06 RX ADMIN — FERROUS SULFATE TAB EC 325 MG (65 MG FE EQUIVALENT) 325 MG: 325 (65 FE) TABLET DELAYED RESPONSE at 18:01

## 2022-08-06 RX ADMIN — GABAPENTIN 100 MG: 100 CAPSULE ORAL at 07:34

## 2022-08-06 RX ADMIN — PHENAZOPYRIDINE HYDROCHLORIDE 200 MG: 200 TABLET ORAL at 18:01

## 2022-08-06 RX ADMIN — FLUOROMETHOLONE 1 DROP: 1 SOLUTION/ DROPS OPHTHALMIC at 18:01

## 2022-08-06 RX ADMIN — ASPIRIN 81 MG: 81 TABLET, COATED ORAL at 07:34

## 2022-08-06 RX ADMIN — ACYCLOVIR 800 MG: 800 TABLET ORAL at 19:54

## 2022-08-06 RX ADMIN — ATENOLOL 50 MG: 50 TABLET ORAL at 07:34

## 2022-08-06 RX ADMIN — SODIUM CHLORIDE: 9 INJECTION, SOLUTION INTRAVENOUS at 13:25

## 2022-08-06 RX ADMIN — CYCLOBENZAPRINE HYDROCHLORIDE 5 MG: 5 TABLET, FILM COATED ORAL at 19:53

## 2022-08-06 RX ADMIN — CARBAMAZEPINE 200 MG: 200 TABLET ORAL at 09:50

## 2022-08-06 RX ADMIN — FLUOROMETHOLONE 1 DROP: 1 SOLUTION/ DROPS OPHTHALMIC at 20:05

## 2022-08-06 RX ADMIN — METFORMIN HYDROCHLORIDE 500 MG: 500 TABLET ORAL at 18:01

## 2022-08-06 RX ADMIN — CYANOCOBALAMIN TAB 1000 MCG 1000 MCG: 1000 TAB at 09:50

## 2022-08-06 RX ADMIN — GABAPENTIN 100 MG: 100 CAPSULE ORAL at 14:49

## 2022-08-06 RX ADMIN — PRIMIDONE 50 MG: 50 TABLET ORAL at 09:50

## 2022-08-06 RX ADMIN — MIRTAZAPINE 15 MG: 15 TABLET, FILM COATED ORAL at 19:54

## 2022-08-06 RX ADMIN — SODIUM CHLORIDE: 9 INJECTION, SOLUTION INTRAVENOUS at 11:44

## 2022-08-06 RX ADMIN — HYDROCODONE BITARTRATE AND ACETAMINOPHEN 1 TABLET: 5; 325 TABLET ORAL at 00:03

## 2022-08-06 ASSESSMENT — PAIN DESCRIPTION - ORIENTATION: ORIENTATION: LEFT

## 2022-08-06 ASSESSMENT — ENCOUNTER SYMPTOMS
RESPIRATORY NEGATIVE: 1
GASTROINTESTINAL NEGATIVE: 1

## 2022-08-06 ASSESSMENT — PAIN DESCRIPTION - LOCATION: LOCATION: NECK

## 2022-08-06 ASSESSMENT — PAIN SCALES - GENERAL
PAINLEVEL_OUTOF10: 6
PAINLEVEL_OUTOF10: 4
PAINLEVEL_OUTOF10: 10
PAINLEVEL_OUTOF10: 4

## 2022-08-06 NOTE — FLOWSHEET NOTE
08/06/22 0438   Vital Signs   BP (!) 190/63     Coco Madison notified, she order a one time dose of norvasc

## 2022-08-06 NOTE — PROGRESS NOTES
[facility]  Progress Note    Shamir Luna  MRN: [id number]  Date: 8/6/2022  LOS: 4     Reason for F/U :   Herpes zoster virus infection    Impression :   Left-sided herpes zoster virus ophthalmicus with concern for eye involvement  E. coli urinary tract infection  Diabetes mellitus type 2     Recommendations:   Continue p.o. acyclovir through 8/11/2022 for the herpes virus ophthalmicus. IV ceftriaxone day 5 may discontinue after today's dose  Follow BUN/ Creatinine in a.m. Follow-up with ophthalmology as outpatient      Infection Control Recommendations:   Contact precautions    Discharge Planning:   Estimated Length of IV antimicrobials: Discontinue after today's dose  Patient will need Midline Catheter Insertion/ PICC line Insertion: No  Patient will need: Home IV , [place],  SNF,  LTAC: Undetermined  Patient willneed outpatient wound care: No    Medical Decision making / Summary of Stay:   Shamir Luna is a 80y.o.-year-old female who was initially admitted on 8/2/2022. The patient has a known medical history of diabetes mellitus type 2, essential tremors, coronary artery disease, hypertension, major depression, glaucoma     The patient initially presented to the emergency department 7/30/2022 with complaints of an intermittent headache that started 1 day prior. She was noted to have left-sided facial rash isolated around the forehead/eye, she was diagnosed with herpes zoster . She was given oral acyclovir 800 mg 5 times per day x7 days. She has been compliant with this. She has not eaten or drank much over the last few days, mainly due to a poor appetite. She has not had any associated fevers or chills. She continues to have left-sided headache, redness, swelling pain and eye drainage with eye redness so she decided to come to the emergency department because she folic it was getting worse.   She does tell me she has been having blurry vision with pain and sensitivity to light.  This has gotten worse over the last few days. She also tells me that she has been having some dysuria with pain and pressure with urination. Urine culture has come back positive for E. coli. Patient continues on acyclovir and has been initiated on ceftriaxone. Current evaluation:2022    BP (!) 115/46   Pulse 58   Temp 97.3 °F (36.3 °C) (Oral)   Resp 16   Ht 5' (1.524 m)   Wt 121 lb 3 oz (55 kg)   SpO2 99%   BMI 23.67 kg/m²     Temperature Range: Temp: 97.3 °F (36.3 °C) Temp  Av.4 °F (36.3 °C)  Min: 97.3 °F (36.3 °C)  Max: 97.5 °F (36.4 °C)  The patient is seen and evaluated at bedside she is awake and alert in no acute distress. She is complaining of neck pain, denied fever or chills, denied nausea or vomiting, left periorbital rash improved. Review of Systems   Constitutional: Negative. Eyes:  Positive for visual disturbance. Respiratory: Negative. Cardiovascular: Negative. Gastrointestinal: Negative. Genitourinary: Negative. Musculoskeletal:  Positive for neck pain. Skin: Negative. Neurological: Negative. Psychiatric/Behavioral: Negative. Physical Examination :     Physical Exam  Constitutional:       Appearance: She is well-developed. HENT:      Head: Normocephalic and atraumatic. Eyes:      General:         Left eye: Discharge present. Cardiovascular:      Rate and Rhythm: Regular rhythm. Heart sounds: Normal heart sounds. Pulmonary:      Effort: Pulmonary effort is normal.      Breath sounds: Normal breath sounds. Abdominal:      General: Bowel sounds are normal.      Palpations: Abdomen is soft. Musculoskeletal:      Cervical back: Tenderness present. Skin:     General: Skin is warm and dry. Comments: There is some mild erythema in the left periorbital area and some few blistering lesions. Neurological:      Mental Status: She is alert and oriented to person, place, and time.          Laboratory data:   I have independently reviewed the followinglabs:  CBC with Differential:   Recent Labs     08/04/22  0516   WBC 7.1   HGB 10.9*   HCT 36.2*      LYMPHOPCT 24   MONOPCT 5       BMP:   Recent Labs     08/04/22  0516 08/05/22  0542    136   K 4.1 3.9    102   CO2 22 21   BUN 28* 27*   CREATININE 1.62* 1.36*       Hepatic Function Panel:   No results for input(s): PROT, LABALBU, BILIDIR, IBILI, BILITOT, ALKPHOS, ALT, AST in the last 72 hours. No results found for: PROCAL  Lab Results   Component Value Date/Time    CRP 9.0 08/02/2022 11:03 AM    CRP 13.3 12/16/2021 06:45 AM    CRP 11.5 11/11/2021 07:19 AM     Lab Results   Component Value Date    SEDRATE 43 (H) 08/02/2022         Lab Results   Component Value Date/Time    DDIMER 309 02/22/2018 06:15 PM     Lab Results   Component Value Date/Time    FERRITIN 49 06/07/2021 07:10 AM    FERRITIN 30 04/12/2021 06:20 AM    FERRITIN 18.4 07/02/2018 10:47 AM     No results found for: LDH  No results found for: FIBRINOGEN    No results found for requested labs within last 30 days. Lab Results   Component Value Date/Time    COVID19 Not Detected 10/25/2021 10:13 AM    COVID19 Not Detected 03/02/2021 05:03 PM       No results for input(s): VANCOTROUGH in the last 72 hours. Imaging Studies:   No new imaging    Cultures:     Culture, Blood 1 [[id number]] Collected: 08/02/22 1220   Order Status: Completed Specimen: Blood Updated: 08/04/22 1412    Specimen Description . BLOOD    Special Requests LEFT AC,12ML    Culture NO GROWTH 2 DAYS   Culture, Blood 1 [[id number]] Collected: 08/02/22 1232   Order Status: Completed Specimen: Blood Updated: 08/04/22 1410    Specimen Description . BLOOD    Special Requests RIGHT AC,12ML    Culture NO GROWTH 2 DAYS   Culture, Urine [[id number]] (Abnormal)  Collected: 08/02/22 1145   Order Status: Completed Specimen: Urine, clean catch Updated: 08/04/22 0013    Specimen Description . CLEAN CATCH URINE    Culture ESCHERICHIA COLI >[id number] CFU/ML Abnormal      Escherichia coli (1)    Antibiotic Interpretation Microscan Method Status    ampicillin Sensitive 4 BACTERIAL SUSCEPTIBILITY PANEL JERRY Final    aztreonam Sensitive <=1 BACTERIAL SUSCEPTIBILITY PANEL JERRY Final    ceFAZolin Sensitive <=4 BACTERIAL SUSCEPTIBILITY PANEL JERRY Final     Cefazolin sensitivity results can be used to predict the effectiveness of oral   cephalosporins (eg.  Cephalexin) in uncomplicated Urinary Tract Infections due to E. coli, K.    pneumoniae, and P. mirabilis        cefTRIAXone Sensitive <=1 BACTERIAL SUSCEPTIBILITY PANEL JERRY Final    ciprofloxacin Sensitive <=0.25 BACTERIAL SUSCEPTIBILITY PANEL JERRY Final    Confirmatory Extended Spectrum Beta-Lactamase Negative NEGATIVE BACTERIAL SUSCEPTIBILITY PANEL JERRY Final    gentamicin Sensitive <=1 BACTERIAL SUSCEPTIBILITY PANEL JERRY Final    nitrofurantoin Sensitive <=16 BACTERIAL SUSCEPTIBILITY PANEL JERRY Final    tobramycin Sensitive <=1 BACTERIAL SUSCEPTIBILITY PANEL JERRY Final    trimethoprim-sulfamethoxazole Sensitive <=20 BACTERIAL SUSCEPTIBILITY PANEL JERRY Final    piperacillin-tazobactam Sensitive <=4 BACTERIAL SUSCEPTIBILITY PANEL JERRY Final          Specimen Collected: 08/02/22 11:45 EDT Last Resulted: 08/04/22 00:13 EDT           Medications:      acyclovir  800 mg Oral TID    atenolol  50 mg Oral Daily    atorvastatin  20 mg Oral Nightly    carBAMazepine  200 mg Oral BID    metFORMIN  500 mg Oral BID WC    mirtazapine  15 mg Oral Nightly    pantoprazole  40 mg Oral Daily    vitamin B-12  1,000 mcg Oral Daily    vitamin D  50,000 Units Oral Weekly    aspirin EC  81 mg Oral Daily    therapeutic multivitamin-minerals  1 tablet Oral Daily    primidone  50 mg Oral BID    Vitamin D  500 Units Oral Daily    traMADol  25 mg Oral BID    amLODIPine  2.5 mg Oral Daily    ferrous sulfate  325 mg Oral BID WC    potassium chloride  20 mEq Oral Daily    predniSONE  5 mg Oral Every Other Day    [Held by provider] furosemide  40 mg Oral Daily    mycophenolate  1,000 mg Oral Daily    mycophenolate  500 mg Oral Nightly    sodium chloride flush  5-40 mL IntraVENous 2 times per day    enoxaparin  30 mg SubCUTAneous Daily    insulin lispro  0-4 Units SubCUTAneous TID WC    insulin lispro  0-4 Units SubCUTAneous Nightly    gabapentin  100 mg Oral TID    fluorometholone  1 drop Left Eye 4x Daily    phenazopyridine  200 mg Oral TID WC    citalopram  30 mg Oral Daily           [facility]  Ewa Conn MD  Perfect [employer] messaging  OFFICE: [phone]      Electronically signed by Ewa Conn MD on 8/6/2022 at 4:40 PM  Thank you for allowing us to participate in the care of this patient. Please call with questions. This note iscreated with the assistance of a speech recognition program.  While intending to generate a document that actually reflects the content of the visit, the document can still have some errors including those of syntax andsound a like substitutions which may escape proof reading. In such instances, actual meaning can be extrapolated by contextual diversion.

## 2022-08-06 NOTE — FLOWSHEET NOTE
08/05/22 2004   Treatment Team Notification   Reason for Communication Abnormal vitals  (122/45)     Evie Rosen was notified.  Continue to monitor pt closely

## 2022-08-06 NOTE — PROGRESS NOTES
[facility]  Office: [address], DO, Kaur Nyhan, DO, Lindakallie Pedro, DO, Nomi Opal Marquez, DO, Jameson White MD, Jaclyn Meyers MD, Brady Dobbins MD, Steve Cavanaugh MD,  Ngozi Prajapati MD, Danielle Fontana MD, Bhav[address] Street, DO, Radha Christie MD,  Franki Gonzalez MD, Camila Lindsay MD, Taty Peacock, DO, Олег Snider MD, Homero Walker MD, Sebastian Johnston MD, Brandon Chino DO, Laura Benz MD, Brooks Serrano MD, Fouzia Monae, CNP,  Javier Castillo, CNP, Michael Garibay, CNP, Crystal Bhakta, CNP, Tremaine Wells PA-C, Joann Melendrez, [facility], Abel Reese, CNP, Evonne Castro, CNP, Jluis Kim, CNP, Jannette Dominguez, CNP, Josy Leyva, CNP, Yossi Laguna, CNS, Naga Avalos, [facility], Bertin Hammonds, CNP, Azul Adam, CNP, Olga Lidia Callaway, [facility]    Progress Note    8/6/2022    8:03 AM    Name:   Manfred Grant  MRN:     [id number]     Acct:      [de-identified]   Room:   [facility] Day:  4  Admit Date:  8/2/2022 10:20 AM    PCP:   No primary care provider on file. Code Status:  DNR-CCA    Subjective:     C/C:   Chief Complaint   Patient presents with    Facial Pain    Headache    Otalgia     Interval History Status: improved. Patient reports decreasing eye pain. Denies any chest pain, shortness of breath, nausea vomiting, fevers or chills. Brief History: This is a 80-year-old female that was diagnosed with facial shingles on July 30 and treated as an outpatient with acyclovir. She returns at this time with worsening erythema of the left side of her face and concern for periorbital cellulitis/facial cellulitis. She is admitted for IV antibiotic therapy and infectious disease and ophthalmology evaluations.     Review of Systems:     Constitutional:  negative for chills, fevers, sweats  Respiratory:  negative for cough, dyspnea on exertion, shortness of breath, HYDROcodone 5 mg - acetaminophen **OR** HYDROcodone 5 mg - acetaminophen, morphine **OR** morphine    Data:     Past Medical History:   has a past medical history of Anemia, Arthritis of knee, Benign essential tremor, CAD S/P percutaneous coronary angioplasty, Diabetes mellitus type II, controlled ([address] Gastrointestinal hemorrhage, History of pancreatitis, Humerus fracture, Hyperlipidemia, Hypertension, Major depression, S/P CABG (coronary artery bypass graft), Ventral hernia, and Vitamin B12 deficiency. Social History:   reports that she has never smoked. She has never used smokeless tobacco. She reports that she does not drink alcohol and does not use drugs. Family History:   Family History   Problem Relation Age of Onset    Heart Disease Father     Stroke Sister        Vitals:  BP (!) 178/50   Pulse 56   Temp 97.3 °F (36.3 °C) (Axillary)   Resp 16   Ht 5' (1.524 m)   Wt 121 lb 3 oz (55 kg)   SpO2 95%   BMI 23.67 kg/m²   Temp (24hrs), Av.6 °F (36.4 °C), Min:97.3 °F (36.3 °C), Max:98.2 °F (36.8 °C)    Recent Labs     22  0822  11322  16322   POCGLU 99 145* 144* 145*       I/O (24Hr):   No intake or output data in the 24 hours ending 22 0803    Labs:  Hematology:  Recent Labs     22  0516   WBC 7.1   RBC 3.48*   HGB 10.9*   HCT 36.2*   .0*   MCH 31.3   MCHC 30.1   RDW 12.2      MPV 10.7     Chemistry:  Recent Labs     22  0516 22  0542    136   K 4.1 3.9    102   CO2 22 21   GLUCOSE 90 103*   BUN 28* 27*   CREATININE 1.62* 1.36*   ANIONGAP 12 13   LABGLOM 30* 37*   GFRAA 36* 44*   CALCIUM 8.5* 8.4*     Recent Labs     22  1655 22  0808 22  11322  16322   POCGLU 107* 111* 99 145* 144* 145*     ABG:No results found for: POCPH, PHART, PH, POCPCO2, OIS1KCT, PCO2, POCPO2, PO2ART, PO2, POCHCO3, YMC9ADG, HCO3, NBEA, PBEA, BEART, BE, THGBART, THB, HUS3WWO, PNNF0MCM, C9DMBHHE, O2SAT, FIO2  Lab Results   Component Value Date/Time    SPECIAL RIGHT AC,12ML 08/02/2022 12:32 PM     Lab Results   Component Value Date/Time    CULTURE NO GROWTH 3 DAYS 08/02/2022 12:32 PM       Radiology:  CT HEAD WO CONTRAST    Result Date: 8/2/2022  1. Mild left periorbital soft tissue swelling. 2. No evidence of orbital/postseptal involvement. 3. No acute intracranial findings. CT FACIAL BONES W CONTRAST    Result Date: 8/2/2022  1. Mild left periorbital soft tissue swelling. 2. No evidence of orbital/postseptal involvement. 3. No acute intracranial findings.        Physical Examination:        General appearance:  alert, cooperative and no distress  Mental Status:  oriented to person, place and time and normal affect  Lungs:  clear to auscultation bilaterally, normal effort  Heart:  regular rate and rhythm, no murmur  Abdomen:  soft, nontender, nondistended, normal bowel sounds, no masses, hepatomegaly, splenomegaly  Extremities:  no edema, redness, tenderness in the calves  Skin:  no gross lesions, rashes, induration    Assessment:        Hospital Problems             Last Modified POA    * (Principal) Periorbital cellulitis of left eye 8/2/2022 Yes    Chronic combined systolic and diastolic heart failure ([address] 8/2/2022 Yes    Gastroesophageal reflux disease 8/2/2022 Yes    Type 2 diabetes mellitus without complication ([address] 5/1/1355 Yes    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 8/3/2022 Yes    DNR (do not resuscitate) 8/2/2022 Yes    THEODORA (acute kidney injury) ([address] 8/4/2022 Yes    Overview Signed 8/4/2022  2:51 PM by Erik Salomon DO     Baseline creatinine 0.7-0.8         Benign essential tremor 8/2/2022 Yes    Overview Signed 10/6/2017 11:27 AM by Solomon Joseph CMA     Last Assessment & Plan:   Controlled on current regimen and continue to monitor adjust as needed          Primary hypertension 8/2/2022 Yes    Dyslipidemia 8/2/2022 Yes    Major depression 8/2/2022 Yes    ASHD (arteriosclerotic heart disease) 8/2/2022 Yes    Acute cystitis without hematuria 8/2/2022 Yes       Plan:        Continue acyclovir for complete course, Keflex for UTI plan discharge  Insulin scale  Outpatient ophthalmology evaluation scheduled for Monday  Antihypertensives as ordered  Continue present cardiac medications  PT and OT discharge to skilled facility pending precertification  Clif Avalos DO  8/6/2022  8:03 AM

## 2022-08-06 NOTE — FLOWSHEET NOTE
08/06/22 0626   Vital Signs   BP (!) 188/61   1X dose of metoprolol was ordered by [employer]

## 2022-08-07 VITALS
HEIGHT: 60 IN | TEMPERATURE: 98.2 F | HEART RATE: 64 BPM | OXYGEN SATURATION: 93 % | DIASTOLIC BLOOD PRESSURE: 64 MMHG | BODY MASS INDEX: 23.79 KG/M2 | SYSTOLIC BLOOD PRESSURE: 189 MMHG | WEIGHT: 121.19 LBS | RESPIRATION RATE: 16 BRPM

## 2022-08-07 LAB
BUN BLDV-MCNC: 18 MG/DL (ref 8–23)
CREAT SERPL-MCNC: 1.07 MG/DL (ref 0.5–0.9)
CULTURE: NORMAL
CULTURE: NORMAL
GFR AFRICAN AMERICAN: 58 ML/MIN
GFR NON-AFRICAN AMERICAN: 48 ML/MIN
GFR SERPL CREATININE-BSD FRML MDRD: ABNORMAL ML/MIN/{1.73_M2}
GLUCOSE BLD-MCNC: 120 MG/DL (ref 65–105)
Lab: NORMAL
Lab: NORMAL
SARS-COV-2, RAPID: NOT DETECTED
SPECIMEN DESCRIPTION: NORMAL

## 2022-08-07 PROCEDURE — 2580000003 HC RX 258: Performed by: NURSE PRACTITIONER

## 2022-08-07 PROCEDURE — 97116 GAIT TRAINING THERAPY: CPT

## 2022-08-07 PROCEDURE — 6360000002 HC RX W HCPCS: Performed by: NURSE PRACTITIONER

## 2022-08-07 PROCEDURE — 87635 SARS-COV-2 COVID-19 AMP PRB: CPT

## 2022-08-07 PROCEDURE — 6370000000 HC RX 637 (ALT 250 FOR IP): Performed by: NURSE PRACTITIONER

## 2022-08-07 PROCEDURE — 99232 SBSQ HOSP IP/OBS MODERATE 35: CPT | Performed by: INTERNAL MEDICINE

## 2022-08-07 PROCEDURE — 97530 THERAPEUTIC ACTIVITIES: CPT

## 2022-08-07 PROCEDURE — 82565 ASSAY OF CREATININE: CPT

## 2022-08-07 PROCEDURE — 36415 COLL VENOUS BLD VENIPUNCTURE: CPT

## 2022-08-07 PROCEDURE — 84520 ASSAY OF UREA NITROGEN: CPT

## 2022-08-07 PROCEDURE — 6370000000 HC RX 637 (ALT 250 FOR IP): Performed by: INTERNAL MEDICINE

## 2022-08-07 PROCEDURE — 82947 ASSAY GLUCOSE BLOOD QUANT: CPT

## 2022-08-07 RX ADMIN — PHENAZOPYRIDINE HYDROCHLORIDE 200 MG: 200 TABLET ORAL at 12:40

## 2022-08-07 RX ADMIN — METFORMIN HYDROCHLORIDE 500 MG: 500 TABLET ORAL at 08:20

## 2022-08-07 RX ADMIN — SODIUM CHLORIDE, PRESERVATIVE FREE 10 ML: 5 INJECTION INTRAVENOUS at 12:41

## 2022-08-07 RX ADMIN — HYDROCODONE BITARTRATE AND ACETAMINOPHEN 2 TABLET: 5; 325 TABLET ORAL at 01:46

## 2022-08-07 RX ADMIN — PREDNISONE 5 MG: 5 TABLET ORAL at 08:21

## 2022-08-07 RX ADMIN — HYDROCODONE BITARTRATE AND ACETAMINOPHEN 1 TABLET: 5; 325 TABLET ORAL at 16:00

## 2022-08-07 RX ADMIN — TRAMADOL HYDROCHLORIDE 25 MG: 50 TABLET, COATED ORAL at 08:29

## 2022-08-07 RX ADMIN — CYANOCOBALAMIN TAB 1000 MCG 1000 MCG: 1000 TAB at 08:20

## 2022-08-07 RX ADMIN — AMLODIPINE BESYLATE 2.5 MG: 2.5 TABLET ORAL at 08:21

## 2022-08-07 RX ADMIN — FERROUS SULFATE TAB EC 325 MG (65 MG FE EQUIVALENT) 325 MG: 325 (65 FE) TABLET DELAYED RESPONSE at 16:00

## 2022-08-07 RX ADMIN — Medication 500 UNITS: at 08:20

## 2022-08-07 RX ADMIN — MULTIPLE VITAMINS W/ MINERALS TAB 1 TABLET: TAB at 08:22

## 2022-08-07 RX ADMIN — CARBAMAZEPINE 200 MG: 200 TABLET ORAL at 08:21

## 2022-08-07 RX ADMIN — CYCLOBENZAPRINE HYDROCHLORIDE 5 MG: 5 TABLET, FILM COATED ORAL at 08:22

## 2022-08-07 RX ADMIN — ACYCLOVIR 800 MG: 800 TABLET ORAL at 12:40

## 2022-08-07 RX ADMIN — FERROUS SULFATE TAB EC 325 MG (65 MG FE EQUIVALENT) 325 MG: 325 (65 FE) TABLET DELAYED RESPONSE at 08:21

## 2022-08-07 RX ADMIN — CYCLOBENZAPRINE HYDROCHLORIDE 5 MG: 5 TABLET, FILM COATED ORAL at 16:00

## 2022-08-07 RX ADMIN — MYCOPHENOLATE MOFETIL 1000 MG: 250 CAPSULE ORAL at 08:21

## 2022-08-07 RX ADMIN — PRIMIDONE 50 MG: 50 TABLET ORAL at 08:22

## 2022-08-07 RX ADMIN — FLUOROMETHOLONE 1 DROP: 1 SOLUTION/ DROPS OPHTHALMIC at 12:42

## 2022-08-07 RX ADMIN — ATENOLOL 50 MG: 50 TABLET ORAL at 08:21

## 2022-08-07 RX ADMIN — PHENAZOPYRIDINE HYDROCHLORIDE 200 MG: 200 TABLET ORAL at 08:21

## 2022-08-07 RX ADMIN — MORPHINE SULFATE 2 MG: 2 INJECTION, SOLUTION INTRAMUSCULAR; INTRAVENOUS at 16:59

## 2022-08-07 RX ADMIN — CITALOPRAM HYDROBROMIDE 30 MG: 20 TABLET ORAL at 08:21

## 2022-08-07 RX ADMIN — MORPHINE SULFATE 4 MG: 4 INJECTION, SOLUTION INTRAMUSCULAR; INTRAVENOUS at 05:00

## 2022-08-07 RX ADMIN — GABAPENTIN 100 MG: 100 CAPSULE ORAL at 12:40

## 2022-08-07 RX ADMIN — HYDROCODONE BITARTRATE AND ACETAMINOPHEN 1 TABLET: 5; 325 TABLET ORAL at 08:22

## 2022-08-07 RX ADMIN — GABAPENTIN 100 MG: 100 CAPSULE ORAL at 08:21

## 2022-08-07 RX ADMIN — ENOXAPARIN SODIUM 30 MG: 100 INJECTION SUBCUTANEOUS at 08:22

## 2022-08-07 RX ADMIN — POTASSIUM CHLORIDE 20 MEQ: 1500 TABLET, EXTENDED RELEASE ORAL at 08:20

## 2022-08-07 RX ADMIN — METFORMIN HYDROCHLORIDE 500 MG: 500 TABLET ORAL at 16:00

## 2022-08-07 RX ADMIN — PANTOPRAZOLE SODIUM 40 MG: 40 TABLET, DELAYED RELEASE ORAL at 08:21

## 2022-08-07 RX ADMIN — ASPIRIN 81 MG: 81 TABLET, COATED ORAL at 08:21

## 2022-08-07 RX ADMIN — FLUOROMETHOLONE 1 DROP: 1 SOLUTION/ DROPS OPHTHALMIC at 16:02

## 2022-08-07 RX ADMIN — FLUOROMETHOLONE 1 DROP: 1 SOLUTION/ DROPS OPHTHALMIC at 08:26

## 2022-08-07 RX ADMIN — ACYCLOVIR 800 MG: 800 TABLET ORAL at 08:20

## 2022-08-07 ASSESSMENT — ENCOUNTER SYMPTOMS
RESPIRATORY NEGATIVE: 1
GASTROINTESTINAL NEGATIVE: 1

## 2022-08-07 ASSESSMENT — PAIN SCALES - GENERAL
PAINLEVEL_OUTOF10: 6
PAINLEVEL_OUTOF10: 6
PAINLEVEL_OUTOF10: 5
PAINLEVEL_OUTOF10: 6
PAINLEVEL_OUTOF10: 10
PAINLEVEL_OUTOF10: 10

## 2022-08-07 ASSESSMENT — PAIN DESCRIPTION - LOCATION: LOCATION: NECK

## 2022-08-07 NOTE — CARE COORDINATION
Discharge planning    Patient awaiting precert to [facility]. Discussed with SS and they are calling admission to see if there has been any answer.

## 2022-08-07 NOTE — CARE COORDINATION
Social work: called [facility] and she did get [facility] that is good through Tuesday. They are full so they are setting up for Tuesday at 12:30 pm and if there is a discharge sooner they will call to move up the admission to Monday. Will need: lola, Rx and discharge order for snf at discharge. Will advise pt and family of same. José Denson \[facility]\""    Social work: Isela Gardner called they have a cancellation and can take pt today. Will need lola, Rx and discharge order for snf. Also need a covid swab. José Denson \[facility]\""    Social work: faxed lola and hens. Await results of covid test will fax those next. [facility] set up for 5:30- 6 pm today. Will call family to notify them RN to advise pt.     Report: [phone]   Fax [phone] or fax [phone]  José Denson \[facility]\""

## 2022-08-07 NOTE — PROGRESS NOTES
[facility]  Office: [address], DO, Swapna Hodgson, DO, Josué Rosettecong, DO, Ernieximena Yuaner Blood, DO, Mendoza Briggs MD, Alan Quiñones MD, Shantel Vivas MD, Mendoza Obregon MD,  Himanshu Dumont MD, Radha Sapp MD, Atul Maldonado, DO, Cesar Hurtado MD,  Andrea Branch MD, Lita Aj MD, Justin Mansfield DO, Katheryn Barahona MD, Katalina Beasley MD, Santhosh Jones MD, Chelsi Alfaro DO, Esteban Quinonez MD, Nesha Ramos MD, Swapna Lockhart, CNP,  Shelia Fierro, CNP, Fernando Bradshaw, CNP, Yan Archer, CNP, Juan Antonio De León PA-C, Ayush Coello, [facility], lAex King, CNP, Tripp Pierre, CNP, Ness Magaña, CNP, Monique Hui, CNP, Kirsty Biggs, CNP, Luke Sesay, CNS, Deep Foley, [facility], Minh Gordon, CNP, Roseanne Demarco, CNP, Romeo Don, [facility]    Progress Note    8/7/2022    9:18 AM    Name:   Eriberto Almonte  MRN:     [id number]     Acct:      [de-identified]   Room:   [facility]/1018-02   Day:  5  Admit Date:  8/2/2022 10:20 AM    PCP:   No primary care provider on file. Code Status:  DNR-CCA    Subjective:     C/C:   Chief Complaint   Patient presents with    Facial Pain    Headache    Otalgia     Interval History Status: improved. Patient is resting comfortably denies any complaints of chest pain, shortness of breath, nausea vomiting, fever chills or acute complaints. Decreasing facial pain, continues to have neck pain, some relief with Flexeril. Brief History: This is a 44-year-old female that was diagnosed with facial shingles on July 30 and treated as an outpatient with acyclovir. She returns at this time with worsening erythema of the left side of her face and concern for periorbital cellulitis/facial cellulitis. She is admitted for IV antibiotic therapy and infectious disease and ophthalmology evaluations.     Review of Systems:     Constitutional:  negative for chills, fevers, sweats  Respiratory:  negative for cough, dyspnea on exertion, shortness of breath, wheezing  Cardiovascular:  negative for chest pain, chest pressure/discomfort, lower extremity edema, palpitations  Gastrointestinal:  negative for abdominal pain, constipation, diarrhea, nausea, vomiting  Neurological:  negative for dizziness, headache    Medications: Allergies:     Allergies   Allergen Reactions    Penicillins Swelling     Rash and swelling      Penicillin G        Current Meds:   Scheduled Meds:    acyclovir  800 mg Oral TID    atenolol  50 mg Oral Daily    atorvastatin  20 mg Oral Nightly    carBAMazepine  200 mg Oral BID    metFORMIN  500 mg Oral BID WC    mirtazapine  15 mg Oral Nightly    pantoprazole  40 mg Oral Daily    vitamin B-12  1,000 mcg Oral Daily    vitamin D  50,000 Units Oral Weekly    aspirin EC  81 mg Oral Daily    therapeutic multivitamin-minerals  1 tablet Oral Daily    primidone  50 mg Oral BID    Vitamin D  500 Units Oral Daily    traMADol  25 mg Oral BID    amLODIPine  2.5 mg Oral Daily    ferrous sulfate  325 mg Oral BID WC    potassium chloride  20 mEq Oral Daily    predniSONE  5 mg Oral Every Other Day    [Held by provider] furosemide  40 mg Oral Daily    mycophenolate  1,000 mg Oral Daily    mycophenolate  500 mg Oral Nightly    sodium chloride flush  5-40 mL IntraVENous 2 times per day    enoxaparin  30 mg SubCUTAneous Daily    insulin lispro  0-4 Units SubCUTAneous TID WC    insulin lispro  0-4 Units SubCUTAneous Nightly    gabapentin  100 mg Oral TID    fluorometholone  1 drop Left Eye 4x Daily    phenazopyridine  200 mg Oral TID WC    citalopram  30 mg Oral Daily     Continuous Infusions:    dextrose      sodium chloride 50 mL/hr at 08/06/22 1325    sodium chloride 10 mL/hr at 08/06/22 1144     PRN Meds: cyclobenzaprine, ondansetron **OR** ondansetron, melatonin, hydrOXYzine HCl, glucose, dextrose bolus **OR** dextrose bolus, glucagon (rDNA), dextrose, sodium chloride flush, sodium chloride, polyethylene glycol, acetaminophen **OR** acetaminophen, HYDROcodone 5 mg - acetaminophen **OR** HYDROcodone 5 mg - acetaminophen, morphine **OR** morphine    Data:     Past Medical History:   has a past medical history of Anemia, Arthritis of knee, Benign essential tremor, CAD S/P percutaneous coronary angioplasty, Diabetes mellitus type II, controlled ([facility] [address] Gastrointestinal hemorrhage, History of pancreatitis, Humerus fracture, Hyperlipidemia, Hypertension, Major depression, S/P CABG (coronary artery bypass graft), Ventral hernia, and Vitamin B12 deficiency. Social History:   reports that she has never smoked. She has never used smokeless tobacco. She reports that she does not drink alcohol and does not use drugs. Family History:   Family History   Problem Relation Age of Onset    Heart Disease Father     Stroke Sister        Vitals:  BP (!) 191/59   Pulse 59   Temp 97.7 °F (36.5 °C) (Oral)   Resp 16   Ht 5' (1.524 m)   Wt 121 lb 3 oz (55 kg)   SpO2 95%   BMI 23.67 kg/m²   Temp (24hrs), Av.6 °F (36.4 °C), Min:97.3 °F (36.3 °C), Max:97.9 °F (36.6 °C)    Recent Labs     22  1154 22  1543 22  1932 22  0816   POCGLU 160* 115* 109* 120*       I/O (24Hr): Intake/Output Summary (Last 24 hours) at 2022 0918  Last data filed at 2022 1326  Gross per 24 hour   Intake 183.67 ml   Output --   Net 183.67 ml       Labs:  Hematology:  No results for input(s): WBC, RBC, HGB, HCT, MCV, MCH, MCHC, RDW, PLT, MPV, SEDRATE, CRP, INR, DDIMER, TB0AMWDC, LABABSO in the last 72 hours.     Invalid input(s): PT    Chemistry:  Recent Labs     22  0542      K 3.9      CO2 21   GLUCOSE 103*   BUN 27*   CREATININE 1.36*   ANIONGAP 13   LABGLOM 37*   GFRAA 44*   CALCIUM 8.4*     Recent Labs     22  1956 22  0821 22  1154 22  1543 22  1932 22  0816   POCGLU 145* 104 160* 115* 109* 120*     ABG:No results found for: POCPH, PHART, PH, POCPCO2, HWJ4VLZ, PCO2, POCPO2, PO2ART, PO2, POCHCO3, LCB9FVG, HCO3, NBEA, PBEA, BEART, BE, THGBART, THB, PYG7HFX, PADZ9FZO, Q8MGQRTC, O2SAT, FIO2  Lab Results   Component Value Date/Time    SPECIAL RIGHT AC,12ML 08/02/2022 12:32 PM     Lab Results   Component Value Date/Time    CULTURE NO GROWTH 4 DAYS 08/02/2022 12:32 PM       Radiology:  CT HEAD WO CONTRAST    Result Date: 8/2/2022  1. Mild left periorbital soft tissue swelling. 2. No evidence of orbital/postseptal involvement. 3. No acute intracranial findings. CT FACIAL BONES W CONTRAST    Result Date: 8/2/2022  1. Mild left periorbital soft tissue swelling. 2. No evidence of orbital/postseptal involvement. 3. No acute intracranial findings.        Physical Examination:        General appearance:  alert, cooperative and no distress  Mental Status:  oriented to person, place and time and normal affect  Lungs:  clear to auscultation bilaterally, normal effort  Heart:  regular rate and rhythm, no murmur  Abdomen:  soft, nontender, nondistended, normal bowel sounds, no masses, hepatomegaly, splenomegaly  Extremities:  no edema, redness, tenderness in the calves  Skin:  no gross lesions, rashes, induration    Assessment:        Hospital Problems             Last Modified POA    * (Principal) Periorbital cellulitis of left eye 8/2/2022 Yes    Chronic combined systolic and diastolic heart failure ([address] 8/2/2022 Yes    Gastroesophageal reflux disease 8/2/2022 Yes    Type 2 diabetes mellitus without complication ([address] 7/7/5277 Yes    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 8/3/2022 Yes    DNR (do not resuscitate) 8/2/2022 Yes    THEODORA (acute kidney injury) ([address] 8/4/2022 Yes    Overview Signed 8/4/2022  2:51 PM by Dee Liang DO     Baseline creatinine 0.7-0.8         Benign essential tremor 8/2/2022 Yes    Overview Signed 10/6/2017 11:27 AM by Jailene Andrade CMA     Last Assessment & Plan:   Controlled on current regimen and continue to monitor adjust as needed          Primary hypertension 8/2/2022 Yes    Dyslipidemia 8/2/2022 Yes    Major depression 8/2/2022 Yes    ASHD (arteriosclerotic heart disease) 8/2/2022 Yes    Acute cystitis without hematuria 8/2/2022 Yes       Plan:        Continue acyclovir for complete course, Keflex for UTI upon discharge  Monitoring control blood pressure  Cardiac medications as ordered  Insulin scale  Outpatient ophthalmology evaluation scheduled for tomorrow a.m.   Continue home maintenance medications  PT and OT discharge to skilled facility pending precertification    Loyd Hernandez DO  8/7/2022  9:18 AM

## 2022-08-07 NOTE — PLAN OF CARE
D/c to [facility] this evening at 0744-2626. Pt had minimal appetite today. Pt has slept most of the day. Pt did work with therapy and get into recliner for a few hours. Pt had a full thorough wash up this morning. Pt is unmotivated today compared to yesterday, and is weaker with transfers. Problem: Discharge Planning  Goal: Discharge to home or other facility with appropriate resources  8/7/2022 1604 by Manolo Jay RN  Outcome: Progressing     Problem: Skin/Tissue Integrity - Adult  Goal: Incisions, wounds, or drain sites healing without S/S of infection  8/7/2022 1604 by Manolo Jay RN  Outcome: Progressing     Problem: Skin/Tissue Integrity  Goal: Absence of new skin breakdown  Description: 1. Monitor for areas of redness and/or skin breakdown  2. Assess vascular access sites hourly  3. Every 4-6 hours minimum:  Change oxygen saturation probe site  4. Every 4-6 hours:  If on nasal continuous positive airway pressure, respiratory therapy assess nares and determine need for appliance change or resting period.   8/7/2022 1604 by Manolo Jay RN  Outcome: Progressing     Problem: Safety - Adult  Goal: Free from fall injury  8/7/2022 1604 by Manolo Jay RN  Outcome: Progressing     Problem: ABCDS Injury Assessment  Goal: Absence of physical injury  8/7/2022 1604 by Manolo Jay RN  Outcome: Progressing     Problem: Pain  Goal: Verbalizes/displays adequate comfort level or baseline comfort level  8/7/2022 1604 by Manolo Jay RN  Outcome: Progressing     Problem: Chronic Conditions and Co-morbidities  Goal: Patient's chronic conditions and co-morbidity symptoms are monitored and maintained or improved  8/7/2022 1604 by Manolo Jay RN  Outcome: Progressing     Problem: Cardiovascular - Adult  Goal: Maintains optimal cardiac output and hemodynamic stability  8/7/2022 1604 by Manolo Jay RN  Outcome: Progressing     Problem: Cardiovascular - Adult  Goal: Absence of cardiac dysrhythmias or at baseline  8/7/2022 1604 by Palma Logan RN  Outcome: Progressing     Problem: Musculoskeletal - Adult  Goal: Return mobility to safest level of function  8/7/2022 1604 by Palma Logan RN  Outcome: Progressing     Problem: Musculoskeletal - Adult  Goal: Maintain proper alignment of affected body part  8/7/2022 1604 by Palma Logan RN  Outcome: Progressing     Problem: Musculoskeletal - Adult  Goal: Return ADL status to a safe level of function  8/7/2022 1604 by Palma Logan RN  Outcome: Progressing

## 2022-08-07 NOTE — PLAN OF CARE
Problem: Skin/Tissue Integrity - Adult  Goal: Incisions, wounds, or drain sites healing without S/S of infection  Outcome: Progressing     Skin No rashes or nodules noted. . Mucus membranes pink, dry and intact. Patient turned and repositioned as needed. Heels elevated as needed. Dressings changed as needed and per orders. Continue to monitor skin for breakdown.

## 2022-08-07 NOTE — PROGRESS NOTES
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 coli urinary tract infection  Diabetes mellitus type 2     Recommendations:   Continue p.o. acyclovir through 8/11/2022 for the herpes virus ophthalmicus. IV ceftriaxone 5 days course was completed 8/6/2022   BUN/ Creatinine pending  Follow-up with ophthalmology as outpatient      Infection Control Recommendations:   Contact precautions    Discharge Planning:   Estimated Length of IV antimicrobials: Completed 8/6/2022  Patient will need Midline Catheter Insertion/ PICC line Insertion: No  Patient will need: Home IV , [place],  SNF,  LTAC: Undetermined  Patient willneed outpatient wound care: No    Medical Decision making / Summary of Stay:   Duncan Medrano is a 80y.o.-year-old female who was initially admitted on 8/2/2022. The patient has a known medical history of diabetes mellitus type 2, essential tremors, coronary artery disease, hypertension, major depression, glaucoma     The patient initially presented to the emergency department 7/30/2022 with complaints of an intermittent headache that started 1 day prior. She was noted to have left-sided facial rash isolated around the forehead/eye, she was diagnosed with herpes zoster . She was given oral acyclovir 800 mg 5 times per day x7 days. She has been compliant with this. She has not eaten or drank much over the last few days, mainly due to a poor appetite. She has not had any associated fevers or chills. She continues to have left-sided headache, redness, swelling pain and eye drainage with eye redness so she decided to come to the emergency department because she folic it was getting worse. She does tell me she has been having blurry vision with pain and sensitivity to light.   This has gotten worse over the last few days. She also tells me that she has been having some dysuria with pain and pressure with urination. Urine culture has come back positive for E. coli. Patient continues on acyclovir and has been initiated on ceftriaxone. Current evaluation:2022    BP (!) 191/59   Pulse 59   Temp 97.7 °F (36.5 °C) (Oral)   Resp 16   Ht 5' (1.524 m)   Wt 121 lb 3 oz (55 kg)   SpO2 95%   BMI 23.67 kg/m²     Temperature Range: Temp: 97.7 °F (36.5 °C) Temp  Av.6 °F (36.4 °C)  Min: 97.3 °F (36.3 °C)  Max: 97.9 °F (36.6 °C)  The patient is seen and evaluated at bedside she is awake and alert in no acute distress. She is still complaining of left-sided neck pain, denied fever or chills, denied nausea or vomiting, no other complaints. Blood pressure on the high side. Review of Systems   Constitutional: Negative. Eyes:  Positive for visual disturbance. Respiratory: Negative. Cardiovascular: Negative. Gastrointestinal: Negative. Genitourinary: Negative. Musculoskeletal:  Positive for neck pain. Skin: Negative. Neurological: Negative. Psychiatric/Behavioral: Negative. Physical Examination :     Physical Exam  Constitutional:       Appearance: She is well-developed. HENT:      Head: Normocephalic and atraumatic. Eyes:      General:         Left eye: Discharge present. Cardiovascular:      Rate and Rhythm: Regular rhythm. Heart sounds: Normal heart sounds. Pulmonary:      Effort: Pulmonary effort is normal.      Breath sounds: Normal breath sounds. Abdominal:      General: Bowel sounds are normal.      Palpations: Abdomen is soft. Musculoskeletal:      Cervical back: Tenderness present. Skin:     General: Skin is warm and dry. Comments: There is some mild erythema in the left periorbital area and some few blistering lesions. Neurological:      Mental Status: She is alert and oriented to person, place, and time. Laboratory data:    I have independently reviewed the followinglabs:  CBC with Differential:   No results for input(s): WBC, HGB, HCT, PLT, SEGSPCT, BANDSPCT, LYMPHOPCT, MONOPCT, EOSPCT in the last 72 hours. BMP:   Recent Labs     08/05/22  0542      K 3.9      CO2 21   BUN 27*   CREATININE 1.36*       Hepatic Function Panel:   No results for input(s): PROT, LABALBU, BILIDIR, IBILI, BILITOT, ALKPHOS, ALT, AST in the last 72 hours. No results found for: PROCAL  Lab Results   Component Value Date/Time    CRP 9.0 08/02/2022 11:03 AM    CRP 13.3 12/16/2021 06:45 AM    CRP 11.5 11/11/2021 07:19 AM     Lab Results   Component Value Date    SEDRATE 43 (H) 08/02/2022         Lab Results   Component Value Date/Time    DDIMER 309 02/22/2018 06:15 PM     Lab Results   Component Value Date/Time    FERRITIN 49 06/07/2021 07:10 AM    FERRITIN 30 04/12/2021 06:20 AM    FERRITIN 18.4 07/02/2018 10:47 AM     No results found for: LDH  No results found for: FIBRINOGEN    No results found for requested labs within last 30 days. Lab Results   Component Value Date/Time    COVID19 Not Detected 10/25/2021 10:13 AM    COVID19 Not Detected 03/02/2021 05:03 PM       No results for input(s): VANCOTROUGH in the last 72 hours. Imaging Studies:   No new imaging    Cultures:     Culture, Blood 1 [[id number]] Collected: 08/02/22 1220   Order Status: Completed Specimen: Blood Updated: 08/04/22 1412    Specimen Description . BLOOD    Special Requests LEFT AC,12ML    Culture NO GROWTH 2 DAYS   Culture, Blood 1 [[id number]] Collected: 08/02/22 1232   Order Status: Completed Specimen: Blood Updated: 08/04/22 1410    Specimen Description . BLOOD    Special Requests RIGHT AC,12ML    Culture NO GROWTH 2 DAYS   Culture, Urine [[id number]] (Abnormal)  Collected: 08/02/22 1145   Order Status: Completed Specimen: Urine, clean catch Updated: 08/04/22 0013    Specimen Description . CLEAN CATCH URINE    Culture ESCHERICHIA COLI >[id number] CFU/ML Abnormal Escherichia coli (1)    Antibiotic Interpretation Microscan Method Status    ampicillin Sensitive 4 BACTERIAL SUSCEPTIBILITY PANEL JERRY Final    aztreonam Sensitive <=1 BACTERIAL SUSCEPTIBILITY PANEL JERRY Final    ceFAZolin Sensitive <=4 BACTERIAL SUSCEPTIBILITY PANEL JERRY Final     Cefazolin sensitivity results can be used to predict the effectiveness of oral   cephalosporins (eg.  Cephalexin) in uncomplicated Urinary Tract Infections due to E. coli, K.    pneumoniae, and P. mirabilis        cefTRIAXone Sensitive <=1 BACTERIAL SUSCEPTIBILITY PANEL JERRY Final    ciprofloxacin Sensitive <=0.25 BACTERIAL SUSCEPTIBILITY PANEL JERRY Final    Confirmatory Extended Spectrum Beta-Lactamase Negative NEGATIVE BACTERIAL SUSCEPTIBILITY PANEL JERRY Final    gentamicin Sensitive <=1 BACTERIAL SUSCEPTIBILITY PANEL JERRY Final    nitrofurantoin Sensitive <=16 BACTERIAL SUSCEPTIBILITY PANEL JERRY Final    tobramycin Sensitive <=1 BACTERIAL SUSCEPTIBILITY PANEL JERRY Final    trimethoprim-sulfamethoxazole Sensitive <=20 BACTERIAL SUSCEPTIBILITY PANEL JERRY Final    piperacillin-tazobactam Sensitive <=4 BACTERIAL SUSCEPTIBILITY PANEL JERRY Final          Specimen Collected: 08/02/22 11:45 EDT Last Resulted: 08/04/22 00:13 EDT           Medications:      acyclovir  800 mg Oral TID    atenolol  50 mg Oral Daily    atorvastatin  20 mg Oral Nightly    carBAMazepine  200 mg Oral BID    metFORMIN  500 mg Oral BID WC    mirtazapine  15 mg Oral Nightly    pantoprazole  40 mg Oral Daily    vitamin B-12  1,000 mcg Oral Daily    vitamin D  50,000 Units Oral Weekly    aspirin EC  81 mg Oral Daily    therapeutic multivitamin-minerals  1 tablet Oral Daily    primidone  50 mg Oral BID    Vitamin D  500 Units Oral Daily    traMADol  25 mg Oral BID    amLODIPine  2.5 mg Oral Daily    ferrous sulfate  325 mg Oral BID WC    potassium chloride  20 mEq Oral Daily    predniSONE  5 mg Oral Every Other Day    [Held by provider] furosemide  40 mg Oral Daily mycophenolate  1,000 mg Oral Daily    mycophenolate  500 mg Oral Nightly    sodium chloride flush  5-40 mL IntraVENous 2 times per day    enoxaparin  30 mg SubCUTAneous Daily    insulin lispro  0-4 Units SubCUTAneous TID WC    insulin lispro  0-4 Units SubCUTAneous Nightly    gabapentin  100 mg Oral TID    fluorometholone  1 drop Left Eye 4x Daily    phenazopyridine  200 mg Oral TID WC    citalopram  30 mg Oral Daily           [facility]  Edie Montenegro MD  [employer]  OFFICE: [phone]      Electronically signed by Edie Montenegro MD on 8/7/2022 at 10:58 AM  Thank you for allowing us to participate in the care of this patient. Please call with questions. This note iscreated with the assistance of a speech recognition program.  While intending to generate a document that actually reflects the content of the visit, the document can still have some errors including those of syntax andsound a like substitutions which may escape proof reading. In such instances, actual meaning can be extrapolated by contextual diversion.

## 2022-08-07 NOTE — PROGRESS NOTES
Physical Therapy  Facility/Department: [facility] PROGRESSIVE [facility]ARE  Rehabilitation Physical Therapy Treatment Note    NAME: Jenise Martinez  : 1931 (56 y.o.)  MRN: [id number]  CODE STATUS: DNR-CCA    Date of Service: 22     Pt currently functioning below baseline. Recommend daily inpatient skilled therapy at time of discharge to maximize long term outcomes and prevent re-admission. Please refer to AM-PAC score for current level of function. Restrictions:  Restrictions/Precautions: Contact Precautions;Isolation; Fall Risk;General Precautions  Position Activity Restriction  Other position/activity restrictions: elevate heels, up with assist, AIRBORNE contact iso for shingles     SUBJECTIVE  Subjective  Subjective: Patient lethargic upon therapists arrival. Patient once awake agreeable to PT treatment this date RN Heidi states patient is appropriate for PT treatment. OBJECTIVE  Cognition  Overall Cognitive Status: Exceptions  Arousal/Alertness: Appropriate responses to stimuli  Following Commands: Follows one step commands with increased time; Follows one step commands with repetition  Attention Span: Appears intact; Attends with cues to redirect  Memory: Decreased short term memory  Safety Judgement: Decreased awareness of need for assistance;Decreased awareness of need for safety  Problem Solving: Decreased awareness of errors;Assistance required to identify errors made;Assistance required to correct errors made  Insights: Decreased awareness of deficits  Initiation: Requires cues for some  Sequencing: Requires cues for some  Orientation  Overall Orientation Status: Within Functional Limits  Orientation Level: Oriented X4    Functional Mobility  Bed Mobility  Overall Assistance Level: Moderate Assistance; Requires x 2 Assistance  Additional Factors: Set-up; Verbal cues; Increased time to complete; Head of bed flat; With handrails  Roll Left  Assistance Level:  Moderate assistance  Roll Right  Assistance Level: Moderate assistance  Supine to Sit  Assistance Level: Moderate assistance;Maximum assistance; Requires x 2 assistance  Skilled Clinical Factors: For upper body motion initiation skills and  SLOAN LALITHA OOBLloyd RN Heidi assisted therapist throughout treatment  Scooting  Assistance Level: Moderate assistance; Requires x 2 assistance  Skilled Clinical Factors: Requires a MOD x2 A to scoot all the way out and place feet flat on the floor for increased safety and support. Patient requires MAX vc's and increased time for minamal motions. Balance  Sitting Balance: Supervision  Standing Balance: Moderate assistance  Standing Balance  Activity: Patient stands EOB with MAX x2 assist for balance and stability with knee buckling during transfer to bed side commode. Patient given MAX vc's for knee extension to maximize safety in transfer training this date. Transfers  Surface: To chair with arms;From bed;Bedside commode  Additional Factors: Set-up; Verbal cues; Hand placement cues; Increased time to complete; With handrails  Device: Walker  Sit to Stand  Assistance Level: Moderate assistance; Requires x 2 assistance  Skilled Clinical Factors: MOD x 2 with RW for increased safety and support as patient with decreased safety awareness throughout treatment this date. Patient fatigues easily with minimal activity. Stand to Sit  Assistance Level: Moderate assistance; Requires x 2 assistance  Bed To/From Chair  Technique: Stand step  Assistance Level: Moderate assistance;Maximum assistance; Requires x 2 assistance  Skilled Clinical Factors: Patient with increased weakness as treatment progressed. Patient requreis MAX vc's for hand placement with RW      Environmental Mobility  Ambulation  Surface: Level surface  Device: Rolling walker  Distance: 2 feet x1 3 feet x1  Activity: Within Room  Activity Comments: Patient fatigues quickly with minimal activity and demo's increased weakness as activity increases.   Patient require increased time and MAX vc's for hand placement and progression technique and therapist advances RW as patient is unable to this date. Additional Factors: Set-up; Verbal cues; Hand placement cues; Increased time to complete  Assistance Level: Moderate assistance;Maximum assistance; Requires x 2 assistance  Gait Deviations: Slow nohemy; Unsteady gait;Narrow base of support    ASSESSMENT/PROGRESS TOWARDS GOALS       Assessment  Assessment: Patient with global deconditioning. Patient able to ambulate short distances with RW and MOD/MAX x2 Assist with RW. Patient with knee buckling SLOAN and requries multiple vc's to straighten knees for increased support throughout ambulation and transfer activties. Patient would benefit from continued skilled PT treatment to maximize return to PLOF. Activity Tolerance: Patient limited by endurance; Patient limited by pain; Patient limited by fatigue  Discharge Recommendations: Patient would benefit from continued therapy after discharge    Goals  Patient Goals   Patient goals : Feel better  Short Term Goals  Time Frame for Short term goals: 12 treatments  Short term goal 1: Independent bed mobility/transfers  Short term goal 2: Ambulate 50' x 1 w/ RW and CGA  Short term goal 3: Good standing balance and posture  Short term goal 4: Tolerate 30 min ther act  Short term goal 5: 1/2 to 1 grade strength increase B LE's    PLAN OF CARE/SAFETY  Plan  Plan: 5-7 times per week  Current Treatment Recommendations: Strengthening;Balance training;Functional mobility training;Transfer training;Gait training; Endurance training  Safety Devices  Type of Devices: Gait belt; Chair alarm in place; All fall risk precautions in place; Left in chair;Nurse notified; Heels elevated for pressure relief;Patient at risk for falls  Restraints  Restraints Initially in Place: No    EDUCATION  Education  Education Given To: Patient  Education Provided: Role of Therapy;Transfer Training; Safety; Mobility Training  Education Method: Verbal  Barriers to Learning: Cognition  Education Outcome: Unable to verbalize;Continued education needed    Therapy Time   Individual Concurrent Group Co-treatment   Time In 1235         Time Out 1259         Minutes [address] Nelia Arthur, [place], 08/07/22 at 2:01 PM

## 2022-08-23 ENCOUNTER — APPOINTMENT (OUTPATIENT)
Dept: GENERAL RADIOLOGY | Age: 87
End: 2022-08-23
Payer: MEDICARE

## 2022-08-23 ENCOUNTER — HOSPITAL ENCOUNTER (OUTPATIENT)
Age: 87
Setting detail: OBSERVATION
Discharge: NURSING, REHAB OR LONG-TERM CARE | End: 2022-08-25
Attending: EMERGENCY MEDICINE | Admitting: NURSE PRACTITIONER
Payer: MEDICARE

## 2022-08-23 DIAGNOSIS — B02.29 HERPES ZOSTER VIRUS INFECTION OF FACE AND EAR NERVES: ICD-10-CM

## 2022-08-23 DIAGNOSIS — F32.A DEPRESSION, UNSPECIFIED DEPRESSION TYPE: ICD-10-CM

## 2022-08-23 DIAGNOSIS — F33.41 RECURRENT MAJOR DEPRESSIVE DISORDER, IN PARTIAL REMISSION (HCC): ICD-10-CM

## 2022-08-23 DIAGNOSIS — R29.6 RECURRENT FALLS: Primary | ICD-10-CM
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
## 2022-08-23 LAB
ABSOLUTE EOS #: 0.12 K/UL (ref 0–0.44)
ABSOLUTE IMMATURE GRANULOCYTE: 0.02 K/UL (ref 0–0.3)
ABSOLUTE LYMPH #: 2.4 K/UL (ref 1.1–3.7)
ABSOLUTE MONO #: 0.39 K/UL (ref 0.1–1.2)
ANION GAP SERPL CALCULATED.3IONS-SCNC: 14 MMOL/L (ref 9–17)
BASOPHILS # BLD: 1 % (ref 0–2)
BASOPHILS ABSOLUTE: 0.03 K/UL (ref 0–0.2)
BUN BLDV-MCNC: 39 MG/DL (ref 8–23)
BUN/CREAT BLD: 30 (ref 9–20)
CALCIUM SERPL-MCNC: 9.5 MG/DL (ref 8.6–10.4)
CHLORIDE BLD-SCNC: 97 MMOL/L (ref 98–107)
CO2: 28 MMOL/L (ref 20–31)
CREAT SERPL-MCNC: 1.3 MG/DL (ref 0.5–0.9)
EKG ATRIAL RATE: 288 BPM
EKG Q-T INTERVAL: 474 MS
EKG QRS DURATION: 126 MS
EKG QTC CALCULATION (BAZETT): 457 MS
EKG R AXIS: 10 DEGREES
EKG T AXIS: -23 DEGREES
EKG VENTRICULAR RATE: 56 BPM
EOSINOPHILS RELATIVE PERCENT: 2 % (ref 1–4)
GFR AFRICAN AMERICAN: 47 ML/MIN
GFR NON-AFRICAN AMERICAN: 38 ML/MIN
GFR SERPL CREATININE-BSD FRML MDRD: ABNORMAL ML/MIN/{1.73_M2}
GLUCOSE BLD-MCNC: 122 MG/DL (ref 70–99)
GLUCOSE BLD-MCNC: 125 MG/DL (ref 65–105)
GLUCOSE BLD-MCNC: 132 MG/DL (ref 65–105)
GLUCOSE BLD-MCNC: 180 MG/DL (ref 65–105)
GLUCOSE BLD-MCNC: 188 MG/DL (ref 65–105)
HCT VFR BLD CALC: 35.1 % (ref 36.3–47.1)
HEMOGLOBIN: 11.2 G/DL (ref 11.9–15.1)
IMMATURE GRANULOCYTES: 0 %
LYMPHOCYTES # BLD: 39 % (ref 24–43)
MCH RBC QN AUTO: 31.8 PG (ref 25.2–33.5)
MCHC RBC AUTO-ENTMCNC: 31.9 G/DL (ref 28.4–34.8)
MCV RBC AUTO: 99.7 FL (ref 82.6–102.9)
MONOCYTES # BLD: 6 % (ref 3–12)
NRBC AUTOMATED: 0 PER 100 WBC
PDW BLD-RTO: 13.3 % (ref 11.8–14.4)
PLATELET # BLD: 203 K/UL (ref 138–453)
PMV BLD AUTO: 11.7 FL (ref 8.1–13.5)
POTASSIUM SERPL-SCNC: 3.3 MMOL/L (ref 3.7–5.3)
RBC # BLD: 3.52 M/UL (ref 3.95–5.11)
SEG NEUTROPHILS: 52 % (ref 36–65)
SEGMENTED NEUTROPHILS ABSOLUTE COUNT: 3.25 K/UL (ref 1.5–8.1)
SODIUM BLD-SCNC: 139 MMOL/L (ref 135–144)
WBC # BLD: 6.2 K/UL (ref 3.5–11.3)

## 2022-08-23 PROCEDURE — 80048 BASIC METABOLIC PNL TOTAL CA: CPT

## 2022-08-23 PROCEDURE — 97112 NEUROMUSCULAR REEDUCATION: CPT

## 2022-08-23 PROCEDURE — G0378 HOSPITAL OBSERVATION PER HR: HCPCS

## 2022-08-23 PROCEDURE — 93010 ELECTROCARDIOGRAM REPORT: CPT | Performed by: INTERNAL MEDICINE

## 2022-08-23 PROCEDURE — 6360000002 HC RX W HCPCS: Performed by: NURSE PRACTITIONER

## 2022-08-23 PROCEDURE — 6370000000 HC RX 637 (ALT 250 FOR IP): Performed by: NURSE PRACTITIONER

## 2022-08-23 PROCEDURE — 97535 SELF CARE MNGMENT TRAINING: CPT

## 2022-08-23 PROCEDURE — 99219 PR INITIAL OBSERVATION CARE/DAY 50 MINUTES: CPT | Performed by: INTERNAL MEDICINE

## 2022-08-23 PROCEDURE — 96372 THER/PROPH/DIAG INJ SC/IM: CPT

## 2022-08-23 PROCEDURE — 85025 COMPLETE CBC W/AUTO DIFF WBC: CPT

## 2022-08-23 PROCEDURE — 97116 GAIT TRAINING THERAPY: CPT

## 2022-08-23 PROCEDURE — 97167 OT EVAL HIGH COMPLEX 60 MIN: CPT

## 2022-08-23 PROCEDURE — 82947 ASSAY GLUCOSE BLOOD QUANT: CPT

## 2022-08-23 PROCEDURE — APPSS30 APP SPLIT SHARED TIME 16-30 MINUTES: Performed by: NURSE PRACTITIONER

## 2022-08-23 PROCEDURE — 97530 THERAPEUTIC ACTIVITIES: CPT

## 2022-08-23 PROCEDURE — 97163 PT EVAL HIGH COMPLEX 45 MIN: CPT

## 2022-08-23 PROCEDURE — 99285 EMERGENCY DEPT VISIT HI MDM: CPT

## 2022-08-23 PROCEDURE — 73110 X-RAY EXAM OF WRIST: CPT

## 2022-08-23 PROCEDURE — 2580000003 HC RX 258: Performed by: NURSE PRACTITIONER

## 2022-08-23 PROCEDURE — 93005 ELECTROCARDIOGRAM TRACING: CPT | Performed by: EMERGENCY MEDICINE

## 2022-08-23 RX ORDER — ACETAMINOPHEN 650 MG/1
650 SUPPOSITORY RECTAL EVERY 6 HOURS PRN
Status: DISCONTINUED | OUTPATIENT
Start: 2022-08-23 | End: 2022-08-25 | Stop reason: HOSPADM

## 2022-08-23 RX ORDER — POLYETHYLENE GLYCOL 3350 17 G/17G
17 POWDER, FOR SOLUTION ORAL DAILY PRN
Status: DISCONTINUED | OUTPATIENT
Start: 2022-08-23 | End: 2022-08-25 | Stop reason: HOSPADM

## 2022-08-23 RX ORDER — SODIUM CHLORIDE 9 MG/ML
INJECTION, SOLUTION INTRAVENOUS PRN
Status: DISCONTINUED | OUTPATIENT
Start: 2022-08-23 | End: 2022-08-25 | Stop reason: HOSPADM

## 2022-08-23 RX ORDER — TRAMADOL HYDROCHLORIDE 50 MG/1
25 TABLET ORAL 2 TIMES DAILY
Status: DISCONTINUED | OUTPATIENT
Start: 2022-08-23 | End: 2022-08-25 | Stop reason: HOSPADM

## 2022-08-23 RX ORDER — POTASSIUM CHLORIDE 7.45 MG/ML
10 INJECTION INTRAVENOUS PRN
Status: DISCONTINUED | OUTPATIENT
Start: 2022-08-23 | End: 2022-08-23

## 2022-08-23 RX ORDER — ATORVASTATIN CALCIUM 20 MG/1
20 TABLET, FILM COATED ORAL NIGHTLY
Status: DISCONTINUED | OUTPATIENT
Start: 2022-08-23 | End: 2022-08-25 | Stop reason: HOSPADM

## 2022-08-23 RX ORDER — SODIUM CHLORIDE 0.9 % (FLUSH) 0.9 %
10 SYRINGE (ML) INJECTION PRN
Status: DISCONTINUED | OUTPATIENT
Start: 2022-08-23 | End: 2022-08-25 | Stop reason: HOSPADM

## 2022-08-23 RX ORDER — ASPIRIN 81 MG/1
81 TABLET ORAL DAILY
Status: DISCONTINUED | OUTPATIENT
Start: 2022-08-23 | End: 2022-08-25 | Stop reason: HOSPADM

## 2022-08-23 RX ORDER — CITALOPRAM 10 MG/1
10 TABLET ORAL DAILY
Status: DISCONTINUED | OUTPATIENT
Start: 2022-08-23 | End: 2022-08-25 | Stop reason: HOSPADM

## 2022-08-23 RX ORDER — FUROSEMIDE 40 MG/1
40 TABLET ORAL DAILY
Status: DISCONTINUED | OUTPATIENT
Start: 2022-08-23 | End: 2022-08-25 | Stop reason: HOSPADM

## 2022-08-23 RX ORDER — MYCOPHENOLATE MOFETIL 500 MG/1
1000 TABLET ORAL 2 TIMES DAILY
Status: DISCONTINUED | OUTPATIENT
Start: 2022-08-23 | End: 2022-08-24

## 2022-08-23 RX ORDER — ONDANSETRON 2 MG/ML
4 INJECTION INTRAMUSCULAR; INTRAVENOUS EVERY 6 HOURS PRN
Status: DISCONTINUED | OUTPATIENT
Start: 2022-08-23 | End: 2022-08-25 | Stop reason: HOSPADM

## 2022-08-23 RX ORDER — AMLODIPINE BESYLATE 2.5 MG/1
2.5 TABLET ORAL DAILY
Status: DISCONTINUED | OUTPATIENT
Start: 2022-08-23 | End: 2022-08-25 | Stop reason: HOSPADM

## 2022-08-23 RX ORDER — PEDIATRIC MULTIVITAMIN NO.17
1 TABLET,CHEWABLE ORAL DAILY
Status: DISCONTINUED | OUTPATIENT
Start: 2022-08-23 | End: 2022-08-25 | Stop reason: HOSPADM

## 2022-08-23 RX ORDER — INSULIN LISPRO 100 [IU]/ML
0-4 INJECTION, SOLUTION INTRAVENOUS; SUBCUTANEOUS NIGHTLY
Status: DISCONTINUED | OUTPATIENT
Start: 2022-08-23 | End: 2022-08-25 | Stop reason: HOSPADM

## 2022-08-23 RX ORDER — CHOLECALCIFEROL (VITAMIN D3) 125 MCG
5 CAPSULE ORAL NIGHTLY
Status: DISCONTINUED | OUTPATIENT
Start: 2022-08-23 | End: 2022-08-25 | Stop reason: HOSPADM

## 2022-08-23 RX ORDER — PRIMIDONE 50 MG/1
50 TABLET ORAL 2 TIMES DAILY
Status: DISCONTINUED | OUTPATIENT
Start: 2022-08-23 | End: 2022-08-25 | Stop reason: HOSPADM

## 2022-08-23 RX ORDER — CITALOPRAM 20 MG/1
20 TABLET ORAL DAILY
Status: DISCONTINUED | OUTPATIENT
Start: 2022-08-23 | End: 2022-08-25 | Stop reason: HOSPADM

## 2022-08-23 RX ORDER — PANTOPRAZOLE SODIUM 40 MG/1
40 TABLET, DELAYED RELEASE ORAL DAILY
Status: DISCONTINUED | OUTPATIENT
Start: 2022-08-23 | End: 2022-08-25 | Stop reason: HOSPADM

## 2022-08-23 RX ORDER — GABAPENTIN 100 MG/1
100 CAPSULE ORAL 3 TIMES DAILY
Status: DISCONTINUED | OUTPATIENT
Start: 2022-08-23 | End: 2022-08-25 | Stop reason: HOSPADM

## 2022-08-23 RX ORDER — DEXTROSE MONOHYDRATE 100 MG/ML
INJECTION, SOLUTION INTRAVENOUS CONTINUOUS PRN
Status: DISCONTINUED | OUTPATIENT
Start: 2022-08-23 | End: 2022-08-25 | Stop reason: HOSPADM

## 2022-08-23 RX ORDER — SODIUM CHLORIDE 0.9 % (FLUSH) 0.9 %
5-40 SYRINGE (ML) INJECTION EVERY 12 HOURS SCHEDULED
Status: DISCONTINUED | OUTPATIENT
Start: 2022-08-23 | End: 2022-08-25 | Stop reason: HOSPADM

## 2022-08-23 RX ORDER — POTASSIUM CHLORIDE 20 MEQ/1
20 TABLET, EXTENDED RELEASE ORAL DAILY
Status: DISCONTINUED | OUTPATIENT
Start: 2022-08-23 | End: 2022-08-25 | Stop reason: HOSPADM

## 2022-08-23 RX ORDER — MIRTAZAPINE 15 MG/1
15 TABLET, FILM COATED ORAL NIGHTLY
Status: DISCONTINUED | OUTPATIENT
Start: 2022-08-23 | End: 2022-08-25 | Stop reason: HOSPADM

## 2022-08-23 RX ORDER — ONDANSETRON 4 MG/1
4 TABLET, ORALLY DISINTEGRATING ORAL EVERY 8 HOURS PRN
Status: DISCONTINUED | OUTPATIENT
Start: 2022-08-23 | End: 2022-08-25 | Stop reason: HOSPADM

## 2022-08-23 RX ORDER — FLUOROMETHOLONE 0.1 %
1 SUSPENSION, DROPS(FINAL DOSAGE FORM)(ML) OPHTHALMIC (EYE) 4 TIMES DAILY
Status: DISCONTINUED | OUTPATIENT
Start: 2022-08-23 | End: 2022-08-25 | Stop reason: HOSPADM

## 2022-08-23 RX ORDER — ATENOLOL 50 MG/1
50 TABLET ORAL DAILY
Status: DISCONTINUED | OUTPATIENT
Start: 2022-08-23 | End: 2022-08-25 | Stop reason: HOSPADM

## 2022-08-23 RX ORDER — CARBAMAZEPINE 200 MG/1
200 TABLET ORAL 2 TIMES DAILY
Status: DISCONTINUED | OUTPATIENT
Start: 2022-08-23 | End: 2022-08-25 | Stop reason: HOSPADM

## 2022-08-23 RX ORDER — LANOLIN ALCOHOL/MO/W.PET/CERES
1000 CREAM (GRAM) TOPICAL DAILY
Status: DISCONTINUED | OUTPATIENT
Start: 2022-08-23 | End: 2022-08-25 | Stop reason: HOSPADM

## 2022-08-23 RX ORDER — LANOLIN ALCOHOL/MO/W.PET/CERES
325 CREAM (GRAM) TOPICAL 2 TIMES DAILY WITH MEALS
Status: DISCONTINUED | OUTPATIENT
Start: 2022-08-23 | End: 2022-08-25 | Stop reason: HOSPADM

## 2022-08-23 RX ORDER — POTASSIUM CHLORIDE 20 MEQ/1
40 TABLET, EXTENDED RELEASE ORAL PRN
Status: DISCONTINUED | OUTPATIENT
Start: 2022-08-23 | End: 2022-08-23

## 2022-08-23 RX ORDER — ENOXAPARIN SODIUM 100 MG/ML
30 INJECTION SUBCUTANEOUS DAILY
Status: DISCONTINUED | OUTPATIENT
Start: 2022-08-23 | End: 2022-08-25 | Stop reason: HOSPADM

## 2022-08-23 RX ORDER — POLYETHYLENE GLYCOL 3350 17 G/17G
17 POWDER, FOR SOLUTION ORAL DAILY PRN
Status: DISCONTINUED | OUTPATIENT
Start: 2022-08-23 | End: 2022-08-24 | Stop reason: SDUPTHER

## 2022-08-23 RX ORDER — POTASSIUM CHLORIDE 20 MEQ/1
40 TABLET, EXTENDED RELEASE ORAL ONCE
Status: COMPLETED | OUTPATIENT
Start: 2022-08-23 | End: 2022-08-23

## 2022-08-23 RX ORDER — MAGNESIUM SULFATE 1 G/100ML
1000 INJECTION INTRAVENOUS PRN
Status: DISCONTINUED | OUTPATIENT
Start: 2022-08-23 | End: 2022-08-23

## 2022-08-23 RX ORDER — ACETAMINOPHEN 325 MG/1
650 TABLET ORAL EVERY 6 HOURS PRN
Status: DISCONTINUED | OUTPATIENT
Start: 2022-08-23 | End: 2022-08-25 | Stop reason: HOSPADM

## 2022-08-23 RX ORDER — INSULIN LISPRO 100 [IU]/ML
0-4 INJECTION, SOLUTION INTRAVENOUS; SUBCUTANEOUS
Status: DISCONTINUED | OUTPATIENT
Start: 2022-08-23 | End: 2022-08-25 | Stop reason: HOSPADM

## 2022-08-23 RX ORDER — ERGOCALCIFEROL (VITAMIN D2) 10 MCG
1 TABLET ORAL DAILY
Status: DISCONTINUED | OUTPATIENT
Start: 2022-08-23 | End: 2022-08-25 | Stop reason: HOSPADM

## 2022-08-23 RX ADMIN — SODIUM CHLORIDE, PRESERVATIVE FREE 10 ML: 5 INJECTION INTRAVENOUS at 09:52

## 2022-08-23 RX ADMIN — ENOXAPARIN SODIUM 30 MG: 30 INJECTION SUBCUTANEOUS at 09:51

## 2022-08-23 RX ADMIN — ACETAMINOPHEN 650 MG: 325 TABLET, FILM COATED ORAL at 07:17

## 2022-08-23 RX ADMIN — SODIUM CHLORIDE, PRESERVATIVE FREE 10 ML: 5 INJECTION INTRAVENOUS at 23:01

## 2022-08-23 RX ADMIN — ACETAMINOPHEN 650 MG: 325 TABLET, FILM COATED ORAL at 19:34

## 2022-08-23 RX ADMIN — POTASSIUM CHLORIDE 20 MEQ: 1500 TABLET, EXTENDED RELEASE ORAL at 07:17

## 2022-08-23 ASSESSMENT — PAIN DESCRIPTION - LOCATION
LOCATION: HEAD
LOCATION: HEAD

## 2022-08-23 ASSESSMENT — ENCOUNTER SYMPTOMS
SHORTNESS OF BREATH: 0
COLOR CHANGE: 1
APNEA: 0
CONSTIPATION: 0
NAUSEA: 0
SINUS PAIN: 0
VOMITING: 0
SHORTNESS OF BREATH: 1
ABDOMINAL PAIN: 0
COUGH: 0
DIARRHEA: 0
CHEST TIGHTNESS: 0
COLOR CHANGE: 0
ABDOMINAL DISTENTION: 0
EYES NEGATIVE: 1

## 2022-08-23 ASSESSMENT — PAIN DESCRIPTION - DESCRIPTORS: DESCRIPTORS: ACHING;DISCOMFORT

## 2022-08-23 ASSESSMENT — PAIN SCALES - GENERAL
PAINLEVEL_OUTOF10: 5
PAINLEVEL_OUTOF10: 3

## 2022-08-23 ASSESSMENT — PAIN - FUNCTIONAL ASSESSMENT: PAIN_FUNCTIONAL_ASSESSMENT: ACTIVITIES ARE NOT PREVENTED

## 2022-08-23 ASSESSMENT — PAIN DESCRIPTION - ORIENTATION: ORIENTATION: LEFT;ANTERIOR

## 2022-08-23 NOTE — CARE COORDINATION
Case Management Initial Discharge Renetta Edi 23,         Readmission Risk              Risk of Unplanned Readmission:  0             Met with:family member patient and grandson to discuss discharge plans. Information verified: address, contacts, phone number, , insurance Yes  PCP: No primary care provider on file. Date of last visit: [place] Provider: Garrison Hoehn Medicare    Discharge Planning  Current Residence:     Living Arrangements:      Home has one stories/none stairs to climb  Support Systems:     Current Services PTA:    Supplier: [facility]  Patient able to perform ADL's:Assisted  DME used to aid ambulation prior to admission: walkerwheelchair/during admissionTBD    Potential Assistance Needed:       Pharmacy: [facility]   Potential Assistance Purchasing Medications:     Does patient want to participate in local refill/ meds to beds program?  Yes    Patient agreeable to home care: No  [place] of choice provided:  n/a      Type of Home Care Services:     Patient expects to be discharged to:       Prior SNF/Rehab Placement and Facility: [facility] 2 days ago  Agreeable to SNF/Rehab: Yes  [place] of choice provided: yes   Evaluation: yes    Expected Discharge date: Follow Up Appointment: Best Day/ Time:      Transportation provider: [facility]  Transportation arrangements needed for discharge: Yes    Discharge Plan: Spoke with MARY KAY tilley([phone]). He states that patient was dc from [address] [place] [address] 2 days ago. Patient fell out of bed at [facility]. Discussed that patient may need additional rehab before returning to [facility]. He would like for her to return to [facility].  Sent referral. Antonina Hendricks        Electronically signed by CALISTA Gannon on 22 at 10:42 AM EDT

## 2022-08-23 NOTE — PROGRESS NOTES
Occupational Therapy  Facility/Department: [facility] MED SURG  Occupational Therapy Initial Assessment    Name: Alana Shaver  : 1931  MRN: [id number]  Date of Service: 2022    Discharge Recommendations:  Patient would benefit from continued therapy after discharge       Pt currently functioning below baseline. Recommend daily inpatient skilled therapy at time of discharge to maximize long term outcomes and prevent re-admission. Please refer to AM-PAC score for current level of function. RN reports patient is medically stable for therapy treatment this date. Chart reviewed prior to treatment and patient is agreeable for therapy. All lines intact and patient positioned comfortably at end of treatment. All patient needs addressed prior to ending therapy session. Patient Diagnosis(es): The primary encounter diagnosis was Recurrent falls. A diagnosis of Depression, unspecified depression type was also pertinent to this visit. Past Medical History:  has a past medical history of Anemia, Arthritis of knee, Benign essential tremor, CAD S/P percutaneous coronary angioplasty, Diabetes mellitus type II, controlled ([facility] [address] Gastrointestinal hemorrhage, History of pancreatitis, Humerus fracture, Hyperlipidemia, Hypertension, Major depression, S/P CABG (coronary artery bypass graft), Ventral hernia, and Vitamin B12 deficiency. Past Surgical History:  has a past surgical history that includes Cholecystectomy; Coronary artery bypass graft (); Hysterectomy, vaginal (); Salpingo-oophorectomy (); Colonoscopy (N/A, 2018); hip surgery (Right); Hip fracture surgery (Right, 10/13/2021); and Hip fracture surgery (Left, 10/19/2021). Per H&P: To the emergency room last night with complaints of sliding out of her bed. Patient states that she has satin sheets on her bed that are slippery and she slipped out of bed. She landed on her right side.   Patient currently resides at [facility], but is not in the assisted living portion. Patient thinks that she needs to be transitioned to the assisted living quarters or go to rehab. She falls often, and uses a walker in her apartment. Following the slide out of bed, she denies hitting her head, but does endorse bilateral wrist pain. Skin tears were noted to her bilateral wrists. Patient has a significant past medical history of an essential tremor, coronary artery disease, diabetes, hyperlipidemia and hypertension. She denies any fevers, chills, chest pain, nausea, vomiting and diarrhea. Patient states that occasionally she gets short of breath. Patient's other complaint is insomnia. She has been unable to sleep for the past 2 days    Assessment   Performance deficits / Impairments: Decreased functional mobility ; Decreased strength;Decreased endurance;Decreased ADL status; Decreased safe awareness;Decreased posture;Decreased cognition;Decreased balance;Decreased coordination;Decreased fine motor control  Assessment: Pt limited d/t poor activity tolerance as pt fatigues quickly with self-care and functional tasks. Pt required 2 staff assist for safety and balance with functional tasks.  Skilled OT is indicated to increase overall IND and safety with self-care and functional tasks to return to PLOF  Prognosis: Good  Decision Making: High Complexity  REQUIRES OT FOLLOW-UP: Yes  Activity Tolerance  Activity Tolerance: Patient Tolerated treatment well        Plan   Plan  Times per Week: 4-5x per week, 1-2x per day as stephania  Current Treatment Recommendations: Strengthening, Balance training, Functional mobility training, Endurance training, Neuromuscular re-education, Safety education & training, Patient/Caregiver education & training, Equipment evaluation, education, & procurement, Self-Care / ADL, Positioning     Restrictions  Restrictions/Precautions  Restrictions/Precautions: General Precautions, Fall Risk  Required Braces or Orthoses?: No  Position Activity Restriction  Other position/activity restrictions: Up with assist, DNNR-CCA, heels off bed at all times, LUE IV, ALARMS    Subjective   General  Chart Reviewed: Yes  Patient assessed for rehabilitation services?: Yes  Family / Caregiver Present: No  Subjective  Subjective: \"Its just been so hard, I miss my \"  General Comment  Comments: pt agreeable to OT eval     Social/Functional History  Social/Functional History  Lives With: Alone  Type of Home: Senior housing apartment  Home Layout: One level  Home Access: Level entry  Bathroom Shower/Tub: Walk-in shower (has one small lip to step into)  Bathroom Toilet: Standard  Bathroom Equipment: Grab bars in shower, Grab bars around toilet, Built-in shower seat  Home Equipment: Alert Button, Walker, 4 wheeled, [address]  Has the patient had two or more falls in the past year or any fall with injury in the past year?: Yes  Receives Help From: Family (pt has supportive grandson)  ADL Assistance: Independent  Homemaking Assistance: Needs assistance (Indep except for meals in dining morales.)  Ambulation Assistance: Independent (uses 4WW in room, W/C outside of room or community)  Active : No  Patient's  Info: transport provided by facility  Occupation: Retired  Type of Occupation:        Objective           Observation/Palpation  Posture: Fair  Observation: pt resting in bed, is very distraught & upset that she's in pain & hasn't slept in 3 days; pt has fragile skin with multiple scattered bruises of BUE's, & bruising of L knee, lower leg & ankle, has BUE tremoring; pt is very weepy/emotional over recent deaths of her spouse & son  Edema: LLE    Safety Devices  Type of Devices: Patient at risk for falls;Call light within reach; Chair alarm in place;Gait belt; Heels elevated for pressure relief;Left in chair;Nurse notified    Functional Mobility  Overall Level of Assistance:  Moderate assistance;Assist X2 (with RW, pt walked from bed repeat back education. Patient positioned appropriately after treatment with all high risk areas elevated or propped. Air mattress is inflated to appropriate level. Pt reports being comfortable at end of treatment. Transfers  Sit to stand: Moderate assistance;2 Person assistance  Stand to sit: Moderate assistance;2 Person assistance  Transfer Comments: Pt required Mod VC/tactile cues for proper hand placement on stable surface, controlled sit<>stand, upright posture, pacing, pursed lip breathing, RW safety/mgmt, squaring self/AD to surface, assist with lines, all to inc safety/reduce fall risk    Vision  Vision: Impaired  Vision Exceptions: Wears glasses at all times (pt cannot see well out of left eye)  Hearing  Hearing: Within functional limits    Cognition  Overall Cognitive Status: Exceptions  Arousal/Alertness: Appropriate responses to stimuli  Following Commands: Follows one step commands consistently  Attention Span: Attends with cues to redirect  Memory: Decreased recall of recent events  Safety Judgement: Decreased awareness of need for assistance;Decreased awareness of need for safety  Problem Solving: Assistance required to generate solutions;Assistance required to correct errors made;Assistance required to implement solutions;Assistance required to identify errors made  Insights: Decreased awareness of deficits  Initiation: Does not require cues  Sequencing: Requires cues for some  Orientation  Overall Orientation Status: Impaired  Orientation Level: Disoriented to place;Oriented to person;Disoriented to time;Oriented to situation        Sensation  Overall Sensation Status: WFL (pt denies any paresthesias)         Education Given To: Patient  Education Provided: Role of Therapy; ADL Adaptive Strategies; Fall Prevention Strategies; Plan of Care;Transfer Training;Energy Conservation  Education Provided Comments: OT POC, discharge recommendations, benefits of OOB activity, call light/fall prevention, safety in function  Education Method: Demonstration;Verbal  Education Outcome: Continued education needed         Pt instructed to \"move what moves\", such as moving all joints in any comfortable direction, within ROM and pain tolerance. OT provided demonstration of AROM of neck, shoulders, elbows, forearms, wrists, fingers, knees, and ankles. OT recommended while pt is watching TV to use commercials as a guide to initiate AROM of one joint. Pt with Good understanding of importance of AROM to promote circulation, maintain strength/endurance and to prevent overall stiffness. Pt with Good demo of education. AM-PAC Score        AM-[facility] Inpatient Daily Activity Raw Score: 17 (08/23/22 1424)  AM-PAC Inpatient ADL T-Scale Score : 37.26 (08/23/22 1424)  ADL Inpatient CMS 0-100% Score: 50.11 (08/23/22 1424)  ADL Inpatient CMS G-Code Modifier : CK (08/23/22 1424)           Goals  Short Term Goals  Time Frame for Short term goals: by discharge, pt to demo  Short Term Goal 1: I/MI for bed mob tasks without bed rails  Short Term Goal 2: I/MI for UB ADLs and CGA for LB ADLs with AE as needed and Good safety  Short Term Goal 3: SBA for ADL transfers and functional mob with AD and Good safety  Short Term Goal 4: increase BUE strength by 1/2 grade to inc IND with self-care and be IND with HEP  Short Term Goal 5: pt/family to be IND with EC/WS, fall prevention tech, pressure relief education, discharge recommendations with use of handouts as needed  Patient Goals   Patient goals : to go home       Therapy Time   Individual Concurrent Group Co-treatment   Time In 0849         Time Out 0951 (+10 chart review)         Minutes 72          Tx time: 62 min    Co-treatment with PT warranted secondary to decreased safety and independence requiring 2 skilled therapy professionals to address individual discipline's goals.  OT addressing preparation for ADL transfer, sitting balance for increased ADL performance, sitting/activity tolerance, functional reaching, environmental safety/scanning, fall prevention, functional mobility for ADL transfers, ability to sequence and follow directions, bed mobility tech, and functional UE strength. Upon writer exit, call light within reach, pt retired to chair. All lines intact and patient positioned comfortably. All patient needs addressed prior to ending therapy session. Chart reviewed prior to treatment and patient is agreeable for therapy. RN reports patient is medically stable for therapy treatment this date.     Mavis Ash OTR/L

## 2022-08-23 NOTE — DISCHARGE INSTR - COC
Continuity of Care Form    Patient Name: Tino Conner   :  1931  MRN:  [id number]    Admit date:  2022  Discharge date:  22    Code Status Order: DNR-CCA   Advance Directives:     Admitting Physician:  Anyi Lundberg MD  PCP: No primary care provider on file.     Discharging Nurse: Adithya Cox Unit/Room#:   Discharging Unit Phone Number: [phone]    Emergency Contact:   Extended Emergency Contact Information  Primary Emergency Contact: Jose Hood Phone: [phone]  Relation: Grandchild  Secondary Emergency Contact: [address] [facility] Phone: [phone]  Relation: Child    Past Surgical History:  Past Surgical History:   Procedure Laterality Date    CHOLECYSTECTOMY      COLONOSCOPY N/A 2018    COLONOSCOPY DIAGNOSTIC OR SCREENING performed by Stephon Vanessa MD at [address] [facility]    ? number grafts    HIP FRACTURE SURGERY Right 10/13/2021    HIP OPEN REDUCTION INTERNAL FIXATION-NAILING performed by Maria Luisa Perez DO at [facility] Left 10/19/2021    HIP OPEN REDUCTION INTERNAL FIXATION - IM NAIL performed by Maria Luisa Perez DO at [address] Right     HYSTERECTOMY, [facility]    SALPINGO-OOPHORECTOMY  1960       Immunization History:   Immunization History   Administered Date(s) Administered    COVID-19, PFIZER PURPLE top, DILUTE for use, (age 15 y+), 30mcg/0.3mL 2021, 2021    Influenza, FLUARIX, FLULAVAL, (age 10 mo+) AND AFLURIA, FLUZONE (age 1 y+), PF 10/06/2017    Influenza, High Dose (Fluzone 65 yrs and older) 10/17/2016, 2018    Pneumococcal Conjugate 13-valent ([id number]) 2018    Pneumococcal Polysaccharide ([id number]) 10/06/2016    Tdap (Boostrix, Adacel) 10/06/2010       Active Problems:  Patient Active Problem List   Diagnosis Code    Benign essential tremor G25.0    Primary hypertension I10    Type 2 diabetes mellitus ([facility] [address] E11.9 Dyslipidemia E78.5    Major depression F32.9    Arthritis of knee M17.10    Vitamin B12 deficiency E53.8    Anemia D64.9    Acute lower gastrointestinal bleeding K92.2    S/P CABG (coronary artery bypass graft) Z95.1    S/P angioplasty with stent Z95.820    Closed right hip fracture, initial encounter ([facility] S72.001A    ASHD (arteriosclerotic heart disease) I25.10    Acute cystitis without hematuria N30.00    Abnormal ECG R94.31    Closed left hip fracture, initial encounter ([facility]) S72.002A    Acute blood loss as cause of postoperative anemia D62    Hypomagnesemia E83.42    Chronic combined systolic and diastolic heart failure ([facility]) I50.42    Cellulitis L03.90    Bullous pemphigoid L12.0    Periorbital cellulitis of left eye L03.213    Gastroesophageal reflux disease K21.9    Type 2 diabetes mellitus without complication ([facility]) N50.6    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 B02.29    DNR (do not resuscitate) Z66    THEODORA (acute kidney injury) ([facility] N17.9    Falls, initial encounter W19. XXXA    Recurrent falls R29.6       Isolation/Infection:   Isolation            No Isolation          Patient Infection Status       None to display            Nurse Assessment:  Last Vital Signs: BP (!) 111/46   Pulse 67   Temp 98.1 °F (36.7 °C) (Oral)   Resp 15   Ht 5' (1.524 m)   Wt 116 lb 1 oz (52.6 kg)   SpO2 98%   BMI 22.67 kg/m²     Last documented pain score (0-10 scale): Pain Level: 3  Last Weight:   Wt Readings from Last 1 Encounters:   08/23/22 116 lb 1 oz (52.6 kg)     Mental Status:  oriented and alert    IV Access:  - None    Nursing Mobility/ADLs:  Walking   Assisted  Transfer  Assisted  Bathing  Assisted  Dressing  Assisted  Toileting  Assisted  Feeding  Independent  Med Admin  Independent  Med Delivery   whole    Wound Care Documentation and Therapy:  Incision 10/13/21 Hip Anterior;Right (Active)   Number of days: 314       Incision 10/19/21 Femoral Anterior;Left;Proximal (Active)   Number of days: of Manpreet Maldonado  is necessary for the continuing treatment of the diagnosis listed and that she requires [place] for less 30 days.      Update Admission H&P: No change in H&P    PHYSICIAN SIGNATURE:  Electronically signed by Angy Marquez DO on 8/25/22 at 11:58 AM EDT

## 2022-08-23 NOTE — PLAN OF CARE
Problem: Discharge Planning  Goal: Discharge to home or other facility with appropriate resources  Outcome: Progressing  Flowsheets (Taken 8/23/2022 9992)  Discharge to home or other facility with appropriate resources:   Identify barriers to discharge with patient and caregiver   Refer to discharge planning if patient needs post-hospital services based on physician order or complex needs related to functional status, cognitive ability or social support system     Problem: Skin/Tissue Integrity  Goal: Absence of new skin breakdown  Description: 1. Monitor for areas of redness and/or skin breakdown  2. Assess vascular access sites hourly  3. Every 4-6 hours minimum:  Change oxygen saturation probe site  4. Every 4-6 hours:  If on nasal continuous positive airway pressure, respiratory therapy assess nares and determine need for appliance change or resting period.   Outcome: Progressing     Problem: Safety - Adult  Goal: Free from fall injury  Outcome: Progressing     Problem: ABCDS Injury Assessment  Goal: Absence of physical injury  Outcome: Progressing     Problem: Chronic Conditions and Co-morbidities  Goal: Patient's chronic conditions and co-morbidity symptoms are monitored and maintained or improved  Outcome: Progressing  Flowsheets (Taken 8/23/2022 0929)  Care Plan - Patient's Chronic Conditions and Co-Morbidity Symptoms are Monitored and Maintained or Improved: Monitor and assess patient's chronic conditions and comorbid symptoms for stability, deterioration, or improvement     Problem: Neurosensory - Adult  Goal: Achieves stable or improved neurological status  Outcome: Progressing  Flowsheets (Taken 8/23/2022 0951)  Achieves stable or improved neurological status: Assess for and report changes in neurological status     Problem: Metabolic/Fluid and Electrolytes - Adult  Goal: Electrolytes maintained within normal limits  Outcome: Progressing  Flowsheets (Taken 8/23/2022 0951)  Electrolytes maintained within normal limits: Monitor labs and assess patient for signs and symptoms of electrolyte imbalances     Problem: Cardiovascular - Adult  Goal: Maintains optimal cardiac output and hemodynamic stability  Outcome: Progressing  Flowsheets (Taken 8/23/2022 0951)  Maintains optimal cardiac output and hemodynamic stability: Monitor blood pressure and heart rate     Problem: Skin/Tissue Integrity - Adult  Goal: Skin integrity remains intact  Recent Flowsheet Documentation  Taken 8/23/2022 0467 by Claudette Wilson RN  Skin Integrity Remains Intact: Monitor for areas of redness and/or skin breakdown     Problem: Musculoskeletal - Adult  Goal: Return mobility to safest level of function  Outcome: Progressing  Flowsheets (Taken 8/23/2022 0951)  Return Mobility to Safest Level of Function: Assess patient stability and activity tolerance for standing, transferring and ambulating with or without assistive devices

## 2022-08-23 NOTE — PROGRESS NOTES
Patient admitted to [facility] room 2001 in stable condition. Patient alert and oriented.  Standard safety protocols taken

## 2022-08-23 NOTE — PROGRESS NOTES
Physical Therapy  Facility/Department: [facility] SURG  Physical Therapy Initial Assessment    Name: David Rubio  : 1931  MRN: [id number]  Date of Service: 2022    Discharge Recommendations:  Patient would benefit from continued therapy after discharge    Pt presented to ED on 22  for a fall. Patient has history of falls. She reports that often times she falls at night in her sleep. Her   several years ago and in her sleep she reports she is \"always chasing him. \"  Buzz Dixons she states she fell trying to reach for his chest.  She reports she is very depressed and tearful. She currently lives alone. She is [facility] but is only in their assisted living. She denies head injury. She is having pain to her wrists bilaterally. She has some minor skin tears to the wrists    Pt admitted for further medical management of falls    RN reports patient is medically stable for therapy treatment this date. Chart reviewed prior to treatment and patient is agreeable for therapy. Patient Diagnosis(es): The primary encounter diagnosis was Recurrent falls. A diagnosis of Depression, unspecified depression type was also pertinent to this visit. Past Medical History:  has a past medical history of Anemia, Arthritis of knee, Benign essential tremor, CAD S/P percutaneous coronary angioplasty, Diabetes mellitus type II, controlled ([facility] [address] Gastrointestinal hemorrhage, History of pancreatitis, Humerus fracture, Hyperlipidemia, Hypertension, Major depression, S/P CABG (coronary artery bypass graft), Ventral hernia, and Vitamin B12 deficiency. Past Surgical History:  has a past surgical history that includes Cholecystectomy; Coronary artery bypass graft (); Hysterectomy, vaginal (); Salpingo-oophorectomy (); Colonoscopy (N/A, 2018); hip surgery (Right); Hip fracture surgery (Right, 10/13/2021); and Hip fracture surgery (Left, 10/19/2021).     Assessment   Body Structures, Functions, Activity Limitations Requiring Skilled Therapeutic Intervention: Decreased functional mobility ; Decreased strength;Decreased safe awareness;Decreased endurance;Decreased balance;Decreased posture  Assessment: Pt with deficits of bed mobility, transfers, ambulation, balance, cognition, posture, safety awareness and endurance this session,  & is decline compared to prior level of function. With current deficits, Pt HIGH risk for falls & requires continued PT to maximize independence with functional mobility, balance, safety awareness & activity tolerance to improve overall tolerance of ADL's and ensure a safe return to prior level of independent living at Senior housing. Pt currently functioning below baseline. Recommend daily inpatient skilled therapy at time of discharge to maximize long term outcomes and prevent re-admission. Please refer to AM-PAC score for current level of function.   Therapy Prognosis: Good  Decision Making: High Complexity  Exam: ROM, MMT, functional mobility, activity tolerance, Balance, & [address] [address] [facility] 6 Clicks Basic Mobility  Clinical Presentation: evolving  Requires PT Follow-Up: Yes  Activity Tolerance  Activity Tolerance: Patient limited by fatigue;Patient limited by endurance     Plan   Plan  Plan weeks: 1-2x/D, 5-6D/week  Current Treatment Recommendations: Strengthening, Balance training, Functional mobility training, Transfer training, ADL/Self-care training, Endurance training, Gait training, Home exercise program, Safety education & training, Patient/Caregiver education & training  Safety Devices  Type of Devices: Patient at risk for falls, Call light within reach, Chair alarm in place, Gait belt, Heels elevated for pressure relief, Left in chair, Nurse notified     Restrictions  Restrictions/Precautions  Restrictions/Precautions: General Precautions, Fall Risk  Required Braces or Orthoses?: No  Position Activity Restriction  Other position/activity restrictions: Up with assist, DNNR-CCA, heels off bed at all times, LUE IV, ALARMS     Subjective   General  Chart Reviewed: Yes  Patient assessed for rehabilitation services?: Yes  Additional Pertinent Hx: frequent falls, OA, CAD, HTN, depression, s/p CABG  Response To Previous Treatment: Not applicable  General Comment  Comments: RN okays PT  Subjective  Subjective: Pt agreeable to PT, c/o pain at top of her head         Social/Functional History  Social/Functional History  Lives With: Alone  Type of Home: Senior housing apartment  Home Layout: One level  Home Access: Level entry  Bathroom Shower/Tub: Walk-in shower (has one small lip to step into)  Bathroom Toilet: Standard  Bathroom Equipment: Grab bars in shower, Grab bars around toilet, Built-in shower seat  Home Equipment: Alert Button, Walker, 4 wheeled, Huggins Daryl  Has the patient had two or more falls in the past year or any fall with injury in the past year?: Yes  Receives Help From: Family (pt has supportive grandson)  ADL Assistance: Independent  Homemaking Assistance: Needs assistance (Indep except for meals in dining morales.)  Ambulation Assistance: Independent (uses 4WW in room, W/C outside of room or community)  Active : No  Patient's  Info: transport provided by facility  Occupation: Retired  Type of Occupation:   Vision/Hearing  Vision  Vision: Impaired  Vision Exceptions: Wears glasses at all times (pt cannot see well out of left eye)  Hearing  Hearing: Within functional limits    Cognition   Orientation  Overall Orientation Status: Impaired  Orientation Level: Disoriented to place;Oriented to person;Disoriented to time;Oriented to situation  Cognition  Overall Cognitive Status: Exceptions  Arousal/Alertness: Appropriate responses to stimuli  Following Commands:  Follows one step commands consistently  Attention Span: Attends with cues to redirect  Memory: Decreased recall of recent events  Safety Judgement: Decreased awareness of need for assistance;Decreased awareness of need for safety  Problem Solving: Assistance required to generate solutions;Assistance required to correct errors made;Assistance required to implement solutions;Assistance required to identify errors made  Insights: Decreased awareness of deficits  Initiation: Does not require cues  Sequencing: Requires cues for some     Objective   Heart Rate: 59  Heart Rate Source: Monitor  BP: (!) 133/42  BP Location: Right Arm  MAP (Calculated): 72.33  Resp: 15  SpO2: 93 %  O2 Device: None (Room air)     Observation/Palpation  Posture: Fair  Observation: pt resting in bed, is very distraught & upset that she's in pain & hasn't slept in 3 days; pt has fragile skin with multiple scattered bruises of BUE's, & bruising of L knee, lower leg & ankle, has BUE tremoring; pt is very weepy/emotional over recent deaths of her spouse & son  Edema: LLE        AROM RLE (degrees)  RLE AROM: WFL  AROM LLE (degrees)  LLE AROM : WFL  AROM RUE (degrees)  RUE General AROM: See OT assessment  AROM LUE (degrees)  LUE General AROM: See OT assessment  Strength RLE  Comment: 4-/5  Strength LLE  Comment: 4-/5  Strength RUE  Comment: See OT assessment  Strength LUE  Comment: See OT assessment     Sensation  Overall Sensation Status: WFL (pt denies any paresthesias)  Gait  Overall Level of Assistance: Moderate assistance;Assist X2 (pt walked from bed to bathroom, bathroom to door, door to bedside chair. Pt initally Mod A x2 and progressed to Min A x1-2)  Interventions: Safety awareness training; Tactile cues; Verbal cues (Mod cues for RW safety/mgmt, upright posture, pacing, pursed lip breathing, assist with lines, scanning room environment, all to inc safety/reduce fall risk.)  Assistive Device: Walker, rolling;Gait belt  Bed mobility  Supine to Sit: Minimal assistance  Scooting: Minimal assistance  Bed Mobility Comments: MIN cues for hand placement on bed rail as needed, to slow down movements & for pacing, and use of BUE's to scoot fully out to EOB as well as awareness/assist with lines to increase safety. Transfers  Sit to Stand: Moderate Assistance;2 Person Assistance  Stand to sit: Moderate Assistance  Bed to Chair: Moderate assistance  Stand Pivot Transfers: Moderate Assistance  Comment: MOD VC + tactile assist on correct use of upper body for safe sit/stand + to back all way back to surface with walker until she feels touch behind legs & to ensure she reaches with UB support to arms of chair  Ambulation  Surface: level tile  Device: [address]: Moderate assistance;2 Person assistance  Quality of Gait: step to pattern, MOD cues to keep walker close at all times & to amb inside base of walker & upright posture for safety/scanning+ to slow pace to reduce fall risk  Gait Deviations: Decreased step length;Decreased step height  Distance: 15ft, 20ft  Comments: Pt amb to BR for toileting, Mod Assistance to sit to toilet.  Pt stood with Mod Assistance, stood 2 minutes for pericare & amb 3ft to sink for hand/oral hygiene x 4 minutes then ambulated 20 more ft with R/walker & sat to chair with Mod Assistance     Balance  Posture: Fair  Sitting - Static: Good  Sitting - Dynamic: Good;-  Standing - Static: Fair;+ (R/W)  Standing - Dynamic: Fair (R/W)  Single Leg Stance R Le  Single Leg Stance L Le  Exercise Treatment: Pt completed lateral WS seated & completed sit to stands x 5 to promote mobility and functional pre gait activities & completed static standing weight shifts & picking feet up off floor with UB support at R/walker to improve core strength & stability  Pressure Relief Exercises: WS seated x 10               AM-PAC Score  AM-PAC Inpatient Mobility Raw Score : 14 (22)  AM-PAC Inpatient T-Scale Score : 38.1 (22)  Mobility Inpatient CMS 0-100% Score: 61.29 (22)  Mobility Inpatient CMS G-Code Modifier : CL (22 015) Goals  Short Term Goals  Time Frame for Short term goals: 12 visits  Short term goal 1: Inc bed-mobility & transfers to independent to enable pt to safely get in/OOB & chair to return to PLOF & decrease risk for falls; Short term goal 2: Inc gait to amb 100ft or > indep w/ RW to enable pt to return to previous level of independence & able to demonstrate indep/ safe use of RW in functional activities including approaching surfaces and turning to sit. Short term goal 3: Inc strength to [facility] & standing balance to good with device to facilitate pt independence for performance of ADL's & functional mobility, & reduce fall risk  Short term goal 4: Pt able to tolerate 30-40 min of activity to include 15-20 reps of ex, NMR & functional mobility with device to facilitate activity tolerance to [facility]  Short term goal 5: Ed pt on home ex's, safety, balance & endurance training, pressure relief with Pt able to demonstrate effective pressure relief techniques, fall prevention, & issue written Pt Ed       Education  Patient Education  Education Given To: Patient  Education Provided: Role of Therapy;Plan of Care;Precautions;Transfer Training  Education Provided Comments: Pt educated on purpose of acute PT eval, importance of continued mobility throughout admission, general safety awareness, fall risk prevention, safe transfers & ambulation w/ RW, prevention of sedentary complications, and PT POC. Pt demonstrated FAIR carryover  Pt requires continued reinforcement of education.   Education Method: Demonstration;Verbal  Education Outcome: Verbalized understanding;Continued education needed      Therapy Time   Individual Concurrent Group Co-treatment   Time In 0847         Time Out 1001         Minutes 74             Treatment time: 69 minutes  Additional 10 minutes for chart review    Co-treatment with OT warranted secondary to decreased safety and independence requiring 2 skilled therapy professionals to address individual discipline's goals. PT addressing core control in transitions with bed mobility & transfers, seated/standing posture, pressure relief, seated & standing postural alignment and breathing techniques, safety/scanning, & fall prevention in ambulation techniques.       [address], PT

## 2022-08-23 NOTE — PROGRESS NOTES
The potassium/magnesium sliding scale has been automatically discontinued per [facility] approved policy because the patient has decreased renal function (CrCl<30 ml/min). The patient's current K/Mag levels are currently:    Recent Labs     08/23/22  0241   K 3.3*       Estimated Creatinine Clearance: 21 mL/min (A) (based on SCr of 1.3 mg/dL (H)). For patients with decreased renal function (below 30ml/min) needing potassium/magnesium supplementation, please order individual bolus doses with appropriate monitoring. Please contact the inpatient pharmacy at [phone] with any concerns. Thank you.     Connie Wallace [facility]  8/23/2022  6:30 AM

## 2022-08-23 NOTE — ED NOTES
Pt presents to ED via EMS from [facility]. Pt had fall out of bed. Pt has skin tears on bilateral wrists. Pt reports bilateral wrist pain. EMS states pt may need . EMS reports pt does not receive nursing care from facility under contract.       Chas Paz RN  08/23/22 3267

## 2022-08-23 NOTE — H&P
[facility]  Office: [address], DO, Merry Osorio, DO, Dave Jiménez, DO, Krista Ewa Blood, DO, Cleo Stubbs MD, Kristine Campos MD, Francesco Lyon MD, Paul Montero MD,  Phoebe Serrano MD, Trent Ruvalcaba MD, Laly Murrieta, DO, Coty Geronimo MD,  Unknown MD Ann, Debbie Farah MD, Zachery Johnston, DO, Olya Conde MD, Severiano Border, MD, Florentin Flor MD, Marcelle Milian, DO, Christina Kapaida MD, Skye Jackman MD, Willie Pickering, CNP,  Tim Powell, CNP, Manisha Zeng, CNP, Bronwyn Blackburn, CNP, Sarah Mensah PADylanC, Parvez Little, DNP, Bethany Mcfarlane, CNP, Brie Ogden, CNP, Grabiel Bynum, CNP, Nathan Weston, CNP, Efren Willoughby, CNP, Lazaro Babb, CNS, Joan Rojo, PINO, Claudell Belfast, CNP, Figueroa Hearing, CNP, Jonna Palma, CNP           [facility]    HISTORY AND PHYSICAL EXAMINATION            Date:   8/23/2022  Patient name:  Manpreet Maldonado  Date of admission:  8/23/2022 12:56 AM  MRN:   [id number]  Account:  [de-identified]  YOB: 1931  PCP:    No primary care provider on file. Room:   [place] [address]  Code Status:    Prior    Chief Complaint:     Chief Complaint   Patient presents with    Fall     Rolled out of bed     History Obtained From:     patient, electronic medical record    History of Present Illness: To the emergency room last night with complaints of sliding out of her bed. Patient states that she has satin sheets on her bed that are slippery and she slipped out of bed. She landed on her right side. Patient currently resides at [facility], but is not in the assisted living portion. Patient thinks that she needs to be transitioned to the assisted living quarters or go to rehab. She falls often, and uses a walker in her apartment. Following the slide out of bed, she denies hitting her head, but does endorse bilateral wrist pain.   Skin tears were noted to her Medications Prior to Admission:     Prior to Admission medications    Medication Sig Start Date End Date Taking? Authorizing Provider   gabapentin (NEURONTIN) 100 MG capsule Take 1 capsule by mouth in the morning and 1 capsule at noon and 1 capsule before bedtime. Do all this for 30 days. 8/4/22 9/3/22  En Akers, DO   insulin lispro (HUMALOG) 100 UNIT/ML SOLN injection vial Inject 0-4 Units into the skin 3 times daily (with meals) 8/4/22   En Akers, DO   insulin lispro (HUMALOG) 100 UNIT/ML SOLN injection vial Inject 0-4 Units into the skin nightly 8/4/22   En Akers, DO   polyethylene glycol (GLYCOLAX) 17 g packet Take 17 g by mouth daily as needed for Constipation 8/4/22 9/3/22  Franky Akers, DO   fluorometholone (FML) 0.1 % ophthalmic suspension Place 1 drop into the left eye in the morning and 1 drop at noon and 1 drop in the evening and 1 drop before bedtime. 8/4/22 9/3/22  En Akers, DO   permethrin (ELIMITE) 5 % cream Apply topically to entire body or affected areas once, leave on for 8 hours, then rinse.   May repeat in 1-2 weeks if symptoms persist. 7/24/22   Desmond Hamilton, DO   triamcinolone acetonide (KENALOG-40) 40 MG/ML injection INJECT 1ML (40MG) I.M. EVERY 2 WEEKS ON MONDAY AS DIRECTED 1/31/22   Kaya Garcia MD   predniSONE (DELTASONE) 5 MG tablet Take 5 mg by mouth every other day    Historical Provider, MD   furosemide (LASIX) 40 MG tablet Take 1 tablet by mouth daily 12/16/21   Yary Jeter,    Elastic Bandages & Supports (JOBST KNEE HIGH COMPRESSION SM) MISC 1 each by Does not apply route daily as needed (swelling) 12/16/21   Yary Jeter DO   amLODIPine (NORVASC) 2.5 MG tablet Take 1 tablet by mouth daily 10/26/21   SIMBA Loving CNP   ferrous sulfate (IRON 325) 325 (65 Fe) MG tablet Take 1 tablet by mouth 2 times daily (with meals) 10/25/21   Guinevere Brunette, APRN - CNP   melatonin 5 MG TABS tablet Take 1 tablet by mouth nightly as needed (insominia)  Patient taking differently: Take 5 mg by mouth nightly  10/25/21   Brandan Triana APRN - CNP   potassium chloride (KLOR-CON M) 20 MEQ extended release tablet Take 1 tablet by mouth daily 10/25/21   Brandan Triana APRN - CNP   sodium chloride 0.9 % infusion Infuse 50 mL/hr intravenously continuous 10/25/21   Brandan Triana APRN - CNP   traMADol (ULTRAM) 50 MG tablet Take 25 mg by mouth 2 times daily.     Historical Provider, MD   acetaminophen (TYLENOL) 325 MG tablet Take 650 mg by mouth every 4 hours as needed for Pain or Fever    Historical Provider, MD   atenolol (TENORMIN) 50 MG tablet Take 1 tablet by mouth daily 8/2/21   Nanette Lira MD   atorvastatin (LIPITOR) 20 MG tablet Take 1 tablet by mouth nightly 8/2/21   Nanette Lira MD   carBAMazepine (TEGRETOL) 200 MG tablet Take 1 tablet by mouth 2 times daily 8/2/21   Nanette Lira MD   citalopram (CELEXA) 20 MG tablet Take 1 tablet by mouth daily  Patient taking differently: Take 20 mg by mouth daily 30 mg total 8/2/21   Nanette Lira MD   metFORMIN (GLUCOPHAGE) 500 MG tablet Take 1 tablet by mouth 2 times daily (with meals) 8/2/21   Nanette Lira MD   mirtazapine (REMERON) 15 MG tablet Take 1 tablet by mouth nightly 8/2/21   Nanette Lira MD   pantoprazole (PROTONIX) 40 MG tablet Take 1 tablet by mouth daily 8/2/21   Nanette Lira MD   vitamin B-12 (CYANOCOBALAMIN) 1000 MCG tablet Take 1 tablet by mouth daily 8/2/21   Nanette Lira MD   Ergocalciferol (VITAMIN D2) 50 MCG (2000 UT) TABS Take 50,000 Units by mouth once a week 8/2/21   Nanette Lira MD   aspirin EC 81 MG EC tablet Take 1 tablet by mouth daily 8/2/21   Nanette Lira MD   citalopram (CELEXA) 10 MG tablet Take 1 tablet by mouth daily  Patient taking differently: Take 10 mg by mouth daily Total 30 8/2/21   Nanette Lira MD   Pediatric Multiple Vitamins (MULTIVITAMIN CHILDRENS) CHEW Take 1 tablet by mouth daily 8/2/21   Nanette Lira MD   mycophenolate (CELLCEPT) Max:98 °F (36.7 °C)    No results for input(s): POCGLU in the last 72 hours. No intake or output data in the 24 hours ending 08/23/22 0519    Physical Exam  Vitals and nursing note reviewed. Constitutional:       General: She is not in acute distress. Appearance: Normal appearance. HENT:      Head: Normocephalic. Mouth/Throat:      Mouth: Mucous membranes are moist.   Eyes:      Pupils: Pupils are equal, round, and reactive to light. Cardiovascular:      Rate and Rhythm: Regular rhythm. Bradycardia present. Pulses: Normal pulses. Heart sounds: Normal heart sounds. No murmur heard. No friction rub. No gallop. Pulmonary:      Effort: Pulmonary effort is normal. No respiratory distress. Breath sounds: Normal breath sounds. No wheezing or rales. Abdominal:      General: Bowel sounds are normal. There is no distension. Palpations: Abdomen is soft. Tenderness: There is no abdominal tenderness. Musculoskeletal:         General: No swelling or signs of injury. Normal range of motion. Cervical back: Normal range of motion. Skin:     General: Skin is warm and dry. Capillary Refill: Capillary refill takes less than 2 seconds. Coloration: Skin is not pale. Findings: Bruising present. Neurological:      General: No focal deficit present. Mental Status: She is alert and oriented to person, place, and time. Motor: Weakness (left leg more weak than right) and tremor present. Psychiatric:         Mood and Affect: Mood normal. Affect is tearful. Behavior: Behavior is cooperative.          Judgment: Judgment normal.       Investigations:      Laboratory Testing:  Recent Results (from the past 24 hour(s))   EKG 12 Lead    Collection Time: 08/23/22  2:31 AM   Result Value Ref Range    Ventricular Rate 56 BPM    Atrial Rate 288 BPM    QRS Duration 126 ms    Q-T Interval 474 ms    QTc Calculation (Bazett) 457 ms    R Axis 10 degrees    T Axis -23 degrees   CBC with Auto Differential    Collection Time: 08/23/22  2:41 AM   Result Value Ref Range    WBC 6.2 3.5 - 11.3 k/uL    RBC 3.52 (L) 3.95 - 5.11 m/uL    Hemoglobin 11.2 (L) 11.9 - 15.1 g/dL    Hematocrit 35.1 (L) 36.3 - 47.1 %    MCV 99.7 82.6 - 102.9 fL    MCH 31.8 25.2 - 33.5 pg    MCHC 31.9 28.4 - 34.8 g/dL    RDW 13.3 11.8 - 14.4 %    Platelets 332 792 - 344 k/uL    MPV 11.7 8.1 - 13.5 fL    NRBC Automated 0.0 0.0 per 100 WBC    Seg Neutrophils 52 36 - 65 %    Lymphocytes 39 24 - 43 %    Monocytes 6 3 - 12 %    Eosinophils % 2 1 - 4 %    Basophils 1 0 - 2 %    Immature Granulocytes 0 0 %    Segs Absolute 3.25 1.50 - 8.10 k/uL    Absolute Lymph # 2.40 1.10 - 3.70 k/uL    Absolute Mono # 0.39 0.10 - 1.20 k/uL    Absolute Eos # 0.12 0.00 - 0.44 k/uL    Basophils Absolute 0.03 0.00 - 0.20 k/uL    Absolute Immature Granulocyte 0.02 0.00 - 0.30 k/uL   BMP    Collection Time: 08/23/22  2:41 AM   Result Value Ref Range    Glucose 122 (H) 70 - 99 mg/dL    BUN 39 (H) 8 - 23 mg/dL    Creatinine 1.30 (H) 0.50 - 0.90 mg/dL    Bun/Cre Ratio 30 (H) 9 - 20    Calcium 9.5 8.6 - 10.4 mg/dL    Sodium 139 135 - 144 mmol/L    Potassium 3.3 (L) 3.7 - 5.3 mmol/L    Chloride 97 (L) 98 - 107 mmol/L    CO2 28 20 - 31 mmol/L    Anion Gap 14 9 - 17 mmol/L    GFR Non-African American 38 (L) >60 mL/min    GFR  47 (L) >60 mL/min    GFR Comment             Imaging/Diagnostics:  XR WRIST LEFT (MIN 3 VIEWS)    Result Date: 8/23/2022  No acute osseous abnormality.        Assessment :      Hospital Problems             Last Modified POA    * (Principal) Falls, initial encounter 8/23/2022 Yes    Chronic combined systolic and diastolic heart failure ([facility] 8/23/2022 Yes    Gastroesophageal reflux disease 8/23/2022 Yes    Type 2 diabetes mellitus without complication ([facility] 4/75/8863 Yes    DNR (do not resuscitate) 8/23/2022 Yes    Benign essential tremor 8/23/2022 Yes    Overview Signed 10/6/2017 11:27 AM by Peter Snow, CMA     Last Assessment & Plan:   Controlled on current regimen and continue to monitor adjust as needed          Primary hypertension 8/23/2022 Yes    Dyslipidemia 8/23/2022 Yes       Plan:     Patient status observation in the  Med/Surge    Fall  PT/OT  Social work consult  CHF  Continue home medication  GERD  Continue home medications  Diabetes  Continue home medication  Monitor blood sugar levels before meals and at bedtime with sliding scale coverage  DNR  Essential tremor  Continue home medication  Hypertension  Continue home medications  Dyslipidemia  Continue home medication  PT/OT  Adult diet    Consultations:   IP CONSULT TO INTERNAL MEDICINE  IP CONSULT TO SOCIAL WORK    On this date 8/23/2022 I have spent 24 minutes reviewing previous notes, test results and face to face with the patient discussing the diagnosis and importance of compliance with the treatment plan as well as documenting on the day of the visit. At least 50% of the time documented was spent with the patient to provide counseling and/or coordination of care. Edmund Saint, APRN - CNP  8/23/2022  5:19 AM    Copy sent to Dr. Luis Eduardo Santoro primary care provider on file.

## 2022-08-23 NOTE — CARE COORDINATION
[facility] approved patient for admission and began precert.  Ming Jean

## 2022-08-23 NOTE — ED PROVIDER NOTES
EMERGENCY DEPARTMENT ENCOUNTER    Pt Name: Ofe Moyer  MRN: [id number]  [place] 1931  Date of evaluation: 22  CHIEF COMPLAINT       Chief Complaint   Patient presents with    Fall     Rolled out of bed     HISTORY OF PRESENT ILLNESS   26-year-old female presents emergency room for a fall. Patient has history of falls. She reports that often times she falls at night in her sleep. Her   several years ago and in her sleep she reports she is \"always chasing him. \"  Jenni  she states she fell trying to reach for his chest.  She reports she is very depressed and tearful. She currently lives alone. She is [facility] but is only in their assisted living. She denies head injury. She is having pain to her wrists bilaterally. She has some minor skin tears to the wrists        REVIEW OF SYSTEMS     Review of Systems   Constitutional:  Positive for activity change, appetite change and fatigue. Negative for chills and diaphoresis. HENT:  Negative for congestion, sinus pain and tinnitus. Eyes: Negative. Respiratory:  Negative for apnea, chest tightness and shortness of breath. Gastrointestinal:  Negative for abdominal distention, constipation, diarrhea and vomiting. Genitourinary:  Negative for difficulty urinating and frequency. Musculoskeletal:  Negative for arthralgias and myalgias. Skin:  Negative for color change and rash. Neurological:  Negative for dizziness. Hematological: Negative. Psychiatric/Behavioral:  Positive for dysphoric mood and sleep disturbance.       PASTMEDICAL HISTORY     Past Medical History:   Diagnosis Date    Anemia     Arthritis of knee 2018    bilateral knees; \"bone on bone\"; declines surg    Benign essential tremor 04/15/2016    CAD S/P percutaneous coronary angioplasty 1998    done at [facility]    Diabetes mellitus type II, controlled ([facility] 1979    Gastrointestinal hemorrhage     Duodenal Ulcer by EGD    History of pancreatitis 2013    after GI bleed    Humerus fracture 2015    right humerus ; [place]    Hyperlipidemia     Hypertension     Major depression     S/P CABG (coronary artery bypass graft) 1996    Ventral hernia 8987    infraumbilical ; incisional    Vitamin B12 deficiency 07/2018    Vitamin B12 270     Past Problem List  Patient Active Problem List   Diagnosis Code    Benign essential tremor G25.0    Primary hypertension I10    Type 2 diabetes mellitus ([facility] E11.9    Dyslipidemia E78.5    Major depression F32.9    Arthritis of knee M17.10    Vitamin B12 deficiency E53.8    Anemia D64.9    Acute lower gastrointestinal bleeding K92.2    S/P CABG (coronary artery bypass graft) Z95.1    S/P angioplasty with stent Z95.820    Closed right hip fracture, initial encounter ([facility] S72.001A    ASHD (arteriosclerotic heart disease) I25.10    Acute cystitis without hematuria N30.00    Abnormal ECG R94.31    Closed left hip fracture, initial encounter ([facility] S72.002A    Acute blood loss as cause of postoperative anemia D62    Hypomagnesemia E83.42    Chronic combined systolic and diastolic heart failure (HCC) I50.42    Cellulitis L03.90    Bullous pemphigoid L12.0    Periorbital cellulitis of left eye L03.213    Gastroesophageal reflux disease K21.9    Type 2 diabetes mellitus without complication (HCC) A78.6    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 XXXA     SURGICAL HISTORY       Past Surgical History:   Procedure Laterality Date    CHOLECYSTECTOMY      COLONOSCOPY N/A 11/5/2018    COLONOSCOPY DIAGNOSTIC OR SCREENING performed by Nuris Avery MD at [address]    ? number grafts    HIP FRACTURE SURGERY Right 10/13/2021    HIP OPEN REDUCTION INTERNAL FIXATION-NAILING performed by Patel Malone DO at [address] Left 10/19/2021    HIP OPEN REDUCTION INTERNAL FIXATION - IM NAIL performed by Jena Howard, DO at [address] Right     HYSTERECTOMY, VAGINAL  1958    SALPINGO-OOPHORECTOMY  1960     CURRENT MEDICATIONS       Previous Medications    ACETAMINOPHEN (TYLENOL) 325 MG TABLET    Take 650 mg by mouth every 4 hours as needed for Pain or Fever    AMLODIPINE (NORVASC) 2.5 MG TABLET    Take 1 tablet by mouth daily    ASPIRIN EC 81 MG EC TABLET    Take 1 tablet by mouth daily    ATENOLOL (TENORMIN) 50 MG TABLET    Take 1 tablet by mouth daily    ATORVASTATIN (LIPITOR) 20 MG TABLET    Take 1 tablet by mouth nightly    CARBAMAZEPINE (TEGRETOL) 200 MG TABLET    Take 1 tablet by mouth 2 times daily    CITALOPRAM (CELEXA) 10 MG TABLET    Take 1 tablet by mouth daily    CITALOPRAM (CELEXA) 20 MG TABLET    Take 1 tablet by mouth daily    ELASTIC BANDAGES & SUPPORTS (JOBST KNEE HIGH COMPRESSION SM) MISC    1 each by Does not apply route daily as needed (swelling)    ERGOCALCIFEROL (VITAMIN D2) 10 MCG (400 UNIT) TABS    Take 1 tablet by mouth daily    ERGOCALCIFEROL (VITAMIN D2) 50 MCG (2000 UT) TABS    Take 50,000 Units by mouth once a week    FERROUS SULFATE (IRON 325) 325 (65 FE) MG TABLET    Take 1 tablet by mouth 2 times daily (with meals)    FLUOROMETHOLONE (FML) 0.1 % OPHTHALMIC SUSPENSION    Place 1 drop into the left eye in the morning and 1 drop at noon and 1 drop in the evening and 1 drop before bedtime. FUROSEMIDE (LASIX) 40 MG TABLET    Take 1 tablet by mouth daily    GABAPENTIN (NEURONTIN) 100 MG CAPSULE    Take 1 capsule by mouth in the morning and 1 capsule at noon and 1 capsule before bedtime. Do all this for 30 days.     INSULIN LISPRO (HUMALOG) 100 UNIT/ML SOLN INJECTION VIAL    Inject 0-4 Units into the skin 3 times daily (with meals)    INSULIN LISPRO (HUMALOG) 100 UNIT/ML SOLN INJECTION VIAL    Inject 0-4 Units into the skin nightly    MELATONIN 5 MG TABS TABLET    Take 1 tablet by mouth nightly as needed (insominia) METFORMIN (GLUCOPHAGE) 500 MG TABLET    Take 1 tablet by mouth 2 times daily (with meals)    MIRTAZAPINE (REMERON) 15 MG TABLET    Take 1 tablet by mouth nightly    MYCOPHENOLATE (CELLCEPT) 500 MG TABLET    Take 2 tabs qam and 1 tab every evening    NITROGLYCERIN (NITROSTAT) 0.4 MG SL TABLET    Place 1 tablet under the tongue every 5 minutes as needed for Chest pain up to max of 3 total doses. If no relief after 1 dose, call 911. PANTOPRAZOLE (PROTONIX) 40 MG TABLET    Take 1 tablet by mouth daily    PEDIATRIC MULTIPLE VITAMINS (MULTIVITAMIN CHILDRENS) CHEW    Take 1 tablet by mouth daily    PERMETHRIN (ELIMITE) 5 % CREAM    Apply topically to entire body or affected areas once, leave on for 8 hours, then rinse. May repeat in 1-2 weeks if symptoms persist.    POLYETHYLENE GLYCOL (GLYCOLAX) 17 G PACKET    Take 17 g by mouth daily as needed for Constipation    POTASSIUM CHLORIDE (KLOR-CON M) 20 MEQ EXTENDED RELEASE TABLET    Take 1 tablet by mouth daily    PREDNISONE (DELTASONE) 5 MG TABLET    Take 5 mg by mouth every other day    PRIMIDONE (MYSOLINE) 50 MG TABLET    Take 1 tablet by mouth 2 times daily    SODIUM CHLORIDE 0.9 % INFUSION    Infuse 50 mL/hr intravenously continuous    TRAMADOL (ULTRAM) 50 MG TABLET    Take 25 mg by mouth 2 times daily. TRIAMCINOLONE ACETONIDE (KENALOG-40) 40 MG/ML INJECTION    INJECT 1ML (40MG) I.M. EVERY 2 WEEKS ON MONDAY AS DIRECTED    VITAMIN B-12 (CYANOCOBALAMIN) 1000 MCG TABLET    Take 1 tablet by mouth daily     ALLERGIES     is allergic to penicillins and penicillin g. FAMILY HISTORY     She indicated that her mother is . She indicated that her father is . She indicated that her sister is .      SOCIAL HISTORY       Social History     Tobacco Use    Smoking status: Never    Smokeless tobacco: Never   Vaping Use    Vaping Use: Never used   Substance Use Topics    Alcohol use: No    Drug use: No     PHYSICAL EXAM     INITIAL VITALS: BP (!) 139/50 Pulse 52   Temp 98 °F (36.7 °C) (Oral)   Resp 24   Ht 5' (1.524 m)   Wt 117 lb (53.1 kg)   SpO2 98%   BMI 22.85 kg/m²    Physical Exam  Constitutional:       General: She is not in acute distress. Appearance: She is well-developed. HENT:      Head: Normocephalic. Eyes:      Pupils: Pupils are equal, round, and reactive to light. Cardiovascular:      Rate and Rhythm: Normal rate and regular rhythm. Heart sounds: Normal heart sounds. Pulmonary:      Effort: Pulmonary effort is normal. No respiratory distress. Breath sounds: Normal breath sounds. Abdominal:      General: Bowel sounds are normal.      Palpations: Abdomen is soft. Tenderness: There is no abdominal tenderness. Musculoskeletal:         General: Normal range of motion. Skin:     General: Skin is warm and dry. Comments: Minor skin tears to both wrists   Neurological:      Mental Status: She is alert and oriented to person, place, and time. Psychiatric:         Mood and Affect: Affect is tearful. MEDICAL DECISION MAKIN-year-old female presenting to the emergency room with increase in falls. Labs are within acceptable limits. Plan is for admission for social work consult. [facility] CRITICAL CARE:       PROCEDURES:    Procedures    DIAGNOSTIC RESULTS   EKG:All EKG's are interpreted by the Emergency Department Physician who either signs or Co-signs this chart in the absence of a cardiologist.    EKG Motion artifact present  sinus rhythm with likely first-degree AV block however MS undetermined  Incomplete right bundle branch block  ST changes anterior leads  T wave inversion V4 V5 lead III avF  QTc 457  Overall unchanged appearance from previous EKG from 10/19/2021      RADIOLOGY:All plain film, CT, MRI, and formal ultrasound images (except ED bedside ultrasound) are read by the radiologist, see reports below, unless otherwisenoted in MDM or here.   XR WRIST LEFT (MIN 3 VIEWS)   Final Result   No acute osseous abnormality. LABS: All lab results were reviewed by myself, and all abnormals are listed below. Labs Reviewed   CBC WITH AUTO DIFFERENTIAL - Abnormal; Notable for the following components:       Result Value    RBC 3.52 (*)     Hemoglobin 11.2 (*)     Hematocrit 35.1 (*)     All other components within normal limits   BASIC METABOLIC PANEL - Abnormal; Notable for the following components:    Glucose 122 (*)     BUN 39 (*)     Creatinine 1.30 (*)     Bun/Cre Ratio 30 (*)     Potassium 3.3 (*)     Chloride 97 (*)     GFR Non- 38 (*)     GFR  47 (*)     All other components within normal limits       EMERGENCY DEPARTMENTCOURSE:         Vitals:    Vitals:    08/23/22 0101 08/23/22 0130 08/23/22 0200 08/23/22 0300   BP: (!) 190/49 (!) 150/56 (!) 156/52 (!) 139/50   Pulse: 62 55 54 52   Resp: 14 16 21 24   Temp: 98 °F (36.7 °C)      TempSrc: Oral      SpO2: 100% 99% 98% 98%   Weight: 117 lb (53.1 kg)      Height: 5' (1.524 m)          The patient was given the following medications while in the emergency department:  No orders of the defined types were placed in this encounter. CONSULTS:  IP CONSULT TO INTERNAL MEDICINE  IP CONSULT TO SOCIAL WORK    FINAL IMPRESSION    No diagnosis found. DISPOSITION/PLAN   DISPOSITION Admitted 08/23/2022 04:59:07 AM      PATIENT REFERRED TO:  No follow-up provider specified. DISCHARGE MEDICATIONS:  New Prescriptions    No medications on file     Nena Arrington MD  Attending Emergency Physician      Care during this encounter was due to an unprecedented national emergency due to COVID-19.              Liv Marie MD  08/23/22 [id number]

## 2022-08-23 NOTE — ED NOTES
ED to inpatient nurses report     Chief Complaint   Patient presents with   [address] out of bed      Present to ED from [facility] assisted living facility s/p reported fall out of bed this morning. Patient complains of wrist pain and has skin tears to bilateral wrists. Needs admission for social work placement in facility with higher level of nursing care. LOC: alert and orientated to name and place  Vital signs   Vitals:    08/23/22 0101 08/23/22 0130 08/23/22 0200 08/23/22 0300   BP: (!) 190/49 (!) 150/56 (!) 156/52 (!) 139/50   Pulse: 62 55 54 52   Resp: 14 16 21 24   Temp: 98 °F (36.7 °C)      TempSrc: Oral      SpO2: 100% 99% 98% 98%   Weight: 117 lb (53.1 kg)      Height: 5' (1.524 m)         Oxygen Baseline room air    Current needs required none. LDAs:   Peripheral IV 08/23/22 Left Antecubital (Active)   Site Assessment Clean, dry & intact 08/23/22 0240   Line Status Blood return noted;Brisk blood return;Flushed;Specimen collected 08/23/22 0240   Phlebitis Assessment No symptoms 08/23/22 0240   Infiltration Assessment 0 08/23/22 0240   Dressing Status New dressing applied;Clean, dry & intact 08/23/22 0240     Mobility: Requires assistance * 2  Fall Risk: [place] 1 Fall Risk  Presents to emergency department  because of falls (Syncope, seizure, or loss of consciousness): Yes (08/23/22 0112)  Age > 79: Yes (08/23/22 0112)  Altered Mental Status, Intoxication with alcohol or substance confusion (Disorientation, impaired judgment, poor safety awaremess, or inability to follow instructions): No (08/23/22 0112)  Impaired Mobility: Ambulates or transfers with assistive devices or assistance;  Unable to ambulate or transer.: Yes (08/23/22 0112)  Nursing Judgement: Yes (08/23/22 0112)  Pending ED orders: N/A  Present condition: stable  Code Status: DNRCC-A  Consults: IP CONSULT TO INTERNAL MEDICINE  [x]  Hospitalist  Completed  [x] yes [] no Who: Intermed  []  Medicine  Completed  [] yes [] No Who: []  Cardiology  Completed  [] yes [] No Who:   []  GI   Completed  [] yes [] No Who:   []  Neurology  Completed  [] yes [] No Who:   []  Nephrology Completed  [] yes [] No Who:    []  Vascular  Completed  [] yes [] No Who:   []  Ortho  Completed  [] yes [] No Who:     []  Surgery  Completed  [] yes [] No Who:    []  Urology  Completed  [] yes [] No Who:    []  CT Surgery Completed  [] yes [] No Who:   []  Podiatry  Completed  [] yes [] No Who:    []  Other    Completed  [] yes [] No Who:  Interventions: Admit for placement. Important Events: Wrist XRAY negative, dressings placed on skin tears.         Electronically signed by Cheryl Gautam RN on 8/23/2022 at 4:41 AM     Cheryl Gautam RN  08/23/22 [address] [facility], RN  08/23/22 0416

## 2022-08-24 LAB
ANION GAP SERPL CALCULATED.3IONS-SCNC: 8 MMOL/L (ref 9–17)
BUN BLDV-MCNC: 27 MG/DL (ref 8–23)
BUN/CREAT BLD: 24 (ref 9–20)
CALCIUM SERPL-MCNC: 8.9 MG/DL (ref 8.6–10.4)
CHLORIDE BLD-SCNC: 102 MMOL/L (ref 98–107)
CO2: 29 MMOL/L (ref 20–31)
CREAT SERPL-MCNC: 1.13 MG/DL (ref 0.5–0.9)
GFR AFRICAN AMERICAN: 55 ML/MIN
GFR NON-AFRICAN AMERICAN: 45 ML/MIN
GFR SERPL CREATININE-BSD FRML MDRD: ABNORMAL ML/MIN/{1.73_M2}
GLUCOSE BLD-MCNC: 105 MG/DL (ref 65–105)
GLUCOSE BLD-MCNC: 112 MG/DL (ref 65–105)
GLUCOSE BLD-MCNC: 124 MG/DL (ref 70–99)
GLUCOSE BLD-MCNC: 127 MG/DL (ref 65–105)
GLUCOSE BLD-MCNC: 169 MG/DL (ref 65–105)
INR BLD: 1
MAGNESIUM: 1.5 MG/DL (ref 1.6–2.6)
POTASSIUM SERPL-SCNC: 3.5 MMOL/L (ref 3.7–5.3)
PROTHROMBIN TIME: 13.7 SEC (ref 11.5–14.2)
SARS-COV-2, RAPID: NOT DETECTED
SODIUM BLD-SCNC: 139 MMOL/L (ref 135–144)
SPECIMEN DESCRIPTION: NORMAL

## 2022-08-24 PROCEDURE — 96372 THER/PROPH/DIAG INJ SC/IM: CPT

## 2022-08-24 PROCEDURE — 85610 PROTHROMBIN TIME: CPT

## 2022-08-24 PROCEDURE — 97110 THERAPEUTIC EXERCISES: CPT

## 2022-08-24 PROCEDURE — 99225 PR SBSQ OBSERVATION CARE/DAY 25 MINUTES: CPT | Performed by: INTERNAL MEDICINE

## 2022-08-24 PROCEDURE — 6360000002 HC RX W HCPCS: Performed by: NURSE PRACTITIONER

## 2022-08-24 PROCEDURE — G0378 HOSPITAL OBSERVATION PER HR: HCPCS

## 2022-08-24 PROCEDURE — 6370000000 HC RX 637 (ALT 250 FOR IP): Performed by: INTERNAL MEDICINE

## 2022-08-24 PROCEDURE — 2580000003 HC RX 258: Performed by: NURSE PRACTITIONER

## 2022-08-24 PROCEDURE — 97530 THERAPEUTIC ACTIVITIES: CPT

## 2022-08-24 PROCEDURE — 82947 ASSAY GLUCOSE BLOOD QUANT: CPT

## 2022-08-24 PROCEDURE — 80048 BASIC METABOLIC PNL TOTAL CA: CPT

## 2022-08-24 PROCEDURE — 6370000000 HC RX 637 (ALT 250 FOR IP): Performed by: NURSE PRACTITIONER

## 2022-08-24 PROCEDURE — 87635 SARS-COV-2 COVID-19 AMP PRB: CPT

## 2022-08-24 PROCEDURE — 36415 COLL VENOUS BLD VENIPUNCTURE: CPT

## 2022-08-24 PROCEDURE — 83735 ASSAY OF MAGNESIUM: CPT

## 2022-08-24 PROCEDURE — 97116 GAIT TRAINING THERAPY: CPT

## 2022-08-24 RX ORDER — MYCOPHENOLATE MOFETIL 250 MG/1
500 CAPSULE ORAL NIGHTLY
Status: DISCONTINUED | OUTPATIENT
Start: 2022-08-24 | End: 2022-08-25 | Stop reason: HOSPADM

## 2022-08-24 RX ORDER — MYCOPHENOLATE MOFETIL 250 MG/1
1000 CAPSULE ORAL EVERY MORNING
Status: DISCONTINUED | OUTPATIENT
Start: 2022-08-25 | End: 2022-08-25 | Stop reason: HOSPADM

## 2022-08-24 RX ORDER — TRAMADOL HYDROCHLORIDE 50 MG/1
25 TABLET ORAL EVERY 6 HOURS PRN
Status: DISCONTINUED | OUTPATIENT
Start: 2022-08-24 | End: 2022-08-24

## 2022-08-24 RX ORDER — TRAMADOL HYDROCHLORIDE 50 MG/1
25 TABLET ORAL EVERY 12 HOURS PRN
Status: DISCONTINUED | OUTPATIENT
Start: 2022-08-24 | End: 2022-08-25 | Stop reason: HOSPADM

## 2022-08-24 RX ORDER — LANOLIN ALCOHOL/MO/W.PET/CERES
400 CREAM (GRAM) TOPICAL 2 TIMES DAILY
Status: DISCONTINUED | OUTPATIENT
Start: 2022-08-24 | End: 2022-08-25 | Stop reason: HOSPADM

## 2022-08-24 RX ADMIN — SODIUM CHLORIDE, PRESERVATIVE FREE 10 ML: 5 INJECTION INTRAVENOUS at 20:30

## 2022-08-24 RX ADMIN — Medication 400 MG: at 16:14

## 2022-08-24 RX ADMIN — TRAMADOL HYDROCHLORIDE 25 MG: 50 TABLET ORAL at 20:29

## 2022-08-24 RX ADMIN — Medication 400 MG: at 20:30

## 2022-08-24 RX ADMIN — ENOXAPARIN SODIUM 30 MG: 30 INJECTION SUBCUTANEOUS at 08:26

## 2022-08-24 RX ADMIN — TRAMADOL HYDROCHLORIDE 25 MG: 50 TABLET ORAL at 11:00

## 2022-08-24 RX ADMIN — SODIUM CHLORIDE, PRESERVATIVE FREE 10 ML: 5 INJECTION INTRAVENOUS at 08:25

## 2022-08-24 ASSESSMENT — PAIN DESCRIPTION - LOCATION: LOCATION: HEAD

## 2022-08-24 ASSESSMENT — PAIN SCALES - GENERAL: PAINLEVEL_OUTOF10: 9

## 2022-08-24 NOTE — CARE COORDINATION
Discharge planning    Notified Jeovanny Martinezuntain, patient's grandson, and he has no transportation to take patient to the ophthalmology appt tomorrow. Lesley Rain

## 2022-08-24 NOTE — PROGRESS NOTES
Physical Therapy  Facility/Department: [facility] MED SURG  Daily Treatment Note  NAME: Merlene Spine  : 1931  MRN: [id number]    Date of Service: 2022    Discharge Recommendations:  Patient would benefit from continued therapy after discharge   Patient Diagnosis(es): The primary encounter diagnosis was Recurrent falls. A diagnosis of Depression, unspecified depression type was also pertinent to this visit. Assessment   Assessment: Pt continues with deficits in strength, endurance, balance, bed mobility, transfers, and gait requiring min to mod A for all mobility. Pt is at high risk for falls and would benefit from continued therapy after discharge to maximize independence with all functional mobility. AM-PAC score of 15/24 for current level of function. Activity Tolerance: Patient limited by fatigue;Patient limited by endurance   Plan    Plan  Plan weeks: 1-2x/D, 5-6D/week  Current Treatment Recommendations: Strengthening;Balance training;Functional mobility training;Transfer training;ADL/Self-care training; Endurance training;Gait training;Home exercise program;Safety education & training;Patient/Caregiver education & training     Restrictions  Restrictions/Precautions  Restrictions/Precautions: General Precautions, Fall Risk, Up as Tolerated  Required Braces or Orthoses?: No  Position Activity Restriction  Other position/activity restrictions: Up with assist, DNNR-CCA, heels off bed at all times, LUE IV, ALARMS     Subjective    Subjective  Subjective: Pt agreeable to PT session (Nurse gives approval for PT session)  Orientation  Overall Orientation Status: Within Functional Limits  Orientation Level: Oriented X4  Cognition  Overall Cognitive Status: Exceptions  Arousal/Alertness: Appropriate responses to stimuli  Following Commands: Follows one step commands with increased time; Follows one step commands with repetition  Attention Span: Appears intact; Attends with cues to redirect  Memory: Decreased recall of recent events;Decreased short term memory  Safety Judgement: Decreased awareness of need for assistance;Decreased awareness of need for safety  Problem Solving: Assistance required to generate solutions;Assistance required to correct errors made;Assistance required to implement solutions;Assistance required to identify errors made  Insights: Decreased awareness of deficits  Initiation: Requires cues for some  Sequencing: Requires cues for some     Objective   Bed Mobility Training  Bed Mobility Training: Yes  Overall Level of Assistance: Minimum assistance; Moderate assistance  Interventions: Safety awareness training; Tactile cues; Verbal cues  Rolling: Minimum assistance  Supine to Sit:Moderate assistance  Sit to Supine: Moderate assistance  Scooting: Moderate assistance  Balance  Sitting: Intact  Standing: With support  Transfer Training  Transfer Training: Yes  Overall Level of Assistance: Minimum assistance  Interventions: Safety awareness training; Tactile cues; Verbal cues  Sit to Stand: Minimum assistance  Stand to Sit: Minimum assistance  Stand Pivot Transfers: Minimum assistance  Bed to Chair: Minimum assistance  Toilet Transfer: Minimum assistance  Gait Training  Gait Training: Yes  Gait  Overall Level of Assistance: Minimum assistance  Interventions: Safety awareness training; Tactile cues; Verbal cues  Speed/Nohemy: Slow;Shuffled  Step Length: Right shortened;Left shortened  Gait Abnormalities:  (extremely slow nohemy, no LOB)  Distance (ft): 40 Feet x2  Assistive Device: Walker, rolling;Gait belt     PT Exercises  Exercise Treatment: yes  A/AROM Exercises: LAQ, seated marching, hip ABD  Circulation/Endurance Exercises: ankle pumps  Other activities  Assisted pt to the bathroom with min A for sit to stand from commode and CGA for hand washing. Goals  Short Term Goals  Time Frame for Short term goals: 12 visits  Short term goal 1:  Inc bed-mobility & transfers to independent to enable pt to safely get in/OOB & chair to return to PLOF & decrease risk for falls; Short term goal 2: Inc gait to amb 100ft or > indep w/ RW to enable pt to return to previous level of independence & able to demonstrate indep/ safe use of RW in functional activities including approaching surfaces and turning to sit. Short term goal 3: Inc strength to [address] standing balance to good with device to facilitate pt independence for performance of ADL's & functional mobility, & reduce fall risk  Short term goal 4: Pt able to tolerate 30-40 min of activity to include 15-20 reps of ex, NMR & functional mobility with device to facilitate activity tolerance to [facility]  Short term goal 5: Ed pt on home ex's, safety, balance & endurance training, pressure relief with Pt able to demonstrate effective pressure relief techniques, fall prevention, & issue written Pt Ed    Education  Patient Education  Education Given To: Patient  Education Provided: Plan of Care;Role of Therapy;Home Exercise Program;Transfer Training  Education Provided Comments: Pt educated on safety withall functional mobiltiy.   Education Method: Demonstration;Verbal  Education Outcome: Verbalized understanding;Continued education needed    Therapy Time   Individual Concurrent Group Co-treatment   Time In 1612         Time Out [address] [place] [address]

## 2022-08-24 NOTE — PROGRESS NOTES
Occupational Therapy  Facility/Department: [facility] MED SURG  Rehabilitation Occupational Therapy Daily Treatment Note    Date: 22  Patient Name: Yamil Hyatt       Room:   MRN: [id number]  Account: [de-identified]   : 1931  (80 y.o.) Gender: female        Pt currently functioning below baseline. Recommend daily inpatient skilled therapy at time of discharge to maximize long term outcomes and prevent re-admission. Please refer to AM-PAC score for current level of function. Past Medical History:  has a past medical history of Anemia, Arthritis of knee, Benign essential tremor, CAD S/P percutaneous coronary angioplasty, Diabetes mellitus type II, controlled ([facility] [address] Gastrointestinal hemorrhage, History of pancreatitis, Humerus fracture, Hyperlipidemia, Hypertension, Major depression, S/P CABG (coronary artery bypass graft), Ventral hernia, and Vitamin B12 deficiency. Past Surgical History:   has a past surgical history that includes Cholecystectomy; Coronary artery bypass graft (); Hysterectomy, vaginal (); Salpingo-oophorectomy (); Colonoscopy (N/A, 2018); hip surgery (Right); Hip fracture surgery (Right, 10/13/2021); and Hip fracture surgery (Left, 10/19/2021). Restrictions  Restrictions/Precautions: General Precautions; Fall Risk;Up as Tolerated  Other position/activity restrictions: Up with assist, DNNR-CCA, heels off bed at all times, LUE IV, ALARMS  Required Braces or Orthoses?: No    Subjective  Subjective: Pt in chair upon arrival and just finished shower with PCT. Restrictions/Precautions: General Precautions; Fall Risk;Up as Tolerated             Objective     Cognition  Overall Cognitive Status: Exceptions  Arousal/Alertness: Appropriate responses to stimuli  Following Commands: Follows one step commands with increased time; Follows one step commands with repetition  Attention Span: Appears intact; Attends with cues to redirect  Memory: Decreased recall of recent events;Decreased short term memory  Safety Judgement: Decreased awareness of need for assistance;Decreased awareness of need for safety  Problem Solving: Assistance required to generate solutions;Assistance required to correct errors made;Assistance required to implement solutions;Assistance required to identify errors made  Insights: Decreased awareness of deficits  Initiation: Requires cues for some  Sequencing: Requires cues for some  Orientation  Overall Orientation Status: Impaired                      Bridging  Assistance Level: Minimal assistance; Moderate assistance  Skilled Clinical Factors: Verbal/tactile cues for technique and sequening movements. Sit to Supine  Assistance Level: Moderate assistance;Maximum assistance  Skilled Clinical Factors: Max verbal/tactile cues for safety and technique. Transfers  Surface: From chair with arms; To bed  Additional Factors: Verbal cues; Hand placement cues  Device: Walker  Sit to Stand  Assistance Level: Minimal assistance  Skilled Clinical Factors: Max verbal/tactile cues for hand placement, nose over toes, upright posture, walker mgmt/safety, squaring self up and reaching back prior to sitting down and overall safety. Stand to Sit  Assistance Level: Minimal assistance         Assessment  Assessment  Assessment: Pt progressing towards goals but is limited by pain, fatigue and cognitive deficits. Pt fatigued after shower and in pain wanting to return to bed. Pt req'd MIN A with transfers and Mod-Max A with bed mobility. Skilled OT indicated to increase safety and IND with all functional tasks to ensure a safe return to OF. Activity Tolerance: Patient limited by fatigue;Patient limited by endurance; Patient limited by pain  Discharge Recommendations: Patient would benefit from continued therapy after discharge  Safety Devices  Safety Devices in place: Yes  Type of devices: All fall risk precautions in place; Left in bed;Bed alarm in place;Call light within reach;Nurse notified;Gait belt;Patient at risk for falls    Patient Education  Education  Education Given To: Patient  Education Provided: Mobility Training;Transfer Training;Cognition; Safety  Education Method: Verbal  Barriers to Learning: Cognition  Education Outcome: Continued education needed    Plan  Plan  Times per Week: 4-5x per week, 1-2x per day as stephania  Current Treatment Recommendations: Strengthening;Balance training;Functional mobility training; Endurance training;Neuromuscular re-education; Safety education & training;Patient/Caregiver education & training;Equipment evaluation, education, & procurement;Self-Care / ADL;Positioning    Goals  Patient Goals   Patient goals : to go home  Short Term Goals  Time Frame for Short term goals: by discharge, pt to demo  Short Term Goal 1: I/MI for bed mob tasks without bed rails  Short Term Goal 2: I/MI for UB ADLs and CGA for LB ADLs with AE as needed and Good safety  Short Term Goal 3: SBA for ADL transfers and functional mob with AD and Good safety  Short Term Goal 4: increase BUE strength by 1/2 grade to inc IND with self-care and be IND with HEP  Short Term Goal 5: pt/family to be IND with EC/WS, fall prevention tech, pressure relief education, discharge recommendations with use of handouts as needed    AM-PAC Score        AM-PAC Inpatient Daily Activity Raw Score: 16 (08/24/22 1150)  AM-PAC Inpatient ADL T-Scale Score : 35.96 (08/24/22 1150)  ADL Inpatient CMS 0-100% Score: 53.32 (08/24/22 1150)  ADL Inpatient CMS G-Code Modifier : CK (08/24/22 1150)      Therapy Time   Individual Concurrent Group Co-treatment   Time In 1055         Time Out 1105         Minutes 10                 LINDA Suresh

## 2022-08-24 NOTE — PLAN OF CARE
Problem: Discharge Planning  Goal: Discharge to home or other facility with appropriate resources  Outcome: Progressing  Flowsheets (Taken 8/24/2022 0826)  Discharge to home or other facility with appropriate resources: Identify barriers to discharge with patient and caregiver     Problem: Skin/Tissue Integrity  Goal: Absence of new skin breakdown  Description: 1. Monitor for areas of redness and/or skin breakdown  2. Assess vascular access sites hourly  3. Every 4-6 hours minimum:  Change oxygen saturation probe site  4. Every 4-6 hours:  If on nasal continuous positive airway pressure, respiratory therapy assess nares and determine need for appliance change or resting period. 8/24/2022 1014 by Jesús Duval RN  Outcome: Progressing  8/23/2022 2331 by Aren Conway RN  Outcome: Progressing  Note: NO new skin issues this shift      Problem: Safety - Adult  Goal: Free from fall injury  8/24/2022 1014 by Jesús Duval RN  Outcome: Progressing  8/23/2022 2331 by Aren Conway RN  Outcome: Progressing  Note: Patient will be free from falls this shift and is aware of personal limits. Problem: ABCDS Injury Assessment  Goal: Absence of physical injury  8/24/2022 1014 by Jesús Duval RN  Outcome: Progressing  8/23/2022 2331 by Aren Conway RN  Outcome: Progressing  Note: Patient is free from injury this shift.        Problem: Chronic Conditions and Co-morbidities  Goal: Patient's chronic conditions and co-morbidity symptoms are monitored and maintained or improved  Outcome: Progressing  Flowsheets (Taken 8/24/2022 0826)  Care Plan - Patient's Chronic Conditions and Co-Morbidity Symptoms are Monitored and Maintained or Improved: Monitor and assess patient's chronic conditions and comorbid symptoms for stability, deterioration, or improvement     Problem: Neurosensory - Adult  Goal: Achieves stable or improved neurological status  Outcome: Progressing  Flowsheets (Taken 8/24/2022 0826)  Achieves stable or improved neurological status: Assess for and report changes in neurological status     Problem: Metabolic/Fluid and Electrolytes - Adult  Goal: Electrolytes maintained within normal limits  Outcome: Progressing  Flowsheets (Taken 8/24/2022 0826)  Electrolytes maintained within normal limits: Monitor labs and assess patient for signs and symptoms of electrolyte imbalances     Problem: Cardiovascular - Adult  Goal: Maintains optimal cardiac output and hemodynamic stability  Outcome: Progressing  Flowsheets (Taken 8/24/2022 0826)  Maintains optimal cardiac output and hemodynamic stability: Monitor blood pressure and heart rate     Problem: Skin/Tissue Integrity - Adult  Goal: Skin integrity remains intact  Recent Flowsheet Documentation  Taken 8/24/2022 0510 by Jesús Duval RN  Skin Integrity Remains Intact: Monitor for areas of redness and/or skin breakdown     Problem: Musculoskeletal - Adult  Goal: Return mobility to safest level of function  Outcome: Progressing  Flowsheets (Taken 8/24/2022 0826)  Return Mobility to Safest Level of Function: Assess patient stability and activity tolerance for standing, transferring and ambulating with or without assistive devices

## 2022-08-24 NOTE — CARE COORDINATION
Social Work-[facility]. They have not received auth. Dolores Kumar

## 2022-08-24 NOTE — PROGRESS NOTES
[facility]  Office: [address], DO, Nicole Dickens, DO, Steve Barnes, DO, Rosalina Cody Blood, DO, Jeannie Aleman MD, Ronak Campos MD, Cami Kaur MD, Kolby Wheeler MD,  Bria Eli MD, Lazarus Moats, MD, Jayla Neal, DO, Jeannie Olguin MD,  Maura Lopes MD, Vanessa Monsalve MD, Radha Duckworth, DO, Esther Cartwright MD, Aileen Wisdom MD, Hal Read MD, Fahad Alaniz DO, Arcelia Duarte MD, Nancy Lucas MD, Macho Jovel, CNP,  Magan Mckenzie, CNP, Paola Richards, CNP, Alise Mendez CNP, Theresa Penny PA-C, Paulette Segovia, [facility], Eulalio Fan, CNP, Yeison Banuelos, CNP, Brian Toussaint, CNP, Avila Haskins, CNP, Doris Frances, CNP, Morris Plaza, CNS, Rayna Marcus, [facility], Gretta Cruz, CNP, Kathy Alvarez, CNP, Carmen Knox, [address]    Progress Note    8/24/2022    2:23 PM    Name:   Tatiana Chau  MRN:     [id number]     Acct:      [de-identified]   Room:   2001/2001-02  IP Day:  0  Admit Date:  8/23/2022 12:56 AM    PCP:   No primary care provider on file. Code Status:  DNR-CCA    Subjective:     C/C:   Chief Complaint   Patient presents with    Fall     Rolled out of bed     Interval History Status: not changed. Still has some facial pain but better with extra tramadol    Says she has for the most part lost her left sided vision for past couple months. We had set up outpatient ophtho apt for her but she was in snf and she did not make the appt    Brief History:     Has had multiple falls, most recently out of bed. No specific injuries, her only complaint is left forehead pain from recently diagnosed shingles. She apparently has had numerous falls. She is tearful when describing how she is so used to feeling for her 's heartbeat, she implied this played a role in her fall.     Review of Systems:     Constitutional:  negative for chills, fevers, sweats  Respiratory: negative for cough, dyspnea on exertion, shortness of breath, wheezing  Cardiovascular:  negative for chest pain, chest pressure/discomfort, lower extremity edema, palpitations  Gastrointestinal:  negative for abdominal pain, constipation, diarrhea, nausea, vomiting  Neurological:  negative for dizziness, headache    Medications: Allergies:     Allergies   Allergen Reactions    Penicillins Swelling     Rash and swelling      Penicillin G        Current Meds:   Scheduled Meds:    [START ON 8/25/2022] mycophenolate  1,000 mg Oral QAM    mycophenolate  500 mg Oral Nightly    amLODIPine  2.5 mg Oral Daily    aspirin EC  81 mg Oral Daily    atenolol  50 mg Oral Daily    atorvastatin  20 mg Oral Nightly    carBAMazepine  200 mg Oral BID    citalopram  10 mg Oral Daily    citalopram  20 mg Oral Daily    Vitamin D2  1 tablet Oral Daily    ferrous sulfate  325 mg Oral BID WC    fluorometholone  1 drop Left Eye 4x Daily    furosemide  40 mg Oral Daily    gabapentin  100 mg Oral TID    melatonin  5 mg Oral Nightly    metFORMIN  500 mg Oral BID WC    mirtazapine  15 mg Oral Nightly    pantoprazole  40 mg Oral Daily    multivitamin childrens  1 tablet Oral Daily    potassium chloride  20 mEq Oral Daily    primidone  50 mg Oral BID    traMADol  25 mg Oral BID    vitamin B-12  1,000 mcg Oral Daily    sodium chloride flush  5-40 mL IntraVENous 2 times per day    enoxaparin  30 mg SubCUTAneous Daily    insulin lispro  0-4 Units SubCUTAneous TID WC    insulin lispro  0-4 Units SubCUTAneous Nightly     Continuous Infusions:    sodium chloride      dextrose       PRN Meds: traMADol, sodium chloride flush, sodium chloride, ondansetron **OR** ondansetron, polyethylene glycol, acetaminophen **OR** acetaminophen, glucose, dextrose bolus **OR** dextrose bolus, glucagon (rDNA), dextrose    Data:     Past Medical History:   has a past medical history of Anemia, Arthritis of knee, Benign essential tremor, CAD S/P percutaneous coronary angioplasty, Diabetes mellitus type II, controlled ([facility] [address] Gastrointestinal hemorrhage, History of pancreatitis, Humerus fracture, Hyperlipidemia, Hypertension, Major depression, S/P CABG (coronary artery bypass graft), Ventral hernia, and Vitamin B12 deficiency. Social History:   reports that she has never smoked. She has never used smokeless tobacco. She reports that she does not drink alcohol and does not use drugs. Family History:   Family History   Problem Relation Age of Onset    Heart Disease Father     Stroke Sister        Vitals:  BP (!) 127/37   Pulse 60   Temp 98.6 °F (37 °C) (Oral)   Resp 16   Ht 5' (1.524 m)   Wt 114 lb 6 oz (51.9 kg)   SpO2 94%   BMI 22.34 kg/m²   Temp (24hrs), Av.3 °F (36.8 °C), Min:98.1 °F (36.7 °C), Max:98.6 °F (37 °C)    Recent Labs     22  1634 22  0555 22  1159   POCGLU 132* 188* 127* 112*       I/O (24Hr):   No intake or output data in the 24 hours ending 22 1423    Labs:  Hematology:  Recent Labs     22  02422  0652   WBC 6.2  --    RBC 3.52*  --    HGB 11.2*  --    HCT 35.1*  --    MCV 99.7  --    MCH 31.8  --    MCHC 31.9  --    RDW 13.3  --      --    MPV 11.7  --    INR  --  1.0     Chemistry:  Recent Labs     22  0241 22  0652    139   K 3.3* 3.5*   CL 97* 102   CO2 28 29   GLUCOSE 122* 124*   BUN 39* 27*   CREATININE 1.30* 1.13*   MG  --  1.5*   ANIONGAP 14 8*   LABGLOM 38* 45*   GFRAA 47* 55*   CALCIUM 9.5 8.9     Recent Labs     22  0940 22  1231 22  1634 22  2029 22  0555 22  1159   POCGLU 180* 125* 132* 188* 127* 112*     ABG:No results found for: POCPH, PHART, PH, POCPCO2, HNP7WRM, PCO2, POCPO2, PO2ART, PO2, POCHCO3, WNO0HND, HCO3, NBEA, PBEA, BEART, BE, THGBART, THB, JXF9HBK, VSHJ4AGX, [id number], O2SAT, FIO2  Lab Results   Component Value Date/Time    SPECIAL RIGHT AC,12ML 2022 12:32 PM     Lab Results   Component Value Date/Time

## 2022-08-24 NOTE — PROGRESS NOTES
[facility]  PROGRESS NOTE    Room # 2001/2001-02   Name: Ofe Moyer              Reason for visit: Routine    I visited the patient. Admit Date & Time: 8/23/2022 12:56 AM    Assessment:  Ofe Moyer is a 80 y.o. female. Upon entering the room patient states about their medical condition, states struggles with their medical situation. States worries, fears frustrations. Patient states well , treated well. Patient states family support from her grandson, states broken relationships from her daughter, son. Patient states about the death and grief of her . PT: shares Yarsani beliefs. Intervention:   provided a ministry presence, listening and prayer. Outcome:  Patient open to visit. Plan:  Chaplains will remain available to offer spiritual and emotional support as needed. Electronically signed by Chaplain Shima, on 8/23/2022 at 8:27 PM.  Gilberto        08/23/22 2015   Encounter Summary   Service Provided For: Patient   Referral/Consult From: [facility] Family members   Last Encounter  08/23/22   Complexity of Encounter High   Begin Time 6187   End Time  0700   Total Time Calculated 17 min   Encounter    Type Initial Screen/Assessment   Spiritual/Emotional needs   Type Spiritual Support;Spiritual Distress; Emotional Distress   Grief, Loss, and Adjustments   Type Grief and loss; Life Adjustments; Adjustment to illness   Assessment/Intervention/Outcome   Assessment Anxious; Sad   Intervention Active listening;Discussed belief system/Yarsani practices/giovanni;Discussed illness injury and its impact; Explored/Affirmed feelings, thoughts, concerns;Grief Care;Prayer (assurance of)/[place];Sustaining Presence/Ministry of presence   Outcome Comfort;Engaged in conversation;Expressed feelings, needs, and concerns;Expressed Gratitude;Receptive

## 2022-08-24 NOTE — PLAN OF CARE
Problem: Skin/Tissue Integrity  Goal: Absence of new skin breakdown  Description: 1. Monitor for areas of redness and/or skin breakdown  2. Assess vascular access sites hourly  3. Every 4-6 hours minimum:  Change oxygen saturation probe site  4. Every 4-6 hours:  If on nasal continuous positive airway pressure, respiratory therapy assess nares and determine need for appliance change or resting period. 8/23/2022 2331 by Ronnie Ugalde RN  Outcome: Progressing  Note: NO new skin issues this shift      Problem: Safety - Adult  Goal: Free from fall injury  8/23/2022 2331 by Ronnie Ugalde RN  Outcome: Progressing  Note: Patient will be free from falls this shift and is aware of personal limits. 8/23/2022 1118 by Alberto Soni RN  Outcome: Progressing     Problem: ABCDS Injury Assessment  Goal: Absence of physical injury  8/23/2022 2331 by Ronnie Ugalde RN  Outcome: Progressing  Note: Patient is free from injury this shift.     8/23/2022 1118 by Alberto Soni RN  Outcome: Progressing

## 2022-08-25 VITALS
WEIGHT: 111 LBS | SYSTOLIC BLOOD PRESSURE: 131 MMHG | HEART RATE: 60 BPM | HEIGHT: 60 IN | OXYGEN SATURATION: 96 % | RESPIRATION RATE: 16 BRPM | BODY MASS INDEX: 21.79 KG/M2 | TEMPERATURE: 98.1 F | DIASTOLIC BLOOD PRESSURE: 30 MMHG

## 2022-08-25 LAB
GLUCOSE BLD-MCNC: 119 MG/DL (ref 65–105)
GLUCOSE BLD-MCNC: 139 MG/DL (ref 65–105)

## 2022-08-25 PROCEDURE — G0378 HOSPITAL OBSERVATION PER HR: HCPCS

## 2022-08-25 PROCEDURE — 6360000002 HC RX W HCPCS: Performed by: INTERNAL MEDICINE

## 2022-08-25 PROCEDURE — 6360000002 HC RX W HCPCS: Performed by: NURSE PRACTITIONER

## 2022-08-25 PROCEDURE — 97530 THERAPEUTIC ACTIVITIES: CPT

## 2022-08-25 PROCEDURE — 6370000000 HC RX 637 (ALT 250 FOR IP): Performed by: NURSE PRACTITIONER

## 2022-08-25 PROCEDURE — 99217 PR OBSERVATION CARE DISCHARGE MANAGEMENT: CPT | Performed by: INTERNAL MEDICINE

## 2022-08-25 PROCEDURE — 82947 ASSAY GLUCOSE BLOOD QUANT: CPT

## 2022-08-25 PROCEDURE — 2580000003 HC RX 258: Performed by: NURSE PRACTITIONER

## 2022-08-25 PROCEDURE — 6370000000 HC RX 637 (ALT 250 FOR IP): Performed by: INTERNAL MEDICINE

## 2022-08-25 PROCEDURE — 96372 THER/PROPH/DIAG INJ SC/IM: CPT

## 2022-08-25 PROCEDURE — 97535 SELF CARE MNGMENT TRAINING: CPT

## 2022-08-25 RX ORDER — CITALOPRAM 20 MG/1
20 TABLET ORAL DAILY
DISCHARGE
Start: 2022-08-25

## 2022-08-25 RX ORDER — TRAMADOL HYDROCHLORIDE 50 MG/1
25 TABLET ORAL EVERY 12 HOURS PRN
Qty: 6 TABLET | Refills: 0 | Status: SHIPPED | OUTPATIENT
Start: 2022-08-25 | End: 2022-09-01

## 2022-08-25 RX ORDER — GABAPENTIN 100 MG/1
100 CAPSULE ORAL 3 TIMES DAILY
Qty: 10 CAPSULE | Refills: 0 | Status: SHIPPED | OUTPATIENT
Start: 2022-08-25 | End: 2022-09-24

## 2022-08-25 RX ORDER — TRAMADOL HYDROCHLORIDE 50 MG/1
25 TABLET ORAL 2 TIMES DAILY
Qty: 6 TABLET | Refills: 0 | Status: SHIPPED | OUTPATIENT
Start: 2022-08-25 | End: 2022-09-24

## 2022-08-25 RX ORDER — CITALOPRAM 10 MG/1
10 TABLET ORAL DAILY
DISCHARGE
Start: 2022-08-25

## 2022-08-25 RX ORDER — LANOLIN ALCOHOL/MO/W.PET/CERES
400 CREAM (GRAM) TOPICAL 2 TIMES DAILY
Qty: 30 TABLET | DISCHARGE
Start: 2022-08-25

## 2022-08-25 RX ADMIN — CITALOPRAM HYDROBROMIDE 10 MG: 10 TABLET ORAL at 08:37

## 2022-08-25 RX ADMIN — CITALOPRAM HYDROBROMIDE 20 MG: 20 TABLET ORAL at 08:29

## 2022-08-25 RX ADMIN — FLUOROMETHOLONE 1 DROP: 1 SOLUTION/ DROPS OPHTHALMIC at 11:40

## 2022-08-25 RX ADMIN — ASPIRIN 81 MG: 81 TABLET, COATED ORAL at 08:30

## 2022-08-25 RX ADMIN — Medication 400 MG: at 08:29

## 2022-08-25 RX ADMIN — GABAPENTIN 100 MG: 100 CAPSULE ORAL at 08:29

## 2022-08-25 RX ADMIN — FERROUS SULFATE TAB EC 325 MG (65 MG FE EQUIVALENT) 325 MG: 325 (65 FE) TABLET DELAYED RESPONSE at 08:30

## 2022-08-25 RX ADMIN — ACETAMINOPHEN 650 MG: 325 TABLET, FILM COATED ORAL at 09:32

## 2022-08-25 RX ADMIN — ENOXAPARIN SODIUM 30 MG: 30 INJECTION SUBCUTANEOUS at 08:28

## 2022-08-25 RX ADMIN — TRAMADOL HYDROCHLORIDE 25 MG: 50 TABLET ORAL at 08:29

## 2022-08-25 RX ADMIN — PANTOPRAZOLE SODIUM 40 MG: 40 TABLET, DELAYED RELEASE ORAL at 08:29

## 2022-08-25 RX ADMIN — METFORMIN HYDROCHLORIDE 500 MG: 500 TABLET ORAL at 08:29

## 2022-08-25 RX ADMIN — FUROSEMIDE 40 MG: 40 TABLET ORAL at 08:29

## 2022-08-25 RX ADMIN — AMLODIPINE BESYLATE 2.5 MG: 2.5 TABLET ORAL at 08:38

## 2022-08-25 RX ADMIN — SODIUM CHLORIDE, PRESERVATIVE FREE 10 ML: 5 INJECTION INTRAVENOUS at 09:34

## 2022-08-25 RX ADMIN — CYANOCOBALAMIN TAB 1000 MCG 1000 MCG: 1000 TAB at 08:29

## 2022-08-25 RX ADMIN — POTASSIUM CHLORIDE 20 MEQ: 1500 TABLET, EXTENDED RELEASE ORAL at 08:30

## 2022-08-25 RX ADMIN — ATENOLOL 50 MG: 50 TABLET ORAL at 08:38

## 2022-08-25 RX ADMIN — PRIMIDONE 50 MG: 50 TABLET ORAL at 08:38

## 2022-08-25 RX ADMIN — MYCOPHENOLATE MOFETIL 1000 MG: 250 CAPSULE ORAL at 08:28

## 2022-08-25 ASSESSMENT — PAIN DESCRIPTION - DESCRIPTORS: DESCRIPTORS: BURNING;THROBBING

## 2022-08-25 ASSESSMENT — PAIN SCALES - GENERAL: PAINLEVEL_OUTOF10: 8

## 2022-08-25 ASSESSMENT — PAIN DESCRIPTION - ORIENTATION: ORIENTATION: LEFT

## 2022-08-25 ASSESSMENT — PAIN DESCRIPTION - LOCATION: LOCATION: EYE

## 2022-08-25 NOTE — PLAN OF CARE
by Dondra Callie, RN  Outcome: Progressing     Problem: Neurosensory - Adult  Goal: Achieves stable or improved neurological status  8/25/2022 1307 by Lupillo Kwan RN  Outcome: Adequate for Discharge  8/25/2022 0750 by Lupillo Kwan RN  Outcome: Progressing  8/25/2022 0421 by Pollo Ledesma RN  Outcome: Progressing     Problem: Metabolic/Fluid and Electrolytes - Adult  Goal: Electrolytes maintained within normal limits  8/25/2022 1307 by Lupillo Kwan RN  Outcome: Adequate for Discharge  8/25/2022 0750 by Lupillo Kwan RN  Outcome: Progressing  8/25/2022 0421 by Pollo Ledesma RN  Outcome: Progressing     Problem: Cardiovascular - Adult  Goal: Maintains optimal cardiac output and hemodynamic stability  8/25/2022 1307 by Lupillo Kwan RN  Outcome: Adequate for Discharge  8/25/2022 0750 by Lupillo Kwan RN  Outcome: Progressing  8/25/2022 0421 by Pollo Ledesma RN  Outcome: Progressing     Problem: Skin/Tissue Integrity - Adult  Goal: Skin integrity remains intact  8/25/2022 1307 by Lupillo Kwan RN  Outcome: Adequate for Discharge  8/25/2022 0750 by Lupillo Kwan RN  Outcome: Progressing  8/25/2022 0421 by Pollo Ledesma RN  Outcome: Progressing  Flowsheets (Taken 8/24/2022 1944)  Skin Integrity Remains Intact: Monitor for areas of redness and/or skin breakdown     Problem: Musculoskeletal - Adult  Goal: Return mobility to safest level of function  8/25/2022 1307 by Lupillo Kwan RN  Outcome: Adequate for Discharge  8/25/2022 0750 by Lupillo Kwan RN  Outcome: Progressing  8/25/2022 0421 by Pollo Ledesma RN  Outcome: Progressing

## 2022-08-25 NOTE — PROGRESS NOTES
Pt discharged to [facility] via w/c with transport with [facility] at [facility] in route. She is in good condition with belongings  Discharge instructions given  Locked up home medication(s)/personal items given to patient at discharge  Patient/family state they have everything they were admitted with.

## 2022-08-25 NOTE — PROGRESS NOTES
Patient has appointment with Dr. Pavithra Paris at [place]. Dr. Marquez scheduled the appointment and needs the patient to be seen. Patient scheduled for 1400  for 1500 appointment time. Will notify nurse of plan.

## 2022-08-25 NOTE — PLAN OF CARE
Problem: Discharge Planning  Goal: Discharge to home or other facility with appropriate resources  8/25/2022 0750 by Ashlie Miramontes RN  Outcome: Progressing  8/25/2022 0421 by Myriam Rosa RN  Outcome: Progressing     Problem: Skin/Tissue Integrity  Goal: Absence of new skin breakdown  Description: 1. Monitor for areas of redness and/or skin breakdown  2. Assess vascular access sites hourly  3. Every 4-6 hours minimum:  Change oxygen saturation probe site  4. Every 4-6 hours:  If on nasal continuous positive airway pressure, respiratory therapy assess nares and determine need for appliance change or resting period.   8/25/2022 0750 by Ashlie Miramontes RN  Outcome: Progressing  8/25/2022 0421 by Myriam Rosa RN  Outcome: Progressing     Problem: Safety - Adult  Goal: Free from fall injury  8/25/2022 0750 by Ashlie Miramontes RN  Outcome: Progressing  8/25/2022 0421 by Myriam Rosa RN  Outcome: Progressing  Flowsheets (Taken 8/24/2022 1944)  Free From Fall Injury: Instruct family/caregiver on patient safety     Problem: ABCDS Injury Assessment  Goal: Absence of physical injury  8/25/2022 0750 by Ashlie Miramontes RN  Outcome: Progressing  8/25/2022 0421 by Myriam Rosa RN  Outcome: Progressing  Flowsheets (Taken 8/24/2022 1944)  Absence of Physical Injury: Implement safety measures based on patient assessment     Problem: Chronic Conditions and Co-morbidities  Goal: Patient's chronic conditions and co-morbidity symptoms are monitored and maintained or improved  8/25/2022 0750 by Ashlie Miramontes RN  Outcome: Progressing  8/25/2022 0421 by Myriam Rosa RN  Outcome: Progressing     Problem: Neurosensory - Adult  Goal: Achieves stable or improved neurological status  8/25/2022 0750 by Ashlie Miramontes RN  Outcome: Progressing  8/25/2022 0421 by Myriam Rosa RN  Outcome: Progressing     Problem: Metabolic/Fluid and Electrolytes - Adult  Goal: Electrolytes maintained within normal limits  8/25/2022 0750 by Caron Alonso RN  Outcome: Progressing  8/25/2022 0421 by Cassandra Moses RN  Outcome: Progressing     Problem: Cardiovascular - Adult  Goal: Maintains optimal cardiac output and hemodynamic stability  8/25/2022 0750 by Caron Alonso RN  Outcome: Progressing  8/25/2022 0421 by Cassandra Moses RN  Outcome: Progressing     Problem: Skin/Tissue Integrity - Adult  Goal: Skin integrity remains intact  8/25/2022 0750 by Caron Alonso RN  Outcome: Progressing  8/25/2022 0421 by Cassandra Moses RN  Outcome: Progressing  Flowsheets (Taken 8/24/2022 1944)  Skin Integrity Remains Intact: Monitor for areas of redness and/or skin breakdown     Problem: Musculoskeletal - Adult  Goal: Return mobility to safest level of function  8/25/2022 0750 by Caron Alonso RN  Outcome: Progressing  8/25/2022 0421 by Cassandra Moses RN  Outcome: Progressing

## 2022-08-25 NOTE — CARE COORDINATION
Social Work- Jaci Beth will admit today. [employer] will transport to Dr Pablo Posey office and from there to [facility]. Orders faxed. Nurse to call report to [facility] [phone]. HENS completed. Patient and grandson are agreeable with dc plans.  Luna Mercado

## 2022-08-25 NOTE — DISCHARGE SUMMARY
[facility]  Office: [address], DO, Bright May, DO, Lisa Harris, DO, Tremayne Marquez, DO, Cordella Holstein, MD, Sagar Clemente MD, Trent Torres MD, Jayant Gruber MD,  Laurie Leija MD, Ariane Contreras MD, Jonh Arias, DO, Ananya Scherer MD,  Nash Mann MD, Emerald Holley MD, Jerrell Javier DO, Ag Hargrove MD, Jose Alfredo Mcmahon MD, Pavithra Bocanegra MD, Krista Hargrove DO, Best Nuno MD, Carlyn Mccann MD, Marine Hernandez, CNP,  Michelle Marcos, CNP, Nevin Aviles, CNP, Manny Butcher, CNP, Mejia Barron PA-C, Yeison Hoyt, DNP, Enriqueta Jiang, CNP, Brian Lin, CNP, Alex Cade, CNP, Oleksandr Sesay, CNP, Angelina Jurado, CNP, Tyshawn Tadeo, CNS, Ky Gutierrez, DNP, Jorgito Marquez, CNP, Ale Arellano, CNP, Pennie Montemayor, [facility]           [address] [facility]    Discharge Summary     Patient ID: Dorys Carty  :  1931   MRN: [id number]     ACCOUNT:  [de-identified]   Patient's PCP: No primary care provider on file. Admit Date: 2022   Discharge Date: 2022     Length of Stay: 2  Code Status:  DNR-CCA  Admitting Physician: Emerald Holley MD  Discharge Physician: Wilman Marquez DO     Active Discharge Diagnoses:     Hospital Problem Lists:  Principal Problem:    Falls, initial encounter  Active Problems:    Chronic combined systolic and diastolic heart failure (HCC)    Gastroesophageal reflux disease    Type 2 diabetes mellitus without complication ([facility] [address]    DNR (do not resuscitate)    Recurrent falls    Benign essential tremor    Primary hypertension    Dyslipidemia  Resolved Problems:    * No resolved hospital problems. *      Admission Condition:  fair     Discharged Condition: stable    Hospital Stay:     Hospital Course:   Dorys Carty is a 80 y.o. female who was admitted for the management of  Falls, initial encounter , presented to ER with Fall (Rolled out of bed)    Admitted with multiple falls and unsteadiness on feet. Had pt/ot who suggested snf. She also on day 2 told me she had been previously diagnosed with shingles left face and it was hurting a lot and that she has had reduced vision in that eye for past couple months. She had been set up to see ophtho at last discharge but was not taken to appt. This time her appt was coordinated so that she was discharged from [facility], went to opho then on to snf from that appt      Significant therapeutic interventions: see above    Significant Diagnostic Studies:   Labs / Micro:  CBC:   Lab Results   Component Value Date/Time    WBC 6.2 08/23/2022 02:41 AM    RBC 3.52 08/23/2022 02:41 AM    HGB 11.2 08/23/2022 02:41 AM    HCT 35.1 08/23/2022 02:41 AM    MCV 99.7 08/23/2022 02:41 AM    MCH 31.8 08/23/2022 02:41 AM    MCHC 31.9 08/23/2022 02:41 AM    RDW 13.3 08/23/2022 02:41 AM     08/23/2022 02:41 AM     CMP:    Lab Results   Component Value Date/Time    GLUCOSE 124 08/24/2022 06:52 AM     08/24/2022 06:52 AM    K 3.5 08/24/2022 06:52 AM    K 4.2 02/23/2018 05:00 AM     08/24/2022 06:52 AM    CO2 29 08/24/2022 06:52 AM    BUN 27 08/24/2022 06:52 AM    CREATININE 1.13 08/24/2022 06:52 AM    ANIONGAP 8 08/24/2022 06:52 AM    ALKPHOS 114 08/02/2022 11:03 AM    ALT 12 08/02/2022 11:03 AM    AST 20 08/02/2022 11:03 AM    BILITOT 0.37 08/02/2022 11:03 AM    LABALBU 4.4 08/02/2022 11:03 AM    ALBUMIN 1.5 12/16/2021 06:45 AM    LABGLOM 45 08/24/2022 06:52 AM    GFRAA 55 08/24/2022 06:52 AM    GFR      08/24/2022 06:52 AM    PROT 7.4 08/02/2022 11:03 AM    CALCIUM 8.9 08/24/2022 06:52 AM        Radiology:  XR WRIST LEFT (MIN 3 VIEWS)    Result Date: 8/23/2022  No acute osseous abnormality.        Consultations:    Consults:     Final Specialist Recommendations/Findings:   IP CONSULT TO INTERNAL MEDICINE  IP CONSULT TO SOCIAL WORK      The patient was seen and examined on day of discharge and this discharge summary is in conjunction with any daily progress note from day of discharge. Discharge plan:     Disposition: snf    Physician Follow Up:   pcp  No follow-up provider specified. Requiring Further Evaluation/Follow Up POST HOSPITALIZATION/Incidental Findings: to see brina re: romain    Diet: regular diet    Activity: As tolerated    Instructions to Patient: take medications as prescribed      Discharge Medications:      Medication List        START taking these medications      magnesium oxide 400 (240 Mg) MG tablet  Commonly known as: MAG-OX  Take 1 tablet by mouth 2 times daily            CHANGE how you take these medications      melatonin 5 MG Tabs tablet  Take 1 tablet by mouth nightly as needed (insominia)  What changed: when to take this     * traMADol 50 MG tablet  Commonly known as: ULTRAM  Take 0.5 tablets by mouth 2 times daily for 30 days. What changed: Another medication with the same name was added. Make sure you understand how and when to take each. * traMADol 50 MG tablet  Commonly known as: ULTRAM  Take 0.5 tablets by mouth every 12 hours as needed for Pain for up to 7 days. What changed: You were already taking a medication with the same name, and this prescription was added. Make sure you understand how and when to take each. Vitamin D2 10 MCG (400 UNIT) Tabs  Take 1 tablet by mouth daily  What changed: Another medication with the same name was removed. Continue taking this medication, and follow the directions you see here. * This list has 2 medication(s) that are the same as other medications prescribed for you. Read the directions carefully, and ask your doctor or other care provider to review them with you.                 CONTINUE taking these medications      acetaminophen 325 MG tablet  Commonly known as: TYLENOL     amLODIPine 2.5 MG tablet  Commonly known as: NORVASC  Take 1 tablet by mouth daily     aspirin EC 81 MG EC tablet  Take 1 tablet by mouth daily     atenolol 50 MG tablet  Commonly known as: TENORMIN  Take 1 tablet by mouth daily     atorvastatin 20 MG tablet  Commonly known as: Lipitor  Take 1 tablet by mouth nightly     carBAMazepine 200 MG tablet  Commonly known as: TEGRETOL  Take 1 tablet by mouth 2 times daily     * citalopram 20 MG tablet  Commonly known as: CELEXA  Take 1 tablet by mouth daily 30 mg total     * citalopram 10 MG tablet  Commonly known as: CELEXA  Take 1 tablet by mouth daily Total 30     ferrous sulfate 325 (65 Fe) MG tablet  Commonly known as: IRON 325  Take 1 tablet by mouth 2 times daily (with meals)     fluorometholone 0.1 % ophthalmic suspension  Commonly known as: FML  Place 1 drop into the left eye in the morning and 1 drop at noon and 1 drop in the evening and 1 drop before bedtime. furosemide 40 MG tablet  Commonly known as: LASIX  Take 1 tablet by mouth daily     gabapentin 100 MG capsule  Commonly known as: NEURONTIN  Take 1 capsule by mouth 3 times daily for 30 days. * insulin lispro 100 UNIT/ML Soln injection vial  Commonly known as: HUMALOG  Inject 0-4 Units into the skin 3 times daily (with meals)     * insulin lispro 100 UNIT/ML Soln injection vial  Commonly known as: HUMALOG  Inject 0-4 Units into the skin nightly     JOBST KNEE HIGH COMPRESSION SM Misc  1 each by Does not apply route daily as needed (swelling)     metFORMIN 500 MG tablet  Commonly known as: GLUCOPHAGE  Take 1 tablet by mouth 2 times daily (with meals)     mirtazapine 15 MG tablet  Commonly known as: Remeron  Take 1 tablet by mouth nightly     multivitamin childrens Chew chewable  Take 1 tablet by mouth daily     mycophenolate 500 MG tablet  Commonly known as: CellCept  Take 2 tabs qam and 1 tab every evening     nitroGLYCERIN 0.4 MG SL tablet  Commonly known as: Nitrostat  Place 1 tablet under the tongue every 5 minutes as needed for Chest pain up to max of 3 total doses. If no relief after 1 dose, call 911.      pantoprazole 40 MG tablet  Commonly known as: Protonix  Take 1 tablet by mouth daily     polyethylene glycol 17 g packet  Commonly known as: GLYCOLAX  Take 17 g by mouth daily as needed for Constipation     potassium chloride 20 MEQ extended release tablet  Commonly known as: KLOR-CON M  Take 1 tablet by mouth daily     predniSONE 5 MG tablet  Commonly known as: DELTASONE     primidone 50 MG tablet  Commonly known as: Mysoline  Take 1 tablet by mouth 2 times daily     vitamin B-12 1000 MCG tablet  Commonly known as: CYANOCOBALAMIN  Take 1 tablet by mouth daily           * This list has 4 medication(s) that are the same as other medications prescribed for you. Read the directions carefully, and ask your doctor or other care provider to review them with you. STOP taking these medications      permethrin 5 % cream  Commonly known as: Elimite     sodium chloride 0.9 % infusion     triamcinolone acetonide 40 MG/ML injection  Commonly known as: KENALOG-40               Where to Get Your Medications        You can get these medications from any pharmacy    Bring a paper prescription for each of these medications  gabapentin 100 MG capsule  traMADol 50 MG tablet  traMADol 50 MG tablet       Information about where to get these medications is not yet available    Ask your nurse or doctor about these medications  citalopram 10 MG tablet  citalopram 20 MG tablet  magnesium oxide 400 (240 Mg) MG tablet         No discharge procedures on file. Time Spent on discharge is  32 mins in patient examination, evaluation, counseling as well as medication reconciliation, prescriptions for required medications, discharge plan and follow up. Electronically signed by   Sandro Marquez DO  8/25/2022  12:02 PM      Thank you Dr. Cleo Ann primary care provider on file. for the opportunity to be involved in this patient's care.

## 2022-08-25 NOTE — PROGRESS NOTES
Occupational Therapy  Facility/Department: [facility] MED SURG  Rehabilitation Occupational Therapy Daily Treatment Note    Date: 22  Patient Name: Naty Berry       Room: 2215/7633-10  MRN: [id number]  Account: [de-identified]   : 1931  (80 y.o.) Gender: female        Pt currently functioning below baseline. Recommend daily inpatient skilled therapy at time of discharge to maximize long term outcomes and prevent re-admission. Please refer to AM-PAC score for current level of function. Past Medical History:  has a past medical history of Anemia, Arthritis of knee, Benign essential tremor, CAD S/P percutaneous coronary angioplasty, Diabetes mellitus type II, controlled ([facility] [address] Gastrointestinal hemorrhage, History of pancreatitis, Humerus fracture, Hyperlipidemia, Hypertension, Major depression, S/P CABG (coronary artery bypass graft), Ventral hernia, and Vitamin B12 deficiency. Past Surgical History:   has a past surgical history that includes Cholecystectomy; Coronary artery bypass graft (); Hysterectomy, vaginal (); Salpingo-oophorectomy (); Colonoscopy (N/A, 2018); hip surgery (Right); Hip fracture surgery (Right, 10/13/2021); and Hip fracture surgery (Left, 10/19/2021). Restrictions  Restrictions/Precautions: General Precautions; Fall Risk;Up as Tolerated  Other position/activity restrictions: Up with assist, DNNR-CCA, heels off bed at all times, LUE IV, ALARMS  Required Braces or Orthoses?: No    Subjective  Subjective: Pt in bed upon arrival and very upset that she is still here and she is worried about going to eye appt today. Pt also upset about husbands death and stated \"I keep looking for my sweetheart but I can't find him. \" Max time and redirection to task req'd. Restrictions/Precautions: General Precautions; Fall Risk;Up as Tolerated             Objective     Cognition  Overall Cognitive Status: Exceptions  Arousal/Alertness: Appropriate responses to stimuli  Following Commands: Follows one step commands with increased time; Follows one step commands with repetition  Attention Span: Attends with cues to redirect  Memory: Decreased short term memory  Safety Judgement: Decreased awareness of need for assistance;Decreased awareness of need for safety  Problem Solving: Assistance required to generate solutions;Assistance required to correct errors made;Assistance required to implement solutions;Assistance required to identify errors made  Insights: Decreased awareness of deficits  Initiation: Requires cues for some  Sequencing: Requires cues for some  Orientation  Overall Orientation Status: Within Functional Limits   Perception  Overall Perceptual Status: WFL     ADL  Toileting  Assistance Level: Maximum assistance  Skilled Clinical Factors: with hygiene and brief change  Toilet Transfers  Equipment: Standard toilet;Grab bars  Additional Factors: Verbal cues;Cues for hand placement; Increased time to complete  Assistance Level: Minimal assistance          Functional Mobility  Device: Rolling walker  Activity: To/From bathroom  Assistance Level: Contact guard assist;Minimal assistance  Skilled Clinical Factors: Verbal cues for safety and walker mgmt. Supine to Sit  Assistance Level: Minimal assistance  Scooting  Assistance Level: Stand by assist  Sit to Stand  Assistance Level: Contact guard assist;Minimal assistance  Skilled Clinical Factors: Max verbal/tactile cues for hand placement, nose over toes, upright posture, walker mgmt/safety, squaring self up and reaching back prior to sitting down and overall safety. Stand to Sit  Assistance Level: Contact guard assist;Minimal assistance   Neuromuscular Education  Neuromuscular education: Yes  NDT Treatment: Gait ;Sitting;Standing     Assessment  Assessment  Assessment: Pt progressing towards goals but is limited by pain, fatigue and cognitive deficits.  Pt very emotional and tearful expressing frustrations with current situation, max redirection and emotional support provided. Skilled OT indicated to increase safety and IND with all functional tasks to ensure a safe return to PLOF. Activity Tolerance: Patient limited by fatigue;Patient limited by endurance; Patient limited by pain  Discharge Recommendations: Patient would benefit from continued therapy after discharge  Safety Devices  Safety Devices in place: Yes  Type of devices: All fall risk precautions in place; Left in chair;Nurse notified;Call light within reach; Chair alarm in place;Gait belt;Patient at risk for falls    Patient Education  Education  Education Given To: Patient  Education Provided: Mobility Training;Transfer Training;Cognition; Safety  Education Method: Verbal  Barriers to Learning: Cognition  Education Outcome: Verbalized understanding;Demonstrated understanding;Continued education needed    Plan  Plan  Times per Week: 4-5x per week, 1-2x per day as stephania  Current Treatment Recommendations: Strengthening;Balance training;Functional mobility training; Endurance training;Neuromuscular re-education; Safety education & training;Patient/Caregiver education & training;Equipment evaluation, education, & procurement;Self-Care / ADL;Positioning    Goals  Patient Goals   Patient goals : to go home  Short Term Goals  Time Frame for Short term goals: by discharge, pt to demo  Short Term Goal 1: I/MI for bed mob tasks without bed rails  Short Term Goal 2: I/MI for UB ADLs and CGA for LB ADLs with AE as needed and Good safety  Short Term Goal 3: SBA for ADL transfers and functional mob with AD and Good safety  Short Term Goal 4: increase BUE strength by 1/2 grade to inc IND with self-care and be IND with HEP  Short Term Goal 5: pt/family to be IND with EC/WS, fall prevention tech, pressure relief education, discharge recommendations with use of handouts as needed    AM-PAC Score        AM-PAC Inpatient Daily Activity Raw Score: 15 (08/25/22 0810)  AM-PAC Inpatient ADL T-Scale Score : 34.69 (08/25/22 0810)  ADL Inpatient CMS 0-100% Score: 56.46 (08/25/22 0810)  ADL Inpatient CMS G-Code Modifier : CK (08/25/22 0810)      Therapy Time   Individual Concurrent Group Co-treatment   Time In 0748         Time Out 0814         Minutes LINDA Hernandez

## 2022-08-25 NOTE — PROGRESS NOTES
[facility]  Office: [address], DO, Sonido Yamile, DO, Blank Dennys, DO, Bee Aileen Blood, DO, Claudette Walters MD, Albaro Umaña MD, Warren Paz MD, Cristóbal Ngo MD,  Randy Cee MD, Lenard Paz MD, Brandt Del Angel, DO, Bridgette Obrien MD,  Randa Claros MD, Judy Chacko MD, Bill Pal DO, Isaac Rivera MD, Tiffanie Krishnamurthy MD, Roverto Patel MD, Hawa Ortiz DO, Terry Madden MD, Lissette Gillis MD, Alma Rendon, CNP,  French Lim, CNP, Ashlee Arambula, CNP, Gabriel Robles, CNP, RIDDHI RayC, Cleone Denver, PINO, Vianney Mayo, CNP, Bobby Ruff, CNP, Khadra Alonzo, CNP, Grupo Nino, CNP, Aldo Ríos, CNP, Hiren Mukherjee, CNS, Mayda Munoz, [facility], Giovanny Carrillo, CNP, Zaid Castro, CNP, Natalya Wilkes, [facility]    Progress Note    8/25/2022    11:57 AM    Name:   Noa Thao  MRN:     [id number]     Acct:      [de-identified]   Room:   2003/2003-01  IP Day:  0  Admit Date:  8/23/2022 12:56 AM    PCP:   No primary care provider on file. Code Status:  DNR-CCA    Subjective:     C/C:   Chief Complaint   Patient presents with    Fall     Rolled out of bed     Interval History Status: not changed. Still has some facial pain but better with extra tramadol    Says she has for the most part lost her left sided vision for past couple months. We had set up outpatient ophtho apt for her but she was in snf and she did not make the appt    Brief History:     Has had multiple falls, most recently out of bed. No specific injuries, her only complaint is left forehead pain from recently diagnosed shingles. She apparently has had numerous falls. She is tearful when describing how she is so used to feeling for her 's heartbeat, she implied this played a role in her fall.     Review of Systems:     Constitutional:  negative for chills, fevers, sweats  Respiratory: negative for cough, dyspnea on exertion, shortness of breath, wheezing  Cardiovascular:  negative for chest pain, chest pressure/discomfort, lower extremity edema, palpitations  Gastrointestinal:  negative for abdominal pain, constipation, diarrhea, nausea, vomiting  Neurological:  negative for dizziness, headache    Medications: Allergies:     Allergies   Allergen Reactions    Penicillins Swelling     Rash and swelling      Penicillin G        Current Meds:   Scheduled Meds:    mycophenolate  1,000 mg Oral QAM    mycophenolate  500 mg Oral Nightly    magnesium oxide  400 mg Oral BID    amLODIPine  2.5 mg Oral Daily    aspirin EC  81 mg Oral Daily    atenolol  50 mg Oral Daily    atorvastatin  20 mg Oral Nightly    carBAMazepine  200 mg Oral BID    citalopram  10 mg Oral Daily    citalopram  20 mg Oral Daily    Vitamin D2  1 tablet Oral Daily    ferrous sulfate  325 mg Oral BID WC    fluorometholone  1 drop Left Eye 4x Daily    furosemide  40 mg Oral Daily    gabapentin  100 mg Oral TID    melatonin  5 mg Oral Nightly    metFORMIN  500 mg Oral BID WC    mirtazapine  15 mg Oral Nightly    pantoprazole  40 mg Oral Daily    multivitamin childrens  1 tablet Oral Daily    potassium chloride  20 mEq Oral Daily    primidone  50 mg Oral BID    traMADol  25 mg Oral BID    vitamin B-12  1,000 mcg Oral Daily    sodium chloride flush  5-40 mL IntraVENous 2 times per day    enoxaparin  30 mg SubCUTAneous Daily    insulin lispro  0-4 Units SubCUTAneous TID WC    insulin lispro  0-4 Units SubCUTAneous Nightly     Continuous Infusions:    sodium chloride      dextrose       PRN Meds: traMADol, sodium chloride flush, sodium chloride, ondansetron **OR** ondansetron, polyethylene glycol, acetaminophen **OR** acetaminophen, glucose, dextrose bolus **OR** dextrose bolus, glucagon (rDNA), dextrose    Data:     Past Medical History:   has a past medical history of Anemia, Arthritis of knee, Benign essential tremor, CAD S/P percutaneous coronary angioplasty, Diabetes mellitus type II, controlled ([facility]), Gastrointestinal hemorrhage, History of pancreatitis, Humerus fracture, Hyperlipidemia, Hypertension, Major depression, S/P CABG (coronary artery bypass graft), Ventral hernia, and Vitamin B12 deficiency. Social History:   reports that she has never smoked. She has never used smokeless tobacco. She reports that she does not drink alcohol and does not use drugs. Family History:   Family History   Problem Relation Age of Onset    Heart Disease Father     Stroke Sister        Vitals:  BP (!) 131/30   Pulse 60   Temp 98.1 °F (36.7 °C) (Oral)   Resp 16   Ht 5' (1.524 m)   Wt 111 lb (50.3 kg)   SpO2 96%   BMI 21.68 kg/m²   Temp (24hrs), Av.8 °F (36.6 °C), Min:97.1 °F (36.2 °C), Max:98.1 °F (36.7 °C)    Recent Labs     22  1638 22  19322  0608 22  1101   POCGLU 105 169* 119* 139*       I/O (24Hr):   No intake or output data in the 24 hours ending 22 1157    Labs:  Hematology:  Recent Labs     22  02422  0652   WBC 6.2  --    RBC 3.52*  --    HGB 11.2*  --    HCT 35.1*  --    MCV 99.7  --    MCH 31.8  --    MCHC 31.9  --    RDW 13.3  --      --    MPV 11.7  --    INR  --  1.0     Chemistry:  Recent Labs     22  02422  0652    139   K 3.3* 3.5*   CL 97* 102   CO2 28 29   GLUCOSE 122* 124*   BUN 39* 27*   CREATININE 1.30* 1.13*   MG  --  1.5*   ANIONGAP 14 8*   LABGLOM 38* 45*   GFRAA 47* 55*   CALCIUM 9.5 8.9     Recent Labs     22  0555 22  1159 22  1638 22  19322  0608 22  1101   POCGLU 127* 112* 105 169* 119* 139*     ABG:No results found for: POCPH, PHART, PH, POCPCO2, SAS4TNT, PCO2, POCPO2, PO2ART, PO2, POCHCO3, PAD7TTV, HCO3, NBEA, PBEA, BEART, BE, THGBART, THB, GDH9KVW, XYME8JYG, E1VKYREN, O2SAT, FIO2  Lab Results   Component Value Date/Time    SPECIAL RIGHT AC,12ML 2022 12:32 PM     Lab Results   Component Value Date/Time    CULTURE NO GROWTH 5 DAYS 08/02/2022 12:32 PM       Radiology:  XR WRIST LEFT (MIN 3 VIEWS)    Result Date: 8/23/2022  No acute osseous abnormality.        Physical Examination:        General appearance:  alert, cooperative and no distress  Mental Status:  oriented to person, place and time and normal affect  Lungs:  clear to auscultation bilaterally, normal effort  Heart:  regular rate and rhythm, no murmur  Abdomen:  soft, nontender, nondistended, normal bowel sounds, no masses, hepatomegaly, splenomegaly  Extremities:  no edema, redness, tenderness in the calves  Skin:  multiple ecchymoses  No left peripheral field vision and cannot focus to read anything with left eye-only sees shadows there    Assessment:        Hospital Problems             Last Modified POA    * (Principal) Falls, initial encounter 8/23/2022 Yes    Chronic combined systolic and diastolic heart failure ([facility] [address] 8/23/2022 Yes    Gastroesophageal reflux disease 8/23/2022 Yes    Type 2 diabetes mellitus without complication ([facility] [address] 7/09/3646 Yes    DNR (do not resuscitate) 8/23/2022 Yes    Recurrent falls 8/23/2022 Yes    Benign essential tremor 8/23/2022 Yes    Overview Signed 10/6/2017 11:27 AM by Eric Phillips CMA     Last Assessment & Plan:   Controlled on current regimen and continue to monitor adjust as needed          Primary hypertension 8/23/2022 Yes    Dyslipidemia 8/23/2022 Yes       Plan:        Ophtho appt rescheduled for her for today(snf had not sent her to the previously scheduled appt)- I personally set up this appt for her  Dc to [facility]    Deion Marquez DO  8/25/2022  11:57 AM

## 2022-08-25 NOTE — PROGRESS NOTES
Physical Therapy  DATE: 2022    NAME: Eriberto Almonte  MRN: [id number]   : 1931    Patient not seen this date for Physical Therapy due to:      [] Cancel by RN or physician due to:    [] Hemodialysis    [] Critical Lab Value Level     [] Blood transfusion in progress    [] Acute or unstable cardiovascular status   _MAP < 55 or more than >115  _HR < 40 or > 130    [] Acute or unstable pulmonary status   -FiO2 > 60%   _RR < 5 or >40    _O2 sats < 85%    [] Strict Bedrest    [] Off Unit for surgery or procedure    [] Off Unit for testing       [] Pending imaging to R/O fracture    [x] Refusal by Patient Pt very anxious about eye doctor appt, she has a HA and does not want to participate     [] Other      [] PT being discontinued at this time. Patient independent. No further needs. [] PT being discontinued at this time as the patient has been transferred to hospice care. No further needs.       REESE VILLALOBOS, PTA

## 2022-10-10 ENCOUNTER — NURSE TRIAGE (OUTPATIENT)
Dept: OTHER | Facility: CLINIC | Age: 87
End: 2022-10-10

## 2022-10-10 NOTE — TELEPHONE ENCOUNTER
Received call from Mary Watts at [facility]; Patient with [employer] Complaint requesting to establish care with [facility]. Subjective: Caller states \"fell prior to August\"     Current Symptoms:   Thedokarlos Jefry prior to August and hurt her face, was hospitalized  Still experiencing quite a bit of pain  Doesn't feel it was appropriately addressed, but her face was xrayed  Still having severe pain in her temple and eye  Unable to lie on that side of her face, eye \"feels loose\"  Forehead is tender to touch  Has been seen by two eye doctors  Was told she has shingles  Would like a sooner appointment if possible  Symptoms not new or worsening, no triage indicated    Onset: before August; unchanged    Denies any other questions or concerns; instructed to call back for any new or worsening symptoms. Patient/Caller agrees with recommended disposition; writer provided warm transfer to  [facility] at [facility] for appointment scheduling. Attention Provider: Thank you for allowing me to participate in the care of your patient. The patient was connected to triage in response to information provided to the [facility]. Please do not respond through this encounter as the response is not directed to a shared pool. Reason for Disposition   Caller has already spoken with another triager or PCP AND has further questions AND triager able to answer questions.     Protocols used: No Contact or Duplicate Contact Call-ADULT-

## 2022-10-12 ENCOUNTER — TELEPHONE (OUTPATIENT)
Dept: FAMILY MEDICINE CLINIC | Age: 87
End: 2022-10-12

## 2022-10-12 NOTE — TELEPHONE ENCOUNTER
----- Message from Ivin Saint sent at 10/10/2022  1:45 PM EDT -----  Subject: Message to Provider    QUESTIONS  Information for Provider? Pt called in and want to see if appointment can   be moved up to a sooner date then 10/21 because pt did fall and hit her   face and pt states face is hurting but would like to be seen earlier . ---------------------------------------------------------------------------  --------------  Yahaira Marquez [facility]  [phone]; OK to leave message on voicemail  ---------------------------------------------------------------------------  --------------  SCRIPT ANSWERS  Relationship to Patient?  Self

## 2022-10-12 NOTE — TELEPHONE ENCOUNTER
----- Message from Sommer  sent at 10/10/2022  1:45 PM EDT -----  Subject: Message to Provider    QUESTIONS  Information for Provider? Pt called in and want to see if appointment can   be moved up to a sooner date then 10/21 because pt did fall and hit her   face and pt states face is hurting but would like to be seen earlier . ---------------------------------------------------------------------------  --------------  Michelle WILD  [phone]; OK to leave message on voicemail  ---------------------------------------------------------------------------  --------------  SCRIPT ANSWERS  Relationship to Patient?  Self

## 2022-10-13 NOTE — PROGRESS NOTES
Nutrition Note      Pt remain intubated, on mechanical ventilator. Tolerating trickle tube feeds. Okay to advance to 20 mL/hr, continue advancement slowly towards goal TF rate. Not making a lot of urine; MD discussed with Nephrology about possible CRRT to assist with urine/ fluid output. Pt on bumex. Na 142 mmol/L. BM 10/12. Progressing towards nutrition goals      Nutrition Recommendations/Plan:   Advance tube feeds of Glucerna 1.5 to 20 mL/hr, with continued advancement by 10 mL q 12 hours to goal rate of 45 mL/hr with water flushes of 150 mL 6 hours.      (Goal EN provides:  1620 kcal, 89 gm protein, 820 mL free water, 100% RDIs)        Electronically signed by Gwen Marshall RD on 10/13/2022 at 9:51 AM    Contact: [phone] Patient alert and oriented x 4. Vital signs and head to toe assessment performed. Repositioned for comfort with pillow support. Encouraged breakfast (25-30% eaten). Meds given whole with sips of water and tolerated well. Bed in lowest position with bed alarm on and bed wheels locked. Call light and patient belongings in reach. Instructed to use call light for assistance.

## 2022-11-14 ENCOUNTER — TELEPHONE (OUTPATIENT)
Dept: FAMILY MEDICINE CLINIC | Age: 87
End: 2022-11-14

## 2022-11-14 NOTE — TELEPHONE ENCOUNTER
Yasemin from [facility] called and stated that after patients appt tomorrow 11/15 if we can fax the office notes please. Fax#[phone].  Thank you

## 2024-11-10 NOTE — PLAN OF CARE
No answer, left message to return call.    Problem: Discharge Planning  Goal: Discharge to home or other facility with appropriate resources  Outcome: Progressing  Flowsheets (Taken 8/5/2022 2000)  Discharge to home or other facility with appropriate resources: Identify barriers to discharge with patient and caregiver     Problem: Skin/Tissue Integrity - Adult  Goal: Incisions, wounds, or drain sites healing without S/S of infection  Outcome: Progressing  Flowsheets (Taken 8/5/2022 2000)  Incisions, Wounds, or Drain Sites Healing Without Sign and Symptoms of Infection: TWICE DAILY: Assess and document skin integrity     Problem: Cardiovascular - Adult  Goal: Maintains optimal cardiac output and hemodynamic stability  Outcome: Progressing  Flowsheets (Taken 8/5/2022 2000)  Maintains optimal cardiac output and hemodynamic stability: Monitor blood pressure and heart rate     Problem: Skin/Tissue Integrity  Goal: Absence of new skin breakdown  Description: 1. Monitor for areas of redness and/or skin breakdown  2. Assess vascular access sites hourly  3. Every 4-6 hours minimum:  Change oxygen saturation probe site  4. Every 4-6 hours:  If on nasal continuous positive airway pressure, respiratory therapy assess nares and determine need for appliance change or resting period.   8/6/2022 0137 by Josie No RN  Outcome: Progressing     Problem: Safety - Adult  Goal: Free from fall injury  8/6/2022 0137 by Josie No RN  Outcome: Progressing

## (undated) DEVICE — GUIDEWIRE ORTH L400MM DIA3.2MM FOR TFN

## (undated) DEVICE — 3M™ STERI-DRAPE™ U-DRAPE 1015: Brand: STERI-DRAPE™

## (undated) DEVICE — CHLORAPREP 26ML ORANGE

## (undated) DEVICE — PADDING,UNDERCAST,COTTON, 4"X4YD STERILE: Brand: MEDLINE

## (undated) DEVICE — SOLUTION IV IRRIG POUR BRL 0.9% SODIUM CHL 2F7124

## (undated) DEVICE — 3.0MM X 1000MM BALL TIP GUIDE ROD: Brand: TRIGEN

## (undated) DEVICE — PAD,NON-ADHERENT,2X3,STERILE,LF,1/PK: Brand: MEDLINE

## (undated) DEVICE — SUTURE ETHBND EXCEL SZ 0 L30IN NONABSORBABLE GRN CT1 L36MM X424H

## (undated) DEVICE — THREE-QUARTER SHEET: Brand: CONVERTORS

## (undated) DEVICE — TOWEL SURG SM W12XL18IN CLR PLAS TEAR RESIST REINF ADH FRST

## (undated) DEVICE — SPONGE LAP W18XL18IN WHT COT 4 PLY FLD STRUNG RADPQ DISP ST

## (undated) DEVICE — DRESSING FOAM W3.5XL6IN POSTOP STRP GENTLE OPTIFOAM AG

## (undated) DEVICE — INTERTAN LAG SCREW DRILL: Brand: TRIGEN

## (undated) DEVICE — SUTURE VCRL SZ 0 L36IN ABSRB UD L36MM CT-1 1/2 CIR J946H

## (undated) DEVICE — 6617 IOBAN II PATIENT ISOLATION DRAPE 5/BX,4BX/CS: Brand: STERI-DRAPE™ IOBAN™ 2

## (undated) DEVICE — BASIC PACK: Brand: MEDLINE INDUSTRIES, INC.

## (undated) DEVICE — SUTURE VCRL + SZ 2-0 L27IN ABSRB WHT SH 1/2 CIR TAPERCUT VCP417H

## (undated) DEVICE — PAD,ABDOMINAL,8"X10",ST,LF: Brand: MEDLINE

## (undated) DEVICE — Device

## (undated) DEVICE — TRIGEN INTERTAN 10S 10MM X 18CM 125DEGREE
Type: IMPLANTABLE DEVICE | Site: HIP | Status: NON-FUNCTIONAL
Brand: TRIGEN
Removed: 2021-10-19

## (undated) DEVICE — GLOVE ORANGE PI 8   MSG9080

## (undated) DEVICE — 4.0MM LONG AO PILOT DRILL: Brand: TRIGEN

## (undated) DEVICE — STAPLER EXT SKIN 35 WIDE S STL STPL SQUEEZE HNDL VISISTAT

## (undated) DEVICE — GUIDE PIN 3.2MM X 343MM: Brand: TRIGEN

## (undated) DEVICE — NEEDLE HYPO 22GA L1.5IN BLK S STL HUB POLYPR SHLD REG BVL

## (undated) DEVICE — 35 ML SYRINGE LUER-LOCK TIP: Brand: MONOJECT

## (undated) DEVICE — DRESSING TRNSPAR W4XL4.8IN W/ N ADH PD SURESITE-123 PLUSPD

## (undated) DEVICE — GLOVE SURG SZ 75 L12IN FNGR THK87MIL WHT LTX FREE

## (undated) DEVICE — Z DISCONTINUED NO SUB IDED TUBING ETCO2 AD L6.5FT NSL ORAL CVD PRNG NONFLARED TIP OVR

## (undated) DEVICE — SYRINGE BULB 50/CS 48/PLT: Brand: MEDEGEN MEDICAL PRODUCTS, LLC

## (undated) DEVICE — BANDAGE COBAN 4 IN COMPR W4INXL5YD FOAM COHESIVE QUIK STK SELF ADH SFT

## (undated) DEVICE — BLADE SURG NO10 C STL STR DISP GLASSVAN

## (undated) DEVICE — ELECTRODE ES AD CRD L15FT DISP FOR PT BELOW 30LB REM

## (undated) DEVICE — PADDING UNDERCAST W4INXL4YD COT FBR LO LINTING WYTEX